# Patient Record
Sex: MALE | Race: BLACK OR AFRICAN AMERICAN | NOT HISPANIC OR LATINO | Employment: FULL TIME | URBAN - METROPOLITAN AREA
[De-identification: names, ages, dates, MRNs, and addresses within clinical notes are randomized per-mention and may not be internally consistent; named-entity substitution may affect disease eponyms.]

---

## 2017-02-13 ENCOUNTER — TELEPHONE (OUTPATIENT)
Dept: GASTROENTEROLOGY | Facility: CLINIC | Age: 32
End: 2017-02-13

## 2017-02-13 RX ORDER — SODIUM CHLORIDE 0.9 % (FLUSH) 0.9 %
10 SYRINGE (ML) INJECTION
Status: CANCELLED | OUTPATIENT
Start: 2017-02-13

## 2017-02-13 RX ORDER — METHYLPREDNISOLONE SODIUM SUCCINATE 125 MG/2ML
125 INJECTION INTRAMUSCULAR; INTRAVENOUS
Status: CANCELLED | OUTPATIENT
Start: 2017-02-13

## 2017-02-13 RX ORDER — PROMETHAZINE HYDROCHLORIDE 25 MG/ML
12.5 INJECTION, SOLUTION INTRAMUSCULAR; INTRAVENOUS
Status: CANCELLED | OUTPATIENT
Start: 2017-02-13

## 2017-02-13 RX ORDER — DIPHENHYDRAMINE HCL 25 MG
25 CAPSULE ORAL
Status: CANCELLED | OUTPATIENT
Start: 2017-02-13

## 2017-02-13 RX ORDER — ACETAMINOPHEN 500 MG
500 TABLET ORAL
Status: CANCELLED | OUTPATIENT
Start: 2017-02-13

## 2017-02-13 RX ORDER — HEPARIN SODIUM (PORCINE) LOCK FLUSH IV SOLN 100 UNIT/ML 100 UNIT/ML
100 SOLUTION INTRAVENOUS
Status: CANCELLED | OUTPATIENT
Start: 2017-02-13

## 2017-02-13 NOTE — TELEPHONE ENCOUNTER
----- Message from Ronni Freeman MD sent at 2/13/2017  8:03 AM CST -----  Therapy plan done, but he needs f/u appt with me, has been over year  Could be NP first then me  ----- Message -----     From: Lorin Yousif MA     Sent: 2/13/2017   7:24 AM       To: Ronni Freeman MD        ----- Message -----     From: Destinee Pham     Sent: 2/11/2017   8:52 AM       To: Pete Mcdonald    Good Morning,    An updated therapy plan is needed for Entyvio.  The last update was in 2016 and the dates must be updated for 2017.    Thank you,  Destinee Pham  Pre Service  [phone]

## 2017-02-15 RX ORDER — DIPHENHYDRAMINE HYDROCHLORIDE 50 MG/ML
25 INJECTION INTRAMUSCULAR; INTRAVENOUS
Status: CANCELLED | OUTPATIENT
Start: 2017-02-15

## 2017-03-06 ENCOUNTER — TELEPHONE (OUTPATIENT)
Dept: GASTROENTEROLOGY | Facility: CLINIC | Age: 32
End: 2017-03-06

## 2017-03-06 ENCOUNTER — OFFICE VISIT (OUTPATIENT)
Dept: GASTROENTEROLOGY | Facility: CLINIC | Age: 32
End: 2017-03-06
Payer: MEDICARE

## 2017-03-06 VITALS
SYSTOLIC BLOOD PRESSURE: 135 MMHG | WEIGHT: 197.75 LBS | HEART RATE: 76 BPM | HEIGHT: 68 IN | DIASTOLIC BLOOD PRESSURE: 85 MMHG | BODY MASS INDEX: 29.97 KG/M2

## 2017-03-06 DIAGNOSIS — K50.80 CROHN'S DISEASE OF BOTH SMALL AND LARGE INTESTINE WITHOUT COMPLICATION: Primary | ICD-10-CM

## 2017-03-06 DIAGNOSIS — K50.819 CROHN'S DISEASE OF SMALL AND LARGE INTESTINES WITH COMPLICATION: Primary | ICD-10-CM

## 2017-03-06 DIAGNOSIS — K50.819 CROHN'S DISEASE OF BOTH SMALL AND LARGE INTESTINE WITH COMPLICATION: Primary | ICD-10-CM

## 2017-03-06 PROCEDURE — 99999 PR PBB SHADOW E&M-EST. PATIENT-LVL III: CPT | Mod: PBBFAC,,, | Performed by: INTERNAL MEDICINE

## 2017-03-06 PROCEDURE — 99215 OFFICE O/P EST HI 40 MIN: CPT | Mod: S$PBB,,, | Performed by: INTERNAL MEDICINE

## 2017-03-06 PROCEDURE — 99213 OFFICE O/P EST LOW 20 MIN: CPT | Mod: PBBFAC | Performed by: INTERNAL MEDICINE

## 2017-03-06 NOTE — TELEPHONE ENCOUNTER
Patient aware of results and expressed understanding.  Scheduled for this Thursday 3/9 at 9:30 for lab work.

## 2017-03-06 NOTE — MR AVS SNAPSHOT
"    [facility]  [address]  [place] [address]  Phone: [phone]  Fax: [phone]                  Amando Calix   3/6/2017 8:00 AM   Office Visit    Description:  Male : 1985   Provider:  Ronni Freeman MD   Department:  [facility]           Reason for Visit     Crohn's Disease           Diagnoses this Visit        Comments    Crohn's disease of both small and large intestine with complication    -  Primary            To Do List           Future Appointments        Provider Department Dept Phone    3/6/2017 9:20 AM LAB, APPOINTMENT [place] [facility] [phone]      Goals (5 Years of Data)     None      [facility]     [facility] Care Line -  Assistance  Registered nurses in the [facility] provide clinical advisement, health education, appointment booking, and other advisory services.  Call for this free service at [phone].             Medications           Message regarding Medications     Verify the changes and/or additions to your medication regime listed below are the same as discussed with your clinician today.  If any of these changes or additions are incorrect, please notify your healthcare provider.        STOP taking these medications     ondansetron (ZOFRAN-ODT) 4 MG TbDL Take 1 tablet (4 mg total) by mouth every 8 (eight) hours as needed.           Verify that the below list of medications is an accurate representation of the medications you are currently taking.  If none reported, the list may be blank. If incorrect, please contact your healthcare provider. Carry this list with you in case of emergency.           Current Medications     VEDOLIZUMAB (ENTYVIO IV) Inject into the vein.           Clinical Reference Information           Your Vitals Were     BP Pulse Height Weight BMI    135/85 76 5' 8" (1.727 m) 89.7 kg (197 lb 12 oz) 30.07 kg/m2      Blood Pressure          Most " Recent Value    BP  135/85      Allergies as of 3/6/2017     Ibuprofen      Immunizations Administered on Date of Encounter - 3/6/2017     None      Orders Placed During Today's Visit     Future Labs/Procedures Expected by Expires    C-reactive protein  3/6/2017 3/6/2018    CBC auto differential  3/6/2017 3/6/2018    Comprehensive metabolic panel  3/6/2017 3/6/2018      [facility] Sign-Up     Activating your [facility] account is as easy as 1-2-3!     1) Visit [email or web address], select Sign Up Now, enter this activation code and your date of birth, then select Next.  [email or web address]  Expires: 4/20/2017  8:39 AM      2) Create a username and password to use when you visit [facility] in the future and select a security question in case you lose your password and select Next.    3) Enter your e-mail address and click Sign Up!    Additional Information  If you have questions, please e-mail [email or web address] or call [phone] to talk to our [facility] staff. Remember, [facility] is NOT to be used for urgent needs. For medical emergencies, dial 911.         Smoking Cessation     If you would like to quit smoking:   You may be eligible for free services if you are a [place] resident and started smoking cigarettes before September 1, 1988.  Call the Smoking Cessation Trust ([facility]) toll free at (051) 566-9577 or (026) 612-9538.   Call 1-800-QUIT-NOW if you do not meet the above criteria.            Language Assistance Services     ATTENTION: Language assistance services are available, free of charge. Please call [phone].      ATENCIÓN: Si habla español, tiene a dixon disposición servicios gratuitos de asistencia lingüística. Llame al [phone].     GLADYS Ý: N?u b?n nói Ti?ng Vi?t, có các d?ch v? h? tr? ngôn ng? mi?n phí dành cho b?n. G?i s? [phone].         [facility] complies with applicable Federal civil rights laws and does not discriminate on the basis of race, color, national origin,  age, disability, or sex.

## 2017-03-06 NOTE — PROGRESS NOTES
Subjective:       Patient ID: Amando Calix is a 31 y.o. male.    Chief Complaint: Crohn's Disease    HPI  Review of Systems   Constitutional: Negative for activity change, appetite change, chills, diaphoresis, fatigue, fever and unexpected weight change.   HENT: Positive for dental problem. Negative for congestion, ear pain, mouth sores, rhinorrhea, sinus pressure, sneezing, sore throat, trouble swallowing and voice change.    Eyes: Negative for pain.   Respiratory: Negative for cough and shortness of breath.    Cardiovascular: Negative for chest pain, palpitations and leg swelling.   Genitourinary: Negative for difficulty urinating and flank pain.   Musculoskeletal: Negative for arthralgias, back pain, gait problem, joint swelling, myalgias and neck pain.   Skin: Negative for rash.   Neurological: Negative for dizziness, tremors, syncope, numbness and headaches.   Hematological: Negative for adenopathy. Does not bruise/bleed easily.   Psychiatric/Behavioral: Negative for agitation, behavioral problems, confusion, decreased concentration and dysphoric mood.       Objective:      Physical Exam   Constitutional: He is oriented to person, place, and time. He appears well-developed and well-nourished. No distress.   HENT:   Right Ear: External ear normal.   Left Ear: External ear normal.   Nose: Nose normal.   Mouth/Throat: Oropharynx is clear and moist. No oropharyngeal exudate.   Eyes: Conjunctivae are normal. Pupils are equal, round, and reactive to light. No scleral icterus.   Neck: Normal range of motion. Neck supple. No thyromegaly present.   Cardiovascular: Normal rate, normal heart sounds and intact distal pulses.  Exam reveals no gallop.    No murmur heard.  Pulmonary/Chest: Effort normal and breath sounds normal. He has no wheezes.   Abdominal: Soft. Normal appearance and bowel sounds are normal. He exhibits no distension, no fluid wave, no ascites and no mass. There is no hepatosplenomegaly. There is no  tenderness. There is no rigidity, no rebound, no guarding and no CVA tenderness.   Genitourinary:   Genitourinary Comments: recent   Musculoskeletal: Normal range of motion. He exhibits no edema or tenderness.   Lymphadenopathy:     He has no cervical adenopathy.   Neurological: He is alert and oriented to person, place, and time. He has normal reflexes. No cranial nerve deficit.   Skin: Skin is warm. No rash noted. He is not diaphoretic.   Psychiatric: He has a normal mood and affect. His behavior is normal. Thought content normal.       Assessment:       1. Crohn's disease of both small and large intestine with complication        Plan:         HISTORY OF PRESENT ILLNESS:  This is a followup visit for Amando.  A   31-year-old gentleman with a very complicated Crohn disease.  I have followed   him for a number of years.  He had problems or failed for management with   Crohn's for his Remicade, Humira, methotrexate, azathioprine, in fact he is one   of the first patients we had on Stelara trial.  However, nothing seemed to work   and he developed very severe stricturing disease of his sigmoid colon.  So now,   he is about two and half to three years status post his subtotal colectomy.  His   rectum is left in place.  I have been maintaining him in Entyvio.    He comes in today and in general, he is doing very well.  He denies any   abdominal pain.  His ileostomy output is normal.  No nausea or vomiting.  No   significant reflux problems.  He still has some drainage as expected from his   rectum and he has intermittent abscesses in the perianal region.  This has not   required any aggressive treatment in terms of antibiotics or drainage.  He will   feel discomfort and then he will have spontaneous drainage and then he will be   okay for a while.  He does feel like he is having more of the abscesses than he   has had in the past.  He has not received his Entyvio infusion.  He has about   one to two months late on  that.  There were some insurance issues at the   beginning of the year, but I believe we have that straightened out.    SOCIAL HISTORY:  He has an 8-year-old daughter.  He is still working at [employer].    ASSESSMENT AND DECISION MAKIN.  Crohn disease.  He is status post colectomy with ileostomy.  He is on   Entyvio.  He is having problems with perianal abscesses.  On exam today, there   is one area of induration.  We talked about the natural history of Crohn's.  He   still is interested in surgery, which would be IPAA, but I told him and I had a   conference with Dr. Rodriguez that I just do not think that is feasible at this   time until there are medicines available that are more effective than what we   are using right now.  He would have such a high likelihood of recurrent disease,   which would cause I think significant problems.  He has done remarkably well   with his surgery.  I think before surgery, he had lost down to 120 pounds and   now he is up to almost 200 pounds, in fact he is for the first time ever at the   point where he really needs to watch his weight and even lose a little bit.  His   energy level is dramatically better than it was years ago.  I am going to   continue the Entyvio for now.  I am concerned about the abscesses.  I have asked   him to call me right away or call Dr. Rodriguez if this gets worse as he may   need antibiotics and/or drainage.  2.  Long-term use of high-risk medicine.  I discussed the importance of getting   in touch with me if there is any illness.  I do think that pneumonia vaccine is   a good idea.  I placed an order for that.  I did advise him to get a flu vaccine   every fall.  We will get some labs today to just monitor and the routine sets.      MARK  dd: 2017 09:16:18 (CST)  td: 2017 03:33:45 (CST)  Doc ID   #[id number]  Job ID #[id number]    CC:

## 2017-03-06 NOTE — TELEPHONE ENCOUNTER
----- Message from Ronni Freeman MD sent at 3/6/2017  2:08 PM CST -----  done  ----- Message -----     From: Lorin Yousif MA     Sent: 3/6/2017   2:04 PM       To: MD Alia Cavanaugh, the chemistry lab called and stated that Amando's blood hemolyzed and will need to be repeated.  Will need new orders.    ----- Message -----     From: Ronni Freeman MD     Sent: 3/6/2017   2:01 PM       To: Pete PATEL Staff    Blood count is great

## 2017-03-09 ENCOUNTER — TELEPHONE (OUTPATIENT)
Dept: GASTROENTEROLOGY | Facility: CLINIC | Age: 32
End: 2017-03-09

## 2017-03-09 ENCOUNTER — LAB VISIT (OUTPATIENT)
Dept: LAB | Facility: HOSPITAL | Age: 32
End: 2017-03-09
Attending: INTERNAL MEDICINE
Payer: MEDICARE

## 2017-03-09 DIAGNOSIS — K50.80 CROHN'S DISEASE OF BOTH SMALL AND LARGE INTESTINE WITHOUT COMPLICATION: ICD-10-CM

## 2017-03-09 LAB
ALBUMIN SERPL BCP-MCNC: 3.9 G/DL
ALP SERPL-CCNC: 92 U/L
ALT SERPL W/O P-5'-P-CCNC: 26 U/L
ANION GAP SERPL CALC-SCNC: 8 MMOL/L
AST SERPL-CCNC: 26 U/L
BILIRUB SERPL-MCNC: 0.4 MG/DL
BUN SERPL-MCNC: 11 MG/DL
CALCIUM SERPL-MCNC: 9.3 MG/DL
CHLORIDE SERPL-SCNC: 104 MMOL/L
CO2 SERPL-SCNC: 28 MMOL/L
CREAT SERPL-MCNC: 1.1 MG/DL
CRP SERPL-MCNC: 16.8 MG/L
EST. GFR  (AFRICAN AMERICAN): >60 ML/MIN/1.73 M^2
EST. GFR  (NON AFRICAN AMERICAN): >60 ML/MIN/1.73 M^2
GLUCOSE SERPL-MCNC: 110 MG/DL
POTASSIUM SERPL-SCNC: 4.4 MMOL/L
PROT SERPL-MCNC: 8.1 G/DL
SODIUM SERPL-SCNC: 140 MMOL/L

## 2017-03-09 PROCEDURE — 36415 COLL VENOUS BLD VENIPUNCTURE: CPT

## 2017-03-09 PROCEDURE — 86140 C-REACTIVE PROTEIN: CPT

## 2017-03-09 PROCEDURE — 80053 COMPREHEN METABOLIC PANEL: CPT

## 2017-03-09 NOTE — TELEPHONE ENCOUNTER
----- Message from Ronni Freeman MD sent at 3/9/2017  1:59 PM CST -----  Labs are ok, the inflammation test is a bit high. Not like it used to be, but more than i thought it would be

## 2017-03-30 ENCOUNTER — INFUSION (OUTPATIENT)
Dept: INFECTIOUS DISEASES | Facility: HOSPITAL | Age: 32
End: 2017-03-30
Attending: INTERNAL MEDICINE
Payer: MEDICARE

## 2017-03-30 VITALS
HEIGHT: 68 IN | WEIGHT: 197 LBS | DIASTOLIC BLOOD PRESSURE: 83 MMHG | TEMPERATURE: 98 F | SYSTOLIC BLOOD PRESSURE: 134 MMHG | BODY MASS INDEX: 29.86 KG/M2 | HEART RATE: 82 BPM

## 2017-03-30 DIAGNOSIS — K50.918 CROHN'S DISEASE WITH OTHER COMPLICATION, UNSPECIFIED GASTROINTESTINAL TRACT LOCATION: Primary | ICD-10-CM

## 2017-03-30 PROCEDURE — 96375 TX/PRO/DX INJ NEW DRUG ADDON: CPT

## 2017-03-30 PROCEDURE — 96413 CHEMO IV INFUSION 1 HR: CPT

## 2017-03-30 PROCEDURE — 25000003 PHARM REV CODE 250: Performed by: INTERNAL MEDICINE

## 2017-03-30 PROCEDURE — 63600175 PHARM REV CODE 636 W HCPCS: Performed by: INTERNAL MEDICINE

## 2017-03-30 RX ORDER — DIPHENHYDRAMINE HYDROCHLORIDE 50 MG/ML
25 INJECTION INTRAMUSCULAR; INTRAVENOUS
Status: DISCONTINUED | OUTPATIENT
Start: 2017-03-30 | End: 2017-03-30 | Stop reason: HOSPADM

## 2017-03-30 RX ORDER — DIPHENHYDRAMINE HYDROCHLORIDE 50 MG/ML
25 INJECTION INTRAMUSCULAR; INTRAVENOUS
Status: CANCELLED | OUTPATIENT
Start: 2017-03-30

## 2017-03-30 RX ADMIN — VEDOLIZUMAB 300 MG: 300 INJECTION, POWDER, LYOPHILIZED, FOR SOLUTION INTRAVENOUS at 09:03

## 2017-03-30 RX ADMIN — DIPHENHYDRAMINE HYDROCHLORIDE 25 MG: 50 INJECTION, SOLUTION INTRAMUSCULAR; INTRAVENOUS at 09:03

## 2017-05-25 ENCOUNTER — INFUSION (OUTPATIENT)
Dept: INFECTIOUS DISEASES | Facility: HOSPITAL | Age: 32
End: 2017-05-25
Attending: INTERNAL MEDICINE
Payer: MEDICARE

## 2017-05-25 VITALS — WEIGHT: 197 LBS | BODY MASS INDEX: 29.86 KG/M2 | HEIGHT: 68 IN

## 2017-05-25 DIAGNOSIS — K50.918 CROHN'S DISEASE WITH OTHER COMPLICATION, UNSPECIFIED GASTROINTESTINAL TRACT LOCATION: Primary | ICD-10-CM

## 2017-05-25 PROCEDURE — 96375 TX/PRO/DX INJ NEW DRUG ADDON: CPT

## 2017-05-25 PROCEDURE — 63600175 PHARM REV CODE 636 W HCPCS: Performed by: INTERNAL MEDICINE

## 2017-05-25 PROCEDURE — 25000003 PHARM REV CODE 250: Performed by: INTERNAL MEDICINE

## 2017-05-25 PROCEDURE — 96413 CHEMO IV INFUSION 1 HR: CPT

## 2017-05-25 RX ORDER — DIPHENHYDRAMINE HYDROCHLORIDE 50 MG/ML
25 INJECTION INTRAMUSCULAR; INTRAVENOUS
Status: DISCONTINUED | OUTPATIENT
Start: 2017-05-25 | End: 2017-05-25 | Stop reason: HOSPADM

## 2017-05-25 RX ORDER — DIPHENHYDRAMINE HYDROCHLORIDE 50 MG/ML
25 INJECTION INTRAMUSCULAR; INTRAVENOUS
Status: CANCELLED | OUTPATIENT
Start: 2017-05-25

## 2017-05-25 RX ADMIN — DIPHENHYDRAMINE HYDROCHLORIDE 25 MG: 50 INJECTION INTRAMUSCULAR; INTRAVENOUS at 09:05

## 2017-05-25 RX ADMIN — VEDOLIZUMAB 300 MG: 300 INJECTION, POWDER, LYOPHILIZED, FOR SOLUTION INTRAVENOUS at 09:05

## 2017-05-25 NOTE — PLAN OF CARE
Problem: Patient Care Overview (Adult)  Goal: Individualization & Mutuality  Outcome: Ongoing (interventions implemented as appropriate)  Pt educated on proper hand hygiene and infection risks. Pt verbalized understanding.

## 2017-05-27 ENCOUNTER — HOSPITAL ENCOUNTER (EMERGENCY)
Facility: HOSPITAL | Age: 32
Discharge: HOME | End: 2017-05-27
Attending: FAMILY MEDICINE
Payer: MEDICARE

## 2017-05-27 VITALS
RESPIRATION RATE: 18 BRPM | OXYGEN SATURATION: 96 % | SYSTOLIC BLOOD PRESSURE: 128 MMHG | DIASTOLIC BLOOD PRESSURE: 69 MMHG | HEART RATE: 95 BPM | HEIGHT: 68 IN | WEIGHT: 189 LBS | BODY MASS INDEX: 28.64 KG/M2 | TEMPERATURE: 98 F

## 2017-05-27 DIAGNOSIS — K61.0 PERIANAL ABSCESS: Primary | ICD-10-CM

## 2017-05-27 LAB
ALBUMIN SERPL BCP-MCNC: 3.7 G/DL
ALP SERPL-CCNC: 110 U/L
ALT SERPL W/O P-5'-P-CCNC: 17 U/L
ANION GAP SERPL CALC-SCNC: 13 MMOL/L
AST SERPL-CCNC: 23 U/L
BASOPHILS # BLD AUTO: 0.02 K/UL
BASOPHILS NFR BLD: 0.1 %
BILIRUB SERPL-MCNC: 0.6 MG/DL
BUN SERPL-MCNC: 9 MG/DL
CALCIUM SERPL-MCNC: 9.1 MG/DL
CHLORIDE SERPL-SCNC: 104 MMOL/L
CO2 SERPL-SCNC: 21 MMOL/L
CREAT SERPL-MCNC: 0.9 MG/DL
DIFFERENTIAL METHOD: ABNORMAL
EOSINOPHIL # BLD AUTO: 0.3 K/UL
EOSINOPHIL NFR BLD: 1.5 %
ERYTHROCYTE [DISTWIDTH] IN BLOOD BY AUTOMATED COUNT: 14.2 %
EST. GFR  (AFRICAN AMERICAN): >60 ML/MIN/1.73 M^2
EST. GFR  (NON AFRICAN AMERICAN): >60 ML/MIN/1.73 M^2
GLUCOSE SERPL-MCNC: 66 MG/DL
HCT VFR BLD AUTO: 42.3 %
HGB BLD-MCNC: 15.1 G/DL
LYMPHOCYTES # BLD AUTO: 2.1 K/UL
LYMPHOCYTES NFR BLD: 12.6 %
MCH RBC QN AUTO: 29.8 PG
MCHC RBC AUTO-ENTMCNC: 35.7 %
MCV RBC AUTO: 83 FL
MONOCYTES # BLD AUTO: 1.6 K/UL
MONOCYTES NFR BLD: 9.4 %
NEUTROPHILS # BLD AUTO: 12.6 K/UL
NEUTROPHILS NFR BLD: 76.1 %
PLATELET # BLD AUTO: 223 K/UL
PMV BLD AUTO: 11.2 FL
POTASSIUM SERPL-SCNC: 4.4 MMOL/L
PROT SERPL-MCNC: 8 G/DL
RBC # BLD AUTO: 5.07 M/UL
SODIUM SERPL-SCNC: 138 MMOL/L
WBC # BLD AUTO: 16.56 K/UL

## 2017-05-27 PROCEDURE — 87077 CULTURE AEROBIC IDENTIFY: CPT

## 2017-05-27 PROCEDURE — 96365 THER/PROPH/DIAG IV INF INIT: CPT

## 2017-05-27 PROCEDURE — 10061 I&D ABSCESS COMP/MULTIPLE: CPT | Mod: ,,, | Performed by: EMERGENCY MEDICINE

## 2017-05-27 PROCEDURE — 99284 EMERGENCY DEPT VISIT MOD MDM: CPT | Mod: 25

## 2017-05-27 PROCEDURE — 63600175 PHARM REV CODE 636 W HCPCS: Performed by: EMERGENCY MEDICINE

## 2017-05-27 PROCEDURE — 80053 COMPREHEN METABOLIC PANEL: CPT

## 2017-05-27 PROCEDURE — 99285 EMERGENCY DEPT VISIT HI MDM: CPT | Mod: 25,,, | Performed by: EMERGENCY MEDICINE

## 2017-05-27 PROCEDURE — 85025 COMPLETE CBC W/AUTO DIFF WBC: CPT

## 2017-05-27 PROCEDURE — 87186 SC STD MICRODIL/AGAR DIL: CPT

## 2017-05-27 PROCEDURE — 46050 I&D PERIANAL ABSCESS SUPFC: CPT

## 2017-05-27 PROCEDURE — 87147 CULTURE TYPE IMMUNOLOGIC: CPT

## 2017-05-27 PROCEDURE — 96375 TX/PRO/DX INJ NEW DRUG ADDON: CPT

## 2017-05-27 PROCEDURE — 25000003 PHARM REV CODE 250: Performed by: EMERGENCY MEDICINE

## 2017-05-27 PROCEDURE — 87070 CULTURE OTHR SPECIMN AEROBIC: CPT

## 2017-05-27 RX ORDER — CLINDAMYCIN HYDROCHLORIDE 150 MG/1
300 CAPSULE ORAL 4 TIMES DAILY
Qty: 40 CAPSULE | Refills: 0 | Status: SHIPPED | OUTPATIENT
Start: 2017-05-27 | End: 2017-06-06

## 2017-05-27 RX ORDER — LIDOCAINE HYDROCHLORIDE AND EPINEPHRINE 10; 10 MG/ML; UG/ML
20 INJECTION, SOLUTION INFILTRATION; PERINEURAL
Status: DISCONTINUED | OUTPATIENT
Start: 2017-05-27 | End: 2017-05-27

## 2017-05-27 RX ORDER — CLINDAMYCIN PHOSPHATE 600 MG/50ML
600 INJECTION, SOLUTION INTRAVENOUS
Status: COMPLETED | OUTPATIENT
Start: 2017-05-27 | End: 2017-05-27

## 2017-05-27 RX ORDER — LIDOCAINE HYDROCHLORIDE AND EPINEPHRINE 10; 10 MG/ML; UG/ML
20 INJECTION, SOLUTION INFILTRATION; PERINEURAL
Status: COMPLETED | OUTPATIENT
Start: 2017-05-27 | End: 2017-05-27

## 2017-05-27 RX ORDER — LIDOCAINE HCL/EPINEPHRINE/PF 2%-1:200K
1 VIAL (ML) INJECTION ONCE
Status: DISCONTINUED | OUTPATIENT
Start: 2017-05-27 | End: 2017-05-27

## 2017-05-27 RX ORDER — OXYCODONE AND ACETAMINOPHEN 5; 325 MG/1; MG/1
1 TABLET ORAL EVERY 4 HOURS PRN
Qty: 10 TABLET | Refills: 0 | Status: SHIPPED | OUTPATIENT
Start: 2017-05-27 | End: 2017-06-20

## 2017-05-27 RX ORDER — HYDROMORPHONE HYDROCHLORIDE 1 MG/ML
1 INJECTION, SOLUTION INTRAMUSCULAR; INTRAVENOUS; SUBCUTANEOUS
Status: COMPLETED | OUTPATIENT
Start: 2017-05-27 | End: 2017-05-27

## 2017-05-27 RX ADMIN — LIDOCAINE HYDROCHLORIDE,EPINEPHRINE BITARTRATE 20 ML: 10; .01 INJECTION, SOLUTION INFILTRATION; PERINEURAL at 10:05

## 2017-05-27 RX ADMIN — HYDROMORPHONE HYDROCHLORIDE 1 MG: 1 INJECTION, SOLUTION INTRAMUSCULAR; INTRAVENOUS; SUBCUTANEOUS at 10:05

## 2017-05-27 RX ADMIN — CLINDAMYCIN IN 5 PERCENT DEXTROSE 600 MG: 12 INJECTION, SOLUTION INTRAVENOUS at 11:05

## 2017-05-27 NOTE — ED NOTES
GENERAL: The patient is a well-developed, well-nourished male in no apparent distress. He is alert and oriented x3.    HEENT: Head is normocephalic and atraumatic. Extraocular muscles are intact. Pupils are equal, round, and reactive to light and accommodation. Nares appeared normal. Mouth is well hydrated and without lesions. Mucous membranes are moist. Posterior pharynx clear of any exudate or lesions.    NECK: Supple. No carotid bruits. No lymphadenopathy or thyromegaly.    LUNGS: Clear to auscultation.    HEART: Regular rate and rhythm without murmur.     ABDOMEN: Soft, nontender, and nondistended. Positive bowel sounds. No hepatosplenomegaly was noted.     EXTREMITIES: Without any cyanosis, clubbing, rash, lesions or edema.     NEUROLOGIC: Cranial nerves II through XII are grossly intact.     PSYCHIATRIC: Flat affect, but denies suicidal or homicidal ideations.    SKIN: No ulceration or induration present.  Large abscess right buttock near anus.

## 2017-05-28 NOTE — ED PROVIDER NOTES
"Encounter Date: 5/27/2017       History     Chief Complaint   Patient presents with    Abscess     Pt states "I have Crohn's and I have a lot of abscesses and I was told not to cut it myself."      Review of patient's allergies indicates:   Allergen Reactions    Ibuprofen Other (See Comments)     Can't take d/t stomach ulcers     Mr. Amando Calix is a 32 yo man here with perianal abscess and PMH significant for Crohn's disease s/p hemicolectomy and iliostomy. He has history of multiple abscesses in the past and frequent I&D with and without antibiotics. He reports this abscess has become quite painful and limits his ability to ambulated and perform normal ADLs. He is a patient of Dr. Rodriguez and has tried many immunosuppressive regimens; currently on Entyvio (last infusion on 5/25/17). He denies fever, chills, any blood in his stool, diarrhea, or constipation.           Past Medical History:   Diagnosis Date    Anemia     Chronic diarrhea     Clostridium difficile infection     Crohn's disease     Protein calorie malnutrition     Steroid-induced diabetes      Past Surgical History:   Procedure Laterality Date    ABSCESS DRAINAGE      COLONOSCOPY      COLOSTOMY      HERNIA REPAIR      ILEOSTOMY      SIGMOIDECTOMY       Family History   Problem Relation Age of Onset    Diabetes Father     Hyperlipidemia Mother     Diabetes Mother     Crohn's disease Neg Hx     Celiac disease Neg Hx     Cirrhosis Neg Hx     Colon cancer Neg Hx     Esophageal cancer Neg Hx     Irritable bowel syndrome Neg Hx     Liver cancer Neg Hx     Rectal cancer Neg Hx     Stomach cancer Neg Hx     Ulcerative colitis Neg Hx      Social History   Substance Use Topics    Smoking status: Current Every Day Smoker     Packs/day: 0.50     Years: 3.00     Types: Cigarettes     Last attempt to quit: 11/6/2014    Smokeless tobacco: Never Used    Alcohol use Yes      Comment: socially-weekly     Review of Systems "   Constitutional: Negative for chills, diaphoresis, fever and unexpected weight change.   HENT: Negative for sinus pressure, sneezing and sore throat.    Eyes: Negative for visual disturbance.   Respiratory: Negative for choking, chest tightness, shortness of breath and wheezing.    Cardiovascular: Negative for chest pain, palpitations and leg swelling.   Gastrointestinal: Negative for abdominal pain, blood in stool, constipation, diarrhea, nausea and vomiting.   Genitourinary: Negative for dysuria and hematuria.   Musculoskeletal: Negative for back pain and neck pain.   Skin: Positive for wound. Negative for color change and pallor.        Perianal abscess   Neurological: Negative for seizures, numbness and headaches.   Psychiatric/Behavioral: Negative for agitation, behavioral problems and confusion.       Physical Exam     Initial Vitals [05/27/17 1653]   BP Pulse Resp Temp SpO2   (!) 149/67 108 16 98.4 °F (36.9 °C) 96 %     Physical Exam    Constitutional: He appears well-developed and well-nourished. No distress.   Eyes: EOM are normal. Pupils are equal, round, and reactive to light.   Neck: Normal range of motion. Neck supple. No JVD present.   Cardiovascular: Normal rate, regular rhythm and normal heart sounds. Exam reveals no gallop and no friction rub.    No murmur heard.  Pulmonary/Chest: Breath sounds normal. No respiratory distress. He has no wheezes. He has no rhonchi. He has no rales.   Abdominal: Soft. Bowel sounds are normal. He exhibits no distension. There is no tenderness. There is no rebound and no guarding.   Genitourinary:   Genitourinary Comments: Perianal abscess measuring 2 cm x 5 cm; fluctuant, warm, and tender. No drainage.    Musculoskeletal: Normal range of motion. He exhibits no edema or tenderness.   Neurological: He is alert and oriented to person, place, and time. No cranial nerve deficit.   Skin: Skin is warm and dry.   Psychiatric: He has a normal mood and affect. His behavior is  "normal. Thought content normal.         ED Course   I & D - Incision and Drainage  Date/Time: 2017 11:59 PM  Performed by: DIANA FLOREZ  Authorized by: AURELIANO HERNANDEZ   Consent Done: Yes  Consent: Verbal consent obtained.  Risks and benefits: risks, benefits and alternatives were discussed  Consent given by: patient  Patient understanding: patient states understanding of the procedure being performed  Patient identity confirmed:  and name  Time out: Immediately prior to procedure a "time out" was called to verify the correct patient, procedure, equipment, support staff and site/side marked as required.  Type: abscess  Body area: anogenital  Location details: perianal  Anesthesia: local infiltration    Anesthesia:  Anesthesia: local infiltration  Local Anesthetic: lidocaine 1% with epinephrine   Anesthetic total: 5 mL  Risk factor: underlying major vessel  Scalpel size: 11  Incision type: single straight  Complexity: simple  Drainage: pus and  serosanguinous  Drainage amount: copious  Wound treatment: incision,  wound left open,  expression of material and  wound packed  Packing material: 1/2 in iodoform gauze  Complications: No  Estimated blood loss (mL): 5  Specimens: No  Implants: No  Patient tolerance: Patient tolerated the procedure well with no immediate complications        Labs Reviewed   CBC W/ AUTO DIFFERENTIAL - Abnormal; Notable for the following:        Result Value    WBC 16.56 (*)     Gran # 12.6 (*)     Mono # 1.6 (*)     Gran% 76.1 (*)     Lymph% 12.6 (*)     All other components within normal limits   COMPREHENSIVE METABOLIC PANEL - Abnormal; Notable for the following:     CO2 21 (*)     Glucose 66 (*)     All other components within normal limits             Medical Decision Making:   History:   Old Medical Records: I decided to obtain old medical records.  Initial Assessment:   Mr. Amando Calix is a 32 yo man here with perianal abscess and PMH significant for Crohn's disease s/p " hemicolectomy and iliostomy. Clinical exam shows perianal abscess that is tender and fluctuant. Will perform I&D at bedside.   Differential Diagnosis:   Perianal abscess  Clinical Tests:   Lab Tests: Ordered  ED Management:  CBC shows WBC 16.56; CMP glucose 66.  MDS 10:19 PM         APC / Resident Notes:   I assumed care of pt on sign out from previous provider.   I drained his abscess. He tolerated it well. No mass appreciated near rectum.   Pt is alert and oriented with stable gait. Iv med given. He was discharged with ABX, pain control and follow up with his surgeon. Pt aware of plan and will call on monday to be seen for wound check.   Pablo  Emergency Medicine, PGY-3  05/27/2017, 11:58 PM                ED Course     Clinical Impression:   The encounter diagnosis was Perianal abscess.          Erica Simpson MD  Resident  05/28/17 0001

## 2017-05-28 NOTE — PROGRESS NOTES
32 yo M with Crohn's on immunosuppressive therapy and hx of perirectal abscesses presents with an abscess to his R buttock. He denies fever or chills.     Large abscess noted to the medial R buttock with marked tenderness extending perianally. There is surrounding erythema and purulent drainage noted to the area. Given pt's medical history and immunosuppressed status, we will move to ED pod for further management, possible imaging and final disposition.    I initially evaluated this patient and ordered workup while in intake.  The patient will receive a full evaluation in an ED pod when space is available.  All results from tests ordered in intake will not be followed by the intake team, including myself. All results will be followed by the ED Pod team.

## 2017-05-29 ENCOUNTER — OFFICE VISIT (OUTPATIENT)
Dept: SURGERY | Facility: CLINIC | Age: 32
End: 2017-05-29
Payer: MEDICARE

## 2017-05-29 VITALS
DIASTOLIC BLOOD PRESSURE: 83 MMHG | BODY MASS INDEX: 29.9 KG/M2 | HEIGHT: 68 IN | WEIGHT: 197.31 LBS | HEART RATE: 87 BPM | SYSTOLIC BLOOD PRESSURE: 150 MMHG

## 2017-05-29 DIAGNOSIS — K61.1 PERIRECTAL ABSCESS: Primary | ICD-10-CM

## 2017-05-29 PROCEDURE — 99999 PR PBB SHADOW E&M-EST. PATIENT-LVL III: CPT | Mod: PBBFAC,,, | Performed by: COLON & RECTAL SURGERY

## 2017-05-29 PROCEDURE — 99212 OFFICE O/P EST SF 10 MIN: CPT | Mod: S$PBB,,, | Performed by: COLON & RECTAL SURGERY

## 2017-05-29 PROCEDURE — 99213 OFFICE O/P EST LOW 20 MIN: CPT | Mod: PBBFAC | Performed by: COLON & RECTAL SURGERY

## 2017-05-29 NOTE — PROGRESS NOTES
Subjective:       Patient ID: Amando Calix is a 31 y.o. male.    Chief Complaint: Follow-up    HPI   31 M with Crohn's on Entyvio who was seen in the ED 5/27 for drainage for a perirectal abscess. He is on a 10 day course of clindamycin. Reports improvement in pain, minimal drainage. Maryann fevers or chills.   Patient of Dr. Vasquez, last seen 05/2016.     Review of Systems   Constitutional: Negative for appetite change, chills, fatigue, fever and unexpected weight change.   Respiratory: Negative for cough and shortness of breath.    Cardiovascular: Negative for chest pain and leg swelling.   Gastrointestinal: Positive for abdominal distention. Negative for constipation, diarrhea, nausea, rectal pain and vomiting.       Objective:      Physical Exam   Constitutional: He is oriented to person, place, and time. He appears well-developed and well-nourished.   Eyes: Conjunctivae and EOM are normal.   Pulmonary/Chest: Effort normal. No respiratory distress.   Genitourinary: Rectal exam shows no mass and no tenderness.   Genitourinary Comments: Right lateral abscess, resolved. Packing removed. Minimal amount of purulent drainage present.    Musculoskeletal: Normal range of motion.   Neurological: He is alert and oriented to person, place, and time.   Skin: Skin is warm and dry.   Psychiatric: He has a normal mood and affect. His behavior is normal.       Assessment:       1. Perirectal abscess        Plan:       Calmoseptine ointment for intermittent itching  F/U with Dr. Rodriguez        I have personally taken the history and examined this patient and agree with the NP's note as stated above.

## 2017-05-31 LAB
BACTERIA SPEC AEROBE CULT: NORMAL
BACTERIA SPEC AEROBE CULT: NORMAL

## 2017-06-20 ENCOUNTER — OFFICE VISIT (OUTPATIENT)
Dept: SURGERY | Facility: CLINIC | Age: 32
End: 2017-06-20
Payer: MEDICARE

## 2017-06-20 VITALS
HEIGHT: 68 IN | HEART RATE: 77 BPM | WEIGHT: 196.88 LBS | DIASTOLIC BLOOD PRESSURE: 74 MMHG | BODY MASS INDEX: 29.84 KG/M2 | SYSTOLIC BLOOD PRESSURE: 148 MMHG

## 2017-06-20 DIAGNOSIS — K50.114 CROHN'S DISEASE OF LARGE INTESTINE WITH ABSCESS: Primary | ICD-10-CM

## 2017-06-20 PROCEDURE — 99213 OFFICE O/P EST LOW 20 MIN: CPT | Mod: S$PBB,,, | Performed by: COLON & RECTAL SURGERY

## 2017-06-20 PROCEDURE — 99999 PR PBB SHADOW E&M-EST. PATIENT-LVL III: CPT | Mod: PBBFAC,,, | Performed by: COLON & RECTAL SURGERY

## 2017-06-20 PROCEDURE — 99213 OFFICE O/P EST LOW 20 MIN: CPT | Mod: PBBFAC | Performed by: COLON & RECTAL SURGERY

## 2017-06-20 NOTE — PROGRESS NOTES
Subjective:       Patient ID: Amando Calix is a 31 y.o. male.    Chief Complaint: Follow-up (ED visit for Abscess )    HPI   31 M with Crohn's on Entyvio who was seen in the ED 5/27 for drainage for a perirectal abscess. He is on a 10 day course of clindamycin. Reports improvement in pain, minimal drainage. Maryann fevers or chills.   Was seen previously by Dr Ponce after Er  Review of Systems   Constitutional: Negative for appetite change, chills, diaphoresis, fatigue, fever and unexpected weight change.   Respiratory: Negative for cough and shortness of breath.    Cardiovascular: Negative for chest pain and leg swelling.   Gastrointestinal: Negative for abdominal distention, abdominal pain, anal bleeding, blood in stool, constipation, diarrhea, nausea, rectal pain and vomiting.       Objective:      Physical Exam   Constitutional: He is oriented to person, place, and time. He appears well-developed and well-nourished.   Eyes: Conjunctivae and EOM are normal.   Pulmonary/Chest: Effort normal. No respiratory distress.   Genitourinary: Rectal exam shows no mass and no tenderness.   Genitourinary Comments: Right lateral abscess, resolved. No  drainage present.    Musculoskeletal: Normal range of motion.   Neurological: He is alert and oriented to person, place, and time.   Skin: Skin is warm and dry.   Psychiatric: He has a normal mood and affect. His behavior is normal.       Assessment:       1. Crohn's disease of large intestine with abscess        Plan:   Resolved abscess  Continue medical managment

## 2017-09-07 ENCOUNTER — TELEPHONE (OUTPATIENT)
Dept: GASTROENTEROLOGY | Facility: CLINIC | Age: 32
End: 2017-09-07

## 2017-09-07 NOTE — TELEPHONE ENCOUNTER
----- Message from Cinthia Galloway sent at 9/7/2017 10:53 AM CDT -----  Contact: Self- [phone]  Pete- pt called to speak with Alisia about scheduling his infusion- please call pt back at [phone]

## 2017-09-07 NOTE — TELEPHONE ENCOUNTER
----- Message from Cinthia Galloway sent at 9/7/2017 10:53 AM CDT -----  Contact: Self- [phone]  Pete- pt called to speak with Alisia about scheduling his infusion- please call pt back at [phone]

## 2017-09-10 ENCOUNTER — HOSPITAL ENCOUNTER (EMERGENCY)
Facility: HOSPITAL | Age: 32
Discharge: HOME | End: 2017-09-10
Attending: FAMILY MEDICINE
Payer: MEDICARE

## 2017-09-10 VITALS
RESPIRATION RATE: 19 BRPM | DIASTOLIC BLOOD PRESSURE: 81 MMHG | BODY MASS INDEX: 28.49 KG/M2 | HEIGHT: 68 IN | WEIGHT: 188 LBS | TEMPERATURE: 100 F | OXYGEN SATURATION: 98 % | SYSTOLIC BLOOD PRESSURE: 141 MMHG | HEART RATE: 101 BPM

## 2017-09-10 DIAGNOSIS — J06.9 VIRAL URI WITH COUGH: Primary | ICD-10-CM

## 2017-09-10 DIAGNOSIS — R05.8 PRODUCTIVE COUGH: ICD-10-CM

## 2017-09-10 LAB
DEPRECATED S PYO AG THROAT QL EIA: NEGATIVE
FLUAV AG SPEC QL IA: NEGATIVE
FLUBV AG SPEC QL IA: NEGATIVE
SPECIMEN SOURCE: NORMAL

## 2017-09-10 PROCEDURE — 63600175 PHARM REV CODE 636 W HCPCS: Performed by: ANESTHESIOLOGY

## 2017-09-10 PROCEDURE — 87880 STREP A ASSAY W/OPTIC: CPT

## 2017-09-10 PROCEDURE — 87147 CULTURE TYPE IMMUNOLOGIC: CPT

## 2017-09-10 PROCEDURE — 96374 THER/PROPH/DIAG INJ IV PUSH: CPT

## 2017-09-10 PROCEDURE — 87400 INFLUENZA A/B EACH AG IA: CPT

## 2017-09-10 PROCEDURE — 99283 EMERGENCY DEPT VISIT LOW MDM: CPT | Mod: 25

## 2017-09-10 PROCEDURE — 87081 CULTURE SCREEN ONLY: CPT

## 2017-09-10 PROCEDURE — 99283 EMERGENCY DEPT VISIT LOW MDM: CPT | Mod: ,,, | Performed by: FAMILY MEDICINE

## 2017-09-10 RX ORDER — DEXAMETHASONE SODIUM PHOSPHATE 4 MG/ML
12 INJECTION, SOLUTION INTRA-ARTICULAR; INTRALESIONAL; INTRAMUSCULAR; INTRAVENOUS; SOFT TISSUE
Status: COMPLETED | OUTPATIENT
Start: 2017-09-10 | End: 2017-09-10

## 2017-09-10 RX ADMIN — DEXAMETHASONE SODIUM PHOSPHATE 12 MG: 4 INJECTION, SOLUTION INTRAMUSCULAR; INTRAVENOUS at 02:09

## 2017-09-10 NOTE — ED PROVIDER NOTES
Encounter Date: 9/10/2017       History     Chief Complaint   Patient presents with    Cough     cough and congestion with fever x 2 days     HPI   32yo M with PMHx of Crohn's disease, C. Diff infection, chronic diarrhea who presents complaining of productive cough with green/yellow muous, nose and chest congestion, fever, rhinorrhea, sneezing, watery eyes, SOB, and fever x2 days.Patient denies sore throat, CP, nausea, vomiting, body aches. He reports taking dayquil and nyquil without relief.     He denies Crohn's flare at this time. He denies any sick contacts.    Review of patient's allergies indicates:   Allergen Reactions    Ibuprofen Other (See Comments)     Can't take d/t stomach ulcers     Past Medical History:   Diagnosis Date    Anemia     Chronic diarrhea     Clostridium difficile infection     Crohn's disease     Protein calorie malnutrition     Steroid-induced diabetes      Past Surgical History:   Procedure Laterality Date    ABSCESS DRAINAGE      COLONOSCOPY      COLOSTOMY      HERNIA REPAIR      ILEOSTOMY      SIGMOIDECTOMY       Family History   Problem Relation Age of Onset    Diabetes Father     Hyperlipidemia Mother     Diabetes Mother     Crohn's disease Neg Hx     Celiac disease Neg Hx     Cirrhosis Neg Hx     Colon cancer Neg Hx     Esophageal cancer Neg Hx     Irritable bowel syndrome Neg Hx     Liver cancer Neg Hx     Rectal cancer Neg Hx     Stomach cancer Neg Hx     Ulcerative colitis Neg Hx      Social History   Substance Use Topics    Smoking status: Current Every Day Smoker     Packs/day: 0.50     Years: 3.00     Types: Cigarettes     Last attempt to quit: 11/6/2014    Smokeless tobacco: Never Used    Alcohol use Yes      Comment: socially-weekly     Review of Systems   Constitutional: Positive for fatigue and fever. Negative for chills.   HENT: Positive for congestion, rhinorrhea and sneezing. Negative for sore throat.    Eyes: Negative for discharge and  redness.   Respiratory: Positive for cough and shortness of breath. Negative for chest tightness and wheezing.    Cardiovascular: Negative for chest pain and leg swelling.   Gastrointestinal: Negative for abdominal pain, nausea and vomiting.   Genitourinary: Negative for dysuria, frequency and urgency.   Musculoskeletal: Negative for back pain.   Skin: Negative for rash.   Neurological: Negative for weakness.   Hematological: Does not bruise/bleed easily.   Psychiatric/Behavioral: Negative for agitation, behavioral problems and confusion.       Physical Exam     Initial Vitals [09/10/17 1205]   BP Pulse Resp Temp SpO2   (!) 156/89 92 16 99.2 °F (37.3 °C) 98 %      MAP       111.33         Physical Exam    Nursing note and vitals reviewed.  Constitutional: He appears well-developed and well-nourished. He is not diaphoretic. No distress.   HENT:   Head: Normocephalic and atraumatic.   Mild LAD without TTP   Eyes: Pupils are equal, round, and reactive to light. No scleral icterus.   Neck: Normal range of motion. Neck supple.   Cardiovascular: Normal rate, regular rhythm and normal heart sounds.   Pulmonary/Chest: Breath sounds normal. No respiratory distress. He has no wheezes.   Abdominal: Soft. Bowel sounds are normal. He exhibits no distension. There is no tenderness. There is no rebound and no guarding.   Musculoskeletal: He exhibits no edema or tenderness.   Neurological: He is alert and oriented to person, place, and time. No cranial nerve deficit.   Psychiatric: He has a normal mood and affect. Thought content normal.         ED Course   Procedures  Labs Reviewed   THROAT SCREEN, RAPID   CULTURE, STREP A,  THROAT   INFLUENZA A AND B ANTIGEN            Differential diagnosis includes but not limited to viral URI vs. Flu vs. Strep. Will get flu and strep test and give 12mg IV decadron.   Gemma Miramontes MD, PGY-4  12:57 PM    Flu and strep test are negative. Likely viral URI. Encouraged over the counter  symptomatic relief. Encouraged to follow up with PCP if symptoms not improving in 3 days. Work excuse given to return on 9/12/17.   Gemma Miramontes MD, PGY-4  2:44 PM                   Attending Attestation:   Physician Attestation Statement for Resident:  As the supervising MD  I agree with the above history. -:   As the supervising MD I agree with the above PE.    As the supervising MD I agree with the above treatment, course, plan, and disposition.                    ED Course      Clinical Impression:   The primary encounter diagnosis was Productive cough. A diagnosis of Viral URI with cough was also pertinent to this visit.    Disposition:   Disposition: Discharged  Condition: Stable                        Juan Manuel Michaels MD  09/10/17 2933

## 2017-09-10 NOTE — ED NOTES
LOC: The patient is awake, alert and aware of environment with an appropriate affect, the patient is oriented x 3 and speaking appropriately.  APPEARANCE: Patient resting comfortably and in no acute distress, patient is clean and well groomed, patient's clothing is properly fastened.  SKIN: The skin is warm and dry, color consistent with ethnicity, patient has normal skin turgor and moist mucus membranes, skin intact, no breakdown or bruising noted.  MUSCULOSKELETAL: Patient moving all extremities spontaneously, no obvious swelling or deformities noted.  RESPIRATORY: Airway is open and patent, respirations are spontaneous, patient has a normal effort and rate, no accessory muscle use noted.  ABDOMEN: Soft and non tender to palpation, no distention noted, ileostomy present.  NEUROLOGIC:  facial expression is symmetrical, patient moving all extremities spontaneously, normal sensation in all extremities when touched with a finger.  Follows all commands appropriately.

## 2017-09-13 LAB
BACTERIA THROAT CULT: NORMAL
BACTERIA THROAT CULT: NORMAL

## 2017-09-18 ENCOUNTER — INFUSION (OUTPATIENT)
Dept: INFECTIOUS DISEASES | Facility: HOSPITAL | Age: 32
End: 2017-09-18
Attending: INTERNAL MEDICINE
Payer: MEDICARE

## 2017-09-18 VITALS
TEMPERATURE: 99 F | BODY MASS INDEX: 31.57 KG/M2 | DIASTOLIC BLOOD PRESSURE: 90 MMHG | RESPIRATION RATE: 15 BRPM | WEIGHT: 208.31 LBS | HEIGHT: 68 IN | SYSTOLIC BLOOD PRESSURE: 134 MMHG | OXYGEN SATURATION: 98 % | HEART RATE: 87 BPM

## 2017-09-18 DIAGNOSIS — K50.918 CROHN'S DISEASE WITH OTHER COMPLICATION, UNSPECIFIED GASTROINTESTINAL TRACT LOCATION: Primary | ICD-10-CM

## 2017-09-18 PROCEDURE — 25000003 PHARM REV CODE 250: Performed by: INTERNAL MEDICINE

## 2017-09-18 PROCEDURE — 96375 TX/PRO/DX INJ NEW DRUG ADDON: CPT

## 2017-09-18 PROCEDURE — 96413 CHEMO IV INFUSION 1 HR: CPT

## 2017-09-18 PROCEDURE — 63600175 PHARM REV CODE 636 W HCPCS: Performed by: INTERNAL MEDICINE

## 2017-09-18 RX ORDER — DIPHENHYDRAMINE HYDROCHLORIDE 50 MG/ML
25 INJECTION INTRAMUSCULAR; INTRAVENOUS
Status: DISCONTINUED | OUTPATIENT
Start: 2017-09-18 | End: 2017-09-18 | Stop reason: HOSPADM

## 2017-09-18 RX ORDER — DIPHENHYDRAMINE HYDROCHLORIDE 50 MG/ML
25 INJECTION INTRAMUSCULAR; INTRAVENOUS
Status: CANCELLED | OUTPATIENT
Start: 2017-09-18

## 2017-09-18 RX ADMIN — VEDOLIZUMAB 300 MG: 300 INJECTION, POWDER, LYOPHILIZED, FOR SOLUTION INTRAVENOUS at 10:09

## 2017-09-18 RX ADMIN — DIPHENHYDRAMINE HYDROCHLORIDE 25 MG: 50 INJECTION INTRAMUSCULAR; INTRAVENOUS at 09:09

## 2017-09-18 NOTE — PROGRESS NOTES
"Infusion medication:  Entyvio (pre-medication of Benadryl 25 mgIVP)   Today's weight:    Wt Readings from Last 1 Encounters:   09/18/17 94.5 kg (208 lb 5.4 oz)         Checklist prior to infusion:  Is the Biologic RX (therapy plan) up to date within the past one year? Yes  Are the most recent labs within the last 3 - 6 months? Yes  Has the patient had an appointment with the MD/ANIKA that is prescribing the biologic infusion within the last 3 - 6 months? Yes  Documentation of safety questions prior to infusion:   RN TO NOTIFY MD IF "YES" answered to any of below questions prior to infusion:    Symptoms in past 2 weeks? (fever, night sweats, URI or Flu-like symptoms, cough, painful urination, warm/red/painful skin, skin ulcers/wounds, tooth infection/abscess): No (ER last Sunday for "flu-like" symptoms, but sent home with a steroid shot) (pt not taking AB)  Current symptoms of an active infection? No  Temperature greater than 100.4 in the last 48 hours? No  Recent infections that required antibiotic use in the last 10-14 days?No  If patient has had surgery, any problems with the surgical wound (drainage, redness, tenderness)? No If yes then has surgeon cleared patient prior to getting this infusion? N/A  Pregnant? N/A  If yes, is prescribing provider aware of this pregnancy?  N/A  NEW or WORSENING abdominal pain, diarrhea, nausea/vomiting? No  Any SOB, ankle/feet swelling, or sudden weight gain? No (weight gain, but not sudden)    Patient tolerated infusion well today, vital signs remained normal throughout infusion and patient left with no apparent distress:  Yes  6  Received next infusion date prior to discharge: Yes    Additional note to provider:  N/A        "

## 2017-11-16 ENCOUNTER — INFUSION (OUTPATIENT)
Dept: INFECTIOUS DISEASES | Facility: HOSPITAL | Age: 32
End: 2017-11-16
Attending: INTERNAL MEDICINE
Payer: MEDICARE

## 2017-11-16 VITALS
BODY MASS INDEX: 30.58 KG/M2 | SYSTOLIC BLOOD PRESSURE: 139 MMHG | TEMPERATURE: 99 F | HEART RATE: 87 BPM | DIASTOLIC BLOOD PRESSURE: 84 MMHG | HEIGHT: 68 IN | WEIGHT: 201.75 LBS

## 2017-11-16 DIAGNOSIS — K50.918 CROHN'S DISEASE WITH OTHER COMPLICATION, UNSPECIFIED GASTROINTESTINAL TRACT LOCATION: Primary | ICD-10-CM

## 2017-11-16 PROCEDURE — 25000003 PHARM REV CODE 250: Performed by: INTERNAL MEDICINE

## 2017-11-16 PROCEDURE — 96365 THER/PROPH/DIAG IV INF INIT: CPT

## 2017-11-16 PROCEDURE — 63600175 PHARM REV CODE 636 W HCPCS: Performed by: INTERNAL MEDICINE

## 2017-11-16 PROCEDURE — 96375 TX/PRO/DX INJ NEW DRUG ADDON: CPT

## 2017-11-16 RX ORDER — DIPHENHYDRAMINE HYDROCHLORIDE 50 MG/ML
25 INJECTION INTRAMUSCULAR; INTRAVENOUS
Status: DISCONTINUED | OUTPATIENT
Start: 2017-11-16 | End: 2017-11-16 | Stop reason: HOSPADM

## 2017-11-16 RX ORDER — DIPHENHYDRAMINE HYDROCHLORIDE 50 MG/ML
25 INJECTION INTRAMUSCULAR; INTRAVENOUS
Status: CANCELLED | OUTPATIENT
Start: 2017-11-16

## 2017-11-16 RX ADMIN — DIPHENHYDRAMINE HYDROCHLORIDE 25 MG: 50 INJECTION INTRAMUSCULAR; INTRAVENOUS at 10:11

## 2017-11-16 RX ADMIN — VEDOLIZUMAB 300 MG: 300 INJECTION, POWDER, LYOPHILIZED, FOR SOLUTION INTRAVENOUS at 10:11

## 2017-11-16 NOTE — PROGRESS NOTES
"Infusion medication:  ENTYVIO- PREMED OF BENADRYL IV   Today's weight:    Wt Readings from Last 1 Encounters:   11/16/17 91.5 kg (201 lb 11.5 oz)         Checklist prior to infusion:  Is the Biologic RX (therapy plan) up to date within the past one year? Yes  Are the most recent labs within the last 3 - 6 months ? No  Has the patient had an appointment with the MD/ANIKA that is prescribing the biologic infusion within the last 3 - 6 months? Yes  Documentation of safety questions prior to infusion:   RN TO NOTIFY MD IF "YES" answered to any of below questions prior to infusion:    Symptoms in past 2 weeks? (fever, night sweats, URI or Flu-like symptoms, cough, painful urination, warm/red/painful skin, skin ulcers/wounds, tooth infection/abscess): No  Current symptoms of an active infection? No  Temperature greater than 100.4 in the last 48 hours? No  Recent infections that required antibiotic use in the last 10-14 days?No  If patient has had surgery, any problems with the surgical wound (drainage, redness, tenderness)? No If yes then has surgeon cleared patient prior to getting this infusion? N/A  Pregnant? N/A  If yes, is prescribing provider aware of this pregnancy?  N/A  NEW or WORSENING abdominal pain, diarrhea, nausea/vomiting? No  Any SOB, ankle/feet swelling, or sudden weight gain? No    Patient tolerated infusion well today, vital signs remained normal throughout infusion and patient left with no apparent distress:  Yes  6  Received next infusion date prior to discharge: Yes    Additional note to provider:  No        "

## 2017-11-16 NOTE — PLAN OF CARE
Problem: Patient Care Overview  Goal: Individualization & Mutuality  Outcome: Ongoing (interventions implemented as appropriate)  Pt educated on proper hand hygiene and infection risks. Pt verbalized understanding.

## 2018-01-11 ENCOUNTER — INFUSION (OUTPATIENT)
Dept: INFECTIOUS DISEASES | Facility: HOSPITAL | Age: 33
End: 2018-01-11
Attending: INTERNAL MEDICINE
Payer: MEDICARE

## 2018-01-11 VITALS
HEART RATE: 77 BPM | BODY MASS INDEX: 30.94 KG/M2 | WEIGHT: 204.13 LBS | SYSTOLIC BLOOD PRESSURE: 142 MMHG | TEMPERATURE: 99 F | DIASTOLIC BLOOD PRESSURE: 83 MMHG | OXYGEN SATURATION: 99 % | RESPIRATION RATE: 16 BRPM | HEIGHT: 68 IN

## 2018-01-11 DIAGNOSIS — K50.918 CROHN'S DISEASE WITH OTHER COMPLICATION, UNSPECIFIED GASTROINTESTINAL TRACT LOCATION: Primary | ICD-10-CM

## 2018-01-11 PROCEDURE — 63600175 PHARM REV CODE 636 W HCPCS: Mod: JG | Performed by: INTERNAL MEDICINE

## 2018-01-11 PROCEDURE — 25000003 PHARM REV CODE 250: Performed by: INTERNAL MEDICINE

## 2018-01-11 PROCEDURE — 96375 TX/PRO/DX INJ NEW DRUG ADDON: CPT

## 2018-01-11 PROCEDURE — 96365 THER/PROPH/DIAG IV INF INIT: CPT

## 2018-01-11 RX ORDER — DIPHENHYDRAMINE HYDROCHLORIDE 50 MG/ML
25 INJECTION INTRAMUSCULAR; INTRAVENOUS
Status: DISCONTINUED | OUTPATIENT
Start: 2018-01-11 | End: 2018-01-11 | Stop reason: HOSPADM

## 2018-01-11 RX ORDER — DIPHENHYDRAMINE HYDROCHLORIDE 50 MG/ML
25 INJECTION INTRAMUSCULAR; INTRAVENOUS
Status: CANCELLED | OUTPATIENT
Start: 2018-01-11

## 2018-01-11 RX ADMIN — VEDOLIZUMAB 300 MG: 300 INJECTION, POWDER, LYOPHILIZED, FOR SOLUTION INTRAVENOUS at 10:01

## 2018-01-11 RX ADMIN — DIPHENHYDRAMINE HYDROCHLORIDE 25 MG: 50 INJECTION INTRAMUSCULAR; INTRAVENOUS at 09:01

## 2018-01-11 NOTE — PROGRESS NOTES
"Infusion medication:  Entyvio   Today's weight:    Wt Readings from Last 1 Encounters:   01/11/18 92.6 kg (204 lb 2.3 oz)     Premedications: 25mg Benadryl IV push     Checklist prior to infusion:  Is the Biologic RX (therapy plan) up to date within the past one year? Yes    Are the most recent labs within the last 3 - 6 months ? Yes    Has the patient had an appointment with the MD/ANIKA that is prescribing the biologic infusion within the last 3 - 6 months? Yes    Documentation of safety questions prior to infusion:   RN TO NOTIFY MD IF "YES" answered to any of below questions prior to infusion:    Symptoms in past 2 weeks? (fever, night sweats, URI or Flu-like symptoms, cough, painful urination, warm/red/painful skin, skin ulcers/wounds, tooth infection/abscess): No    Current symptoms of an active infection? No    Temperature greater than 100.4 in the last 48 hours? No    Recent infections that required antibiotic use in the last 10-14 days?No    If patient has had surgery, any problems with the surgical wound (drainage, redness, tenderness)? N/A If yes then has surgeon cleared patient prior to getting this infusion? N/A    Pregnant? N/A    If yes, is prescribing provider aware of this pregnancy?  N/A    NEW or WORSENING abdominal pain, diarrhea, nausea/vomiting? No    Any SOB, ankle/feet swelling, or sudden weight gain? No    Patient tolerated infusion well today, vital signs remained normal throughout infusion and patient left with no apparent distress:  Yes    Received next infusion date prior to discharge: Yes      Additional note to provider:  N/A      "

## 2018-03-05 ENCOUNTER — LAB VISIT (OUTPATIENT)
Dept: LAB | Facility: HOSPITAL | Age: 33
End: 2018-03-05
Attending: INTERNAL MEDICINE
Payer: MEDICARE

## 2018-03-05 ENCOUNTER — OFFICE VISIT (OUTPATIENT)
Dept: GASTROENTEROLOGY | Facility: CLINIC | Age: 33
End: 2018-03-05
Payer: MEDICARE

## 2018-03-05 VITALS
BODY MASS INDEX: 31.78 KG/M2 | WEIGHT: 209.69 LBS | SYSTOLIC BLOOD PRESSURE: 138 MMHG | HEIGHT: 68 IN | HEART RATE: 103 BPM | DIASTOLIC BLOOD PRESSURE: 77 MMHG

## 2018-03-05 DIAGNOSIS — Z79.899 ENCOUNTER FOR LONG-TERM (CURRENT) USE OF HIGH-RISK MEDICATION: ICD-10-CM

## 2018-03-05 DIAGNOSIS — K50.819 CROHN'S DISEASE OF BOTH SMALL AND LARGE INTESTINE WITH COMPLICATION: Primary | ICD-10-CM

## 2018-03-05 LAB
ALBUMIN SERPL BCP-MCNC: 4.2 G/DL
ALP SERPL-CCNC: 94 U/L
ALT SERPL W/O P-5'-P-CCNC: 49 U/L
ANION GAP SERPL CALC-SCNC: 10 MMOL/L
AST SERPL-CCNC: 33 U/L
BASOPHILS # BLD AUTO: 0.03 K/UL
BASOPHILS NFR BLD: 0.3 %
BILIRUB SERPL-MCNC: 0.4 MG/DL
BUN SERPL-MCNC: 13 MG/DL
CALCIUM SERPL-MCNC: 9.6 MG/DL
CHLORIDE SERPL-SCNC: 103 MMOL/L
CO2 SERPL-SCNC: 27 MMOL/L
CREAT SERPL-MCNC: 1.2 MG/DL
CRP SERPL-MCNC: 7.2 MG/L
DIFFERENTIAL METHOD: ABNORMAL
EOSINOPHIL # BLD AUTO: 0.2 K/UL
EOSINOPHIL NFR BLD: 2.1 %
ERYTHROCYTE [DISTWIDTH] IN BLOOD BY AUTOMATED COUNT: 13.4 %
EST. GFR  (AFRICAN AMERICAN): >60 ML/MIN/1.73 M^2
EST. GFR  (NON AFRICAN AMERICAN): >60 ML/MIN/1.73 M^2
GLUCOSE SERPL-MCNC: 82 MG/DL
HCT VFR BLD AUTO: 42.8 %
HGB BLD-MCNC: 15.4 G/DL
IMM GRANULOCYTES # BLD AUTO: 0.03 K/UL
IMM GRANULOCYTES NFR BLD AUTO: 0.3 %
LYMPHOCYTES # BLD AUTO: 3.3 K/UL
LYMPHOCYTES NFR BLD: 28.4 %
MCH RBC QN AUTO: 29.8 PG
MCHC RBC AUTO-ENTMCNC: 36 G/DL
MCV RBC AUTO: 83 FL
MONOCYTES # BLD AUTO: 1.1 K/UL
MONOCYTES NFR BLD: 9.3 %
NEUTROPHILS # BLD AUTO: 6.9 K/UL
NEUTROPHILS NFR BLD: 59.6 %
NRBC BLD-RTO: 0 /100 WBC
PLATELET # BLD AUTO: 187 K/UL
PMV BLD AUTO: 12.1 FL
POTASSIUM SERPL-SCNC: 4.4 MMOL/L
PROT SERPL-MCNC: 8.3 G/DL
RBC # BLD AUTO: 5.17 M/UL
SODIUM SERPL-SCNC: 140 MMOL/L
WBC # BLD AUTO: 11.53 K/UL

## 2018-03-05 PROCEDURE — 99215 OFFICE O/P EST HI 40 MIN: CPT | Mod: S$PBB,,, | Performed by: INTERNAL MEDICINE

## 2018-03-05 PROCEDURE — 36415 COLL VENOUS BLD VENIPUNCTURE: CPT

## 2018-03-05 PROCEDURE — 86140 C-REACTIVE PROTEIN: CPT

## 2018-03-05 PROCEDURE — 99999 PR PBB SHADOW E&M-EST. PATIENT-LVL III: CPT | Mod: PBBFAC,,, | Performed by: INTERNAL MEDICINE

## 2018-03-05 PROCEDURE — 85025 COMPLETE CBC W/AUTO DIFF WBC: CPT

## 2018-03-05 PROCEDURE — 99213 OFFICE O/P EST LOW 20 MIN: CPT | Mod: PBBFAC | Performed by: INTERNAL MEDICINE

## 2018-03-05 PROCEDURE — 80053 COMPREHEN METABOLIC PANEL: CPT

## 2018-03-05 RX ORDER — SODIUM CHLORIDE 0.9 % (FLUSH) 0.9 %
10 SYRINGE (ML) INJECTION
Status: CANCELLED | OUTPATIENT
Start: 2018-03-05

## 2018-03-05 RX ORDER — HEPARIN SODIUM (PORCINE) LOCK FLUSH IV SOLN 100 UNIT/ML 100 UNIT/ML
100 SOLUTION INTRAVENOUS
Status: CANCELLED | OUTPATIENT
Start: 2018-03-05

## 2018-03-05 NOTE — PROGRESS NOTES
Subjective:       Patient ID: Amando Calix is a 32 y.o. male.    Chief Complaint: Crohn's Disease    HPI  Review of Systems   Constitutional: Negative for activity change, appetite change, chills, diaphoresis, fatigue, fever and unexpected weight change.   HENT: Negative for congestion, ear pain, mouth sores, rhinorrhea, sinus pressure, sneezing, sore throat, trouble swallowing and voice change.    Eyes: Negative for pain.   Respiratory: Negative for cough and shortness of breath.    Cardiovascular: Negative for chest pain, palpitations and leg swelling.   Genitourinary: Negative for difficulty urinating and flank pain.   Musculoskeletal: Negative for arthralgias, back pain, gait problem, joint swelling, myalgias and neck pain.   Skin: Negative for rash.   Neurological: Negative for dizziness, tremors, syncope, numbness and headaches.   Hematological: Negative for adenopathy. Does not bruise/bleed easily.   Psychiatric/Behavioral: Negative for agitation, behavioral problems, confusion, decreased concentration and dysphoric mood.       Objective:      Physical Exam   Constitutional: He is oriented to person, place, and time. He appears well-developed and well-nourished. No distress.   HENT:   Right Ear: External ear normal.   Left Ear: External ear normal.   Nose: Nose normal.   Mouth/Throat: Oropharynx is clear and moist. No oropharyngeal exudate.   Eyes: Conjunctivae are normal. Pupils are equal, round, and reactive to light. No scleral icterus.   Neck: Normal range of motion. Neck supple. No thyromegaly present.   Cardiovascular: Normal rate, normal heart sounds and intact distal pulses.  Exam reveals no gallop.    No murmur heard.  Pulmonary/Chest: Effort normal and breath sounds normal. He has no wheezes.   Abdominal: Soft. Normal appearance and bowel sounds are normal. He exhibits no distension, no fluid wave, no ascites and no mass. There is no hepatosplenomegaly. There is no tenderness. There is no  rigidity, no rebound, no guarding and no CVA tenderness.   ileostomy   Genitourinary:   Genitourinary Comments: recent   Musculoskeletal: Normal range of motion. He exhibits no edema or tenderness.   Lymphadenopathy:     He has no cervical adenopathy.   Neurological: He is alert and oriented to person, place, and time. He has normal reflexes. No cranial nerve deficit.   Skin: Skin is warm. No rash noted. He is not diaphoretic.   Psychiatric: He has a normal mood and affect. His behavior is normal. Thought content normal.       Assessment:       1. Crohn's disease of both small and large intestine with complication    2. Encounter for long-term (current) use of high-risk medication        Plan:         HISTORY OF PRESENT ILLNESS:  This is a followup visit for Amanod, a 32-year-old   gentleman with a complicated Crohn disease.  This is very difficult to manage.    He failed treatment with Remicade, Humira, azathioprine, methotrexate and even   the experimental use of Stelara at that time.  He had severe stricturing disease   isolated to the colon.  It necessitated a subtotal colectomy in 2014.    Rectum was left in place.  He has now been on Entyvio to prevent disease   recurrence.    He has had problems with recurrent perianal abscess.  Only one time has this   required surgical drainage.  For the most part, he will start developing some   tenderness and pain, but the symptoms resolved spontaneously.  They tend to come   when he has missed a dose of Entyvio or been delayed on a dose of Entyvio and   because of that, he is trying to be more scheduled in his Entyvio.  He denies   any abdominal pain.  There is no change in ileostomy output.  No blood or mucus   seen.  No nausea, vomiting or abdominal pain.    REVIEW OF SYSTEMS:  There are no unusual infections that have occurred.  He had   a minor respiratory infection and may be a mild case of flu as well.    ASSESSMENT AND DECISION MAKIN.  Crohn  disease, status post subtotal colectomy with removal of all known   disease.  He still has the rectum in place and problems with perianal abscesses.    I think right now, the Entyvio is helping to minimize those.  We discussed   whether or not to stop the Entyvio.  I think even if we stop the Entyvio, he   would not have a recurrence of the small bowel, luminal disease, but I would be   worried about perianal complications and perianal complications could lead to a   surgery or other intervention, so I encouraged him to stay on the Entyvio for   now.  I asked him to call me right away if there is any persistence of pain such   that he might need to see one of the surgeons.  2.  Long-term use of high-risk medicine.  I will continue to do the therapy plan   for his Entyvio.  3.  Tobacco abuse.  I discussed the importance of stopping smoking in a patient   with Crohn disease.    RECOMMENDATIONS:  1.  Labs ordered tonight.  2.  Therapy plan reconfirmed.    A 40-minute appointment, greater than half the time in face-to-face counseling.      MARK  dd: 03/05/2018 17:12:10 (CST)  td: 03/06/2018 13:48:12 (CST)  Doc ID   #[id number]  Job ID #[id number]    CC:

## 2018-03-06 ENCOUNTER — TELEPHONE (OUTPATIENT)
Dept: GASTROENTEROLOGY | Facility: CLINIC | Age: 33
End: 2018-03-06

## 2018-03-06 NOTE — TELEPHONE ENCOUNTER
----- Message from Ronni Freeman MD sent at 3/6/2018  7:28 AM CST -----  The labs are ok, everything looks good

## 2018-03-08 ENCOUNTER — INFUSION (OUTPATIENT)
Dept: INFECTIOUS DISEASES | Facility: HOSPITAL | Age: 33
End: 2018-03-08
Attending: INTERNAL MEDICINE
Payer: MEDICARE

## 2018-03-08 VITALS
BODY MASS INDEX: 31.78 KG/M2 | WEIGHT: 209.69 LBS | HEART RATE: 91 BPM | SYSTOLIC BLOOD PRESSURE: 137 MMHG | OXYGEN SATURATION: 99 % | HEIGHT: 68 IN | DIASTOLIC BLOOD PRESSURE: 84 MMHG

## 2018-03-08 DIAGNOSIS — K50.918 CROHN'S DISEASE WITH OTHER COMPLICATION, UNSPECIFIED GASTROINTESTINAL TRACT LOCATION: Primary | ICD-10-CM

## 2018-03-08 PROCEDURE — 96361 HYDRATE IV INFUSION ADD-ON: CPT

## 2018-03-08 PROCEDURE — 25000003 PHARM REV CODE 250: Performed by: INTERNAL MEDICINE

## 2018-03-08 PROCEDURE — A4216 STERILE WATER/SALINE, 10 ML: HCPCS | Performed by: INTERNAL MEDICINE

## 2018-03-08 PROCEDURE — 63600175 PHARM REV CODE 636 W HCPCS: Mod: JG | Performed by: INTERNAL MEDICINE

## 2018-03-08 PROCEDURE — 96365 THER/PROPH/DIAG IV INF INIT: CPT

## 2018-03-08 RX ORDER — HEPARIN SODIUM (PORCINE) LOCK FLUSH IV SOLN 100 UNIT/ML 100 UNIT/ML
100 SOLUTION INTRAVENOUS
Status: CANCELLED | OUTPATIENT
Start: 2018-03-08

## 2018-03-08 RX ORDER — SODIUM CHLORIDE 0.9 % (FLUSH) 0.9 %
10 SYRINGE (ML) INJECTION
Status: CANCELLED | OUTPATIENT
Start: 2018-03-08

## 2018-03-08 RX ORDER — HEPARIN SODIUM (PORCINE) LOCK FLUSH IV SOLN 100 UNIT/ML 100 UNIT/ML
100 SOLUTION INTRAVENOUS
Status: DISCONTINUED | OUTPATIENT
Start: 2018-03-08 | End: 2018-03-08 | Stop reason: HOSPADM

## 2018-03-08 RX ORDER — SODIUM CHLORIDE 0.9 % (FLUSH) 0.9 %
10 SYRINGE (ML) INJECTION
Status: DISCONTINUED | OUTPATIENT
Start: 2018-03-08 | End: 2018-03-08 | Stop reason: HOSPADM

## 2018-03-08 RX ADMIN — Medication 10 ML: at 09:03

## 2018-03-08 RX ADMIN — VEDOLIZUMAB 300 MG: 300 INJECTION, POWDER, LYOPHILIZED, FOR SOLUTION INTRAVENOUS at 10:03

## 2018-03-08 RX ADMIN — SODIUM CHLORIDE 250 ML: 0.9 INJECTION, SOLUTION INTRAVENOUS at 09:03

## 2018-03-08 NOTE — PROGRESS NOTES
"Infusion medication:  entyvio w/ 250cc normal saline bolus  Today's weight:    Wt Readings from Last 1 Encounters:   03/08/18 95.1 kg (209 lb 10.5 oz)         Checklist prior to infusion:  Is the Biologic RX (therapy plan) up to date within the past one year? Yes  Are the most recent labs within the last 3 - 6 months ? Yes  Has the patient had an appointment with the MD/ANIKA that is prescribing the biologic infusion within the last 3 - 6 months? Yes  Documentation of safety questions prior to infusion:   RN TO NOTIFY MD IF "YES" answered to any of below questions prior to infusion:    Symptoms in past 2 weeks? (fever, night sweats, URI or Flu-like symptoms, cough, painful urination, warm/red/painful skin, skin ulcers/wounds, tooth infection/abscess): N/A  Current symptoms of an active infection? No  Temperature greater than 100.4 in the last 48 hours? No  Recent infections that required antibiotic use in the last 10-14 days?No  If patient has had surgery, any problems with the surgical wound (drainage, redness, tenderness)? No If yes then has surgeon cleared patient prior to getting this infusion? N/A  Pregnant? N/A  If yes, is prescribing provider aware of this pregnancy?  No  NEW or WORSENING abdominal pain, diarrhea, nausea/vomiting? No  Any SOB, ankle/feet swelling, or sudden weight gain? No    Patient tolerated infusion well today, vital signs remained normal throughout infusion and patient left with no apparent distress:  Yes  6  Received next infusion date prior to discharge: Yes    Additional note to provider:  No        "

## 2018-04-14 ENCOUNTER — HOSPITAL ENCOUNTER (EMERGENCY)
Facility: OTHER | Age: 33
Discharge: HOME | End: 2018-04-14
Attending: EMERGENCY MEDICINE
Payer: MEDICARE

## 2018-04-14 VITALS
HEART RATE: 100 BPM | BODY MASS INDEX: 31.21 KG/M2 | DIASTOLIC BLOOD PRESSURE: 81 MMHG | WEIGHT: 205.94 LBS | SYSTOLIC BLOOD PRESSURE: 138 MMHG | RESPIRATION RATE: 18 BRPM | OXYGEN SATURATION: 97 % | TEMPERATURE: 98 F | HEIGHT: 68 IN

## 2018-04-14 DIAGNOSIS — M79.89 SWELLING OF RIGHT HAND: ICD-10-CM

## 2018-04-14 DIAGNOSIS — S60.221A CONTUSION OF RIGHT HAND, INITIAL ENCOUNTER: Primary | ICD-10-CM

## 2018-04-14 DIAGNOSIS — M79.641 PAIN OF RIGHT HAND: ICD-10-CM

## 2018-04-14 PROCEDURE — 99283 EMERGENCY DEPT VISIT LOW MDM: CPT

## 2018-04-15 NOTE — ED PROVIDER NOTES
Encounter Date: 4/14/2018       History     Chief Complaint   Patient presents with    Hand Pain     R hand pain after he punched a shop towel container, swelling and ecchymosis     32-year-old male with history of Crohn's disease, anemia, history of C diff, steroid-induced diabetes and protein calorie malnutrition presents emergency department with complaints of right hand pain and swelling after punching a container while at work around 2 or 3:00 this afternoon.  He complains of pain in swelling that has been persistent since onset.  He reports sharp pain about the third MCP.  No current treatment at this time.  He complains of pain that is a 1 out of 10. He is right hand dominant      The history is provided by the patient.     Review of patient's allergies indicates:   Allergen Reactions    Ibuprofen Other (See Comments)     Can't take d/t stomach ulcers     Past Medical History:   Diagnosis Date    Anemia     Chronic diarrhea     Clostridium difficile infection     Crohn's disease     Protein calorie malnutrition     Steroid-induced diabetes      Past Surgical History:   Procedure Laterality Date    ABSCESS DRAINAGE      COLONOSCOPY      COLOSTOMY      HERNIA REPAIR      ILEOSTOMY      SIGMOIDECTOMY       Family History   Problem Relation Age of Onset    Diabetes Father     Hyperlipidemia Mother     Diabetes Mother     Crohn's disease Neg Hx     Celiac disease Neg Hx     Cirrhosis Neg Hx     Colon cancer Neg Hx     Esophageal cancer Neg Hx     Irritable bowel syndrome Neg Hx     Liver cancer Neg Hx     Rectal cancer Neg Hx     Stomach cancer Neg Hx     Ulcerative colitis Neg Hx      Social History   Substance Use Topics    Smoking status: Current Every Day Smoker     Packs/day: 0.50     Years: 3.00     Types: Cigarettes     Last attempt to quit: 11/6/2014    Smokeless tobacco: Never Used    Alcohol use Yes      Comment: socially-weekly     Review of Systems   Constitutional:  Negative for chills and fever.   HENT: Negative for sore throat.    Respiratory: Negative for shortness of breath.    Cardiovascular: Negative for chest pain.   Gastrointestinal: Negative for nausea.   Genitourinary: Negative for dysuria.   Musculoskeletal: Positive for arthralgias and joint swelling. Negative for back pain and gait problem.   Skin: Negative for rash.   Neurological: Negative for weakness and numbness.   Hematological: Does not bruise/bleed easily.       Physical Exam     Initial Vitals [04/14/18 2230]   BP Pulse Resp Temp SpO2   -- 105 20 98 °F (36.7 °C) 96 %      MAP       --         Physical Exam    Nursing note and vitals reviewed.  Constitutional: Vital signs are normal. He appears well-developed and well-nourished. He is not diaphoretic.  Non-toxic appearance. No distress.   HENT:   Head: Normocephalic and atraumatic.   Right Ear: External ear normal.   Left Ear: External ear normal.   Nose: Nose normal.   Eyes: Conjunctivae and lids are normal. No scleral icterus.   Neck: Phonation normal.   Cardiovascular: Normal rate, regular rhythm, normal heart sounds and intact distal pulses. Exam reveals no gallop and no friction rub.    No murmur heard.  Abdominal: Normal appearance.   Musculoskeletal: Normal range of motion.        Right hand: He exhibits tenderness and swelling. He exhibits normal range of motion, normal capillary refill, no deformity and no laceration. Normal sensation noted. Normal strength noted.        Hands:  No obvious deformities, moving all extremities, normal gait   Neurological: He is alert and oriented to person, place, and time. He has normal strength. He displays no atrophy. No sensory deficit. He exhibits normal muscle tone.   Skin: Skin is warm, dry and intact. Capillary refill takes less than 2 seconds. No abrasion, no bruising, no ecchymosis, no laceration, no lesion and no rash noted. No erythema.   Psychiatric: He has a normal mood and affect. His speech is normal  and behavior is normal. Judgment normal. Cognition and memory are normal.         ED Course   Procedures  Labs Reviewed - No data to display     Imaging Results          X-Ray Hand 3 view Right (Final result)  Result time 04/14/18 22:52:04    Final result by Gery Horn MD (04/14/18 22:52:04)                 Impression:      Dorsal soft tissue swelling.  No acute bony abnormality identified.      Electronically signed by: Grey Horn MD  Date:    04/14/2018  Time:    22:52             Narrative:    EXAMINATION:  XR HAND COMPLETE 3 VIEW RIGHT    CLINICAL HISTORY:  trauma;    TECHNIQUE:  Three views of the right hand.    COMPARISON:  None    FINDINGS:  No acute fracture, dislocation, or bony erosion. Dorsal soft tissue swelling is noted at the level of the metacarpals.  No radiopaque foreign body.                                X-Rays:   Independently Interpreted Readings:   Other Readings:  Right hand- no acute fracture or dislocation    Medical Decision Making:   History:   Old Medical Records: I decided to obtain old medical records.  Initial Assessment:   32-year-old male with complaints consistent with contusion to right hand status post punching inanimate objects.  Vital signs stable, afebrile, neurovascularly intact.  He is alert and nontoxic appearing.  He is in no apparent distress.  No focal neurological deficits.  On exam he has swelling and pain with tenderness to palpation about the third MCP joint.  No palpable deformity.  No ecchymosis, abrasions or lacerations.  No erythema or signs of serious bacterial infection or septic joint  Independently Interpreted Test(s):   I have ordered and independently interpreted X-rays - see prior notes.  Clinical Tests:   Radiological Study: Ordered and Reviewed  ED Management:  X-ray obtained independently interpreted by myself no evidence of fracture or dislocation.  He is stable will be discharged home with care instructions.  He is to follow-up with his  doctor in the next 48 hours or return for any worsening signs or symptoms.  He states understanding.  This patient was discussed with the attending physician who agrees with treatment plan.  Other:   I have discussed this case with another health care provider.       <> Summary of the Discussion: Marleny  This note was created using Dragon Medical dictation.  There may be typographical errors secondary to dictation.                        Clinical Impression:     1. Contusion of right hand, initial encounter    2. Pain of right hand    3. Swelling of right hand          Disposition:   Disposition: Discharged  Condition: Stable                        Charo Paul PA-C  04/14/18 6070

## 2018-05-03 ENCOUNTER — INFUSION (OUTPATIENT)
Dept: INFECTIOUS DISEASES | Facility: HOSPITAL | Age: 33
End: 2018-05-03
Attending: INTERNAL MEDICINE
Payer: MEDICARE

## 2018-05-03 VITALS
OXYGEN SATURATION: 96 % | RESPIRATION RATE: 16 BRPM | DIASTOLIC BLOOD PRESSURE: 84 MMHG | HEIGHT: 68 IN | SYSTOLIC BLOOD PRESSURE: 132 MMHG | BODY MASS INDEX: 30.87 KG/M2 | TEMPERATURE: 98 F | WEIGHT: 203.69 LBS | HEART RATE: 86 BPM

## 2018-05-03 DIAGNOSIS — K50.918 CROHN'S DISEASE WITH OTHER COMPLICATION, UNSPECIFIED GASTROINTESTINAL TRACT LOCATION: Primary | ICD-10-CM

## 2018-05-03 PROCEDURE — 25000003 PHARM REV CODE 250: Performed by: INTERNAL MEDICINE

## 2018-05-03 PROCEDURE — 63600175 PHARM REV CODE 636 W HCPCS: Mod: JG | Performed by: INTERNAL MEDICINE

## 2018-05-03 PROCEDURE — 96365 THER/PROPH/DIAG IV INF INIT: CPT

## 2018-05-03 RX ORDER — DIPHENHYDRAMINE HYDROCHLORIDE 50 MG/ML
25 INJECTION INTRAMUSCULAR; INTRAVENOUS
Status: DISCONTINUED | OUTPATIENT
Start: 2018-05-03 | End: 2018-05-03 | Stop reason: HOSPADM

## 2018-05-03 RX ORDER — ACETAMINOPHEN 325 MG/1
650 TABLET ORAL
Status: DISCONTINUED | OUTPATIENT
Start: 2018-05-03 | End: 2018-05-03 | Stop reason: HOSPADM

## 2018-05-03 RX ORDER — SODIUM CHLORIDE 0.9 % (FLUSH) 0.9 %
10 SYRINGE (ML) INJECTION
Status: DISCONTINUED | OUTPATIENT
Start: 2018-05-03 | End: 2018-05-03 | Stop reason: HOSPADM

## 2018-05-03 RX ORDER — EPINEPHRINE 1 MG/ML
0.3 INJECTION, SOLUTION, CONCENTRATE INTRAVENOUS
Status: DISCONTINUED | OUTPATIENT
Start: 2018-05-03 | End: 2018-05-03 | Stop reason: HOSPADM

## 2018-05-03 RX ORDER — IPRATROPIUM BROMIDE AND ALBUTEROL SULFATE 2.5; .5 MG/3ML; MG/3ML
3 SOLUTION RESPIRATORY (INHALATION)
Status: DISCONTINUED | OUTPATIENT
Start: 2018-05-03 | End: 2018-05-03 | Stop reason: HOSPADM

## 2018-05-03 RX ORDER — METHYLPREDNISOLONE SOD SUCC 125 MG
40 VIAL (EA) INJECTION
Status: DISCONTINUED | OUTPATIENT
Start: 2018-05-03 | End: 2018-05-03 | Stop reason: HOSPADM

## 2018-05-03 RX ORDER — SODIUM CHLORIDE 0.9 % (FLUSH) 0.9 %
10 SYRINGE (ML) INJECTION
Status: CANCELLED | OUTPATIENT
Start: 2018-05-03

## 2018-05-03 RX ORDER — HEPARIN SODIUM (PORCINE) LOCK FLUSH IV SOLN 100 UNIT/ML 100 UNIT/ML
100 SOLUTION INTRAVENOUS
Status: CANCELLED | OUTPATIENT
Start: 2018-05-03

## 2018-05-03 RX ORDER — HEPARIN 100 UNIT/ML
500 SYRINGE INTRAVENOUS
Status: DISCONTINUED | OUTPATIENT
Start: 2018-05-03 | End: 2018-05-03 | Stop reason: HOSPADM

## 2018-05-03 RX ADMIN — VEDOLIZUMAB 300 MG: 300 INJECTION, POWDER, LYOPHILIZED, FOR SOLUTION INTRAVENOUS at 10:05

## 2018-05-03 NOTE — PROGRESS NOTES
"Infusion medication:  entyvio w/ 250cc normal saline bolus  Today's weight:    Wt Readings from Last 1 Encounters:   05/03/18 92.4 kg (203 lb 11.3 oz)         Checklist prior to infusion:  Is the Biologic RX (therapy plan) up to date within the past one year? Yes  Are the most recent labs within the last 3 - 6 months ? Yes  Has the patient had an appointment with the MD/ANIKA that is prescribing the biologic infusion within the last 3 - 6 months? Yes  Documentation of safety questions prior to infusion:   RN TO NOTIFY MD IF "YES" answered to any of below questions prior to infusion:    Symptoms in past 2 weeks? (fever, night sweats, URI or Flu-like symptoms, cough, painful urination, warm/red/painful skin, skin ulcers/wounds, tooth infection/abscess): N/A  Current symptoms of an active infection? No  Temperature greater than 100.4 in the last 48 hours? No  Recent infections that required antibiotic use in the last 10-14 days?No  If patient has had surgery, any problems with the surgical wound (drainage, redness, tenderness)? No If yes then has surgeon cleared patient prior to getting this infusion? N/A  Pregnant? N/A  If yes, is prescribing provider aware of this pregnancy?  No  NEW or WORSENING abdominal pain, diarrhea, nausea/vomiting? No  Any SOB, ankle/feet swelling, or sudden weight gain? No    Patient tolerated infusion well today, vital signs remained normal throughout infusion and patient left with no apparent distress:  Yes  6  Received next infusion date prior to discharge: Yes    Additional note to provider:  No      "

## 2018-06-28 ENCOUNTER — INFUSION (OUTPATIENT)
Dept: INFECTIOUS DISEASES | Facility: HOSPITAL | Age: 33
End: 2018-06-28
Attending: INTERNAL MEDICINE
Payer: MEDICARE

## 2018-06-28 VITALS
TEMPERATURE: 99 F | DIASTOLIC BLOOD PRESSURE: 84 MMHG | RESPIRATION RATE: 16 BRPM | OXYGEN SATURATION: 98 % | HEART RATE: 100 BPM | HEIGHT: 68 IN | SYSTOLIC BLOOD PRESSURE: 133 MMHG | WEIGHT: 204.81 LBS | BODY MASS INDEX: 31.04 KG/M2

## 2018-06-28 DIAGNOSIS — K50.918 CROHN'S DISEASE WITH OTHER COMPLICATION, UNSPECIFIED GASTROINTESTINAL TRACT LOCATION: Primary | ICD-10-CM

## 2018-06-28 PROCEDURE — 25000003 PHARM REV CODE 250: Performed by: INTERNAL MEDICINE

## 2018-06-28 PROCEDURE — 96365 THER/PROPH/DIAG IV INF INIT: CPT

## 2018-06-28 PROCEDURE — 63600175 PHARM REV CODE 636 W HCPCS: Mod: JG | Performed by: INTERNAL MEDICINE

## 2018-06-28 RX ADMIN — VEDOLIZUMAB 300 MG: 300 INJECTION, POWDER, LYOPHILIZED, FOR SOLUTION INTRAVENOUS at 10:06

## 2018-06-28 NOTE — PROGRESS NOTES
"Infusion medication:  entyvio w/ 250cc normal saline bolus  Today's weight:    Wt Readings from Last 1 Encounters:   06/28/18 92.9 kg (204 lb 12.9 oz)         Checklist prior to infusion:  Is the Biologic RX (therapy plan) up to date within the past one year? Yes  Are the most recent labs within the last 3 - 6 months ? Yes  Has the patient had an appointment with the MD/ANIKA that is prescribing the biologic infusion within the last 3 - 6 months? Yes  Documentation of safety questions prior to infusion:   RN TO NOTIFY MD IF "YES" answered to any of below questions prior to infusion:    Symptoms in past 2 weeks? (fever, night sweats, URI or Flu-like symptoms, cough, painful urination, warm/red/painful skin, skin ulcers/wounds, tooth infection/abscess): YES  Current symptoms of an active infection? YES  Temperature greater than 100.4 in the last 48 hours? No  Recent infections that required antibiotic use in the last 10-14 days?YES  If patient has had surgery, any problems with the surgical wound (drainage, redness, tenderness)? No If yes then has surgeon cleared patient prior to getting this infusion? N/A  Pregnant? N/A  If yes, is prescribing provider aware of this pregnancy?  No  NEW or WORSENING abdominal pain, diarrhea, nausea/vomiting? No  Any SOB, ankle/feet swelling, or sudden weight gain? No    Patient tolerated infusion well today, vital signs remained normal throughout infusion and patient left with no apparent distress:  Yes  6  Received next infusion date prior to discharge: Yes    Additional note to provider:  No    "

## 2018-08-05 ENCOUNTER — HOSPITAL ENCOUNTER (EMERGENCY)
Facility: HOSPITAL | Age: 33
Discharge: HOME | End: 2018-08-05
Attending: EMERGENCY MEDICINE
Payer: MEDICARE

## 2018-08-05 VITALS
SYSTOLIC BLOOD PRESSURE: 144 MMHG | BODY MASS INDEX: 31.22 KG/M2 | RESPIRATION RATE: 18 BRPM | WEIGHT: 206 LBS | HEART RATE: 72 BPM | DIASTOLIC BLOOD PRESSURE: 79 MMHG | TEMPERATURE: 98 F | HEIGHT: 68 IN | OXYGEN SATURATION: 98 %

## 2018-08-05 DIAGNOSIS — L02.31 ABSCESS OF BUTTOCK, RIGHT: Primary | ICD-10-CM

## 2018-08-05 LAB
BUN SERPL-MCNC: 6 MG/DL (ref 6–30)
CHLORIDE SERPL-SCNC: 105 MMOL/L (ref 95–110)
CREAT SERPL-MCNC: 0.8 MG/DL (ref 0.5–1.4)
GLUCOSE SERPL-MCNC: 101 MG/DL (ref 70–110)
HCT VFR BLD CALC: 45 %PCV (ref 36–54)
POC IONIZED CALCIUM: 1.08 MMOL/L (ref 1.06–1.42)
POC TCO2 (MEASURED): 25 MMOL/L (ref 23–29)
POTASSIUM BLD-SCNC: 4.3 MMOL/L (ref 3.5–5.1)
SAMPLE: NORMAL
SODIUM BLD-SCNC: 139 MMOL/L (ref 136–145)

## 2018-08-05 PROCEDURE — 25500020 PHARM REV CODE 255: Performed by: EMERGENCY MEDICINE

## 2018-08-05 PROCEDURE — 96376 TX/PRO/DX INJ SAME DRUG ADON: CPT

## 2018-08-05 PROCEDURE — 63600175 PHARM REV CODE 636 W HCPCS: Performed by: NURSE PRACTITIONER

## 2018-08-05 PROCEDURE — 99283 EMERGENCY DEPT VISIT LOW MDM: CPT | Mod: 25,,, | Performed by: NURSE PRACTITIONER

## 2018-08-05 PROCEDURE — 96361 HYDRATE IV INFUSION ADD-ON: CPT | Mod: 59

## 2018-08-05 PROCEDURE — 63600175 PHARM REV CODE 636 W HCPCS: Performed by: EMERGENCY MEDICINE

## 2018-08-05 PROCEDURE — 25000003 PHARM REV CODE 250: Performed by: NURSE PRACTITIONER

## 2018-08-05 PROCEDURE — 10060 I&D ABSCESS SIMPLE/SINGLE: CPT | Mod: ,,, | Performed by: NURSE PRACTITIONER

## 2018-08-05 PROCEDURE — 96374 THER/PROPH/DIAG INJ IV PUSH: CPT | Mod: 59

## 2018-08-05 PROCEDURE — 10061 I&D ABSCESS COMP/MULTIPLE: CPT

## 2018-08-05 PROCEDURE — 25000003 PHARM REV CODE 250: Performed by: EMERGENCY MEDICINE

## 2018-08-05 PROCEDURE — 99284 EMERGENCY DEPT VISIT MOD MDM: CPT | Mod: 25

## 2018-08-05 RX ORDER — MORPHINE SULFATE 4 MG/ML
2 INJECTION, SOLUTION INTRAMUSCULAR; INTRAVENOUS
Status: COMPLETED | OUTPATIENT
Start: 2018-08-05 | End: 2018-08-05

## 2018-08-05 RX ORDER — CLINDAMYCIN HYDROCHLORIDE 300 MG/1
300 CAPSULE ORAL EVERY 6 HOURS
Qty: 40 CAPSULE | Refills: 0 | Status: SHIPPED | OUTPATIENT
Start: 2018-08-05 | End: 2018-08-15

## 2018-08-05 RX ORDER — ONDANSETRON 2 MG/ML
4 INJECTION INTRAMUSCULAR; INTRAVENOUS
Status: DISCONTINUED | OUTPATIENT
Start: 2018-08-05 | End: 2018-08-05

## 2018-08-05 RX ORDER — MORPHINE SULFATE 4 MG/ML
4 INJECTION, SOLUTION INTRAMUSCULAR; INTRAVENOUS
Status: COMPLETED | OUTPATIENT
Start: 2018-08-05 | End: 2018-08-05

## 2018-08-05 RX ORDER — LIDOCAINE HYDROCHLORIDE AND EPINEPHRINE 10; 10 MG/ML; UG/ML
1 INJECTION, SOLUTION INFILTRATION; PERINEURAL
Status: COMPLETED | OUTPATIENT
Start: 2018-08-05 | End: 2018-08-05

## 2018-08-05 RX ORDER — MORPHINE SULFATE 4 MG/ML
4 INJECTION, SOLUTION INTRAMUSCULAR; INTRAVENOUS
Status: DISCONTINUED | OUTPATIENT
Start: 2018-08-05 | End: 2018-08-05

## 2018-08-05 RX ORDER — LIDOCAINE HYDROCHLORIDE 10 MG/ML
10 INJECTION INFILTRATION; PERINEURAL ONCE
Status: COMPLETED | OUTPATIENT
Start: 2018-08-05 | End: 2018-08-05

## 2018-08-05 RX ORDER — LIDOCAINE HYDROCHLORIDE AND EPINEPHRINE 20; 10 MG/ML; UG/ML
5 INJECTION, SOLUTION INFILTRATION; PERINEURAL ONCE
Status: DISCONTINUED | OUTPATIENT
Start: 2018-08-05 | End: 2018-08-05

## 2018-08-05 RX ORDER — LIDOCAINE HYDROCHLORIDE 10 MG/ML
10 INJECTION INFILTRATION; PERINEURAL ONCE
Status: DISCONTINUED | OUTPATIENT
Start: 2018-08-05 | End: 2018-08-05

## 2018-08-05 RX ORDER — MORPHINE SULFATE 10 MG/ML
5 INJECTION INTRAMUSCULAR; INTRAVENOUS; SUBCUTANEOUS
Status: DISCONTINUED | OUTPATIENT
Start: 2018-08-05 | End: 2018-08-05

## 2018-08-05 RX ORDER — CLINDAMYCIN HYDROCHLORIDE 150 MG/1
300 CAPSULE ORAL
Status: COMPLETED | OUTPATIENT
Start: 2018-08-05 | End: 2018-08-05

## 2018-08-05 RX ORDER — OXYCODONE AND ACETAMINOPHEN 5; 325 MG/1; MG/1
1 TABLET ORAL EVERY 4 HOURS PRN
Qty: 12 TABLET | Refills: 0 | Status: SHIPPED | OUTPATIENT
Start: 2018-08-05 | End: 2020-06-24 | Stop reason: CLARIF

## 2018-08-05 RX ADMIN — MORPHINE SULFATE 2 MG: 4 INJECTION INTRAVENOUS at 03:08

## 2018-08-05 RX ADMIN — LIDOCAINE HYDROCHLORIDE,EPINEPHRINE BITARTRATE 1 ML: 10; .01 INJECTION, SOLUTION INFILTRATION; PERINEURAL at 02:08

## 2018-08-05 RX ADMIN — LIDOCAINE HYDROCHLORIDE 10 ML: 10 INJECTION, SOLUTION INFILTRATION; PERINEURAL at 09:08

## 2018-08-05 RX ADMIN — MORPHINE SULFATE 4 MG: 4 INJECTION INTRAVENOUS at 10:08

## 2018-08-05 RX ADMIN — IOHEXOL 100 ML: 350 INJECTION, SOLUTION INTRAVENOUS at 12:08

## 2018-08-05 RX ADMIN — MORPHINE SULFATE 4 MG: 4 INJECTION INTRAVENOUS at 01:08

## 2018-08-05 RX ADMIN — SODIUM CHLORIDE 1000 ML: 0.9 INJECTION, SOLUTION INTRAVENOUS at 10:08

## 2018-08-05 RX ADMIN — CLINDAMYCIN HYDROCHLORIDE 300 MG: 150 CAPSULE ORAL at 03:08

## 2018-08-05 NOTE — ED NOTES
Pt. Denies questions or concerns regarding discharge instructions, fu, or antbx prescription. Pt. Wound dressed on buttocks with tape and abd pad. Sent home with supplies for changing dressing. Ambulatory with steady gait to lobby for ride. No distress noted.

## 2018-08-05 NOTE — ED PROVIDER NOTES
Encounter Date: 8/5/2018    SCRIBE #1 NOTE: I, Baron Camilo, am scribing for, and in the presence of,  Dr. Gurrola. I have scribed the following portions of the note - the APC attestation.       History     Chief Complaint   Patient presents with    Abscess     'butt cheek'     HPI   This is a 32-year-old male with a past medical history significant for Crohn's disease, chronic diarrhea and anemia who presents to emergency department today for evaluation of abscess to the perirectal area.  Patient states that the abscess has been present for 1-2 months but has been worse over the course of the last 2 weeks.  Patient receives Entyvio infusions every 2 months and states that he typically gets perirectal abscesses following the infusion.  He states that this is one of the larger abscess is that he has had and he complains of pain to the area.  He denies fever or chills. Denies any other problems or concerns at this time.    Review of patient's allergies indicates:   Allergen Reactions    Ibuprofen Other (See Comments)     Can't take d/t stomach ulcers     Past Medical History:   Diagnosis Date    Anemia     Chronic diarrhea     Clostridium difficile infection     Crohn's disease     Protein calorie malnutrition     Steroid-induced diabetes      Past Surgical History:   Procedure Laterality Date    ABSCESS DRAINAGE      COLONOSCOPY      COLOSTOMY      HERNIA REPAIR      ILEOSTOMY      SIGMOIDECTOMY       Family History   Problem Relation Age of Onset    Diabetes Father     Hyperlipidemia Mother     Diabetes Mother     Crohn's disease Neg Hx     Celiac disease Neg Hx     Cirrhosis Neg Hx     Colon cancer Neg Hx     Esophageal cancer Neg Hx     Irritable bowel syndrome Neg Hx     Liver cancer Neg Hx     Rectal cancer Neg Hx     Stomach cancer Neg Hx     Ulcerative colitis Neg Hx      Social History   Substance Use Topics    Smoking status: Current Every Day Smoker     Packs/day: 0.50      Years: 3.00     Types: Cigarettes     Last attempt to quit: 11/6/2014    Smokeless tobacco: Never Used    Alcohol use Yes      Comment: socially-weekly     Review of Systems   Constitutional: Negative for chills and fever.   HENT: Negative for congestion and sinus pressure.    Eyes: Negative for visual disturbance.   Respiratory: Negative for cough, chest tightness and shortness of breath.    Cardiovascular: Negative for chest pain.   Gastrointestinal:  Negative for abdominal pain.  Genitourinary: Negative for flank pain. Negative for dysuria.  Musculoskeletal: Negative for arthralgias and myalgias.   Skin:  Positive for abscess.    Neurological: Negative for dizziness. Negative for weakness and numbness.   Psychiatric/Behavioral: Negative for confusion.         Physical Exam     Initial Vitals [08/05/18 0911]   BP Pulse Resp Temp SpO2   (!) 135/91 91 18 97.9 °F (36.6 °C) 96 %      MAP       --         Physical Exam   Nursing note and vitals reviewed.  Constitutional: Patient appears well-developed and well-nourished. Not diaphoretic. No distress.   HENT:   Head: Normocephalic and atraumatic.   Eyes: Conjunctivae are normal. No scleral icterus.   Neck: Normal range of motion. Neck supple.   Cardiovascular: Normal rate, regular rhythm and normal heart sounds.   Pulmonary/Chest: Breath sounds normal. No respiratory distress.  Abdominal: Soft. There is no tenderness.   Musculoskeletal: Normal range of motion. No edema or tenderness.   Neurological: Alert and oriented to person, place, and time. Normal strength. No sensory deficit.   Skin:  Multiple abscesses of varying sizes noted to the perirectal area without active purulent drainage or odor.  There is no surrounding cellulitis.  Induration extends toward the scrotum.    Psychiatric: Normal mood and affect. Thought content normal.       ED Course   I & D - Incision and Drainage  Date/Time: 8/5/2018 3:00 PM  Performed by: TAMIA LINARES  Authorized by: YAHIR  JP BUSTAMANTE   Type: abscess  Body area: anogenital (right buttock)  Anesthesia: local infiltration    Anesthesia:  Local Anesthetic: lidocaine 1% with epinephrine  Anesthetic total: 1 mL  Patient sedated: no  Scalpel size: 11  Incision type: single straight  Complexity: complex  Drainage: bloody  Drainage amount: moderate  Wound treatment: wound packed  Packing material: 1/4 in gauze  Patient tolerance: Patient tolerated the procedure well with no immediate complications        Labs Reviewed   ISTAT PROCEDURE     Labs Reviewed   ISTAT PROCEDURE       Imaging Results          CT Abdomen Pelvis With Contrast (Final result)  Result time 08/05/18 13:41:08    Final result by Jacob Buchanan MD (08/05/18 13:41:08)                 Impression:      Partial visualization of skin thickening and soft tissue attenuation in the right gluteal cleft which may reflect phlegmon or developing abscess with small pockets of fluid attenuation noted.    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    Electronically signed by resident: Elicia Tracy  Date:    08/05/2018  Time:    12:25    Electronically signed by: Jacob Buchanan MD  Date:    08/05/2018  Time:    13:41             Narrative:    EXAMINATION:  CT ABDOMEN PELVIS WITH CONTRAST    CLINICAL HISTORY:  daryl-rectal abscess. hx of crohn's disease    TECHNIQUE:  Low dose axial images, sagittal and coronal reformations were obtained from the lung bases to the pubic symphysis following the IV administration of 100 mL of Omnipaque 350 .  Oral contrast was not administered.    COMPARISON:  CT abdomen pelvis 10/11/2014    FINDINGS:  ABDOMEN:    - Lung bases: No infiltrates and no nodules.    - Liver: No focal mass.    - Gallbladder: No calcified gallstones.    - Bile Ducts: No evidence of intra or extra hepatic biliary ductal dilation.    - Spleen: Negative.    - Kidneys: No mass or hydronephrosis.    - Adrenals:  Unremarkable.    - Pancreas: No mass or peripancreatic fat stranding.    - Retroperitoneum:  No significant adenopathy.    - Vascular: No abdominal aortic aneurysm.    - Abdominal wall:  There is a small fat containing parastomal hernia in the right lower quadrant.  There is a 7.8 x 1.5 cm area of skin thickening and soft tissue attenuation within the immediate subcutaneous tissues of the right gluteal cleft which is nonspecific and may represent phlegmon or developing abscess noting small areas of hypoattenuation that may also reflect fluid.    PELVIS:    No pelvic mass, adenopathy, or free fluid.    BOWEL/MESENTERY:    Interval postoperative changes compatible with total abdominal colectomy and right lower quadrant end ileostomy.  Rectal pouch appears within normal limits.  No abnormal fluid collection around the rectal pouch is identified.  No evidence of bowel obstruction or inflammation.    BONES:  No acute osseous abnormality and no suspicious lytic or blastic lesion.                                     Imaging Results          CT Abdomen Pelvis With Contrast (Final result)  Result time 08/05/18 13:41:08    Final result by Jacob Buchanan MD (08/05/18 13:41:08)                 Impression:      Partial visualization of skin thickening and soft tissue attenuation in the right gluteal cleft which may reflect phlegmon or developing abscess with small pockets of fluid attenuation noted.    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    Electronically signed by resident: Elicia Tracy  Date:    08/05/2018  Time:    12:25    Electronically signed by: Jacob Buchanan MD  Date:    08/05/2018  Time:    13:41             Narrative:    EXAMINATION:  CT ABDOMEN PELVIS WITH CONTRAST    CLINICAL HISTORY:  daryl-rectal abscess. hx of crohn's disease    TECHNIQUE:  Low dose axial images, sagittal and coronal reformations were obtained from the lung bases  to the pubic symphysis following the IV administration of 100 mL of Omnipaque 350 .  Oral contrast was not administered.    COMPARISON:  CT abdomen pelvis 10/11/2014    FINDINGS:  ABDOMEN:    - Lung bases: No infiltrates and no nodules.    - Liver: No focal mass.    - Gallbladder: No calcified gallstones.    - Bile Ducts: No evidence of intra or extra hepatic biliary ductal dilation.    - Spleen: Negative.    - Kidneys: No mass or hydronephrosis.    - Adrenals: Unremarkable.    - Pancreas: No mass or peripancreatic fat stranding.    - Retroperitoneum:  No significant adenopathy.    - Vascular: No abdominal aortic aneurysm.    - Abdominal wall:  There is a small fat containing parastomal hernia in the right lower quadrant.  There is a 7.8 x 1.5 cm area of skin thickening and soft tissue attenuation within the immediate subcutaneous tissues of the right gluteal cleft which is nonspecific and may represent phlegmon or developing abscess noting small areas of hypoattenuation that may also reflect fluid.    PELVIS:    No pelvic mass, adenopathy, or free fluid.    BOWEL/MESENTERY:    Interval postoperative changes compatible with total abdominal colectomy and right lower quadrant end ileostomy.  Rectal pouch appears within normal limits.  No abnormal fluid collection around the rectal pouch is identified.  No evidence of bowel obstruction or inflammation.    BONES:  No acute osseous abnormality and no suspicious lytic or blastic lesion.                                 Medical Decision Making:   History:   Old Medical Records: I decided to obtain old medical records.  Clinical Tests:   Lab Tests: Ordered and Reviewed  Radiological Study: Ordered and Reviewed  ED Management:  This is a 32-year-old male with a history of Crohn's disease and recurrent abscesses presenting to the emergency department today for evaluation of painful abscesses to the perirectal area that have been present for several weeks.  I discussed this  patient's case with colorectal surgery who advised CT scan of the abdomen and pelvis with IV contrast to assess the extent of the abscess.  Abscess is cutaneous.  There are no acute changes otherwise on CT scan.  Colorectal surgery advised bedside incision and drainage by ED provider which was performed.  Please see procedure note.  Colorectal surgery also advise discharge home on oral clindamycin, 1st dose given in the ED.  Patient is to follow up in the colon rectal surgery Clinic.  He was given IV morphine for pain in the ED and discharged home with a prescription for Percocet if needed for pain at home.  Strict return precautions discussed.             Scribe Attestation:   Scribe #1: I performed the above scribed service and the documentation accurately describes the services I performed. I attest to the accuracy of the note.    Attending Attestation:     Physician Attestation Statement for NP/PA:   I discussed this assessment and plan of this patient with the NP/PA, but I did not personally examine the patient. The face to face encounter was performed by the NP/PA.                     Clinical Impression:   The encounter diagnosis was Abscess of buttock, right.                             Nathalie Webb NP  08/06/18 2311

## 2018-08-05 NOTE — ED NOTES
Pt. Back to 24, report from Rebecca ECHAVARRIA. States pain 3/10 at this time post medication administration per orders. Pt. Laying in bed, no distress noted, on cont. Pulse ox and bp monitor, call light within reach, will continue to monitor. Awaiting colorectal surg consult.

## 2018-08-05 NOTE — ED NOTES
Pt. States increasing pain, given meds per orders. Awaiting CT scan results and colorectal surg consult. Call light within reach, will continue to monitor.

## 2018-08-05 NOTE — ED NOTES
"Pt identifiers checked and accurate with Amando Calix    Pt reports to ED with complaints of right buttock abscess. Pt states "it flares up off and on with the chron's, this one hasn't settled down". Pt reports "a little blood and pus has come out of it". Pt denies changes in bladder and bowel habits, fever, chills, N/V/D.     LOC: The patient is awake, alert and aware of environment with an appropriate affect, the patient is oriented x 3 and speaking appropriately.  APPEARANCE: Patient resting comfortably and in no acute distress, patient is clean and well groomed  SKIN: The skin is warm and dry, color consistent with ethnicity or bruising noted.  MUSCULOSKELETAL: Patient moving all extremities well, no obvious swelling or deformities noted. Pt ambulates unassisted, steady gait   RESPIRATORY: Airway is open and patent; respirations are spontaneous, patient has a normal effort and rate, no accessory muscle use noted.   ABDOMEN: Soft and non tender to palpation, no distention noted. Bowel sounds present x 4  NEUROLOGIC: PERRL, 3 mm bilaterally, eyes open spontaneously, behavior appropriate to situation, follows commands    "

## 2018-08-23 ENCOUNTER — INFUSION (OUTPATIENT)
Dept: INFECTIOUS DISEASES | Facility: HOSPITAL | Age: 33
End: 2018-08-23
Attending: INTERNAL MEDICINE
Payer: MEDICARE

## 2018-08-23 VITALS
WEIGHT: 198.63 LBS | DIASTOLIC BLOOD PRESSURE: 83 MMHG | HEIGHT: 68 IN | SYSTOLIC BLOOD PRESSURE: 143 MMHG | HEART RATE: 94 BPM | OXYGEN SATURATION: 97 % | BODY MASS INDEX: 30.1 KG/M2 | RESPIRATION RATE: 16 BRPM | TEMPERATURE: 99 F

## 2018-08-23 DIAGNOSIS — K50.918 CROHN'S DISEASE WITH OTHER COMPLICATION, UNSPECIFIED GASTROINTESTINAL TRACT LOCATION: Primary | ICD-10-CM

## 2018-08-23 PROCEDURE — 96375 TX/PRO/DX INJ NEW DRUG ADDON: CPT

## 2018-08-23 PROCEDURE — 63600175 PHARM REV CODE 636 W HCPCS: Performed by: INTERNAL MEDICINE

## 2018-08-23 PROCEDURE — 96365 THER/PROPH/DIAG IV INF INIT: CPT

## 2018-08-23 PROCEDURE — 25000003 PHARM REV CODE 250: Performed by: INTERNAL MEDICINE

## 2018-08-23 RX ORDER — EPINEPHRINE 1 MG/ML
0.3 INJECTION, SOLUTION, CONCENTRATE INTRAVENOUS
Status: ACTIVE | OUTPATIENT
Start: 2018-08-23 | End: 2018-08-23

## 2018-08-23 RX ORDER — SODIUM CHLORIDE 0.9 % (FLUSH) 0.9 %
10 SYRINGE (ML) INJECTION
Status: DISCONTINUED | OUTPATIENT
Start: 2018-08-23 | End: 2018-08-23 | Stop reason: HOSPADM

## 2018-08-23 RX ORDER — IPRATROPIUM BROMIDE AND ALBUTEROL SULFATE 2.5; .5 MG/3ML; MG/3ML
3 SOLUTION RESPIRATORY (INHALATION)
Status: ACTIVE | OUTPATIENT
Start: 2018-08-23 | End: 2018-08-23

## 2018-08-23 RX ORDER — METHYLPREDNISOLONE SOD SUCC 125 MG
40 VIAL (EA) INJECTION
Status: ACTIVE | OUTPATIENT
Start: 2018-08-23 | End: 2018-08-23

## 2018-08-23 RX ORDER — HEPARIN 100 UNIT/ML
500 SYRINGE INTRAVENOUS
Status: ACTIVE | OUTPATIENT
Start: 2018-08-23 | End: 2018-08-23

## 2018-08-23 RX ORDER — ACETAMINOPHEN 325 MG/1
650 TABLET ORAL
Status: ACTIVE | OUTPATIENT
Start: 2018-08-23 | End: 2018-08-23

## 2018-08-23 RX ORDER — DIPHENHYDRAMINE HYDROCHLORIDE 50 MG/ML
25 INJECTION INTRAMUSCULAR; INTRAVENOUS
Status: ACTIVE | OUTPATIENT
Start: 2018-08-23 | End: 2018-08-23

## 2018-08-23 RX ADMIN — DIPHENHYDRAMINE HYDROCHLORIDE 25 MG: 50 INJECTION INTRAMUSCULAR; INTRAVENOUS at 09:08

## 2018-08-23 RX ADMIN — VEDOLIZUMAB 300 MG: 300 INJECTION, POWDER, LYOPHILIZED, FOR SOLUTION INTRAVENOUS at 09:08

## 2018-08-23 NOTE — PROGRESS NOTES
"Infusion medication:  entyvio w/ 250cc normal saline bolus  Today's weight:    Wt Readings from Last 1 Encounters:   08/05/18 93.4 kg (206 lb)         Checklist prior to infusion:  Is the Biologic RX (therapy plan) up to date within the past one year? Yes  Are the most recent labs within the last 3 - 6 months ? YES  Has the patient had an appointment with the MD/ANIKA that is prescribing the biologic infusion within the last 3 - 6 months? Yes  Documentation of safety questions prior to infusion:   RN TO NOTIFY MD IF "YES" answered to any of below questions prior to infusion:    Symptoms in past 2 weeks? (fever, night sweats, URI or Flu-like symptoms, cough, painful urination, warm/red/painful skin, skin ulcers/wounds, tooth infection/abscess): NO  Current symptoms of an active infection? NO  Temperature greater than 100.4 in the last 48 hours? No  Recent infections that required antibiotic use in the last 10-14 days?YES  If patient has had surgery, any problems with the surgical wound (drainage, redness, tenderness)? No If yes then has surgeon cleared patient prior to getting this infusion? N/A  Pregnant? N/A  If yes, is prescribing provider aware of this pregnancy?  No  NEW or WORSENING abdominal pain, diarrhea, nausea/vomiting? No  Any SOB, ankle/feet swelling, or sudden weight gain? No    Patient tolerated infusion well today, vital signs remained normal throughout infusion and patient left with no apparent distress:  Yes  6  Received next infusion date prior to discharge: Yes    Additional note to provider:  No  "

## 2018-10-18 ENCOUNTER — INFUSION (OUTPATIENT)
Dept: INFECTIOUS DISEASES | Facility: HOSPITAL | Age: 33
End: 2018-10-18
Attending: INTERNAL MEDICINE
Payer: MEDICARE

## 2018-10-18 VITALS
OXYGEN SATURATION: 95 % | TEMPERATURE: 98 F | SYSTOLIC BLOOD PRESSURE: 134 MMHG | BODY MASS INDEX: 30.37 KG/M2 | HEIGHT: 68 IN | DIASTOLIC BLOOD PRESSURE: 80 MMHG | WEIGHT: 200.38 LBS | HEART RATE: 92 BPM | RESPIRATION RATE: 16 BRPM

## 2018-10-18 DIAGNOSIS — K50.918 CROHN'S DISEASE WITH OTHER COMPLICATION, UNSPECIFIED GASTROINTESTINAL TRACT LOCATION: Primary | ICD-10-CM

## 2018-10-18 PROCEDURE — 25000003 PHARM REV CODE 250: Performed by: INTERNAL MEDICINE

## 2018-10-18 PROCEDURE — 96365 THER/PROPH/DIAG IV INF INIT: CPT

## 2018-10-18 PROCEDURE — 63600175 PHARM REV CODE 636 W HCPCS: Mod: JG | Performed by: INTERNAL MEDICINE

## 2018-10-18 RX ORDER — EPINEPHRINE 1 MG/ML
0.3 INJECTION, SOLUTION, CONCENTRATE INTRAVENOUS
Status: DISCONTINUED | OUTPATIENT
Start: 2018-10-18 | End: 2018-10-18 | Stop reason: HOSPADM

## 2018-10-18 RX ORDER — IPRATROPIUM BROMIDE AND ALBUTEROL SULFATE 2.5; .5 MG/3ML; MG/3ML
3 SOLUTION RESPIRATORY (INHALATION)
Status: DISCONTINUED | OUTPATIENT
Start: 2018-10-18 | End: 2018-10-18 | Stop reason: HOSPADM

## 2018-10-18 RX ORDER — METHYLPREDNISOLONE SOD SUCC 125 MG
40 VIAL (EA) INJECTION
Status: DISCONTINUED | OUTPATIENT
Start: 2018-10-18 | End: 2018-10-18 | Stop reason: HOSPADM

## 2018-10-18 RX ORDER — SODIUM CHLORIDE 0.9 % (FLUSH) 0.9 %
10 SYRINGE (ML) INJECTION
Status: DISCONTINUED | OUTPATIENT
Start: 2018-10-18 | End: 2018-10-18 | Stop reason: HOSPADM

## 2018-10-18 RX ORDER — DIPHENHYDRAMINE HYDROCHLORIDE 50 MG/ML
25 INJECTION INTRAMUSCULAR; INTRAVENOUS
Status: DISCONTINUED | OUTPATIENT
Start: 2018-10-18 | End: 2018-10-18 | Stop reason: HOSPADM

## 2018-10-18 RX ORDER — HEPARIN 100 UNIT/ML
500 SYRINGE INTRAVENOUS
Status: DISCONTINUED | OUTPATIENT
Start: 2018-10-18 | End: 2018-10-18 | Stop reason: HOSPADM

## 2018-10-18 RX ORDER — ACETAMINOPHEN 325 MG/1
650 TABLET ORAL
Status: DISCONTINUED | OUTPATIENT
Start: 2018-10-18 | End: 2018-10-18 | Stop reason: HOSPADM

## 2018-10-18 RX ADMIN — VEDOLIZUMAB 300 MG: 300 INJECTION, POWDER, LYOPHILIZED, FOR SOLUTION INTRAVENOUS at 09:10

## 2018-10-18 NOTE — PROGRESS NOTES
"Infusion medication:  entyvio   Today's weight:    Wt Readings from Last 1 Encounters:   10/18/18 90.9 kg (200 lb 6.4 oz)         Checklist prior to infusion:  Is the Biologic RX (therapy plan) up to date within the past one year? Yes  Are the most recent labs within the last 3 - 6 months ? YES  Has the patient had an appointment with the MD/ANIKA that is prescribing the biologic infusion within the last 3 - 6 months?   Documentation of safety questions prior to infusion:   RN TO NOTIFY MD IF "YES" answered to any of below questions prior to infusion:    Symptoms in past 2 weeks? (fever, night sweats, URI or Flu-like symptoms, cough, painful urination, warm/red/painful skin, skin ulcers/wounds, tooth infection/abscess): NO  Current symptoms of an active infection? NO  Temperature greater than 100.4 in the last 48 hours? No  Recent infections that required antibiotic use in the last 10-14 days?YES  If patient has had surgery, any problems with the surgical wound (drainage, redness, tenderness)? No If yes then has surgeon cleared patient prior to getting this infusion? N/A  Pregnant? N/A  If yes, is prescribing provider aware of this pregnancy?  No  NEW or WORSENING abdominal pain, diarrhea, nausea/vomiting? No  Any SOB, ankle/feet swelling, or sudden weight gain? No    Patient tolerated infusion well today, vital signs remained normal throughout infusion and patient left with no apparent distress:  Yes  6  Received next infusion date prior to discharge: Yes    Additional note to provider:  No    "

## 2018-12-13 ENCOUNTER — INFUSION (OUTPATIENT)
Dept: INFECTIOUS DISEASES | Facility: HOSPITAL | Age: 33
End: 2018-12-13
Attending: INTERNAL MEDICINE
Payer: MEDICARE

## 2018-12-13 VITALS
SYSTOLIC BLOOD PRESSURE: 140 MMHG | BODY MASS INDEX: 30.5 KG/M2 | RESPIRATION RATE: 18 BRPM | DIASTOLIC BLOOD PRESSURE: 82 MMHG | OXYGEN SATURATION: 98 % | TEMPERATURE: 98 F | HEIGHT: 68 IN | WEIGHT: 201.25 LBS | HEART RATE: 115 BPM

## 2018-12-13 DIAGNOSIS — K50.918 CROHN'S DISEASE WITH OTHER COMPLICATION, UNSPECIFIED GASTROINTESTINAL TRACT LOCATION: Primary | ICD-10-CM

## 2018-12-13 PROCEDURE — 63600175 PHARM REV CODE 636 W HCPCS: Mod: JG | Performed by: INTERNAL MEDICINE

## 2018-12-13 PROCEDURE — 96365 THER/PROPH/DIAG IV INF INIT: CPT

## 2018-12-13 PROCEDURE — 96374 THER/PROPH/DIAG INJ IV PUSH: CPT

## 2018-12-13 PROCEDURE — 25000003 PHARM REV CODE 250: Performed by: INTERNAL MEDICINE

## 2018-12-13 RX ORDER — DIPHENHYDRAMINE HYDROCHLORIDE 50 MG/ML
25 INJECTION INTRAMUSCULAR; INTRAVENOUS
Status: DISCONTINUED | OUTPATIENT
Start: 2018-12-13 | End: 2018-12-13 | Stop reason: HOSPADM

## 2018-12-13 RX ORDER — SODIUM CHLORIDE 0.9 % (FLUSH) 0.9 %
10 SYRINGE (ML) INJECTION
Status: DISCONTINUED | OUTPATIENT
Start: 2018-12-13 | End: 2018-12-13 | Stop reason: HOSPADM

## 2018-12-13 RX ADMIN — DIPHENHYDRAMINE HYDROCHLORIDE 25 MG: 50 INJECTION INTRAMUSCULAR; INTRAVENOUS at 09:12

## 2018-12-13 RX ADMIN — VEDOLIZUMAB 300 MG: 300 INJECTION, POWDER, LYOPHILIZED, FOR SOLUTION INTRAVENOUS at 10:12

## 2018-12-13 NOTE — PLAN OF CARE
Problem: Adult Inpatient Plan of Care  Goal: Plan of Care Review  Patient understands plan of care

## 2019-02-07 ENCOUNTER — INFUSION (OUTPATIENT)
Dept: INFECTIOUS DISEASES | Facility: HOSPITAL | Age: 34
End: 2019-02-07
Attending: INTERNAL MEDICINE
Payer: MEDICARE

## 2019-02-07 VITALS
SYSTOLIC BLOOD PRESSURE: 151 MMHG | TEMPERATURE: 99 F | BODY MASS INDEX: 31.41 KG/M2 | WEIGHT: 207.25 LBS | DIASTOLIC BLOOD PRESSURE: 86 MMHG | HEART RATE: 86 BPM | HEIGHT: 68 IN

## 2019-02-07 DIAGNOSIS — K50.918 CROHN'S DISEASE WITH OTHER COMPLICATION, UNSPECIFIED GASTROINTESTINAL TRACT LOCATION: Primary | ICD-10-CM

## 2019-02-07 PROCEDURE — 25000003 PHARM REV CODE 250: Performed by: INTERNAL MEDICINE

## 2019-02-07 PROCEDURE — 63600175 PHARM REV CODE 636 W HCPCS: Mod: JG | Performed by: INTERNAL MEDICINE

## 2019-02-07 PROCEDURE — 96365 THER/PROPH/DIAG IV INF INIT: CPT

## 2019-02-07 RX ORDER — EPINEPHRINE 0.3 MG/.3ML
0.3 INJECTION SUBCUTANEOUS
Status: DISCONTINUED | OUTPATIENT
Start: 2019-02-07 | End: 2019-02-07 | Stop reason: HOSPADM

## 2019-02-07 RX ORDER — DIPHENHYDRAMINE HYDROCHLORIDE 50 MG/ML
25 INJECTION INTRAMUSCULAR; INTRAVENOUS
Status: DISCONTINUED | OUTPATIENT
Start: 2019-02-07 | End: 2019-02-07 | Stop reason: HOSPADM

## 2019-02-07 RX ORDER — METHYLPREDNISOLONE SOD SUCC 125 MG
40 VIAL (EA) INJECTION
Status: DISCONTINUED | OUTPATIENT
Start: 2019-02-07 | End: 2019-02-07 | Stop reason: HOSPADM

## 2019-02-07 RX ORDER — SODIUM CHLORIDE 0.9 % (FLUSH) 0.9 %
10 SYRINGE (ML) INJECTION
Status: DISCONTINUED | OUTPATIENT
Start: 2019-02-07 | End: 2019-02-07 | Stop reason: HOSPADM

## 2019-02-07 RX ORDER — ACETAMINOPHEN 325 MG/1
650 TABLET ORAL
Status: DISCONTINUED | OUTPATIENT
Start: 2019-02-07 | End: 2019-02-07 | Stop reason: HOSPADM

## 2019-02-07 RX ORDER — IPRATROPIUM BROMIDE AND ALBUTEROL SULFATE 2.5; .5 MG/3ML; MG/3ML
3 SOLUTION RESPIRATORY (INHALATION)
Status: DISCONTINUED | OUTPATIENT
Start: 2019-02-07 | End: 2019-02-07 | Stop reason: HOSPADM

## 2019-02-07 RX ORDER — HEPARIN 100 UNIT/ML
500 SYRINGE INTRAVENOUS
Status: DISCONTINUED | OUTPATIENT
Start: 2019-02-07 | End: 2019-02-07 | Stop reason: HOSPADM

## 2019-02-07 RX ADMIN — VEDOLIZUMAB 300 MG: 300 INJECTION, POWDER, LYOPHILIZED, FOR SOLUTION INTRAVENOUS at 10:02

## 2019-02-07 NOTE — PROGRESS NOTES
"Infusion medication:  entyvio   Today's weight:    Wt Readings from Last 1 Encounters:   02/07/19 94 kg (207 lb 3.7 oz)         Checklist prior to infusion:  Is the Biologic RX (therapy plan) up to date within the past one year? Yes  Are the most recent labs within the last 3 - 6 months ? YES  Has the patient had an appointment with the MD/ANIKA that is prescribing the biologic infusion within the last 3 - 6 months?   Documentation of safety questions prior to infusion:   RN TO NOTIFY MD IF "YES" answered to any of below questions prior to infusion:    Symptoms in past 2 weeks? (fever, night sweats, URI or Flu-like symptoms, cough, painful urination, warm/red/painful skin, skin ulcers/wounds, tooth infection/abscess): NO  Current symptoms of an active infection? NO  Temperature greater than 100.4 in the last 48 hours? No  Recent infections that required antibiotic use in the last 10-14 days?YES  If patient has had surgery, any problems with the surgical wound (drainage, redness, tenderness)? No If yes then has surgeon cleared patient prior to getting this infusion? N/A  Pregnant? N/A  If yes, is prescribing provider aware of this pregnancy?  No  NEW or WORSENING abdominal pain, diarrhea, nausea/vomiting? No  Any SOB, ankle/feet swelling, or sudden weight gain? No    Patient tolerated infusion well today, vital signs remained normal throughout infusion and patient left with no apparent distress:  Yes    Received next infusion date prior to discharge: Yes    Additional note to provider:  No  "

## 2019-03-25 RX ORDER — ACETAMINOPHEN 325 MG/1
650 TABLET ORAL
Status: CANCELLED | OUTPATIENT
Start: 2019-03-31

## 2019-03-25 RX ORDER — DIPHENHYDRAMINE HYDROCHLORIDE 50 MG/ML
25 INJECTION INTRAMUSCULAR; INTRAVENOUS
Status: CANCELLED | OUTPATIENT
Start: 2019-03-31

## 2019-03-25 RX ORDER — HEPARIN 100 UNIT/ML
500 SYRINGE INTRAVENOUS
Status: CANCELLED | OUTPATIENT
Start: 2019-03-31

## 2019-03-25 RX ORDER — EPINEPHRINE 1 MG/ML
0.3 INJECTION, SOLUTION, CONCENTRATE INTRAVENOUS
Status: CANCELLED | OUTPATIENT
Start: 2019-03-31

## 2019-03-25 RX ORDER — METHYLPREDNISOLONE SOD SUCC 125 MG
40 VIAL (EA) INJECTION
Status: CANCELLED | OUTPATIENT
Start: 2019-03-31

## 2019-03-25 RX ORDER — SODIUM CHLORIDE 0.9 % (FLUSH) 0.9 %
10 SYRINGE (ML) INJECTION
Status: CANCELLED | OUTPATIENT
Start: 2019-03-31

## 2019-03-25 RX ORDER — IPRATROPIUM BROMIDE AND ALBUTEROL SULFATE 2.5; .5 MG/3ML; MG/3ML
3 SOLUTION RESPIRATORY (INHALATION)
Status: CANCELLED | OUTPATIENT
Start: 2019-03-31

## 2019-04-04 ENCOUNTER — INFUSION (OUTPATIENT)
Dept: INFECTIOUS DISEASES | Facility: HOSPITAL | Age: 34
End: 2019-04-04
Attending: INTERNAL MEDICINE
Payer: MEDICARE

## 2019-04-04 VITALS
WEIGHT: 198.19 LBS | TEMPERATURE: 98 F | HEART RATE: 90 BPM | RESPIRATION RATE: 16 BRPM | DIASTOLIC BLOOD PRESSURE: 82 MMHG | BODY MASS INDEX: 30.14 KG/M2 | OXYGEN SATURATION: 97 % | SYSTOLIC BLOOD PRESSURE: 143 MMHG

## 2019-04-04 DIAGNOSIS — K50.918 CROHN'S DISEASE WITH OTHER COMPLICATION, UNSPECIFIED GASTROINTESTINAL TRACT LOCATION: Primary | ICD-10-CM

## 2019-04-04 PROCEDURE — 96365 THER/PROPH/DIAG IV INF INIT: CPT

## 2019-04-04 PROCEDURE — 25000003 PHARM REV CODE 250: Performed by: INTERNAL MEDICINE

## 2019-04-04 PROCEDURE — 63600175 PHARM REV CODE 636 W HCPCS: Mod: JG | Performed by: INTERNAL MEDICINE

## 2019-04-04 RX ORDER — DIPHENHYDRAMINE HYDROCHLORIDE 50 MG/ML
25 INJECTION INTRAMUSCULAR; INTRAVENOUS
Status: CANCELLED | OUTPATIENT
Start: 2019-05-30

## 2019-04-04 RX ORDER — SODIUM CHLORIDE 0.9 % (FLUSH) 0.9 %
10 SYRINGE (ML) INJECTION
Status: DISCONTINUED | OUTPATIENT
Start: 2019-04-04 | End: 2019-04-04 | Stop reason: HOSPADM

## 2019-04-04 RX ORDER — HEPARIN 100 UNIT/ML
500 SYRINGE INTRAVENOUS
Status: CANCELLED | OUTPATIENT
Start: 2019-05-30

## 2019-04-04 RX ORDER — IPRATROPIUM BROMIDE AND ALBUTEROL SULFATE 2.5; .5 MG/3ML; MG/3ML
3 SOLUTION RESPIRATORY (INHALATION)
Status: CANCELLED | OUTPATIENT
Start: 2019-05-30

## 2019-04-04 RX ORDER — SODIUM CHLORIDE 0.9 % (FLUSH) 0.9 %
10 SYRINGE (ML) INJECTION
Status: CANCELLED | OUTPATIENT
Start: 2019-05-30

## 2019-04-04 RX ORDER — ACETAMINOPHEN 325 MG/1
650 TABLET ORAL
Status: CANCELLED | OUTPATIENT
Start: 2019-05-30

## 2019-04-04 RX ORDER — EPINEPHRINE 1 MG/ML
0.3 INJECTION, SOLUTION, CONCENTRATE INTRAVENOUS
Status: CANCELLED | OUTPATIENT
Start: 2019-05-30

## 2019-04-04 RX ORDER — METHYLPREDNISOLONE SOD SUCC 125 MG
40 VIAL (EA) INJECTION
Status: CANCELLED | OUTPATIENT
Start: 2019-05-30

## 2019-04-04 RX ADMIN — VEDOLIZUMAB 300 MG: 300 INJECTION, POWDER, LYOPHILIZED, FOR SOLUTION INTRAVENOUS at 11:04

## 2019-04-04 NOTE — PROGRESS NOTES
"Infusion medication:  entyvio  Today's weight:    Wt Readings from Last 1 Encounters:   04/04/19 89.9 kg (198 lb 3.1 oz)         Checklist prior to infusion:  Is the Biologic RX (therapy plan) up to date within the past one year? Yes  Are the most recent labs within the last 3 - 6 months ? Yes  Has the patient had an appointment with the MD/ANIKA that is prescribing the biologic infusion within the last 3 - 6 months? Yes  Documentation of safety questions prior to infusion:   RN TO NOTIFY MD IF "YES" answered to any of below questions prior to infusion:    Symptoms in past 2 weeks? (fever, night sweats, URI or Flu-like symptoms, cough, painful urination, warm/red/painful skin, skin ulcers/wounds, tooth infection/abscess): No  Current symptoms of an active infection? No  Temperature greater than 100.4 in the last 48 hours? No  Recent infections that required antibiotic use in the last 10-14 days?No  If patient has had surgery, any problems with the surgical wound (drainage, redness, tenderness)? No If yes then has surgeon cleared patient prior to getting this infusion? No  Pregnant? No  If yes, is prescribing provider aware of this pregnancy?  No  NEW or WORSENING abdominal pain, diarrhea, nausea/vomiting? No  Any SOB, ankle/feet swelling, or sudden weight gain? No    Patient tolerated infusion well today, vital signs remained normal throughout infusion and patient left with no apparent distress:  Yes  6  Received next infusion date prior to discharge: Yes    Additional note to provider:  No        "

## 2019-06-10 ENCOUNTER — INFUSION (OUTPATIENT)
Dept: INFECTIOUS DISEASES | Facility: HOSPITAL | Age: 34
End: 2019-06-10
Attending: INTERNAL MEDICINE
Payer: MEDICARE

## 2019-06-10 VITALS
SYSTOLIC BLOOD PRESSURE: 139 MMHG | WEIGHT: 199.31 LBS | HEART RATE: 84 BPM | OXYGEN SATURATION: 98 % | TEMPERATURE: 99 F | RESPIRATION RATE: 17 BRPM | DIASTOLIC BLOOD PRESSURE: 75 MMHG | HEIGHT: 68 IN | BODY MASS INDEX: 30.21 KG/M2

## 2019-06-10 DIAGNOSIS — K50.918 CROHN'S DISEASE WITH OTHER COMPLICATION, UNSPECIFIED GASTROINTESTINAL TRACT LOCATION: Primary | ICD-10-CM

## 2019-06-10 PROCEDURE — 25000003 PHARM REV CODE 250: Performed by: INTERNAL MEDICINE

## 2019-06-10 PROCEDURE — 63600175 PHARM REV CODE 636 W HCPCS: Mod: JG | Performed by: INTERNAL MEDICINE

## 2019-06-10 PROCEDURE — 96365 THER/PROPH/DIAG IV INF INIT: CPT

## 2019-06-10 RX ORDER — HEPARIN 100 UNIT/ML
500 SYRINGE INTRAVENOUS
Status: CANCELLED | OUTPATIENT
Start: 2019-07-22

## 2019-06-10 RX ORDER — SODIUM CHLORIDE 0.9 % (FLUSH) 0.9 %
10 SYRINGE (ML) INJECTION
Status: CANCELLED | OUTPATIENT
Start: 2019-07-22

## 2019-06-10 RX ORDER — METHYLPREDNISOLONE SOD SUCC 125 MG
40 VIAL (EA) INJECTION
Status: CANCELLED | OUTPATIENT
Start: 2019-07-22

## 2019-06-10 RX ORDER — SODIUM CHLORIDE 0.9 % (FLUSH) 0.9 %
10 SYRINGE (ML) INJECTION
Status: DISCONTINUED | OUTPATIENT
Start: 2019-06-10 | End: 2019-06-10 | Stop reason: HOSPADM

## 2019-06-10 RX ORDER — EPINEPHRINE 1 MG/ML
0.3 INJECTION, SOLUTION, CONCENTRATE INTRAVENOUS
Status: CANCELLED | OUTPATIENT
Start: 2019-07-22

## 2019-06-10 RX ORDER — IPRATROPIUM BROMIDE AND ALBUTEROL SULFATE 2.5; .5 MG/3ML; MG/3ML
3 SOLUTION RESPIRATORY (INHALATION)
Status: CANCELLED | OUTPATIENT
Start: 2019-07-22

## 2019-06-10 RX ORDER — DIPHENHYDRAMINE HYDROCHLORIDE 50 MG/ML
25 INJECTION INTRAMUSCULAR; INTRAVENOUS
Status: CANCELLED | OUTPATIENT
Start: 2019-07-22

## 2019-06-10 RX ORDER — ACETAMINOPHEN 325 MG/1
650 TABLET ORAL
Status: CANCELLED | OUTPATIENT
Start: 2019-07-22

## 2019-06-10 RX ADMIN — VEDOLIZUMAB 300 MG: 300 INJECTION, POWDER, LYOPHILIZED, FOR SOLUTION INTRAVENOUS at 09:06

## 2019-06-10 NOTE — PROGRESS NOTES
"Infusion medication:  entyvio  Today's weight:    Wt Readings from Last 1 Encounters:   06/10/19 90.4 kg (199 lb 4.7 oz)         Checklist prior to infusion:  Is the Biologic RX (therapy plan) up to date within the past one year? Yes  Are the most recent labs within the last 3 - 6 months ? Yes  Has the patient had an appointment with the MD/ANIKA that is prescribing the biologic infusion within the last 3 - 6 months? Yes  Documentation of safety questions prior to infusion:   RN TO NOTIFY MD IF "YES" answered to any of below questions prior to infusion:    Symptoms in past 2 weeks? (fever, night sweats, URI or Flu-like symptoms, cough, painful urination, warm/red/painful skin, skin ulcers/wounds, tooth infection/abscess): No  Current symptoms of an active infection? No  Temperature greater than 100.4 in the last 48 hours? No  Recent infections that required antibiotic use in the last 10-14 days?No  If patient has had surgery, any problems with the surgical wound (drainage, redness, tenderness)? No If yes then has surgeon cleared patient prior to getting this infusion? No  Pregnant? No  If yes, is prescribing provider aware of this pregnancy?  No  NEW or WORSENING abdominal pain, diarrhea, nausea/vomiting? No  Any SOB, ankle/feet swelling, or sudden weight gain? No    Patient tolerated infusion well today, vital signs remained normal throughout infusion and patient left with no apparent distress:  Yes  6  Received next infusion date prior to discharge: Yes    Additional note to provider:  No      "

## 2019-08-12 ENCOUNTER — INFUSION (OUTPATIENT)
Dept: INFECTIOUS DISEASES | Facility: HOSPITAL | Age: 34
End: 2019-08-12
Attending: INTERNAL MEDICINE
Payer: MEDICARE

## 2019-08-12 VITALS
BODY MASS INDEX: 31.28 KG/M2 | SYSTOLIC BLOOD PRESSURE: 131 MMHG | HEIGHT: 68 IN | HEART RATE: 91 BPM | RESPIRATION RATE: 19 BRPM | WEIGHT: 206.38 LBS | TEMPERATURE: 98 F | OXYGEN SATURATION: 97 % | DIASTOLIC BLOOD PRESSURE: 82 MMHG

## 2019-08-12 DIAGNOSIS — K50.918 CROHN'S DISEASE WITH OTHER COMPLICATION, UNSPECIFIED GASTROINTESTINAL TRACT LOCATION: Primary | ICD-10-CM

## 2019-08-12 PROCEDURE — 96365 THER/PROPH/DIAG IV INF INIT: CPT

## 2019-08-12 PROCEDURE — 63600175 PHARM REV CODE 636 W HCPCS: Mod: JG | Performed by: INTERNAL MEDICINE

## 2019-08-12 RX ORDER — ACETAMINOPHEN 325 MG/1
650 TABLET ORAL
Status: CANCELLED | OUTPATIENT
Start: 2019-09-30

## 2019-08-12 RX ORDER — METHYLPREDNISOLONE SOD SUCC 125 MG
40 VIAL (EA) INJECTION
Status: CANCELLED | OUTPATIENT
Start: 2019-09-30

## 2019-08-12 RX ORDER — HEPARIN 100 UNIT/ML
500 SYRINGE INTRAVENOUS
Status: CANCELLED | OUTPATIENT
Start: 2019-09-30

## 2019-08-12 RX ORDER — SODIUM CHLORIDE 0.9 % (FLUSH) 0.9 %
10 SYRINGE (ML) INJECTION
Status: DISCONTINUED | OUTPATIENT
Start: 2019-08-12 | End: 2019-08-12 | Stop reason: HOSPADM

## 2019-08-12 RX ORDER — SODIUM CHLORIDE 0.9 % (FLUSH) 0.9 %
10 SYRINGE (ML) INJECTION
Status: CANCELLED | OUTPATIENT
Start: 2019-09-30

## 2019-08-12 RX ORDER — DIPHENHYDRAMINE HYDROCHLORIDE 50 MG/ML
25 INJECTION INTRAMUSCULAR; INTRAVENOUS
Status: CANCELLED | OUTPATIENT
Start: 2019-09-30

## 2019-08-12 RX ORDER — EPINEPHRINE 1 MG/ML
0.3 INJECTION, SOLUTION, CONCENTRATE INTRAVENOUS
Status: CANCELLED | OUTPATIENT
Start: 2019-09-30

## 2019-08-12 RX ORDER — IPRATROPIUM BROMIDE AND ALBUTEROL SULFATE 2.5; .5 MG/3ML; MG/3ML
3 SOLUTION RESPIRATORY (INHALATION)
Status: CANCELLED | OUTPATIENT
Start: 2019-09-30

## 2019-08-12 RX ADMIN — VEDOLIZUMAB 300 MG: 300 INJECTION, POWDER, LYOPHILIZED, FOR SOLUTION INTRAVENOUS at 10:08

## 2019-08-12 NOTE — PROGRESS NOTES
"Infusion medication:  entyvio  Today's weight:    Wt Readings from Last 1 Encounters:   08/12/19 93.6 kg (206 lb 5.6 oz)         Checklist prior to infusion:  Is the Biologic RX (therapy plan) up to date within the past one year? Yes  Are the most recent labs within the last 3 - 6 months ? Yes  Has the patient had an appointment with the MD/ANIKA that is prescribing the biologic infusion within the last 3 - 6 months? No  Documentation of safety questions prior to infusion:   RN TO NOTIFY MD IF "YES" answered to any of below questions prior to infusion:    Symptoms in past 2 weeks? (fever, night sweats, URI or Flu-like symptoms, cough, painful urination, warm/red/painful skin, skin ulcers/wounds, tooth infection/abscess): No  Current symptoms of an active infection? No  Temperature greater than 100.4 in the last 48 hours? No  Recent infections that required antibiotic use in the last 10-14 days?No  If patient has had surgery, any problems with the surgical wound (drainage, redness, tenderness)? No If yes then has surgeon cleared patient prior to getting this infusion? No  Pregnant? No  If yes, is prescribing provider aware of this pregnancy?  No  NEW or WORSENING abdominal pain, diarrhea, nausea/vomiting? No  Any SOB, ankle/feet swelling, or sudden weight gain? No    Patient tolerated infusion well today, vital signs remained normal throughout infusion and patient left with no apparent distress, pt will make an appointment with md prior to next visit.   Yes  6  Received next infusion date prior to discharge: Yes    Additional note to provider:  No    "

## 2019-08-22 ENCOUNTER — TELEPHONE (OUTPATIENT)
Dept: GASTROENTEROLOGY | Facility: CLINIC | Age: 34
End: 2019-08-22

## 2019-08-22 NOTE — TELEPHONE ENCOUNTER
----- Message from Jasmin Anderson sent at 8/22/2019 12:53 PM CDT -----  Contact: Madison with [facility]#[phone]  She's calling for a diagnosis code for entyvio

## 2019-08-23 ENCOUNTER — TELEPHONE (OUTPATIENT)
Dept: GASTROENTEROLOGY | Facility: CLINIC | Age: 34
End: 2019-08-23

## 2019-08-23 NOTE — TELEPHONE ENCOUNTER
Patient is aware Mrs. Crump is out of the office today and will contact him when she returns.     Patient verbalized understanding and thanked MA for call.

## 2019-08-23 NOTE — TELEPHONE ENCOUNTER
----- Message from Hansa Mccarthy sent at 8/23/2019  2:13 PM CDT -----  Contact: Madison [facility] [phone]  Patient has been denied for assistance for entyvio he needs to apply through Medicaid  Please call back to discuss

## 2019-08-27 ENCOUNTER — TELEPHONE (OUTPATIENT)
Dept: GASTROENTEROLOGY | Facility: CLINIC | Age: 34
End: 2019-08-27

## 2019-08-27 NOTE — TELEPHONE ENCOUNTER
----- Message from Hansa Mccarthy sent at 8/23/2019  2:13 PM CDT -----  Contact: Madison [facility] [phone]  Patient has been denied for assistance for entyvio he needs to apply through Medicaid  Please call back to discuss

## 2019-10-07 ENCOUNTER — INFUSION (OUTPATIENT)
Dept: INFECTIOUS DISEASES | Facility: HOSPITAL | Age: 34
End: 2019-10-07
Attending: INTERNAL MEDICINE
Payer: MEDICARE

## 2019-10-07 VITALS
OXYGEN SATURATION: 97 % | SYSTOLIC BLOOD PRESSURE: 144 MMHG | TEMPERATURE: 98 F | BODY MASS INDEX: 31.78 KG/M2 | HEART RATE: 95 BPM | HEIGHT: 68 IN | WEIGHT: 209.69 LBS | RESPIRATION RATE: 18 BRPM | DIASTOLIC BLOOD PRESSURE: 94 MMHG

## 2019-10-07 DIAGNOSIS — K50.918 CROHN'S DISEASE WITH OTHER COMPLICATION, UNSPECIFIED GASTROINTESTINAL TRACT LOCATION: Primary | ICD-10-CM

## 2019-10-07 PROCEDURE — 63600175 PHARM REV CODE 636 W HCPCS: Performed by: INTERNAL MEDICINE

## 2019-10-07 PROCEDURE — 96365 THER/PROPH/DIAG IV INF INIT: CPT

## 2019-10-07 RX ORDER — EPINEPHRINE 1 MG/ML
0.3 INJECTION, SOLUTION, CONCENTRATE INTRAVENOUS
Status: CANCELLED | OUTPATIENT
Start: 2019-12-02

## 2019-10-07 RX ORDER — METHYLPREDNISOLONE SOD SUCC 125 MG
40 VIAL (EA) INJECTION
Status: CANCELLED | OUTPATIENT
Start: 2019-12-02

## 2019-10-07 RX ORDER — IPRATROPIUM BROMIDE AND ALBUTEROL SULFATE 2.5; .5 MG/3ML; MG/3ML
3 SOLUTION RESPIRATORY (INHALATION)
Status: CANCELLED | OUTPATIENT
Start: 2019-12-02

## 2019-10-07 RX ORDER — SODIUM CHLORIDE 0.9 % (FLUSH) 0.9 %
10 SYRINGE (ML) INJECTION
Status: CANCELLED | OUTPATIENT
Start: 2019-12-02

## 2019-10-07 RX ORDER — ACETAMINOPHEN 325 MG/1
650 TABLET ORAL
Status: CANCELLED | OUTPATIENT
Start: 2019-12-02

## 2019-10-07 RX ORDER — HEPARIN 100 UNIT/ML
500 SYRINGE INTRAVENOUS
Status: CANCELLED | OUTPATIENT
Start: 2019-12-02

## 2019-10-07 RX ORDER — SODIUM CHLORIDE 0.9 % (FLUSH) 0.9 %
10 SYRINGE (ML) INJECTION
Status: DISCONTINUED | OUTPATIENT
Start: 2019-10-07 | End: 2019-10-07 | Stop reason: HOSPADM

## 2019-10-07 RX ORDER — DIPHENHYDRAMINE HYDROCHLORIDE 50 MG/ML
25 INJECTION INTRAMUSCULAR; INTRAVENOUS
Status: CANCELLED | OUTPATIENT
Start: 2019-12-02

## 2019-10-07 RX ADMIN — VEDOLIZUMAB 300 MG: 300 INJECTION, POWDER, LYOPHILIZED, FOR SOLUTION INTRAVENOUS at 09:10

## 2019-10-07 NOTE — PROGRESS NOTES
"  Infusion medication:  Entyvio  Today's weight:  95.10 kg  Wt Readings from Last 1 Encounters:   07/21/17 109.8 kg (242 lb)         Checklist prior to infusion:  Is the Biologic RX (therapy plan) up to date within the past one year? Yes  Are the most recent labs within the last 3 - 6 months ? No  Has the patient had an appointment with the MD/ANIKA that is prescribing the biologic infusion within the last 3 - 6 months? No , Notified Alisia at Dr. Freeman's office. She stated ok for patient to receive infusion.   Documentation of safety questions prior to infusion:   RN TO NOTIFY MD IF "YES" answered to any of below questions prior to infusion:    Symptoms in past 2 weeks? (fever, night sweats, URI or Flu-like symptoms, cough, painful urination, warm/red/painful skin, skin ulcers/wounds, tooth infection/abscess): No  Current symptoms of an active infection? No  Temperature greater than 100.4 in the last 48 hours? No  Recent infections that required antibiotic use in the last 10-14 days?No  If patient has had surgery, any problems with the surgical wound (drainage, redness, tenderness)? No If yes then has surgeon cleared patient prior to getting this infusion? N/A  Pregnant? N/A  If yes, is prescribing provider aware of this pregnancy?  No  NEW or WORSENING abdominal pain, diarrhea, nausea/vomiting? No  Any SOB, ankle/feet swelling, or sudden weight gain? No    Patient tolerated infusion well today, vital signs remained normal throughout infusion and patient left with no apparent distress:  Yes  6  Received next infusion date prior to discharge: Yes    Additional note to provider:  Yes, last patient appointment was on 2/19  "

## 2020-02-08 ENCOUNTER — HOSPITAL ENCOUNTER (EMERGENCY)
Facility: HOSPITAL | Age: 35
Discharge: HOME | End: 2020-02-09
Attending: EMERGENCY MEDICINE
Payer: OTHER GOVERNMENT

## 2020-02-08 DIAGNOSIS — D72.829 LEUKOCYTOSIS, UNSPECIFIED TYPE: ICD-10-CM

## 2020-02-08 DIAGNOSIS — K61.0 PERIANAL CELLULITIS: ICD-10-CM

## 2020-02-08 DIAGNOSIS — K60.3 PERIANAL FISTULA: Primary | ICD-10-CM

## 2020-02-08 PROCEDURE — 99285 EMERGENCY DEPT VISIT HI MDM: CPT | Mod: ,,, | Performed by: EMERGENCY MEDICINE

## 2020-02-08 PROCEDURE — 96374 THER/PROPH/DIAG INJ IV PUSH: CPT

## 2020-02-08 PROCEDURE — 85025 COMPLETE CBC W/AUTO DIFF WBC: CPT

## 2020-02-08 PROCEDURE — 99285 EMERGENCY DEPT VISIT HI MDM: CPT | Mod: 25

## 2020-02-08 PROCEDURE — 85652 RBC SED RATE AUTOMATED: CPT

## 2020-02-08 PROCEDURE — 99285 PR EMERGENCY DEPT VISIT,LEVEL V: ICD-10-PCS | Mod: ,,, | Performed by: EMERGENCY MEDICINE

## 2020-02-08 PROCEDURE — 80047 BASIC METABLC PNL IONIZED CA: CPT

## 2020-02-08 RX ORDER — MORPHINE SULFATE 4 MG/ML
8 INJECTION, SOLUTION INTRAMUSCULAR; INTRAVENOUS
Status: COMPLETED | OUTPATIENT
Start: 2020-02-08 | End: 2020-02-09

## 2020-02-09 VITALS
OXYGEN SATURATION: 99 % | RESPIRATION RATE: 16 BRPM | TEMPERATURE: 98 F | HEIGHT: 68 IN | DIASTOLIC BLOOD PRESSURE: 75 MMHG | BODY MASS INDEX: 31.07 KG/M2 | SYSTOLIC BLOOD PRESSURE: 140 MMHG | HEART RATE: 96 BPM | WEIGHT: 205 LBS

## 2020-02-09 LAB
ANION GAP SERPL CALC-SCNC: 9 MMOL/L (ref 8–16)
BASOPHILS # BLD AUTO: 0.02 K/UL (ref 0–0.2)
BASOPHILS NFR BLD: 0.1 % (ref 0–1.9)
BUN SERPL-MCNC: 12 MG/DL (ref 6–20)
BUN SERPL-MCNC: 13 MG/DL (ref 6–30)
CALCIUM SERPL-MCNC: 9.1 MG/DL (ref 8.7–10.5)
CHLORIDE SERPL-SCNC: 102 MMOL/L (ref 95–110)
CHLORIDE SERPL-SCNC: 104 MMOL/L (ref 95–110)
CO2 SERPL-SCNC: 24 MMOL/L (ref 23–29)
CREAT SERPL-MCNC: 0.7 MG/DL (ref 0.5–1.4)
CREAT SERPL-MCNC: 0.8 MG/DL (ref 0.5–1.4)
CRP SERPL-MCNC: 34.9 MG/L (ref 0–8.2)
DIFFERENTIAL METHOD: ABNORMAL
EOSINOPHIL # BLD AUTO: 0.2 K/UL (ref 0–0.5)
EOSINOPHIL NFR BLD: 1.7 % (ref 0–8)
ERYTHROCYTE [DISTWIDTH] IN BLOOD BY AUTOMATED COUNT: 16.1 % (ref 11.5–14.5)
ERYTHROCYTE [SEDIMENTATION RATE] IN BLOOD BY WESTERGREN METHOD: 53 MM/HR (ref 0–23)
EST. GFR  (AFRICAN AMERICAN): >60 ML/MIN/1.73 M^2
EST. GFR  (NON AFRICAN AMERICAN): >60 ML/MIN/1.73 M^2
GLUCOSE SERPL-MCNC: 87 MG/DL (ref 70–110)
GLUCOSE SERPL-MCNC: 97 MG/DL (ref 70–110)
HCT VFR BLD AUTO: 42.2 % (ref 40–54)
HCT VFR BLD CALC: 40 %PCV (ref 36–54)
HGB BLD-MCNC: 14.1 G/DL (ref 14–18)
IMM GRANULOCYTES # BLD AUTO: 0.04 K/UL (ref 0–0.04)
IMM GRANULOCYTES NFR BLD AUTO: 0.3 % (ref 0–0.5)
LYMPHOCYTES # BLD AUTO: 3.4 K/UL (ref 1–4.8)
LYMPHOCYTES NFR BLD: 24.8 % (ref 18–48)
MCH RBC QN AUTO: 29.3 PG (ref 27–31)
MCHC RBC AUTO-ENTMCNC: 33.4 G/DL (ref 32–36)
MCV RBC AUTO: 88 FL (ref 82–98)
MONOCYTES # BLD AUTO: 1.2 K/UL (ref 0.3–1)
MONOCYTES NFR BLD: 8.8 % (ref 4–15)
NEUTROPHILS # BLD AUTO: 8.7 K/UL (ref 1.8–7.7)
NEUTROPHILS NFR BLD: 64.3 % (ref 38–73)
NRBC BLD-RTO: 0 /100 WBC
PLATELET # BLD AUTO: 374 K/UL (ref 150–350)
PMV BLD AUTO: 11.9 FL (ref 9.2–12.9)
POC IONIZED CALCIUM: 1.16 MMOL/L (ref 1.06–1.42)
POC TCO2 (MEASURED): 25 MMOL/L (ref 23–29)
POTASSIUM BLD-SCNC: 3.7 MMOL/L (ref 3.5–5.1)
POTASSIUM SERPL-SCNC: 4 MMOL/L (ref 3.5–5.1)
RBC # BLD AUTO: 4.81 M/UL (ref 4.6–6.2)
SAMPLE: NORMAL
SODIUM BLD-SCNC: 138 MMOL/L (ref 136–145)
SODIUM SERPL-SCNC: 135 MMOL/L (ref 136–145)
WBC # BLD AUTO: 13.61 K/UL (ref 3.9–12.7)

## 2020-02-09 PROCEDURE — 80048 BASIC METABOLIC PNL TOTAL CA: CPT

## 2020-02-09 PROCEDURE — 25500020 PHARM REV CODE 255: Performed by: EMERGENCY MEDICINE

## 2020-02-09 PROCEDURE — 86140 C-REACTIVE PROTEIN: CPT

## 2020-02-09 PROCEDURE — 63600175 PHARM REV CODE 636 W HCPCS: Performed by: EMERGENCY MEDICINE

## 2020-02-09 PROCEDURE — 25000003 PHARM REV CODE 250: Performed by: EMERGENCY MEDICINE

## 2020-02-09 RX ORDER — CLINDAMYCIN HYDROCHLORIDE 150 MG/1
450 CAPSULE ORAL EVERY 8 HOURS
Qty: 63 CAPSULE | Refills: 0 | Status: SHIPPED | OUTPATIENT
Start: 2020-02-09 | End: 2020-02-16

## 2020-02-09 RX ORDER — HYDROCODONE BITARTRATE AND ACETAMINOPHEN 5; 325 MG/1; MG/1
1 TABLET ORAL EVERY 6 HOURS PRN
Qty: 12 TABLET | Refills: 0 | Status: SHIPPED | OUTPATIENT
Start: 2020-02-09 | End: 2020-06-24 | Stop reason: CLARIF

## 2020-02-09 RX ORDER — AMOXICILLIN AND CLAVULANATE POTASSIUM 875; 125 MG/1; MG/1
1 TABLET, FILM COATED ORAL
Status: COMPLETED | OUTPATIENT
Start: 2020-02-09 | End: 2020-02-09

## 2020-02-09 RX ORDER — CLINDAMYCIN HYDROCHLORIDE 150 MG/1
450 CAPSULE ORAL
Status: COMPLETED | OUTPATIENT
Start: 2020-02-09 | End: 2020-02-09

## 2020-02-09 RX ORDER — AMOXICILLIN AND CLAVULANATE POTASSIUM 875; 125 MG/1; MG/1
1 TABLET, FILM COATED ORAL 2 TIMES DAILY
Qty: 14 TABLET | Refills: 0 | Status: SHIPPED | OUTPATIENT
Start: 2020-02-09 | End: 2020-06-24 | Stop reason: CLARIF

## 2020-02-09 RX ADMIN — CLINDAMYCIN HYDROCHLORIDE 450 MG: 150 CAPSULE ORAL at 02:02

## 2020-02-09 RX ADMIN — AMOXICILLIN AND CLAVULANATE POTASSIUM 1 TABLET: 875; 125 TABLET, FILM COATED ORAL at 02:02

## 2020-02-09 RX ADMIN — IOHEXOL 100 ML: 350 INJECTION, SOLUTION INTRAVENOUS at 12:02

## 2020-02-09 RX ADMIN — MORPHINE SULFATE 8 MG: 4 INJECTION, SOLUTION INTRAMUSCULAR; INTRAVENOUS at 12:02

## 2020-02-09 NOTE — ED NOTES
MD Deanna aware of pt's potassium level per ISTAT. VORB received for repeat ISTAT at this time. Pt denies palpitations, CP, dizziness at this time.

## 2020-02-09 NOTE — ED PROVIDER NOTES
Encounter Date: 2020       History     Chief Complaint   Patient presents with    Abscess     Abcess to rectum x2 months. Has gotten it drained before. Denies fevers, chills. + Drainage     HPI   Mr. Calix is a 34 y.o. male with crohn's disease with ileostomy and h/o rectal abscess here today with rectal pain. Reports several months history of rectal pain with intermittent drainage. Has had this in the past and required surgical involvement. Seems to have worsening pain and drainage over past few days, so he came to get checked out. Taken nothing for pain. Denies fevers, chills, n/v, abd pain, chest pain, SOB, diarrhea, constipation, hematochezia, melena, trauma.     Review of patient's allergies indicates:   Allergen Reactions    Ibuprofen Other (See Comments)     Can't take d/t stomach ulcers     Past Medical History:   Diagnosis Date    Anemia     Chronic diarrhea     Clostridium difficile infection     Crohn's disease     Protein calorie malnutrition     Steroid-induced diabetes      Past Surgical History:   Procedure Laterality Date    ABSCESS DRAINAGE      COLONOSCOPY      COLOSTOMY      HERNIA REPAIR      ILEOSTOMY      SIGMOIDECTOMY       Family History   Problem Relation Age of Onset    Diabetes Father     Hyperlipidemia Mother     Diabetes Mother     Crohn's disease Neg Hx     Celiac disease Neg Hx     Cirrhosis Neg Hx     Colon cancer Neg Hx     Esophageal cancer Neg Hx     Irritable bowel syndrome Neg Hx     Liver cancer Neg Hx     Rectal cancer Neg Hx     Stomach cancer Neg Hx     Ulcerative colitis Neg Hx      Social History     Tobacco Use    Smoking status: Current Every Day Smoker     Packs/day: 0.50     Years: 3.00     Pack years: 1.50     Types: Cigarettes     Last attempt to quit: 2014     Years since quittin.2    Smokeless tobacco: Never Used   Substance Use Topics    Alcohol use: Yes     Comment: socially-weekly    Drug use: No     Review of Systems    Constitutional: Negative for fever.   HENT: Negative for sore throat.    Respiratory: Negative for shortness of breath.    Cardiovascular: Negative for chest pain.   Gastrointestinal: Positive for rectal pain. Negative for abdominal distention, abdominal pain, anal bleeding, blood in stool, constipation, diarrhea, nausea and vomiting.   Genitourinary: Negative for dysuria.   Musculoskeletal: Negative for back pain.   Skin: Negative for rash.   Neurological: Negative for weakness.   Hematological: Does not bruise/bleed easily.       Physical Exam     Initial Vitals [02/08/20 2244]   BP Pulse Resp Temp SpO2   (!) 161/89 100 14 98.2 °F (36.8 °C) 97 %      MAP       --         Physical Exam    Nursing note and vitals reviewed.  Constitutional: He appears well-developed and well-nourished. He is not diaphoretic. No distress.   HENT:   Head: Normocephalic and atraumatic.   Right Ear: External ear normal.   Left Ear: External ear normal.   Neck: Neck supple.   Cardiovascular: Normal rate, regular rhythm, normal heart sounds and intact distal pulses.   Pulmonary/Chest: Breath sounds normal. No respiratory distress. He has no wheezes. He has no rhonchi. He has no rales.   Abdominal: Soft. He exhibits no distension. There is no tenderness. There is no rebound and no guarding.   Ileostomy in place with normal stool in collection bag.   Genitourinary:   Genitourinary Comments: Gluteal cleft with scarred tissue with TTP. Mild patchy erythema without increased warmth. No active drainage. Refused GRECIA due to pain.   Neurological: He is alert and oriented to person, place, and time. GCS score is 15. GCS eye subscore is 4. GCS verbal subscore is 5. GCS motor subscore is 6.   Skin: Skin is warm. Capillary refill takes less than 2 seconds. No rash noted.   Psychiatric: He has a normal mood and affect.         ED Course   Procedures  Labs Reviewed   SEDIMENTATION RATE - Abnormal; Notable for the following components:       Result  Value    Sed Rate 53 (*)     All other components within normal limits   CBC W/ AUTO DIFFERENTIAL - Abnormal; Notable for the following components:    WBC 13.61 (*)     RDW 16.1 (*)     Platelets 374 (*)     Gran # (ANC) 8.7 (*)     Mono # 1.2 (*)     All other components within normal limits   BASIC METABOLIC PANEL - Abnormal; Notable for the following components:    Sodium 135 (*)     All other components within normal limits   C-REACTIVE PROTEIN - Abnormal; Notable for the following components:    CRP 34.9 (*)     All other components within normal limits   ISTAT PROCEDURE          Imaging Results          CT Pelvis With Contrast (Final result)  Result time 02/09/20 01:52:52    Final result by Grey Horn MD (02/09/20 01:52:52)                 Impression:      Right-sided perianal fistulous tract with area of right gluteal soft tissue inflammation and skin thickening suggestive of cellulitis and/or draining fistula.  No drainable abscess.    Postoperative changes of total colectomy with right lower abdominal quadrant end ileostomy.    Electronically signed by resident: Lydia Mahajan MD  Date:    02/09/2020  Time:    00:54    Electronically signed by: Grey Horn MD  Date:    02/09/2020  Time:    01:52             Narrative:    EXAMINATION:  CT PELVIS WITH  CONTRAST    CLINICAL HISTORY:  Abscess of anal and rectal regions;    TECHNIQUE:  Low dose axial images, sagittal and coronal reformations were obtained from the iliac crests to the pubic symphysis after the administration of 75 cc Omnipaque 350 intravenous contrast.  Oral contrast was not administered.    COMPARISON:  CT abdomen pelvis with contrast 08/05/2018.    FINDINGS:  BOWEL/MESENTERY: Postoperative changes of total colectomy with right lower abdominal quadrant end ileostomy.  The rectal pouch appear unremarkable without evidence of fluid collections to suggest abscess formation.  No free pelvic air or free fluid.  Prominent presacral  fat.    PROSTATE: Unremarkable.    URINARY BLADDER: Unremarkable.    VASCULATURE: No significant atherosclerotic calcification.    BONES: No acute fracture or bony destructive process.    SOFT TISSUES: Asymmetrical soft tissue thickening and induration of the inner right gluteal fold.  Small perianal fistulous tract is identified in the right superior perineum which is continuous with the area of soft tissue induration in the right gluteal fold (coronal image 31).  This fistula appears new when compared with the prior CT in 2018.  No drainable fluid collections.                                 Medical Decision Making:   History:   Old Medical Records: I decided to obtain old medical records.  Clinical Tests:   Lab Tests: Ordered and Reviewed  Radiological Study: Ordered and Reviewed  ED Management:  Tachycardic in triage but not on exam. Here w/ rectal pain, concern for new abscess. Has had this in past. No abd pain. Clinical exam concerning for rectal abscess. Will obtain CBC, BMP, ESR, CRP, and CT pelvis with contrast. 8 mg IV morphine given for pain.    Labs reviewed; grossly WNL w/o actionable values except mildly elevated inflammatory markers.    CT w/ perianal fistula without evidence of abscess. Has some cellulitis.  Will treat with augmentin and clindamycin, first dose here. Rx provided.  Advised that he needs to f/u w/ GI and CRS.    Stable for discharge at this time. Return precautions discussed.                                  Clinical Impression:       ICD-10-CM ICD-9-CM   1. 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 Perianal cellulitis K61.0 566   3. Leukocytosis, unspecified type D72.829 288.60         Disposition:   Disposition: Discharged  Condition: Stable                     Bhargav Huerta MD  02/10/20 [id number]

## 2020-02-09 NOTE — ED TRIAGE NOTES
"Amando Calix, a 34 y.o. male presents to the ED w/ complaint of abscess. Pt with abscess to right inner gluteal fold proximal to rectum. Pt states "I have it because of my Crohn's disease. It comes and goes." Pt states he is supposed to be getting infusions for Crohn's and has missed 2 months due to his job. Pt states "I have to wear maxi pads because it's leaking." Pt denies fever/chills.    Triage note:  Chief Complaint   Patient presents with    Abscess     Abcess to rectum x2 months. Has gotten it drained before. Denies fevers, chills. + Drainage     Review of patient's allergies indicates:   Allergen Reactions    Ibuprofen Other (See Comments)     Can't take d/t stomach ulcers     Past Medical History:   Diagnosis Date    Anemia     Chronic diarrhea     Clostridium difficile infection     Crohn's disease     Protein calorie malnutrition     Steroid-induced diabetes      Adult Physical Assessment  LOC: Amando Calix, 34 y.o. male verified via two identifiers.  The patient is awake, alert, oriented x4 and speaking appropriately at this time.  APPEARANCE: Patient resting comfortably and appears to be in no acute distress at this time. Patient is clean and well groomed, patient's clothing is properly fastened.  SKIN:The skin is warm and dry, color consistent with ethnicity, patient has normal skin turgor and moist mucus membranes. Pt noted to have abscess to right inner gluteal fold proximal to rectum. Site raised, and redness surrounding site. Small amount of yellow drainage noted to site.  MUSCULOSKELETAL: Patient moving all extremities well, no obvious swelling or deformities noted.  RESPIRATORY: Airway is open and patent, respirations are spontaneous, patient has a normal effort and rate, no accessory muscle use noted. Denies SOB.  CARDIAC: Patient has a normal rate and rhythm, no periphreal edema noted in any extremity, capillary refill < 3 seconds in all extremities. Denies CP.  ABDOMEN: Soft " and non tender to palpation, no abdominal distention noted. Bowel sounds present in all four quadrants. Denies N/V/D.  NEUROLOGIC: Eyes open spontaneously, behavior appropriate to situation, follows commands, facial expression symmetrical, bilateral hand grasp equal and even, purposeful motor response noted, normal sensation in all extremities when touched with a finger.

## 2020-03-03 ENCOUNTER — TELEPHONE (OUTPATIENT)
Dept: GASTROENTEROLOGY | Facility: CLINIC | Age: 35
End: 2020-03-03

## 2020-03-03 NOTE — TELEPHONE ENCOUNTER
----- Message from Eddie Ray sent at 3/3/2020 11:22 AM CST -----  Contact: pt @ [phone]  Pt calling to schedule f/u appt w/ the doctor

## 2020-03-18 ENCOUNTER — OFFICE VISIT (OUTPATIENT)
Dept: GASTROENTEROLOGY | Facility: CLINIC | Age: 35
End: 2020-03-18
Payer: OTHER GOVERNMENT

## 2020-03-18 ENCOUNTER — LAB VISIT (OUTPATIENT)
Dept: LAB | Facility: HOSPITAL | Age: 35
End: 2020-03-18
Attending: INTERNAL MEDICINE

## 2020-03-18 ENCOUNTER — TELEPHONE (OUTPATIENT)
Dept: GASTROENTEROLOGY | Facility: CLINIC | Age: 35
End: 2020-03-18

## 2020-03-18 VITALS
BODY MASS INDEX: 30.34 KG/M2 | HEART RATE: 97 BPM | DIASTOLIC BLOOD PRESSURE: 108 MMHG | HEIGHT: 68 IN | SYSTOLIC BLOOD PRESSURE: 149 MMHG | WEIGHT: 200.19 LBS

## 2020-03-18 DIAGNOSIS — K50.113 CROHN'S DISEASE OF COLON WITH FISTULA: ICD-10-CM

## 2020-03-18 DIAGNOSIS — K50.113 CROHN'S DISEASE OF COLON WITH FISTULA: Primary | ICD-10-CM

## 2020-03-18 LAB
ALBUMIN SERPL BCP-MCNC: 3.8 G/DL (ref 3.5–5.2)
ALP SERPL-CCNC: 100 U/L (ref 55–135)
ALT SERPL W/O P-5'-P-CCNC: 62 U/L (ref 10–44)
ANION GAP SERPL CALC-SCNC: 9 MMOL/L (ref 8–16)
AST SERPL-CCNC: 30 U/L (ref 10–40)
BASOPHILS # BLD AUTO: 0.02 K/UL (ref 0–0.2)
BASOPHILS NFR BLD: 0.2 % (ref 0–1.9)
BILIRUB SERPL-MCNC: 0.3 MG/DL (ref 0.1–1)
BUN SERPL-MCNC: 9 MG/DL (ref 6–20)
CALCIUM SERPL-MCNC: 9.7 MG/DL (ref 8.7–10.5)
CHLORIDE SERPL-SCNC: 103 MMOL/L (ref 95–110)
CO2 SERPL-SCNC: 26 MMOL/L (ref 23–29)
CREAT SERPL-MCNC: 0.8 MG/DL (ref 0.5–1.4)
CRP SERPL-MCNC: 31.1 MG/L (ref 0–8.2)
DIFFERENTIAL METHOD: NORMAL
EOSINOPHIL # BLD AUTO: 0.1 K/UL (ref 0–0.5)
EOSINOPHIL NFR BLD: 1 % (ref 0–8)
ERYTHROCYTE [DISTWIDTH] IN BLOOD BY AUTOMATED COUNT: 14.1 % (ref 11.5–14.5)
EST. GFR  (AFRICAN AMERICAN): >60 ML/MIN/1.73 M^2
EST. GFR  (NON AFRICAN AMERICAN): >60 ML/MIN/1.73 M^2
GLUCOSE SERPL-MCNC: 101 MG/DL (ref 70–110)
HCT VFR BLD AUTO: 41.2 % (ref 40–54)
HGB BLD-MCNC: 14.2 G/DL (ref 14–18)
IMM GRANULOCYTES # BLD AUTO: 0.02 K/UL (ref 0–0.04)
IMM GRANULOCYTES NFR BLD AUTO: 0.2 % (ref 0–0.5)
LYMPHOCYTES # BLD AUTO: 1.8 K/UL (ref 1–4.8)
LYMPHOCYTES NFR BLD: 18.2 % (ref 18–48)
MCH RBC QN AUTO: 30 PG (ref 27–31)
MCHC RBC AUTO-ENTMCNC: 34.5 G/DL (ref 32–36)
MCV RBC AUTO: 87 FL (ref 82–98)
MONOCYTES # BLD AUTO: 0.9 K/UL (ref 0.3–1)
MONOCYTES NFR BLD: 9.2 % (ref 4–15)
NEUTROPHILS # BLD AUTO: 7.2 K/UL (ref 1.8–7.7)
NEUTROPHILS NFR BLD: 71.2 % (ref 38–73)
NRBC BLD-RTO: 0 /100 WBC
PLATELET # BLD AUTO: 328 K/UL (ref 150–350)
PMV BLD AUTO: 10.8 FL (ref 9.2–12.9)
POTASSIUM SERPL-SCNC: 4.3 MMOL/L (ref 3.5–5.1)
PROT SERPL-MCNC: 8.7 G/DL (ref 6–8.4)
RBC # BLD AUTO: 4.73 M/UL (ref 4.6–6.2)
SODIUM SERPL-SCNC: 138 MMOL/L (ref 136–145)
WBC # BLD AUTO: 10.04 K/UL (ref 3.9–12.7)

## 2020-03-18 PROCEDURE — 86140 C-REACTIVE PROTEIN: CPT

## 2020-03-18 PROCEDURE — 99215 PR OFFICE/OUTPT VISIT, EST, LEVL V, 40-54 MIN: ICD-10-PCS | Mod: S$PBB,,, | Performed by: INTERNAL MEDICINE

## 2020-03-18 PROCEDURE — 99215 OFFICE O/P EST HI 40 MIN: CPT | Mod: S$PBB,,, | Performed by: INTERNAL MEDICINE

## 2020-03-18 PROCEDURE — 36415 COLL VENOUS BLD VENIPUNCTURE: CPT

## 2020-03-18 PROCEDURE — 99999 PR PBB SHADOW E&M-EST. PATIENT-LVL III: ICD-10-PCS | Mod: PBBFAC,,, | Performed by: INTERNAL MEDICINE

## 2020-03-18 PROCEDURE — 80053 COMPREHEN METABOLIC PANEL: CPT

## 2020-03-18 PROCEDURE — 99213 OFFICE O/P EST LOW 20 MIN: CPT | Mod: PBBFAC | Performed by: INTERNAL MEDICINE

## 2020-03-18 PROCEDURE — 85025 COMPLETE CBC W/AUTO DIFF WBC: CPT

## 2020-03-18 PROCEDURE — 99999 PR PBB SHADOW E&M-EST. PATIENT-LVL III: CPT | Mod: PBBFAC,,, | Performed by: INTERNAL MEDICINE

## 2020-03-18 RX ORDER — CETIRIZINE HYDROCHLORIDE 10 MG/1
10 TABLET ORAL ONCE
Status: CANCELLED | OUTPATIENT
Start: 2020-03-25

## 2020-03-18 RX ORDER — CIPROFLOXACIN 500 MG/1
500 TABLET ORAL EVERY 12 HOURS
Qty: 28 TABLET | Refills: 3 | Status: SHIPPED | OUTPATIENT
Start: 2020-03-18 | End: 2020-11-21 | Stop reason: CLARIF

## 2020-03-18 RX ORDER — ACETAMINOPHEN 325 MG/1
650 TABLET ORAL
Status: CANCELLED | OUTPATIENT
Start: 2020-03-25

## 2020-03-18 RX ORDER — HEPARIN 100 UNIT/ML
500 SYRINGE INTRAVENOUS
Status: CANCELLED | OUTPATIENT
Start: 2020-03-25

## 2020-03-18 RX ORDER — METRONIDAZOLE 500 MG/1
500 TABLET ORAL EVERY 8 HOURS
Qty: 42 TABLET | Refills: 3 | Status: SHIPPED | OUTPATIENT
Start: 2020-03-18 | End: 2020-06-24 | Stop reason: CLARIF

## 2020-03-18 RX ORDER — SODIUM CHLORIDE 0.9 % (FLUSH) 0.9 %
10 SYRINGE (ML) INJECTION
Status: CANCELLED | OUTPATIENT
Start: 2020-03-25

## 2020-03-18 RX ORDER — ACETAMINOPHEN 325 MG/1
650 TABLET ORAL ONCE
Status: CANCELLED | OUTPATIENT
Start: 2020-03-25

## 2020-03-18 RX ORDER — IPRATROPIUM BROMIDE AND ALBUTEROL SULFATE 2.5; .5 MG/3ML; MG/3ML
3 SOLUTION RESPIRATORY (INHALATION)
Status: CANCELLED | OUTPATIENT
Start: 2020-03-25

## 2020-03-18 RX ORDER — DIPHENHYDRAMINE HYDROCHLORIDE 50 MG/ML
25 INJECTION INTRAMUSCULAR; INTRAVENOUS
Status: CANCELLED | OUTPATIENT
Start: 2020-03-25

## 2020-03-18 RX ORDER — EPINEPHRINE 1 MG/ML
0.3 INJECTION, SOLUTION, CONCENTRATE INTRAVENOUS
Status: CANCELLED | OUTPATIENT
Start: 2020-03-25

## 2020-03-18 RX ORDER — METHYLPREDNISOLONE SOD SUCC 125 MG
40 VIAL (EA) INJECTION
Status: CANCELLED | OUTPATIENT
Start: 2020-03-25

## 2020-03-18 NOTE — TELEPHONE ENCOUNTER
----- Message from Ronni Freeman MD sent at 3/18/2020  1:17 PM CDT -----  Labs about the same, stable

## 2020-03-18 NOTE — PROGRESS NOTES
Subjective:       Patient ID: Amando Calix is a 34 y.o. male.    Chief Complaint: Crohn's Disease    HPI  Review of Systems   Constitutional: Negative for activity change, appetite change, chills, diaphoresis, fatigue, fever and unexpected weight change.   HENT: Negative for congestion, ear pain, mouth sores, rhinorrhea, sinus pressure, sneezing, sore throat, trouble swallowing and voice change.    Eyes: Negative for pain.   Respiratory: Negative for cough and shortness of breath.    Cardiovascular: Negative for chest pain, palpitations and leg swelling.   Genitourinary: Negative for difficulty urinating and flank pain.   Musculoskeletal: Negative for arthralgias, back pain, gait problem, joint swelling, myalgias and neck pain.   Skin: Negative for rash.   Neurological: Negative for dizziness, tremors, syncope, numbness and headaches.   Hematological: Negative for adenopathy. Does not bruise/bleed easily.   Psychiatric/Behavioral: Negative for agitation, behavioral problems, confusion, decreased concentration and dysphoric mood.       Objective:      Physical Exam   Constitutional: He is oriented to person, place, and time. He appears well-developed and well-nourished. No distress.   HENT:   Right Ear: External ear normal.   Left Ear: External ear normal.   Nose: Nose normal.   Mouth/Throat: Oropharynx is clear and moist. No oropharyngeal exudate.   Eyes: Pupils are equal, round, and reactive to light. Conjunctivae are normal. No scleral icterus.   Neck: Normal range of motion. Neck supple. No thyromegaly present.   Cardiovascular: Normal rate, normal heart sounds and intact distal pulses. Exam reveals no gallop.   No murmur heard.  Pulmonary/Chest: Effort normal and breath sounds normal. He has no wheezes.   Abdominal: Soft. Normal appearance and bowel sounds are normal. He exhibits no distension, no fluid wave, no ascites and no mass. There is no hepatosplenomegaly. There is no tenderness. There is no  rigidity, no rebound, no guarding and no CVA tenderness.   Genitourinary:   Genitourinary Comments: recent   Musculoskeletal: Normal range of motion. He exhibits no edema or tenderness.   Lymphadenopathy:     He has no cervical adenopathy.   Neurological: He is alert and oriented to person, place, and time. He has normal reflexes. No cranial nerve deficit.   Skin: Skin is warm. No rash noted. He is not diaphoretic.   Psychiatric: He has a normal mood and affect. His behavior is normal. Thought content normal.       Assessment:       1. Crohn's disease of colon with fistula        Plan:         this is an urgent appointment Amando is a 34-year-old gentleman in whom I last visit was 2 years ago per has a complicated history with Crohn's disease and I followed him for many years.  He was originally diagnosed and treated at [facility].  And that is where he probably got his 1st Remicade treatment.  He has failed treatment with Remicade Cimzia Humira azathioprine and methotrexate.  He had severe colonic disease.  He had 1 earlier surgery as well per in about 2012 I was able to get him on the stelara experimental protocol.  And he definitely improved with that.  He was probably better for about a year and a half.  However he developed C difficile.  It seemed like the disease never returned to its improved state after that period and ultimately at the end of 2014 he had a subtotal colectomy with preservation of the rectum.    After that I started him on vedolizumab.  He has been maintained on that for these 5 years.  But he had recurring problems with perianal disease throughout this entire time period for the past 2 years he has done fairly well.  He will have intermittent symptomatic abscesses.  But once he gets the vedolizumab infusion it will improve.  His infusion stopped last October when he lost his insurance.    Right now he complains of mostly perianal symptoms.  He has perianal pain.  He has drainage  from the fistula.  He drains  both pus and blood.  He denies any abdominal pain.  His ileostomy output is normal.  No nausea vomiting.  No blood in the ostomy fluid.    He did go the emergency room a month ago.  They did a CT scan which showed a perianal fistula but no drainable abscess.  They sent him home on Augmentin and clindamycin.    On physical exam today there is significant scarring of the perianal area.  There is tenderness and induration.  There is an active fistula that straining on the right.    Assessment  1.  Crohn's disease.  With persistent active perianal disease.  This is manifest as a fistula and recurring abscess.  He unfortunately had to discontinue the entyvio because of insurance issues.  Hopefully I can restart that.  I want to start him on Cipro and Flagyl today.  Will check labs today.  I want him to follow up with Colorectal surgery.  Will have to assess whether he is a treatment failure and whether  we should proceed with proctectomy.  But we may be able to significantly fine tune his medical therapy.  I wonder if we can even revisit stelara are even Remicade now that we have levels to guide our therapy.    Recommendation 1.  Re-induction of entyvio  2.  Assess levels at week 14  3.  CBC CRP CMP today   4.  Cipro and Flagyl day   5.  MRI pelvis in about a week after antibiotics  6.  Follow-up Colorectal surgery.    40 min appointment greater than half time face-to-face counseling

## 2020-03-18 NOTE — PROGRESS NOTES
Here are some recommendations in regards to your health and coronavirus.     - Please do not stop any medications given this can put you at risk for a flare up or complication  - Try to avoid touching your face including eyes, nose, mouth  - Contact us immediately if you have fevers, cough, or shortness of breath  - Avoid travel unless absolutely necessary  - Avoid public events  - Avoid sick contacts  - Wash hands for 20 seconds or longer with soap and water or hand  with 60% or more alcohol content, especially before touching your face  - Maintain a distance of approximately 6 ft to other individuals   - If you are able to work from home please discuss this with your employer    For more information please visit the website listed below:    [facility]  https://www.crohnscolitisfoundation.org/what-ibd-patients-should-know-about-2019-novel-coronavirus-covid-19    CDC  https://www.cdc.gov/coronavirus/2019-ncov/about/prevention.html?CDC_AA_refVal=https%3A%2F%2Fwww.cdc.gov%2Fcoronavirus%0R7549-zupu%2Fabout%2Fprevention-treatment.html

## 2020-03-19 DIAGNOSIS — K50.80 CROHN'S DISEASE OF BOTH SMALL AND LARGE INTESTINE WITHOUT COMPLICATION: Primary | ICD-10-CM

## 2020-03-26 ENCOUNTER — HOSPITAL ENCOUNTER (OUTPATIENT)
Dept: RADIOLOGY | Facility: HOSPITAL | Age: 35
Discharge: HOME | End: 2020-03-26
Attending: INTERNAL MEDICINE
Payer: OTHER GOVERNMENT

## 2020-03-26 ENCOUNTER — TELEPHONE (OUTPATIENT)
Dept: GASTROENTEROLOGY | Facility: CLINIC | Age: 35
End: 2020-03-26

## 2020-03-26 DIAGNOSIS — K50.113 CROHN'S DISEASE OF COLON WITH FISTULA: ICD-10-CM

## 2020-03-26 PROCEDURE — 72197 MRI PELVIS W/O & W/DYE: CPT | Mod: TC

## 2020-03-26 PROCEDURE — 25500020 PHARM REV CODE 255

## 2020-03-26 PROCEDURE — 74183 MRI ABD W/O CNTR FLWD CNTR: CPT | Mod: 26,,, | Performed by: RADIOLOGY

## 2020-03-26 PROCEDURE — 72197 MRI ENTEROGRAPHY: ICD-10-PCS | Mod: 26,,, | Performed by: RADIOLOGY

## 2020-03-26 PROCEDURE — 74183 MRI ENTEROGRAPHY: ICD-10-PCS | Mod: 26,,, | Performed by: RADIOLOGY

## 2020-03-26 PROCEDURE — A9585 GADOBUTROL INJECTION: HCPCS

## 2020-03-26 PROCEDURE — 72197 MRI PELVIS W/O & W/DYE: CPT | Mod: 26,,, | Performed by: RADIOLOGY

## 2020-03-26 RX ORDER — GADOBUTROL 604.72 MG/ML
10 INJECTION INTRAVENOUS
Status: DISCONTINUED | OUTPATIENT
Start: 2020-03-26 | End: 2020-03-27 | Stop reason: HOSPADM

## 2020-03-26 NOTE — TELEPHONE ENCOUNTER
----- Message from Ronni Freeman MD sent at 3/26/2020  9:18 AM CDT -----  Please call, the mri shows a fistula, but no abscess, let's continue with the antibiotics like we are doing.

## 2020-04-01 DIAGNOSIS — K50.80 CROHN'S DISEASE OF BOTH SMALL AND LARGE INTESTINE WITHOUT COMPLICATION: Primary | ICD-10-CM

## 2020-04-07 ENCOUNTER — TELEPHONE (OUTPATIENT)
Dept: GASTROENTEROLOGY | Facility: CLINIC | Age: 35
End: 2020-04-07

## 2020-04-07 ENCOUNTER — TELEPHONE (OUTPATIENT)
Dept: INFECTIOUS DISEASES | Facility: HOSPITAL | Age: 35
End: 2020-04-07

## 2020-04-07 NOTE — TELEPHONE ENCOUNTER
Spoke with patient.  Aware he is scheduled for Entyvio infusion on 4/8 for 10:00 in the [facility]fusion [facility].  Aware he is come alone and wear a mask if he has one.

## 2020-04-07 NOTE — TELEPHONE ENCOUNTER
Message left for patient to return my call regarding his Entyvio infusion.  Scheduled on 4/8 for 10:00 in the [facility] Infusion [facility].

## 2020-04-07 NOTE — TELEPHONE ENCOUNTER
Spoke with Alisia in GI.  Confirmed with MD patient does not need pre-medications prior to receiving Entyvio.

## 2020-04-08 ENCOUNTER — INFUSION (OUTPATIENT)
Dept: INFECTIOUS DISEASES | Facility: HOSPITAL | Age: 35
End: 2020-04-08
Attending: INTERNAL MEDICINE

## 2020-04-08 VITALS
RESPIRATION RATE: 18 BRPM | HEIGHT: 68 IN | WEIGHT: 203.69 LBS | OXYGEN SATURATION: 97 % | BODY MASS INDEX: 30.87 KG/M2 | SYSTOLIC BLOOD PRESSURE: 142 MMHG | DIASTOLIC BLOOD PRESSURE: 87 MMHG | TEMPERATURE: 99 F | HEART RATE: 92 BPM

## 2020-04-08 DIAGNOSIS — K50.918 CROHN'S DISEASE WITH OTHER COMPLICATION, UNSPECIFIED GASTROINTESTINAL TRACT LOCATION: Primary | ICD-10-CM

## 2020-04-08 PROCEDURE — 25000003 PHARM REV CODE 250: Performed by: INTERNAL MEDICINE

## 2020-04-08 PROCEDURE — 96365 THER/PROPH/DIAG IV INF INIT: CPT

## 2020-04-08 PROCEDURE — 63600175 PHARM REV CODE 636 W HCPCS: Mod: JG | Performed by: INTERNAL MEDICINE

## 2020-04-08 RX ORDER — HEPARIN 100 UNIT/ML
500 SYRINGE INTRAVENOUS
Status: ACTIVE | OUTPATIENT
Start: 2020-04-08 | End: 2020-04-08

## 2020-04-08 RX ORDER — SODIUM CHLORIDE 0.9 % (FLUSH) 0.9 %
10 SYRINGE (ML) INJECTION
Status: CANCELLED | OUTPATIENT
Start: 2020-05-28

## 2020-04-08 RX ORDER — ACETAMINOPHEN 325 MG/1
650 TABLET ORAL
Status: CANCELLED | OUTPATIENT
Start: 2020-05-28

## 2020-04-08 RX ORDER — METHYLPREDNISOLONE SOD SUCC 125 MG
40 VIAL (EA) INJECTION
Status: CANCELLED | OUTPATIENT
Start: 2020-05-28

## 2020-04-08 RX ORDER — IPRATROPIUM BROMIDE AND ALBUTEROL SULFATE 2.5; .5 MG/3ML; MG/3ML
3 SOLUTION RESPIRATORY (INHALATION)
Status: CANCELLED | OUTPATIENT
Start: 2020-05-28

## 2020-04-08 RX ORDER — SODIUM CHLORIDE 0.9 % (FLUSH) 0.9 %
10 SYRINGE (ML) INJECTION
Status: DISCONTINUED | OUTPATIENT
Start: 2020-04-08 | End: 2020-04-08 | Stop reason: HOSPADM

## 2020-04-08 RX ORDER — HEPARIN 100 UNIT/ML
500 SYRINGE INTRAVENOUS
Status: CANCELLED | OUTPATIENT
Start: 2020-05-28

## 2020-04-08 RX ORDER — EPINEPHRINE 1 MG/ML
0.3 INJECTION, SOLUTION, CONCENTRATE INTRAVENOUS
Status: CANCELLED | OUTPATIENT
Start: 2020-05-28

## 2020-04-08 RX ORDER — DIPHENHYDRAMINE HYDROCHLORIDE 50 MG/ML
25 INJECTION INTRAMUSCULAR; INTRAVENOUS
Status: CANCELLED | OUTPATIENT
Start: 2020-05-28

## 2020-04-08 RX ADMIN — VEDOLIZUMAB 300 MG: 300 INJECTION, POWDER, LYOPHILIZED, FOR SOLUTION INTRAVENOUS at 10:04

## 2020-04-08 NOTE — PROGRESS NOTES
Tolerated 1st dose of Entyvio, no pre-meds administered, eduction and handout provided on medication, verbally acknowledged understanding, no reactions noted, tolerated without any difficulty, future appt made prior to depaarture

## 2020-04-09 DIAGNOSIS — K50.80 CROHN'S DISEASE OF BOTH SMALL AND LARGE INTESTINE WITHOUT COMPLICATION: Primary | ICD-10-CM

## 2020-04-14 DIAGNOSIS — K50.80 CROHN'S DISEASE OF BOTH SMALL AND LARGE INTESTINE WITHOUT COMPLICATION: Primary | ICD-10-CM

## 2020-04-17 ENCOUNTER — TELEPHONE (OUTPATIENT)
Dept: INFECTIOUS DISEASES | Facility: HOSPITAL | Age: 35
End: 2020-04-17

## 2020-04-17 DIAGNOSIS — K50.80 CROHN'S DISEASE OF BOTH SMALL AND LARGE INTESTINE WITHOUT COMPLICATION: Primary | ICD-10-CM

## 2020-04-17 NOTE — TELEPHONE ENCOUNTER
Contacted patient this morning to discuss infusion appt.  Pt denied any contact with anyone who is sick and denied any recent travel.  Advised patient on temperature screening upon arrival and restrictions on visitor policy.     Discussed the following with the patient:     In an effort to protect our immunocompromised patients from potential exposure to COVID-19, [facility] will now require all patients receiving an infusion or injection to be tested for COVID-19 prior to their appointment.  All patients must be tested within 72 hours of their appointment.     Pt verbalized understanding and COVID-19 screening appt scheduled.  Will contact patient with results prior to scheduled infusion.

## 2020-04-21 ENCOUNTER — TELEPHONE (OUTPATIENT)
Dept: INFECTIOUS DISEASES | Facility: HOSPITAL | Age: 35
End: 2020-04-21

## 2020-04-21 ENCOUNTER — LAB VISIT (OUTPATIENT)
Dept: INTERNAL MEDICINE | Facility: CLINIC | Age: 35
End: 2020-04-21
Payer: OTHER GOVERNMENT

## 2020-04-21 DIAGNOSIS — K50.80 CROHN'S DISEASE OF BOTH SMALL AND LARGE INTESTINE WITHOUT COMPLICATION: ICD-10-CM

## 2020-04-21 LAB — SARS-COV-2 RNA RESP QL NAA+PROBE: NOT DETECTED

## 2020-04-21 PROCEDURE — U0002 COVID-19 LAB TEST NON-CDC: HCPCS

## 2020-04-22 ENCOUNTER — INFUSION (OUTPATIENT)
Dept: INFECTIOUS DISEASES | Facility: HOSPITAL | Age: 35
End: 2020-04-22
Attending: INTERNAL MEDICINE

## 2020-04-22 VITALS
BODY MASS INDEX: 38.89 KG/M2 | SYSTOLIC BLOOD PRESSURE: 133 MMHG | TEMPERATURE: 98 F | HEIGHT: 60 IN | HEART RATE: 90 BPM | OXYGEN SATURATION: 98 % | RESPIRATION RATE: 18 BRPM | WEIGHT: 198.06 LBS | DIASTOLIC BLOOD PRESSURE: 87 MMHG

## 2020-04-22 DIAGNOSIS — K50.918 CROHN'S DISEASE WITH OTHER COMPLICATION, UNSPECIFIED GASTROINTESTINAL TRACT LOCATION: ICD-10-CM

## 2020-04-22 DIAGNOSIS — K50.80 CROHN'S DISEASE OF BOTH SMALL AND LARGE INTESTINE WITHOUT COMPLICATION: Primary | ICD-10-CM

## 2020-04-22 PROCEDURE — 96365 THER/PROPH/DIAG IV INF INIT: CPT

## 2020-04-22 PROCEDURE — 25000003 PHARM REV CODE 250: Performed by: INTERNAL MEDICINE

## 2020-04-22 PROCEDURE — 63600175 PHARM REV CODE 636 W HCPCS: Mod: JG | Performed by: INTERNAL MEDICINE

## 2020-04-22 RX ORDER — HEPARIN 100 UNIT/ML
500 SYRINGE INTRAVENOUS
Status: CANCELLED | OUTPATIENT
Start: 2020-05-28

## 2020-04-22 RX ORDER — IPRATROPIUM BROMIDE AND ALBUTEROL SULFATE 2.5; .5 MG/3ML; MG/3ML
3 SOLUTION RESPIRATORY (INHALATION)
Status: CANCELLED | OUTPATIENT
Start: 2020-05-28

## 2020-04-22 RX ORDER — EPINEPHRINE 1 MG/ML
0.3 INJECTION, SOLUTION, CONCENTRATE INTRAVENOUS
Status: CANCELLED | OUTPATIENT
Start: 2020-05-28

## 2020-04-22 RX ORDER — DIPHENHYDRAMINE HYDROCHLORIDE 50 MG/ML
25 INJECTION INTRAMUSCULAR; INTRAVENOUS
Status: CANCELLED | OUTPATIENT
Start: 2020-05-28

## 2020-04-22 RX ORDER — ACETAMINOPHEN 325 MG/1
650 TABLET ORAL
Status: CANCELLED | OUTPATIENT
Start: 2020-05-28

## 2020-04-22 RX ORDER — SODIUM CHLORIDE 0.9 % (FLUSH) 0.9 %
10 SYRINGE (ML) INJECTION
Status: DISCONTINUED | OUTPATIENT
Start: 2020-04-22 | End: 2020-04-22 | Stop reason: HOSPADM

## 2020-04-22 RX ORDER — SODIUM CHLORIDE 0.9 % (FLUSH) 0.9 %
10 SYRINGE (ML) INJECTION
Status: CANCELLED | OUTPATIENT
Start: 2020-05-28

## 2020-04-22 RX ORDER — METHYLPREDNISOLONE SOD SUCC 125 MG
40 VIAL (EA) INJECTION
Status: CANCELLED | OUTPATIENT
Start: 2020-05-28

## 2020-04-22 RX ADMIN — VEDOLIZUMAB 300 MG: 300 INJECTION, POWDER, LYOPHILIZED, FOR SOLUTION INTRAVENOUS at 10:04

## 2020-04-22 NOTE — PROGRESS NOTES
Triage Note  Patient arrived for Entyvio infusion, tolerated last infusion without difficulty, no c/o pain or discomfort at this time.

## 2020-04-22 NOTE — PROGRESS NOTES
"  Infusion medication:  Entyvio  Today's weight:  89.8 kg  Wt Readings from Last 1 Encounters:   04/22/20 89.8 kg (198lb)         Checklist prior to infusion:  Is the Biologic RX (therapy plan) up to date within the past one year? Yes  Are the most recent labs within the last 3 - 6 months ? No  Has the patient had an appointment with the MD/ANIKA that is prescribing the biologic infusion within the last 3 - 6 months?  Yes.   Documentation of safety questions prior to infusion:   RN TO NOTIFY MD IF "YES" answered to any of below questions prior to infusion:    Symptoms in past 2 weeks? (fever, night sweats, URI or Flu-like symptoms, cough, painful urination, warm/red/painful skin, skin ulcers/wounds, tooth infection/abscess): No  Current symptoms of an active infection? No  Temperature greater than 100.4 in the last 48 hours? No  Recent infections that required antibiotic use in the last 10-14 days?No  If patient has had surgery, any problems with the surgical wound (drainage, redness, tenderness)? No If yes then has surgeon cleared patient prior to getting this infusion? N/A  Pregnant? N/A  If yes, is prescribing provider aware of this pregnancy?  No  NEW or WORSENING abdominal pain, diarrhea, nausea/vomiting? No  Any SOB, ankle/feet swelling, or sudden weight gain? No    Patient tolerated infusion well today, vital signs remained normal throughout infusion and patient left with no apparent distress:  Yes  6  Received next infusion date prior to discharge: Yes    Additional note to provider:  No  "

## 2020-05-18 ENCOUNTER — LAB VISIT (OUTPATIENT)
Dept: INTERNAL MEDICINE | Facility: CLINIC | Age: 35
End: 2020-05-18
Payer: OTHER GOVERNMENT

## 2020-05-18 DIAGNOSIS — K50.80 CROHN'S DISEASE OF BOTH SMALL AND LARGE INTESTINE WITHOUT COMPLICATION: ICD-10-CM

## 2020-05-18 PROCEDURE — U0003 INFECTIOUS AGENT DETECTION BY NUCLEIC ACID (DNA OR RNA); SEVERE ACUTE RESPIRATORY SYNDROME CORONAVIRUS 2 (SARS-COV-2) (CORONAVIRUS DISEASE [COVID-19]), AMPLIFIED PROBE TECHNIQUE, MAKING USE OF HIGH THROUGHPUT TECHNOLOGIES AS DESCRIBED BY CMS-2020-01-R: HCPCS

## 2020-05-19 LAB — SARS-COV-2 RNA RESP QL NAA+PROBE: NOT DETECTED

## 2020-05-20 ENCOUNTER — INFUSION (OUTPATIENT)
Dept: INFECTIOUS DISEASES | Facility: HOSPITAL | Age: 35
End: 2020-05-20
Attending: INTERNAL MEDICINE

## 2020-05-20 VITALS
HEART RATE: 80 BPM | RESPIRATION RATE: 18 BRPM | BODY MASS INDEX: 29.77 KG/M2 | DIASTOLIC BLOOD PRESSURE: 76 MMHG | SYSTOLIC BLOOD PRESSURE: 130 MMHG | WEIGHT: 196.44 LBS | HEIGHT: 68 IN | OXYGEN SATURATION: 99 % | TEMPERATURE: 99 F

## 2020-05-20 DIAGNOSIS — K50.80 CROHN'S DISEASE OF BOTH SMALL AND LARGE INTESTINE WITHOUT COMPLICATION: Primary | ICD-10-CM

## 2020-05-20 DIAGNOSIS — K50.918 CROHN'S DISEASE WITH OTHER COMPLICATION, UNSPECIFIED GASTROINTESTINAL TRACT LOCATION: ICD-10-CM

## 2020-05-20 PROCEDURE — 96365 THER/PROPH/DIAG IV INF INIT: CPT

## 2020-05-20 PROCEDURE — 63600175 PHARM REV CODE 636 W HCPCS: Mod: JG | Performed by: INTERNAL MEDICINE

## 2020-05-20 PROCEDURE — 25000003 PHARM REV CODE 250: Performed by: INTERNAL MEDICINE

## 2020-05-20 RX ORDER — DIPHENHYDRAMINE HYDROCHLORIDE 50 MG/ML
25 INJECTION INTRAMUSCULAR; INTRAVENOUS
Status: CANCELLED | OUTPATIENT
Start: 2020-05-28

## 2020-05-20 RX ORDER — IPRATROPIUM BROMIDE AND ALBUTEROL SULFATE 2.5; .5 MG/3ML; MG/3ML
3 SOLUTION RESPIRATORY (INHALATION)
Status: CANCELLED | OUTPATIENT
Start: 2020-05-28

## 2020-05-20 RX ORDER — METHYLPREDNISOLONE SOD SUCC 125 MG
40 VIAL (EA) INJECTION
Status: CANCELLED | OUTPATIENT
Start: 2020-05-28

## 2020-05-20 RX ORDER — EPINEPHRINE 0.3 MG/.3ML
0.3 INJECTION SUBCUTANEOUS
Status: ACTIVE | OUTPATIENT
Start: 2020-05-20 | End: 2020-05-20

## 2020-05-20 RX ORDER — HEPARIN 100 UNIT/ML
500 SYRINGE INTRAVENOUS
Status: ACTIVE | OUTPATIENT
Start: 2020-05-20 | End: 2020-05-20

## 2020-05-20 RX ORDER — SODIUM CHLORIDE 0.9 % (FLUSH) 0.9 %
10 SYRINGE (ML) INJECTION
Status: DISCONTINUED | OUTPATIENT
Start: 2020-05-20 | End: 2020-05-20 | Stop reason: HOSPADM

## 2020-05-20 RX ORDER — METHYLPREDNISOLONE SOD SUCC 125 MG
40 VIAL (EA) INJECTION
Status: ACTIVE | OUTPATIENT
Start: 2020-05-20 | End: 2020-05-20

## 2020-05-20 RX ORDER — SODIUM CHLORIDE 0.9 % (FLUSH) 0.9 %
10 SYRINGE (ML) INJECTION
Status: CANCELLED | OUTPATIENT
Start: 2020-05-28

## 2020-05-20 RX ORDER — ACETAMINOPHEN 325 MG/1
650 TABLET ORAL
Status: ACTIVE | OUTPATIENT
Start: 2020-05-20 | End: 2020-05-20

## 2020-05-20 RX ORDER — ACETAMINOPHEN 325 MG/1
650 TABLET ORAL
Status: CANCELLED | OUTPATIENT
Start: 2020-05-28

## 2020-05-20 RX ORDER — EPINEPHRINE 1 MG/ML
0.3 INJECTION, SOLUTION, CONCENTRATE INTRAVENOUS
Status: CANCELLED | OUTPATIENT
Start: 2020-05-28

## 2020-05-20 RX ORDER — IPRATROPIUM BROMIDE AND ALBUTEROL SULFATE 2.5; .5 MG/3ML; MG/3ML
3 SOLUTION RESPIRATORY (INHALATION)
Status: DISPENSED | OUTPATIENT
Start: 2020-05-20 | End: 2020-05-20

## 2020-05-20 RX ORDER — DIPHENHYDRAMINE HYDROCHLORIDE 50 MG/ML
25 INJECTION INTRAMUSCULAR; INTRAVENOUS
Status: ACTIVE | OUTPATIENT
Start: 2020-05-20 | End: 2020-05-20

## 2020-05-20 RX ORDER — HEPARIN 100 UNIT/ML
500 SYRINGE INTRAVENOUS
Status: CANCELLED | OUTPATIENT
Start: 2020-05-28

## 2020-05-20 RX ADMIN — VEDOLIZUMAB 300 MG: 300 INJECTION, POWDER, LYOPHILIZED, FOR SOLUTION INTRAVENOUS at 09:05

## 2020-05-20 NOTE — PROGRESS NOTES
"  Infusion medication:  Entyvio  Today's weight:  89.1 kg  Wt Readings from Last 1 Encounters:   07/21/17 109.8 kg (242 lb)         Checklist prior to infusion:  Is the Biologic RX (therapy plan) up to date within the past one year? Yes  Are the most recent labs within the last 3 - 6 months ? Yes  Has the patient had an appointment with the MD/ANIKA that is prescribing the biologic infusion within the last 3 - 6 months? Yes  Documentation of safety questions prior to infusion:   RN TO NOTIFY MD IF "YES" answered to any of below questions prior to infusion:    Symptoms in past 2 weeks? (fever, night sweats, URI or Flu-like symptoms, cough, painful urination, warm/red/painful skin, skin ulcers/wounds, tooth infection/abscess): No  Current symptoms of an active infection? No  Temperature greater than 100.4 in the last 48 hours? No  Recent infections that required antibiotic use in the last 10-14 days?No  If patient has had surgery, any problems with the surgical wound (drainage, redness, tenderness)? No If yes then has surgeon cleared patient prior to getting this infusion? N/A  Pregnant? N/A  If yes, is prescribing provider aware of this pregnancy?  N/A  NEW or WORSENING abdominal pain, diarrhea, nausea/vomiting? No  Any SOB, ankle/feet swelling, or sudden weight gain? No    Patient tolerated infusion well today, vital signs remained normal throughout infusion and patient left with no apparent distress:  Yes  6  Received next infusion date prior to discharge: Yes    Additional note to provider:  No  "

## 2020-06-01 ENCOUNTER — OFFICE VISIT (OUTPATIENT)
Dept: GASTROENTEROLOGY | Facility: CLINIC | Age: 35
End: 2020-06-01

## 2020-06-01 VITALS
DIASTOLIC BLOOD PRESSURE: 87 MMHG | HEIGHT: 68 IN | HEART RATE: 104 BPM | SYSTOLIC BLOOD PRESSURE: 155 MMHG | WEIGHT: 199.5 LBS | BODY MASS INDEX: 30.23 KG/M2

## 2020-06-01 DIAGNOSIS — K50.80 CROHN'S DISEASE OF BOTH SMALL AND LARGE INTESTINE WITHOUT COMPLICATION: Primary | ICD-10-CM

## 2020-06-01 PROCEDURE — 99999 PR PBB SHADOW E&M-EST. PATIENT-LVL III: ICD-10-PCS | Mod: PBBFAC,,, | Performed by: INTERNAL MEDICINE

## 2020-06-01 PROCEDURE — 99214 OFFICE O/P EST MOD 30 MIN: CPT | Mod: S$GLB,,, | Performed by: INTERNAL MEDICINE

## 2020-06-01 PROCEDURE — 99999 PR PBB SHADOW E&M-EST. PATIENT-LVL III: CPT | Mod: PBBFAC,,, | Performed by: INTERNAL MEDICINE

## 2020-06-01 PROCEDURE — 99214 PR OFFICE/OUTPT VISIT, EST, LEVL IV, 30-39 MIN: ICD-10-PCS | Mod: S$GLB,,, | Performed by: INTERNAL MEDICINE

## 2020-06-01 NOTE — PROGRESS NOTES
Subjective:       Patient ID: Amando Calix is a 34 y.o. male.    Chief Complaint: Crohn's Disease    HPI  Review of Systems   Constitutional: Negative for activity change, appetite change, chills, diaphoresis, fatigue, fever and unexpected weight change.   HENT: Negative for congestion, ear pain, mouth sores, rhinorrhea, sinus pressure, sneezing, sore throat, trouble swallowing and voice change.    Eyes: Negative for pain.   Respiratory: Negative for cough and shortness of breath.    Cardiovascular: Negative for chest pain, palpitations and leg swelling.   Genitourinary: Negative for difficulty urinating and flank pain.   Musculoskeletal: Positive for myalgias. Negative for arthralgias, back pain, gait problem, joint swelling and neck pain.   Skin: Negative for rash.   Neurological: Negative for dizziness, tremors, syncope, numbness and headaches.   Hematological: Negative for adenopathy. Does not bruise/bleed easily.   Psychiatric/Behavioral: Negative for agitation, behavioral problems, confusion, decreased concentration and dysphoric mood.       Objective:      Physical Exam   Abdominal: Soft. Normal appearance and bowel sounds are normal. He exhibits no shifting dullness, no distension, no fluid wave, no abdominal bruit and no mass. There is no tenderness.           Assessment:       1. Crohn's disease of both small and large intestine without complication        Plan:         Amando is here for follow-up.  He has longstanding Crohn's disease.  He has end ileostomy.  Multiple medications for Crohn's as noted in past notes.  On his last visit I had concerned about a perianal fistula and perianal abscess.  I put him on antibiotics and restarted his induction with in tibia.  MRI confirmed perianal fistula.  It did not show an abscess.    He felt like he antibiotics improved although he was never symptom free.  However once the antibiotics were discontinued the drainage has restarted.  He denies any significant  pain.  There is no abdominal pain.  There is no nausea vomiting.  No change in his ileostomy output.    He did apparently injure his arm a couple months ago.  While lifting something he heard a pop in the forearm.  He developed bruising near the antecubital fossa.  And since that time he has had some discomfort and decrease in strength.    Assessment  1.  Crohn's disease.  He has had the problem with active perianal disease.  I would like him to see Colorectal surgery again.  This may require exam under anesthesia or possibly further surgery.  I am hopeful that re-induction of Entyvio may be helpful.  2.  Arm pain this is unrelated Crohn's but he mentions that he would like to see someone about this.  I will put her request and Orthopedics.

## 2020-06-09 ENCOUNTER — TELEPHONE (OUTPATIENT)
Dept: ORTHOPEDICS | Facility: CLINIC | Age: 35
End: 2020-06-09

## 2020-06-09 NOTE — TELEPHONE ENCOUNTER
Patient states verbal understanding and has no further questions.  Regarding appointment will be cancel, pt is pending medicaid. MYRON Calloway PA-C is not accepting any new medicaid pt's at this time. Left number for [facility] Medicaid [phone].

## 2020-06-22 ENCOUNTER — OFFICE VISIT (OUTPATIENT)
Dept: SURGERY | Facility: CLINIC | Age: 35
End: 2020-06-22

## 2020-06-22 VITALS
HEIGHT: 68 IN | WEIGHT: 198.19 LBS | HEART RATE: 90 BPM | SYSTOLIC BLOOD PRESSURE: 154 MMHG | DIASTOLIC BLOOD PRESSURE: 91 MMHG | BODY MASS INDEX: 30.04 KG/M2

## 2020-06-22 DIAGNOSIS — K50.80 CROHN'S DISEASE OF BOTH SMALL AND LARGE INTESTINE WITHOUT COMPLICATION: ICD-10-CM

## 2020-06-22 DIAGNOSIS — K60.3 ANAL FISTULA: Primary | ICD-10-CM

## 2020-06-22 PROCEDURE — 99999 PR PBB SHADOW E&M-EST. PATIENT-LVL V: ICD-10-PCS | Mod: PBBFAC,,, | Performed by: COLON & RECTAL SURGERY

## 2020-06-22 PROCEDURE — 99999 PR PBB SHADOW E&M-EST. PATIENT-LVL V: CPT | Mod: PBBFAC,,, | Performed by: COLON & RECTAL SURGERY

## 2020-06-22 PROCEDURE — 99203 PR OFFICE/OUTPT VISIT, NEW, LEVL III, 30-44 MIN: ICD-10-PCS | Mod: S$GLB,,, | Performed by: COLON & RECTAL SURGERY

## 2020-06-22 PROCEDURE — 99203 OFFICE O/P NEW LOW 30 MIN: CPT | Mod: S$GLB,,, | Performed by: COLON & RECTAL SURGERY

## 2020-06-22 NOTE — H&P (VIEW-ONLY)
Subjective:       Patient ID: Amando Calix is a 34 y.o. male.    Chief Complaint: Crohn's Disease    HPI 33 yo M with long-standing Crohn's disease.    Underwent TAC/EI 11/2014 (Jennifer)    Last flex sig 4/18/16 - anal stricture    I&D perirectal abscess done 5/2017 - last seen by CRS 6/2017    He has continued to have issues with anorectal disease related to his Crohn's - as a placed on a number of antibiotic courses by his gastroenterologist (Dr. Freeman).  Dr. Freeman most recently saw him on 06/01/2020, there which point he was referred for further evaluation.  He had been on Entyvio until last fall which point he lost his health insurance and could no longer take it.  He was recently reinstituted on therapy with Entyvio.  He is a current everyday smoker.    Today reports perianal pain and drainage.  The drainage is not copious.  No fevers or chills.  He denies any significant abdominal symptoms, and his ileostomy is functioning well.    CT A/P 2/9/20:  (Images personally reviewed.)  -Right-sided perianal fistulous tract with area of right gluteal soft tissue inflammation and skin thickening suggestive of cellulitis and/or draining fistula.  No drainable abscess.  -Postoperative changes of total colectomy with right lower abdominal quadrant end ileostomy.    MRE 3/26/20:  (Images personally reviewed.)  1. Postoperative changes of subtotal colectomy with right lower quadrant end ileostomy without evidence to suggest active inflammatory Crohn's disease of the small bowel.  2. Right-sided perianal fistula which can be further evaluated with MRI perianal fistula protocol if clinically indicated.  3. Mild hyperenhancement and mural fat deposition within the rectal stump, consistent with chronic inflammation.    Review of patient's allergies indicates:   Allergen Reactions    Ibuprofen Other (See Comments)     Can't take d/t stomach ulcers       Past Medical History:   Diagnosis Date    Anemia     Chronic diarrhea      Clostridium difficile infection     Crohn's disease     Protein calorie malnutrition     Steroid-induced diabetes        Past Surgical History:   Procedure Laterality Date    ABSCESS DRAINAGE      COLONOSCOPY      COLOSTOMY      HERNIA REPAIR      ILEOSTOMY      SIGMOIDECTOMY         Current Outpatient Medications   Medication Sig Dispense Refill    acetaminophen (TYLENOL ORAL) Take by mouth as needed.      VEDOLIZUMAB (ENTYVIO IV) Inject into the vein.      ciprofloxacin HCl (CIPRO) 500 MG tablet Take 1 tablet (500 mg total) by mouth every 12 (twelve) hours. (Patient not taking: Reported on 2020) 28 tablet 3     Current Facility-Administered Medications   Medication Dose Route Frequency Provider Last Rate Last Dose    vedolizumab (ENTYVIO) 300 mg in sodium chloride 0.9% 250 mL  300 mg Intravenous 1 time in Clinic/HOD Ronni Freeman MD           Family History   Problem Relation Age of Onset    Diabetes Father     Hyperlipidemia Mother     Diabetes Mother     Crohn's disease Neg Hx     Celiac disease Neg Hx     Cirrhosis Neg Hx     Colon cancer Neg Hx     Esophageal cancer Neg Hx     Irritable bowel syndrome Neg Hx     Liver cancer Neg Hx     Rectal cancer Neg Hx     Stomach cancer Neg Hx     Ulcerative colitis Neg Hx        Social History     Socioeconomic History    Marital status: Single     Spouse name: Not on file    Number of children: Not on file    Years of education: Not on file    Highest education level: Not on file   Occupational History    Not on file   Social Needs    Financial resource strain: Not on file    Food insecurity     Worry: Not on file     Inability: Not on file    Transportation needs     Medical: Not on file     Non-medical: Not on file   Tobacco Use    Smoking status: Current Every Day Smoker     Packs/day: 0.50     Years: 3.00     Pack years: 1.50     Types: Cigarettes     Last attempt to quit: 2014     Years since quittin.6     Smokeless tobacco: Never Used   Substance and Sexual Activity    Alcohol use: Yes     Comment: socially-weekly    Drug use: No    Sexual activity: Yes     Partners: Female   Lifestyle    Physical activity     Days per week: Not on file     Minutes per session: Not on file    Stress: Not on file   Relationships    Social connections     Talks on phone: Not on file     Gets together: Not on file     Attends Anabaptist service: Not on file     Active member of club or organization: Not on file     Attends meetings of clubs or organizations: Not on file     Relationship status: Not on file   Other Topics Concern    Not on file   Social History Narrative    Not on file       Review of Systems   Constitutional: Negative for chills and fever.   HENT: Negative for congestion and sinus pressure.    Eyes: Negative for visual disturbance.   Respiratory: Negative for cough and shortness of breath.    Cardiovascular: Negative for chest pain and palpitations.   Gastrointestinal: Positive for rectal pain. Negative for abdominal distention, abdominal pain, anal bleeding, blood in stool, constipation, diarrhea, nausea and vomiting.   Endocrine: Negative for cold intolerance and heat intolerance.   Genitourinary: Negative for dysuria and frequency.   Musculoskeletal: Negative for arthralgias and back pain.   Skin: Negative for rash.   Allergic/Immunologic: Negative for immunocompromised state.   Neurological: Negative for dizziness, light-headedness and headaches.   Psychiatric/Behavioral: Negative for confusion. The patient is not nervous/anxious.        Objective:      Physical Exam  Constitutional:       Appearance: He is well-developed.   HENT:      Head: Normocephalic and atraumatic.   Eyes:      Conjunctiva/sclera: Conjunctivae normal.   Pulmonary:      Effort: Pulmonary effort is normal. No respiratory distress.   Abdominal:      General: There is no distension.      Palpations: Abdomen is soft. There is no mass.       Tenderness: There is no abdominal tenderness. There is no guarding or rebound.      Hernia: No hernia is present.      Comments: Midline scar, RLQ ileostomy   Genitourinary:     Comments: Perineum - extensive Crohn's or changes of the right anal margin with raised, thickened, lichenified skin and external fistula openings in the right lateral and the right anterolateral position.  No evidence of acute abscess.  Skin:     General: Skin is warm and dry.      Findings: No erythema.   Neurological:      Mental Status: He is alert and oriented to person, place, and time.           Lab Results   Component Value Date    WBC 10.24 06/22/2020    HGB 13.4 (L) 06/22/2020    HCT 38.7 (L) 06/22/2020    MCV 87 06/22/2020     06/22/2020     BMP  Lab Results   Component Value Date     06/22/2020    K 4.0 06/22/2020     06/22/2020    CO2 24 06/22/2020    BUN 8 06/22/2020    CREATININE 0.9 06/22/2020    CALCIUM 9.3 06/22/2020    ANIONGAP 11 06/22/2020    ESTGFRAFRICA >60.0 06/22/2020    EGFRNONAA >60.0 06/22/2020     CMP  Sodium   Date Value Ref Range Status   06/22/2020 138 136 - 145 mmol/L Final     Potassium   Date Value Ref Range Status   06/22/2020 4.0 3.5 - 5.1 mmol/L Final     Chloride   Date Value Ref Range Status   06/22/2020 103 95 - 110 mmol/L Final     CO2   Date Value Ref Range Status   06/22/2020 24 23 - 29 mmol/L Final     Glucose   Date Value Ref Range Status   06/22/2020 128 (H) 70 - 110 mg/dL Final     BUN, Bld   Date Value Ref Range Status   06/22/2020 8 6 - 20 mg/dL Final     Creatinine   Date Value Ref Range Status   06/22/2020 0.9 0.5 - 1.4 mg/dL Final     Calcium   Date Value Ref Range Status   06/22/2020 9.3 8.7 - 10.5 mg/dL Final     Total Protein   Date Value Ref Range Status   03/18/2020 8.7 (H) 6.0 - 8.4 g/dL Final     Albumin   Date Value Ref Range Status   03/18/2020 3.8 3.5 - 5.2 g/dL Final     Total Bilirubin   Date Value Ref Range Status   03/18/2020 0.3 0.1 - 1.0 mg/dL Final      Comment:     For infants and newborns, interpretation of results should be based  on gestational age, weight and in agreement with clinical  observations.  Premature Infant recommended reference ranges:  Up to 24 hours.............<8.0 mg/dL  Up to 48 hours............<12.0 mg/dL  3-5 days..................<15.0 mg/dL  6-29 days.................<15.0 mg/dL       Alkaline Phosphatase   Date Value Ref Range Status   03/18/2020 100 55 - 135 U/L Final     AST   Date Value Ref Range Status   03/18/2020 30 10 - 40 U/L Final     ALT   Date Value Ref Range Status   03/18/2020 62 (H) 10 - 44 U/L Final     Anion Gap   Date Value Ref Range Status   06/22/2020 11 8 - 16 mmol/L Final     eGFR if    Date Value Ref Range Status   06/22/2020 >60.0 >60 mL/min/1.73 m^2 Final     eGFR if non    Date Value Ref Range Status   06/22/2020 >60.0 >60 mL/min/1.73 m^2 Final     Comment:     Calculation used to obtain the estimated glomerular filtration  rate (eGFR) is the CKD-EPI equation.        No results found for: CEA        Assessment:       1. Anal fistula    2. Crohn's disease of both small and large intestine without complication        Plan:   He has fairly extensive Crohnsoid changes of the anal margin, with active fistulous disease but no acute abscess.  Will schedule for examination under anesthesia with seton placement - scheduled for 07/10/2020.  I'll also do a flex sig at the same time to evaluate the status of his residual anorectal stump.  In all reality, I do not think he is going to be candidate for an attempt at restoring intestinal continuity given the extent of his anal disease, and I suspect he will ultimately require a completion proctectomy.  This would likely require some sort of flap coverage of the perineum given the extent of his perianal disease.  I mention this briefly, and will discuss this with him again further after the EUA and I've assess his current state of disease.      Robe Suarez MD, FACS, FASCRS  Senior Staff Surgeon  Department of Colon & Rectal Surgery     This note was created using voice recognition software, and may contain some unrecognized transcriptional errors.

## 2020-06-22 NOTE — PROGRESS NOTES
Subjective:       Patient ID: Amando Calix is a 34 y.o. male.    Chief Complaint: Crohn's Disease    HPI 33 yo M with long-standing Crohn's disease.    Underwent TAC/EI 11/2014 (Jennifer)    Last flex sig 4/18/16 - anal stricture    I&D perirectal abscess done 5/2017 - last seen by CRS 6/2017    He has continued to have issues with anorectal disease related to his Crohn's - as a placed on a number of antibiotic courses by his gastroenterologist (Dr. Freeman).  Dr. Freeman most recently saw him on 06/01/2020, there which point he was referred for further evaluation.  He had been on Entyvio until last fall which point he lost his health insurance and could no longer take it.  He was recently reinstituted on therapy with Entyvio.  He is a current everyday smoker.    Today reports perianal pain and drainage.  The drainage is not copious.  No fevers or chills.  He denies any significant abdominal symptoms, and his ileostomy is functioning well.    CT A/P 2/9/20:  (Images personally reviewed.)  -Right-sided perianal fistulous tract with area of right gluteal soft tissue inflammation and skin thickening suggestive of cellulitis and/or draining fistula.  No drainable abscess.  -Postoperative changes of total colectomy with right lower abdominal quadrant end ileostomy.    MRE 3/26/20:  (Images personally reviewed.)  1. Postoperative changes of subtotal colectomy with right lower quadrant end ileostomy without evidence to suggest active inflammatory Crohn's disease of the small bowel.  2. Right-sided perianal fistula which can be further evaluated with MRI perianal fistula protocol if clinically indicated.  3. Mild hyperenhancement and mural fat deposition within the rectal stump, consistent with chronic inflammation.    Review of patient's allergies indicates:   Allergen Reactions    Ibuprofen Other (See Comments)     Can't take d/t stomach ulcers       Past Medical History:   Diagnosis Date    Anemia     Chronic diarrhea      Clostridium difficile infection     Crohn's disease     Protein calorie malnutrition     Steroid-induced diabetes        Past Surgical History:   Procedure Laterality Date    ABSCESS DRAINAGE      COLONOSCOPY      COLOSTOMY      HERNIA REPAIR      ILEOSTOMY      SIGMOIDECTOMY         Current Outpatient Medications   Medication Sig Dispense Refill    acetaminophen (TYLENOL ORAL) Take by mouth as needed.      VEDOLIZUMAB (ENTYVIO IV) Inject into the vein.      ciprofloxacin HCl (CIPRO) 500 MG tablet Take 1 tablet (500 mg total) by mouth every 12 (twelve) hours. (Patient not taking: Reported on 2020) 28 tablet 3     Current Facility-Administered Medications   Medication Dose Route Frequency Provider Last Rate Last Dose    vedolizumab (ENTYVIO) 300 mg in sodium chloride 0.9% 250 mL  300 mg Intravenous 1 time in Clinic/HOD Ronni Freeman MD           Family History   Problem Relation Age of Onset    Diabetes Father     Hyperlipidemia Mother     Diabetes Mother     Crohn's disease Neg Hx     Celiac disease Neg Hx     Cirrhosis Neg Hx     Colon cancer Neg Hx     Esophageal cancer Neg Hx     Irritable bowel syndrome Neg Hx     Liver cancer Neg Hx     Rectal cancer Neg Hx     Stomach cancer Neg Hx     Ulcerative colitis Neg Hx        Social History     Socioeconomic History    Marital status: Single     Spouse name: Not on file    Number of children: Not on file    Years of education: Not on file    Highest education level: Not on file   Occupational History    Not on file   Social Needs    Financial resource strain: Not on file    Food insecurity     Worry: Not on file     Inability: Not on file    Transportation needs     Medical: Not on file     Non-medical: Not on file   Tobacco Use    Smoking status: Current Every Day Smoker     Packs/day: 0.50     Years: 3.00     Pack years: 1.50     Types: Cigarettes     Last attempt to quit: 2014     Years since quittin.6     Smokeless tobacco: Never Used   Substance and Sexual Activity    Alcohol use: Yes     Comment: socially-weekly    Drug use: No    Sexual activity: Yes     Partners: Female   Lifestyle    Physical activity     Days per week: Not on file     Minutes per session: Not on file    Stress: Not on file   Relationships    Social connections     Talks on phone: Not on file     Gets together: Not on file     Attends [employer] service: Not on file     Active member of club or organization: Not on file     Attends meetings of clubs or organizations: Not on file     Relationship status: Not on file   Other Topics Concern    Not on file   Social History Narrative    Not on file       Review of Systems   Constitutional: Negative for chills and fever.   HENT: Negative for congestion and sinus pressure.    Eyes: Negative for visual disturbance.   Respiratory: Negative for cough and shortness of breath.    Cardiovascular: Negative for chest pain and palpitations.   Gastrointestinal: Positive for rectal pain. Negative for abdominal distention, abdominal pain, anal bleeding, blood in stool, constipation, diarrhea, nausea and vomiting.   Endocrine: Negative for cold intolerance and heat intolerance.   Genitourinary: Negative for dysuria and frequency.   Musculoskeletal: Negative for arthralgias and back pain.   Skin: Negative for rash.   Allergic/Immunologic: Negative for immunocompromised state.   Neurological: Negative for dizziness, light-headedness and headaches.   Psychiatric/Behavioral: Negative for confusion. The patient is not nervous/anxious.        Objective:      Physical Exam  Constitutional:       Appearance: He is well-developed.   HENT:      Head: Normocephalic and atraumatic.   Eyes:      Conjunctiva/sclera: Conjunctivae normal.   Pulmonary:      Effort: Pulmonary effort is normal. No respiratory distress.   Abdominal:      General: There is no distension.      Palpations: Abdomen is soft. There is no mass.       Tenderness: There is no abdominal tenderness. There is no guarding or rebound.      Hernia: No hernia is present.      Comments: Midline scar, RLQ ileostomy   Genitourinary:     Comments: Perineum - extensive Crohn's or changes of the right anal margin with raised, thickened, lichenified skin and external fistula openings in the right lateral and the right anterolateral position.  No evidence of acute abscess.  Skin:     General: Skin is warm and dry.      Findings: No erythema.   Neurological:      Mental Status: He is alert and oriented to person, place, and time.           Lab Results   Component Value Date    WBC 10.24 06/22/2020    HGB 13.4 (L) 06/22/2020    HCT 38.7 (L) 06/22/2020    MCV 87 06/22/2020     06/22/2020     BMP  Lab Results   Component Value Date     06/22/2020    K 4.0 06/22/2020     06/22/2020    CO2 24 06/22/2020    BUN 8 06/22/2020    CREATININE 0.9 06/22/2020    CALCIUM 9.3 06/22/2020    ANIONGAP 11 06/22/2020    ESTGFRAFRICA >60.0 06/22/2020    EGFRNONAA >60.0 06/22/2020     CMP  Sodium   Date Value Ref Range Status   06/22/2020 138 136 - 145 mmol/L Final     Potassium   Date Value Ref Range Status   06/22/2020 4.0 3.5 - 5.1 mmol/L Final     Chloride   Date Value Ref Range Status   06/22/2020 103 95 - 110 mmol/L Final     CO2   Date Value Ref Range Status   06/22/2020 24 23 - 29 mmol/L Final     Glucose   Date Value Ref Range Status   06/22/2020 128 (H) 70 - 110 mg/dL Final     BUN, Bld   Date Value Ref Range Status   06/22/2020 8 6 - 20 mg/dL Final     Creatinine   Date Value Ref Range Status   06/22/2020 0.9 0.5 - 1.4 mg/dL Final     Calcium   Date Value Ref Range Status   06/22/2020 9.3 8.7 - 10.5 mg/dL Final     Total Protein   Date Value Ref Range Status   03/18/2020 8.7 (H) 6.0 - 8.4 g/dL Final     Albumin   Date Value Ref Range Status   03/18/2020 3.8 3.5 - 5.2 g/dL Final     Total Bilirubin   Date Value Ref Range Status   03/18/2020 0.3 0.1 - 1.0 mg/dL Final      Comment:     For infants and newborns, interpretation of results should be based  on gestational age, weight and in agreement with clinical  observations.  Premature Infant recommended reference ranges:  Up to 24 hours.............<8.0 mg/dL  Up to 48 hours............<12.0 mg/dL  3-5 days..................<15.0 mg/dL  6-29 days.................<15.0 mg/dL       Alkaline Phosphatase   Date Value Ref Range Status   03/18/2020 100 55 - 135 U/L Final     AST   Date Value Ref Range Status   03/18/2020 30 10 - 40 U/L Final     ALT   Date Value Ref Range Status   03/18/2020 62 (H) 10 - 44 U/L Final     Anion Gap   Date Value Ref Range Status   06/22/2020 11 8 - 16 mmol/L Final     eGFR if    Date Value Ref Range Status   06/22/2020 >60.0 >60 mL/min/1.73 m^2 Final     eGFR if non    Date Value Ref Range Status   06/22/2020 >60.0 >60 mL/min/1.73 m^2 Final     Comment:     Calculation used to obtain the estimated glomerular filtration  rate (eGFR) is the CKD-EPI equation.        No results found for: CEA        Assessment:       1. Anal fistula    2. Crohn's disease of both small and large intestine without complication        Plan:   He has fairly extensive Crohnsoid changes of the anal margin, with active fistulous disease but no acute abscess.  Will schedule for examination under anesthesia with seton placement - scheduled for 07/10/2020.  I'll also do a flex sig at the same time to evaluate the status of his residual anorectal stump.  In all reality, I do not think he is going to be candidate for an attempt at restoring intestinal continuity given the extent of his anal disease, and I suspect he will ultimately require a completion proctectomy.  This would likely require some sort of flap coverage of the perineum given the extent of his perianal disease.  I mention this briefly, and will discuss this with him again further after the EUA and I've assess his current state of disease.      Robe Suarez MD, FACS, FASCRS  Senior Staff Surgeon  Department of Colon & Rectal Surgery     This note was created using voice recognition software, and may contain some unrecognized transcriptional errors.

## 2020-06-24 NOTE — ANESTHESIA PAT ROS NOTE
06/24/2020  Amando Calix is a 34 y.o., male.      Pre-op Assessment          Review of Systems  Anesthesia Hx:  No problems with previous Anesthesia  Denies Family Hx of Anesthesia complications.   Denies Personal Hx of Anesthesia complications.   Social:  Smoker, Social Alcohol Use    Hematology/Oncology:     Oncology Normal    -- Anemia (h/o anemia):   EENT/Dental:EENT/Dental Normal   Cardiovascular:    Denies Angina.  Functional Capacity 4 METS, ABLE TO CLIMB 2 FLIGHTS OF STAIRS    Pulmonary:  Pulmonary Normal  Denies Shortness of breath.  Denies Recent URI.    Renal/:  Renal/ Normal     Hepatic/GI:   ANAL FISTULA,   H/O GASTRIC ULCER PER EPIC Bowel Conditions: (CROHN'S DISEASE OF LARGE AND SMALL INTESTINE)    Musculoskeletal:  Musculoskeletal General/Symptoms: Functional capacity is ambulatory without assistance.    Neurological:  Neurology Normal    Endocrine:  Metabolic Disorders, Obesity / BMI > 30  Psych:  Psychiatric Normal              Anesthesia Assessment: Preoperative EQUATION    Planned Procedure: Procedure(s) (LRB):  PLACEMENT-SETON DRAIN (N/A)  SIGMOIDOSCOPY, FLEXIBLE (N/A)  Requested Anesthesia Type:General  Surgeon: Robe Suarez MD  Service: Colon and Rectal  Known or anticipated Date of Surgery:7/10/2020    Surgeon notes: reviewed    Electronic QUestionnaire Assessment completed via nurse interview with patient.        Triage considerations:     The patient has no apparent active cardiac condition (No unstable coronary Syndrome such as severe unstable angina or recent [<1 month] myocardial infarction, decompensated CHF, severe valvular   disease or significant arrhythmia)    Previous anesthesia records:MAC and No problems  4/18/2016 SIGMOIDOSCOPY-FLEXIBLE (N/A Anus)  Airway/Jaw/Neck:  Airway Findings: Mouth Opening: Normal Tongue: Normal  General Airway Assessment: Adult, Average   Mallampati: II  TM Distance: Normal, at least 6 cm  Jaw/Neck Findings:  Neck ROM: Normal ROM     Last PCP note: outside [facility]   Subspecialty notes: Gastroenterology, CRS    Other important co-morbidities: Obesity, Smoker and CROHN'S DISEASE      Tests already available:  Available tests,  within 1 month , within [facility] .             Instructions given. (See in Nurse's note)    Optimization:  Anesthesia Preop Clinic Assessment- not  Indicated for this surgery        Plan:       Patient  has previously scheduled Medical Appointment:7/8 covid testing    Navigation:                Straight Line to surgery.               No tests, anesthesia preop clinic visit, or consult required.  Jovita Shin RN BSN

## 2020-06-24 NOTE — PRE-PROCEDURE INSTRUCTIONS
Patient stated has not had any problem with anesthesia in the past. No further testing is needed.  Preop instructions given. Hold asa, asa containing products, nsaids, vitamins and supplements one week prior to surgery.(sent to my chart)Medication instructions given. .  Shower the night before surgery and the morning of surgery with an antibacterial soap( hibiclens or dial antibacterial soap).  Nothing on the skin once shower. Do not apply any deodorant, lotion, powder, perfume,or aftershave.  No jewelry going to surgery. . May have solid foods, gum, and hard candy until 8 hours before surgery/procedure time.  May have clear liquids( water, gatorade, powerade or apple juice) until 2 hours prior to surgery/procedure time.  No red drinks. If in doubt , drink water. Nothing to drink 2 hours before arrival time for surgery/procedure. If you are told to take medication in the morning of surgery, it may be taken with a sip of water. If your doctor tells you something different pertaining to when to stop eating or drinking, follow your doctor's instructions.  Call for changes in status or questions.  Directions given to the surgery center. Verbalizes understanding.(sent preop instructions to my chart)

## 2020-07-08 ENCOUNTER — LAB VISIT (OUTPATIENT)
Dept: SURGERY | Facility: CLINIC | Age: 35
End: 2020-07-08
Payer: OTHER GOVERNMENT

## 2020-07-08 DIAGNOSIS — K60.3 ANAL FISTULA: ICD-10-CM

## 2020-07-08 DIAGNOSIS — K50.80 CROHN'S DISEASE OF BOTH SMALL AND LARGE INTESTINE WITHOUT COMPLICATION: ICD-10-CM

## 2020-07-08 LAB — SARS-COV-2 RNA RESP QL NAA+PROBE: NOT DETECTED

## 2020-07-08 PROCEDURE — U0003 INFECTIOUS AGENT DETECTION BY NUCLEIC ACID (DNA OR RNA); SEVERE ACUTE RESPIRATORY SYNDROME CORONAVIRUS 2 (SARS-COV-2) (CORONAVIRUS DISEASE [COVID-19]), AMPLIFIED PROBE TECHNIQUE, MAKING USE OF HIGH THROUGHPUT TECHNOLOGIES AS DESCRIBED BY CMS-2020-01-R: HCPCS

## 2020-07-09 ENCOUNTER — ANESTHESIA EVENT (OUTPATIENT)
Dept: SURGERY | Facility: HOSPITAL | Age: 35
End: 2020-07-09

## 2020-07-09 ENCOUNTER — TELEPHONE (OUTPATIENT)
Dept: SURGERY | Facility: CLINIC | Age: 35
End: 2020-07-09

## 2020-07-10 ENCOUNTER — HOSPITAL ENCOUNTER (OUTPATIENT)
Facility: HOSPITAL | Age: 35
Discharge: HOME | End: 2020-07-10
Attending: COLON & RECTAL SURGERY | Admitting: COLON & RECTAL SURGERY

## 2020-07-10 ENCOUNTER — ANESTHESIA (OUTPATIENT)
Dept: SURGERY | Facility: HOSPITAL | Age: 35
End: 2020-07-10

## 2020-07-10 VITALS
BODY MASS INDEX: 30.04 KG/M2 | OXYGEN SATURATION: 100 % | WEIGHT: 198.19 LBS | TEMPERATURE: 98 F | RESPIRATION RATE: 17 BRPM | SYSTOLIC BLOOD PRESSURE: 144 MMHG | HEART RATE: 75 BPM | DIASTOLIC BLOOD PRESSURE: 79 MMHG | HEIGHT: 68 IN

## 2020-07-10 DIAGNOSIS — K60.3 ANAL FISTULA: Primary | ICD-10-CM

## 2020-07-10 PROBLEM — K60.30 ANAL FISTULA: Status: ACTIVE | Noted: 2020-07-10

## 2020-07-10 PROCEDURE — 45330 DIAGNOSTIC SIGMOIDOSCOPY: CPT | Mod: 51,,, | Performed by: COLON & RECTAL SURGERY

## 2020-07-10 PROCEDURE — 25000003 PHARM REV CODE 250: Performed by: STUDENT IN AN ORGANIZED HEALTH CARE EDUCATION/TRAINING PROGRAM

## 2020-07-10 PROCEDURE — 71000044 HC DOSC ROUTINE RECOVERY FIRST HOUR: Performed by: COLON & RECTAL SURGERY

## 2020-07-10 PROCEDURE — 46020 PR PLACEMENT,SETON: ICD-10-PCS | Mod: ,,, | Performed by: COLON & RECTAL SURGERY

## 2020-07-10 PROCEDURE — 36000704 HC OR TIME LEV I 1ST 15 MIN: Performed by: COLON & RECTAL SURGERY

## 2020-07-10 PROCEDURE — 25000003 PHARM REV CODE 250: Performed by: COLON & RECTAL SURGERY

## 2020-07-10 PROCEDURE — 63600175 PHARM REV CODE 636 W HCPCS: Performed by: ANESTHESIOLOGY

## 2020-07-10 PROCEDURE — 46020 PLACEMENT OF SETON: CPT | Mod: ,,, | Performed by: COLON & RECTAL SURGERY

## 2020-07-10 PROCEDURE — 37000008 HC ANESTHESIA 1ST 15 MINUTES: Performed by: COLON & RECTAL SURGERY

## 2020-07-10 PROCEDURE — 27201423 OPTIME MED/SURG SUP & DEVICES STERILE SUPPLY: Performed by: COLON & RECTAL SURGERY

## 2020-07-10 PROCEDURE — 63600175 PHARM REV CODE 636 W HCPCS: Performed by: STUDENT IN AN ORGANIZED HEALTH CARE EDUCATION/TRAINING PROGRAM

## 2020-07-10 PROCEDURE — D9220A PRA ANESTHESIA: ICD-10-PCS | Mod: ,,, | Performed by: ANESTHESIOLOGY

## 2020-07-10 PROCEDURE — D9220A PRA ANESTHESIA: Mod: ,,, | Performed by: ANESTHESIOLOGY

## 2020-07-10 PROCEDURE — 36000705 HC OR TIME LEV I EA ADD 15 MIN: Performed by: COLON & RECTAL SURGERY

## 2020-07-10 PROCEDURE — C9290 INJ, BUPIVACAINE LIPOSOME: HCPCS | Performed by: COLON & RECTAL SURGERY

## 2020-07-10 PROCEDURE — 71000016 HC POSTOP RECOV ADDL HR: Performed by: COLON & RECTAL SURGERY

## 2020-07-10 PROCEDURE — 71000015 HC POSTOP RECOV 1ST HR: Performed by: COLON & RECTAL SURGERY

## 2020-07-10 PROCEDURE — 63600175 PHARM REV CODE 636 W HCPCS: Performed by: COLON & RECTAL SURGERY

## 2020-07-10 PROCEDURE — 37000009 HC ANESTHESIA EA ADD 15 MINS: Performed by: COLON & RECTAL SURGERY

## 2020-07-10 PROCEDURE — 94761 N-INVAS EAR/PLS OXIMETRY MLT: CPT

## 2020-07-10 PROCEDURE — 25000003 PHARM REV CODE 250: Performed by: NURSE PRACTITIONER

## 2020-07-10 PROCEDURE — 45330 PR SIGMOIDOSCOPY,DIAG2STIC: ICD-10-PCS | Mod: 51,,, | Performed by: COLON & RECTAL SURGERY

## 2020-07-10 RX ORDER — KETAMINE HCL IN 0.9 % NACL 50 MG/5 ML
SYRINGE (ML) INTRAVENOUS
Status: DISCONTINUED | OUTPATIENT
Start: 2020-07-10 | End: 2020-07-10

## 2020-07-10 RX ORDER — OXYCODONE HYDROCHLORIDE 5 MG/1
5 TABLET ORAL EVERY 4 HOURS PRN
Qty: 25 TABLET | Refills: 0 | Status: SHIPPED | OUTPATIENT
Start: 2020-07-10 | End: 2020-11-21 | Stop reason: CLARIF

## 2020-07-10 RX ORDER — FENTANYL CITRATE 50 UG/ML
INJECTION, SOLUTION INTRAMUSCULAR; INTRAVENOUS
Status: DISCONTINUED | OUTPATIENT
Start: 2020-07-10 | End: 2020-07-10

## 2020-07-10 RX ORDER — HYDROMORPHONE HYDROCHLORIDE 1 MG/ML
0.2 INJECTION, SOLUTION INTRAMUSCULAR; INTRAVENOUS; SUBCUTANEOUS EVERY 5 MIN PRN
Status: DISCONTINUED | OUTPATIENT
Start: 2020-07-10 | End: 2020-07-10 | Stop reason: HOSPADM

## 2020-07-10 RX ORDER — SODIUM CHLORIDE 9 MG/ML
INJECTION, SOLUTION INTRAVENOUS CONTINUOUS
Status: DISCONTINUED | OUTPATIENT
Start: 2020-07-10 | End: 2020-07-10 | Stop reason: HOSPADM

## 2020-07-10 RX ORDER — LIDOCAINE HCL/PF 100 MG/5ML
SYRINGE (ML) INTRAVENOUS
Status: DISCONTINUED | OUTPATIENT
Start: 2020-07-10 | End: 2020-07-10

## 2020-07-10 RX ORDER — ROCURONIUM BROMIDE 10 MG/ML
INJECTION, SOLUTION INTRAVENOUS
Status: DISCONTINUED | OUTPATIENT
Start: 2020-07-10 | End: 2020-07-10

## 2020-07-10 RX ORDER — SODIUM CHLORIDE 0.9 % (FLUSH) 0.9 %
10 SYRINGE (ML) INJECTION
Status: DISCONTINUED | OUTPATIENT
Start: 2020-07-10 | End: 2020-07-10 | Stop reason: HOSPADM

## 2020-07-10 RX ORDER — ONDANSETRON 2 MG/ML
4 INJECTION INTRAMUSCULAR; INTRAVENOUS DAILY PRN
Status: DISCONTINUED | OUTPATIENT
Start: 2020-07-10 | End: 2020-07-10 | Stop reason: HOSPADM

## 2020-07-10 RX ORDER — HYDRALAZINE HYDROCHLORIDE 20 MG/ML
5 INJECTION INTRAMUSCULAR; INTRAVENOUS ONCE
Status: COMPLETED | OUTPATIENT
Start: 2020-07-10 | End: 2020-07-10

## 2020-07-10 RX ORDER — DEXAMETHASONE SODIUM PHOSPHATE 4 MG/ML
INJECTION, SOLUTION INTRA-ARTICULAR; INTRALESIONAL; INTRAMUSCULAR; INTRAVENOUS; SOFT TISSUE
Status: DISCONTINUED | OUTPATIENT
Start: 2020-07-10 | End: 2020-07-10

## 2020-07-10 RX ORDER — BUPIVACAINE HYDROCHLORIDE 2.5 MG/ML
INJECTION, SOLUTION EPIDURAL; INFILTRATION; INTRACAUDAL
Status: DISCONTINUED | OUTPATIENT
Start: 2020-07-10 | End: 2020-07-10 | Stop reason: HOSPADM

## 2020-07-10 RX ORDER — LIDOCAINE HYDROCHLORIDE 10 MG/ML
1 INJECTION, SOLUTION EPIDURAL; INFILTRATION; INTRACAUDAL; PERINEURAL ONCE
Status: COMPLETED | OUTPATIENT
Start: 2020-07-10 | End: 2020-07-10

## 2020-07-10 RX ORDER — MIDAZOLAM HYDROCHLORIDE 1 MG/ML
INJECTION, SOLUTION INTRAMUSCULAR; INTRAVENOUS
Status: DISCONTINUED | OUTPATIENT
Start: 2020-07-10 | End: 2020-07-10

## 2020-07-10 RX ORDER — PROPOFOL 10 MG/ML
VIAL (ML) INTRAVENOUS
Status: DISCONTINUED | OUTPATIENT
Start: 2020-07-10 | End: 2020-07-10

## 2020-07-10 RX ORDER — OXYCODONE HYDROCHLORIDE 5 MG/1
5 TABLET ORAL ONCE
Status: COMPLETED | OUTPATIENT
Start: 2020-07-10 | End: 2020-07-10

## 2020-07-10 RX ORDER — MUPIROCIN 20 MG/G
OINTMENT TOPICAL
Status: DISCONTINUED | OUTPATIENT
Start: 2020-07-10 | End: 2020-07-10 | Stop reason: HOSPADM

## 2020-07-10 RX ADMIN — OXYCODONE HYDROCHLORIDE 5 MG: 5 TABLET ORAL at 08:07

## 2020-07-10 RX ADMIN — MIDAZOLAM HYDROCHLORIDE 2 MG: 1 INJECTION, SOLUTION INTRAMUSCULAR; INTRAVENOUS at 06:07

## 2020-07-10 RX ADMIN — HYDRALAZINE HYDROCHLORIDE 5 MG: 20 INJECTION INTRAMUSCULAR; INTRAVENOUS at 08:07

## 2020-07-10 RX ADMIN — HYDROMORPHONE HYDROCHLORIDE 0.2 MG: 1 INJECTION, SOLUTION INTRAMUSCULAR; INTRAVENOUS; SUBCUTANEOUS at 09:07

## 2020-07-10 RX ADMIN — ROCURONIUM BROMIDE 40 MG: 10 INJECTION, SOLUTION INTRAVENOUS at 07:07

## 2020-07-10 RX ADMIN — PROPOFOL 200 MG: 10 INJECTION, EMULSION INTRAVENOUS at 07:07

## 2020-07-10 RX ADMIN — MUPIROCIN: 20 OINTMENT TOPICAL at 06:07

## 2020-07-10 RX ADMIN — DEXAMETHASONE SODIUM PHOSPHATE 8 MG: 4 INJECTION, SOLUTION INTRAMUSCULAR; INTRAVENOUS at 07:07

## 2020-07-10 RX ADMIN — LIDOCAINE HYDROCHLORIDE 10 MG: 10 INJECTION, SOLUTION EPIDURAL; INFILTRATION; INTRACAUDAL; PERINEURAL at 06:07

## 2020-07-10 RX ADMIN — Medication 20 MG: at 07:07

## 2020-07-10 RX ADMIN — LIDOCAINE HYDROCHLORIDE 60 MG: 20 INJECTION, SOLUTION INTRAVENOUS at 07:07

## 2020-07-10 RX ADMIN — HYDROMORPHONE HYDROCHLORIDE 0.2 MG: 1 INJECTION, SOLUTION INTRAMUSCULAR; INTRAVENOUS; SUBCUTANEOUS at 08:07

## 2020-07-10 RX ADMIN — FENTANYL CITRATE 100 MCG: 50 INJECTION, SOLUTION INTRAMUSCULAR; INTRAVENOUS at 07:07

## 2020-07-10 NOTE — OP NOTE
DATE OF PROCEDURE:  07/10/2020     PREOPERATIVE DIAGNOSES:   1.  Crohn's colitis, s/p total abdominal colectomy with end ileostomy  2.  Anal fistulae     POSTOPERATIVE DIAGNOSES:   1.  Crohn's colitis, s/p total abdominal colectomy with end ileostomy  2.  Anal fistulae    PROCEDURES PERFORMED:   1.  Flexible sigmoidoscopy  2.  Examination under anesthesia with placement of draining seton x3     SURGEON:  Roeb Suarez M.D.     ASSISTANT: Karolina Torres M.D. [RES]     ANESTHESIA:  General endotracheal     IV FLUIDS:  1000 mL of crystalloid.     ESTIMATED BLOOD LOSS:  <5 mL.     DRAINS:  Draining seton x3     SPECIMENS:  None    COMPLICATIONS:  None.     OPERATIVE FINDINGS:   1.  Tight anal stenosis, digitally dilated  2.  12 cm anorectal stump proctitis  3.  Watering can appearance of the right ischiorectal fossa with extensive inflammatory changes and multiple extra sphincteric fistulae - no obvious communication with the anal canal     INDICATIONS:  The patient is a 34-year-old male with history of Crohn's colitis having undergone a prior colectomy with end ileostomy, leaving a short rectal stump.  He did well postoperatively, but late last year lost his insurance and stopped his biologic therapy for Crohn's disease.  Following this she began to have worsening problems with perirectal fistulas and drainage.  On examination eating had extensive inflammatory changes involving the right ischiorectal fossa with a watering can appearance and multiple fistulas.  He is being brought to the Operating Room today for examination under anesthesia and drainage.  Additionally we did elected to evaluate his residual rectal stump endoscopically at the same setting, as it has been sometime since this has been performed.       DESCRIPTION OF PROCEDURE:  The patient was identified, brought to the Operating Room and placed on the stretcher in a supine position after obtaining informed consent.  Venous sequential stockings were  placed.  After induction of general endotracheal anesthesia, he was repositioned on the operating table in a prone position using chest rolls and adequate padding of all pressure points.  The table was jackknifed and the buttocks were taped apart to efface the anal verge.      I started with a flexible sigmoidoscopy.  Digital rectal examination was performed, which identified a significant anal stenosis that had to be digitally dilated in order to insert the sigmoidoscope.  Once the stenosis was dilated, the flexible sigmoidoscope was inserted and advanced to the proximal end of the rectal stump, which was approximately 12 cm from the anal verge.  There was severe proctitis involving the mucosa of the rectal stump, likely due to a combination of active Crohn's disease and disuse proctitis.  There were no mass lesions or anything to suggest underlying malignancy identified.    The scope was then removed, and the perianal region was prepped and draped in the usual sterile fashion.  The left ischiorectal fossa appeared grossly normal.  On the right side there were extensive inflammatory changes with skin thickening and induration and multiple external fistula openings.  I probed many of the openings, and they all seemed to interconnect with a subcutaneous cavity in the ischiorectal fossa on the right side.  There was no obvious transsphincteric communication with the anal canal, though evaluation of this was fairly limited given the significant stenosis present.  Given that all the external openings appeared to communicate within a single subcutaneous cavity, I elected to place multiple extra sphincteric setons in order to facilitate full drainage.  A total of 3 extrasphincteric setons were placed on the right side externally to drain the subcutaneous space, utilizing silastic vessel loops.  The ends of the vessel loops were secured to each other with 0 silk ties for them to be left in place.  Once I felt that we had  achieved adequate drainage, an anal block was performed using a combination of Exparel and 0.25% bupivacaine.  There was some diffuse oozing coming from the anal verge were the anal dilation had been performed, so I placed Surgicel within the distal most anal canal to assist with hemostasis.  Sterile dressings were applied.    The patient tolerated the procedure well with no complications.  He was extubated in the Operating Room and taken to Recovery in satisfactory condition.  All needle, instrument and sponge counts were correct at the end of the case.  I was present throughout the entire procedure.    Robe Suarez MD, FACS, FASCRS  Senior Staff Surgeon  Department of Colon & Rectal Surgery     This note was created using voice recognition software, and may contain some unrecognized transcriptional errors.

## 2020-07-10 NOTE — PROGRESS NOTES
Dr. Yu notified of patient's BP.  No new orders noted at this time.  States to continue to monitor BP and call again if not trending down in 20 minutes.

## 2020-07-10 NOTE — ANESTHESIA POSTPROCEDURE EVALUATION
Anesthesia Post Evaluation    Patient: Amando Calix    Procedure(s) Performed: Procedure(s) (LRB):  PLACEMENT-SETON DRAIN (N/A)  SIGMOIDOSCOPY, FLEXIBLE (N/A)    Final Anesthesia Type: general    Patient location during evaluation: PACU  Patient participation: Yes- Able to Participate  Level of consciousness: awake and alert  Post-procedure vital signs: reviewed and stable  Pain management: adequate  Airway patency: patent    PONV status at discharge: No PONV  Anesthetic complications: no      Cardiovascular status: blood pressure returned to baseline  Respiratory status: unassisted  Hydration status: euvolemic  Follow-up not needed.  Comments: Hypertensive initially, responded to hydralazine.          Vitals Value Taken Time   /79 07/10/20 0952   Temp 36.7 °C (98 °F) 07/10/20 0952   Pulse 93 07/10/20 0952   Resp 10 07/10/20 0952   SpO2 100 % 07/10/20 0952   Vitals shown include unvalidated device data.      No case tracking events are documented in the log.      Pain/Anu Score: Pain Rating Prior to Med Admin: 7 (7/10/2020  9:15 AM)  Pain Rating Post Med Admin: 4 (patient states tolerable.) (7/10/2020  9:30 AM)  Anu Score: 9 (7/10/2020  8:45 AM)

## 2020-07-10 NOTE — ANESTHESIA PROCEDURE NOTES
Intubation  Performed by: Gretel Smith MD  Authorized by: Michael Yu MD     Intubation:     Induction:  Intravenous    Intubated:  Postinduction    Mask Ventilation:  Easy mask    Attempts:  2    Attempted By:  Resident anesthesiologist    Method of Intubation:  Direct    Blade:  Jarred 3    Laryngeal View Grade: Grade IIb - only the arytenoids and epiglottis seen      Attempted By (2nd Attempt):  Staff anesthesiologist    Method of Intubation (2nd Attempt):  Direct    Blade (2nd Attempt):  Olivier 2    Laryngeal View Grade (2nd Attempt): Grade IIa - cords partially seen      Difficult Airway Encountered?: No      Complications:  None (anterior airway)    Airway Device:  Oral endotracheal tube    Airway Device Size:  7.5    Style/Cuff Inflation:  Cuffed    Tube secured:  22    Secured at:  The lips    Placement Verified By:  Capnometry    Complicating Factors:  Anterior larynx    Findings Post-Intubation:  BS equal bilateral

## 2020-07-10 NOTE — ANESTHESIA PREPROCEDURE EVALUATION
[facility]  Anesthesia Pre-Operative Evaluation         Patient Name: Amando Calix  YOB: 1985  MRN: [id number]    SUBJECTIVE:     Pre-operative evaluation for Procedure(s) (LRB):  PLACEMENT-SETON DRAIN (N/A)  SIGMOIDOSCOPY, FLEXIBLE (N/A)     07/10/2020    Amando Calix is a 34 y.o. male w/ a significant PMHx of crohn's disease, anemia, and tobacco abuse.    Patient now presents for the above procedure(s).      LDA: None documented.       Peripheral IV - Single Lumen 06/10/19 0910 22 G Left Forearm (Active)   Number of days: 395            Peripheral IV - Single Lumen 10/07/19 0846 24 G Left Antecubital (Active)   Number of days: 276            Peripheral IV - Single Lumen 07/10/20 0626 20 G Left Hand (Active)   Site Assessment Clean;Dry;Intact 07/10/20 0618   Line Status Blood return noted;Flushed 07/10/20 0618   Dressing Status Clean;Dry;Intact 07/10/20 0618   Dressing Intervention First dressing 07/10/20 0618   Number of days: 0            Ileostomy RUQ (Active)   Number of days:        Prev airway: None documented.    Drips: None documented.   sodium chloride 0.9%         Patient Active Problem List   Diagnosis    Crohn's disease of both small and large intestine without complication    Protein calorie malnutrition    Anemia    Encounter for long-term (current) use of high-risk medication    Protein calorie malnutrition    Exacerbation of Crohn's disease    Anemia of other chronic disease    Abdominal pain, LLQ (left lower quadrant)    Perirectal abscess    Abdominal pain    Crohn disease    Crohn's disease    Cigarette smoker    Crohn's disease of colon with fistula    Stricture of sigmoid colon    Moderate protein-calorie malnutrition    Bleeding gastric ulcer    Tachycardia    Bleeding gastric ulcer    Gastric ulcer    Esophagitis, reflux    Special screening for malignant neoplasms, colon    Attention to ileostomy    Anal fistula       Review  of patient's allergies indicates:   Allergen Reactions    Ibuprofen Other (See Comments)     Can't take d/t stomach ulcers       Current Inpatient Medications:      No current facility-administered medications on file prior to encounter.      Current Outpatient Medications on File Prior to Encounter   Medication Sig Dispense Refill    acetaminophen (TYLENOL ORAL) Take by mouth as needed.      ciprofloxacin HCl (CIPRO) 500 MG tablet Take 1 tablet (500 mg total) by mouth every 12 (twelve) hours. (Patient not taking: Reported on 2020) 28 tablet 3    VEDOLIZUMAB (ENTYVIO IV) Inject into the vein.         Past Surgical History:   Procedure Laterality Date    ABSCESS DRAINAGE      COLONOSCOPY      COLOSTOMY      HERNIA REPAIR      ILEOSTOMY      SIGMOIDECTOMY         Social History     Socioeconomic History    Marital status: Single     Spouse name: Not on file    Number of children: Not on file    Years of education: Not on file    Highest education level: Not on file   Occupational History    Not on file   Social Needs    Financial resource strain: Not on file    Food insecurity     Worry: Not on file     Inability: Not on file    Transportation needs     Medical: Not on file     Non-medical: Not on file   Tobacco Use    Smoking status: Current Every Day Smoker     Packs/day: 0.50     Years: 3.00     Pack years: 1.50     Types: Cigarettes     Last attempt to quit: 2014     Years since quittin.6    Smokeless tobacco: Never Used   Substance and Sexual Activity    Alcohol use: Yes     Comment: socially-weekly    Drug use: No    Sexual activity: Yes     Partners: Female   Lifestyle    Physical activity     Days per week: Not on file     Minutes per session: Not on file    Stress: Not on file   Relationships    Social connections     Talks on phone: Not on file     Gets together: Not on file     Attends Yazidi service: Not on file     Active member of club or organization: Not on  file     Attends meetings of clubs or organizations: Not on file     Relationship status: Not on file   Other Topics Concern    Not on file   Social History Narrative    Not on file       OBJECTIVE:     Vital Signs Range (Last 24H):  Temp:  [36.8 °C (98.3 °F)]   Pulse:  [81]   Resp:  [18]   BP: (134)/(83)   SpO2:  [97 %]       Significant Labs:  Lab Results   Component Value Date    WBC 10.24 06/22/2020    HGB 13.4 (L) 06/22/2020    HCT 38.7 (L) 06/22/2020     06/22/2020    ALT 62 (H) 03/18/2020    AST 30 03/18/2020     06/22/2020    K 4.0 06/22/2020     06/22/2020    CREATININE 0.9 06/22/2020    BUN 8 06/22/2020    CO2 24 06/22/2020    TSH 0.635 12/29/2012    INR 1.1 11/18/2014    HGBA1C 6.0 06/23/2011       Diagnostic Studies: No relevant studies.    EKG:   Results for orders placed or performed during the hospital encounter of 11/09/14   EKG 12-lead    Collection Time: 11/18/14  1:59 PM    Narrative    Test Reason : 786.50  Blood Pressure : - mmHG  Vent. Rate : 126 BPM     Atrial Rate : 126 BPM     P-R Int : 124 ms          QRS Dur : 106 ms      QT Int : 316 ms       P-R-T Axes : 069 070 068 degrees     QTc Int : 457 ms    Sinus tachycardia  Nonspecific ST and T wave abnormality  Abnormal ECG  When compared with ECG of 03-OCT-2012 11:00,  Vent. rate has increased BY  47 BPM  ST now depressed in Inferior leads  ST now depressed in Anterior leads  Confirmed by RAOUL ANDERSON MD ([id number]) on 11/18/2014 2:31:58 PM    Referred By: JP SINCLAIR           Confirmed By:RAOUL ANDERSON MD       2D ECHO:  TTE:  No results found for this or any previous visit.    SOL:  No results found for this or any previous visit.    ASSESSMENT/PLAN:         Anesthesia Evaluation          Review of Systems  Anesthesia Hx:  No problems with previous Anesthesia Denies Family Hx of Anesthesia complications.   Denies Personal Hx of Anesthesia complications.   Social:  Smoker, Social Alcohol Use     Hematology/Oncology:     Oncology Normal    -- Anemia (h/o anemia):   EENT/Dental:EENT/Dental Normal   Cardiovascular:    Denies Angina.  Functional Capacity 4 METS, ABLE TO CLIMB 2 FLIGHTS OF STAIRS    Pulmonary:  Pulmonary Normal  Denies Shortness of breath.  Denies Recent URI.    Renal/:  Renal/ Normal     Hepatic/GI:   ANAL FISTULA,   H/O GASTRIC ULCER PER EPIC Bowel Conditions: (CROHN'S DISEASE OF LARGE AND SMALL INTESTINE)    Musculoskeletal:  Musculoskeletal General/Symptoms: Functional capacity is ambulatory without assistance.    Neurological:  Neurology Normal    Endocrine:  Metabolic Disorders, Obesity / BMI > 30  Psych:  Psychiatric Normal           Physical Exam  General:  Well nourished    Airway/Jaw/Neck:  Airway Findings: Mouth Opening: Normal Tongue: Normal  General Airway Assessment: Adult  Mallampati: III  Improves to II with phonation.  TM Distance: Normal, at least 6 cm  Jaw/Neck Findings:  Neck ROM: Normal ROM      Dental:  Dental Findings:    Chest/Lungs:  Chest/Lungs Findings: Normal Respiratory Rate     Heart/Vascular:  Heart Findings: Rate: Normal        Mental Status:  Mental Status Findings:  Alert and Oriented         Anesthesia Assessment: Preoperative EQUATION    Planned Procedure: Procedure(s) (LRB):  PLACEMENT-SETON DRAIN (N/A)  SIGMOIDOSCOPY, FLEXIBLE (N/A)  Requested Anesthesia Type:General  Surgeon: Robe Suarez MD  Service: Colon and Rectal  Known or anticipated Date of Surgery:7/10/2020    Surgeon notes: reviewed    Electronic QUestionnaire Assessment completed via nurse interview with patient.        Triage considerations:     The patient has no apparent active cardiac condition (No unstable coronary Syndrome such as severe unstable angina or recent [<1 month] myocardial infarction, decompensated CHF, severe valvular   disease or significant arrhythmia)    Previous anesthesia records:MAC and No problems  4/18/2016 SIGMOIDOSCOPY-FLEXIBLE (N/A  Anus)  Airway/Jaw/Neck:  Airway Findings: Mouth Opening: Normal Tongue: Normal  General Airway Assessment: Adult, Average  Mallampati: II  TM Distance: Normal, at least 6 cm  Jaw/Neck Findings:  Neck ROM: Normal ROM     Last PCP note: outside [facility]   Subspecialty notes: Gastroenterology, CRS    Other important co-morbidities: Obesity, Smoker and CROHN'S DISEASE      Tests already available:  Available tests,  within 1 month , within [facility] .             Instructions given. (See in Nurse's note)    Optimization:  Anesthesia Preop Clinic Assessment- not  Indicated for this surgery        Plan:       Patient  has previously scheduled Medical Appointment:7/8 covid testing    Navigation:                Straight Line to surgery.               No tests, anesthesia preop clinic visit, or consult required.  Jovita Shin RN BSN      Anesthesia Plan  Type of Anesthesia, risks & benefits discussed:  Anesthesia Type:  general  Patient's Preference:   Intra-op Monitoring Plan: standard ASA monitors  Intra-op Monitoring Plan Comments:   Post Op Pain Control Plan: per primary service following discharge from PACU  Post Op Pain Control Plan Comments:   Induction:   IV  Beta Blocker:  Patient is not currently on a Beta-Blocker (No further documentation required).       Informed Consent: Patient understands risks and agrees with Anesthesia plan.  Questions answered. Anesthesia consent signed with patient.  ASA Score: 3     Day of Surgery Review of History & Physical:    H&P update referred to the surgeon.     Anesthesia Plan Notes: NPO confirmed.   No history of anesthesia problems.  Poor dentition noted.        Ready For Surgery From Anesthesia Perspective.

## 2020-07-10 NOTE — PROGRESS NOTES
Dr. Torres paged, returned page immediately. Notified that patient is extremely uncomfortable with gauze packing in place.  Patient requesting to remove gauze now instead of tomorrow morning.  Dr. Torres states that is fine, ok to remove gauze now.

## 2020-07-10 NOTE — PLAN OF CARE
Discharge instructions reviewed with patient.  Verbalizes understanding. Copies of instructions given to patient. Prescription given to patient. Tolerating po fluids. Denies nausea. States pain is tolerable.

## 2020-07-10 NOTE — INTERVAL H&P NOTE
The patient has been examined and the H&P has been reviewed:    I concur with the findings and no changes have occurred since H&P was written.    Anesthesia/Surgery risks, benefits and alternative options discussed and understood by patient/family.          Active Hospital Problems    Diagnosis  POA    Anal fistula [K60.3]  Yes      Resolved Hospital Problems   No resolved problems to display.

## 2020-07-10 NOTE — BRIEF OP NOTE
[facility]  Brief Operative Note    Surgery Date: 7/10/2020     Surgeon(s) and Role:     * Robe Suarez MD - Primary     * Karolina Torres MD - Fellow    Assisting Surgeon: None    Pre-op Diagnosis:  Crohn's disease of both small and large intestine without complication [K50.80]  Anal fistula [K60.3]    Post-op Diagnosis:  Post-Op Diagnosis Codes:     * Crohn's disease of both small and large intestine without complication [K50.80]     * Anal fistula [K60.3]    Procedure(s) (LRB):  PLACEMENT-SETON DRAIN (N/A)  SIGMOIDOSCOPY, FLEXIBLE (N/A)    Anesthesia: General    Description of the findings of the procedure(s):     Complex right perianal fistula with several subcutaneous tracts and openings, main tracts are:   - One anterior towards the perineal body.   - Two openings in the right lateral side about 6 cm from anal verge.  - And lastly one towards posterior right midline.     No rectal intraluminal fistula openings seen.     Three setons placed, one anterior, one right lateral and one right posterior.     During Flex sigmoidoscopy severe anal stenosis with erythematous and edematous mucosa, about 14 cm rectal stump.      Estimated Blood Loss: * No values recorded between 7/10/2020  7:23 AM and 7/10/2020  7:57 AM *         Specimens:   Specimen (12h ago, onward)    None            Discharge Note    OUTCOME: Patient tolerated treatment/procedure well without complication and is now ready for discharge.    DISPOSITION: Home or Self Care    FINAL DIAGNOSIS:  Perianal fistulizing Crohn's disease.    FOLLOWUP: In clinic    DISCHARGE INSTRUCTIONS:    Discharge Procedure Orders   Diet Adult Regular     Notify your health care provider if you experience any of the following:  temperature >100.4     Notify your health care provider if you experience any of the following:  persistent nausea and vomiting or diarrhea     Notify your health care provider if you experience any of the following:   severe uncontrolled pain     Notify your health care provider if you experience any of the following:  redness, tenderness, or signs of infection (pain, swelling, redness, odor or green/yellow discharge around incision site)     Notify your health care provider if you experience any of the following:  difficulty breathing or increased cough     Notify your health care provider if you experience any of the following:  severe persistent headache     Change dressing (specify)   Order Comments: Change dressing as needed     Activity as tolerated

## 2020-07-10 NOTE — DISCHARGE INSTRUCTIONS
Instructions    Please take pain medication as needed, if taking narcotics please avoid driving.   Take Tylenol and Ibuprofen (Motrin/Advil) around the clock and use narcotic pain medication ONLY when needed.   You have small gauze inside your anus, you may remove it tomorrow.   We placed three seton drains around your perianal area.   Please call if fevers, chills, sob, increase drainage from your wound or bleeding or you may go to the ED.     Please call as well to make an appointment in 3 weeks with Dr. Suarez for wound recheck.         Anesthesia: General Anesthesia     You are watched continuously during your procedure by your anesthesia provider.     Youre due to have surgery. During surgery, youll be given medicine called anesthesia or anesthetic. This will keep you comfortable and pain-free. Your anesthesia provider will use general anesthesia.  What is general anesthesia?  General anesthesia puts you into a state like deep sleep. It goes into the bloodstream (IV anesthetics), into the lungs (gas anesthetics), or both. You feel nothing during the procedure. You will not remember it. During the procedure, the anesthesia provider monitors you continuously. He or she checks your heart rate and rhythm, blood pressure, breathing, and blood oxygen.  · IV anesthetics. IV anesthetics are given through an IV line in your arm. Theyre often given first. This is so you are asleep before a gas anesthetic is started. Some kinds of IV anesthetics relieve pain. Others relax you. Your doctor will decide which kind is best in your case.  · Gas anesthetics. Gas anesthetics are breathed into the lungs. They are often used to keep you asleep. They can be given through a facemask or a tube placed in your larynx or trachea (breathing tube).  ¨ If you have a facemask, your anesthesia provider will most likely place it over your nose and mouth while youre still awake. Youll breathe oxygen through the mask as your IV anesthetic is  started. Gas anesthetic may be added through the mask.  ¨ If you have a tube in the larynx or trachea, it will be inserted into your throat after youre asleep.  Anesthesia tools and medicines  You will likely have:  · IV anesthetics. These are put into an IV line into your bloodstream.  · Gas anesthetics. You breathe these anesthetics into your lungs, where they pass into your bloodstream.  · Pulse oximeter. This is a small clip that is attached to the end of your finger. This measures your blood oxygen level.  · Electrocardiography leads (electrodes). These are small sticky pads that are placed on your chest. They record your heart rate and rhythm.  · Blood pressure cuff. This reads your blood pressure.  Risks and possible complications  General anesthesia has some risks. These include:  · Breathing problems  · Nausea and vomiting  · Sore throat or hoarseness (usually temporary)  · Allergic reaction to the anesthetic  · Irregular heartbeat (rare)  · Cardiac arrest (rare)   Anesthesia safety  · Follow all instructions you are given for how long not to eat or drink before your procedure.  · Be sure your doctor knows what medicines and drugs you take. This includes over-the-counter medicines, herbs, supplements, alcohol or other drugs. You will be asked when those were last taken.  · Have an adult family member or friend drive you home after the procedure.  · For the first 24 hours after your surgery:  ¨ Do not drive or use heavy equipment.  ¨ Do not make important decisions or sign legal documents. If important decisions or signing legal documents is necessary during the first 24 hours after surgery, have a trusted family member or spouse act on your behalf.  ¨ Avoid alcohol.  ¨ Have a responsible adult stay with you. He or she can watch for problems and help keep you safe.  Date Last Reviewed: 12/1/2016  © 0189-1578 [employer]. [address] [place] [address], [place] [address]. All rights reserved. This  information is not intended as a substitute for professional medical care. Always follow your healthcare professional's instructions.

## 2020-07-10 NOTE — TRANSFER OF CARE
"Anesthesia Transfer of Care Note    Patient: Amando Calix    Procedure(s) Performed: Procedure(s) (LRB):  PLACEMENT-SETON DRAIN (N/A)  SIGMOIDOSCOPY, FLEXIBLE (N/A)    Patient location: PACU    Anesthesia Type: general    Transport from OR: Transported from OR on 6-10 L/min O2 by face mask with adequate spontaneous ventilation    Post pain: adequate analgesia    Post assessment: no apparent anesthetic complications    Post vital signs: stable    Level of consciousness: awake    Nausea/Vomiting: no nausea/vomiting    Complications: none    Transfer of care protocol was followed      Last vitals:   Visit Vitals  /83 (BP Location: Left arm, Patient Position: Sitting)   Pulse 81   Temp 36.8 °C (98.3 °F) (Oral)   Resp 18   Ht 5' 8" (1.727 m)   Wt 89.9 kg (198 lb 3.1 oz)   SpO2 97%   BMI 30.14 kg/m²     "

## 2020-07-15 ENCOUNTER — INFUSION (OUTPATIENT)
Dept: INFECTIOUS DISEASES | Facility: HOSPITAL | Age: 35
End: 2020-07-15
Attending: INTERNAL MEDICINE

## 2020-07-15 VITALS
HEIGHT: 68 IN | DIASTOLIC BLOOD PRESSURE: 83 MMHG | TEMPERATURE: 98 F | WEIGHT: 198 LBS | HEART RATE: 92 BPM | BODY MASS INDEX: 30.01 KG/M2 | SYSTOLIC BLOOD PRESSURE: 139 MMHG | RESPIRATION RATE: 18 BRPM

## 2020-07-15 DIAGNOSIS — K50.918 CROHN'S DISEASE WITH OTHER COMPLICATION, UNSPECIFIED GASTROINTESTINAL TRACT LOCATION: Primary | ICD-10-CM

## 2020-07-15 PROCEDURE — 63600175 PHARM REV CODE 636 W HCPCS: Mod: JG | Performed by: INTERNAL MEDICINE

## 2020-07-15 PROCEDURE — 96365 THER/PROPH/DIAG IV INF INIT: CPT

## 2020-07-15 PROCEDURE — 25000003 PHARM REV CODE 250: Performed by: INTERNAL MEDICINE

## 2020-07-15 RX ORDER — METHYLPREDNISOLONE SOD SUCC 125 MG
40 VIAL (EA) INJECTION
Status: ACTIVE | OUTPATIENT
Start: 2020-07-15 | End: 2020-07-15

## 2020-07-15 RX ORDER — HEPARIN 100 UNIT/ML
500 SYRINGE INTRAVENOUS
Status: CANCELLED | OUTPATIENT
Start: 2020-07-22

## 2020-07-15 RX ORDER — IPRATROPIUM BROMIDE AND ALBUTEROL SULFATE 2.5; .5 MG/3ML; MG/3ML
3 SOLUTION RESPIRATORY (INHALATION)
Status: DISPENSED | OUTPATIENT
Start: 2020-07-15 | End: 2020-07-15

## 2020-07-15 RX ORDER — IPRATROPIUM BROMIDE AND ALBUTEROL SULFATE 2.5; .5 MG/3ML; MG/3ML
3 SOLUTION RESPIRATORY (INHALATION)
Status: CANCELLED | OUTPATIENT
Start: 2020-07-22

## 2020-07-15 RX ORDER — SODIUM CHLORIDE 0.9 % (FLUSH) 0.9 %
10 SYRINGE (ML) INJECTION
Status: CANCELLED | OUTPATIENT
Start: 2020-07-22

## 2020-07-15 RX ORDER — DIPHENHYDRAMINE HYDROCHLORIDE 50 MG/ML
25 INJECTION INTRAMUSCULAR; INTRAVENOUS
Status: ACTIVE | OUTPATIENT
Start: 2020-07-15 | End: 2020-07-15

## 2020-07-15 RX ORDER — EPINEPHRINE 1 MG/ML
0.3 INJECTION, SOLUTION, CONCENTRATE INTRAVENOUS
Status: CANCELLED | OUTPATIENT
Start: 2020-07-22

## 2020-07-15 RX ORDER — ACETAMINOPHEN 325 MG/1
650 TABLET ORAL
Status: ACTIVE | OUTPATIENT
Start: 2020-07-15 | End: 2020-07-15

## 2020-07-15 RX ORDER — DIPHENHYDRAMINE HYDROCHLORIDE 50 MG/ML
25 INJECTION INTRAMUSCULAR; INTRAVENOUS
Status: CANCELLED | OUTPATIENT
Start: 2020-07-22

## 2020-07-15 RX ORDER — EPINEPHRINE 1 MG/ML
0.3 INJECTION, SOLUTION, CONCENTRATE INTRAVENOUS
Status: DISPENSED | OUTPATIENT
Start: 2020-07-15 | End: 2020-07-15

## 2020-07-15 RX ORDER — METHYLPREDNISOLONE SOD SUCC 125 MG
40 VIAL (EA) INJECTION
Status: CANCELLED | OUTPATIENT
Start: 2020-07-22

## 2020-07-15 RX ORDER — HEPARIN 100 UNIT/ML
500 SYRINGE INTRAVENOUS
Status: ACTIVE | OUTPATIENT
Start: 2020-07-15 | End: 2020-07-15

## 2020-07-15 RX ORDER — SODIUM CHLORIDE 0.9 % (FLUSH) 0.9 %
10 SYRINGE (ML) INJECTION
Status: DISCONTINUED | OUTPATIENT
Start: 2020-07-15 | End: 2020-07-15 | Stop reason: HOSPADM

## 2020-07-15 RX ORDER — ACETAMINOPHEN 325 MG/1
650 TABLET ORAL
Status: CANCELLED | OUTPATIENT
Start: 2020-07-22

## 2020-07-15 RX ADMIN — VEDOLIZUMAB 300 MG: 300 INJECTION, POWDER, LYOPHILIZED, FOR SOLUTION INTRAVENOUS at 10:07

## 2020-08-03 ENCOUNTER — OFFICE VISIT (OUTPATIENT)
Dept: SURGERY | Facility: CLINIC | Age: 35
End: 2020-08-03
Payer: OTHER GOVERNMENT

## 2020-08-03 VITALS
DIASTOLIC BLOOD PRESSURE: 90 MMHG | BODY MASS INDEX: 30.1 KG/M2 | HEART RATE: 100 BPM | SYSTOLIC BLOOD PRESSURE: 150 MMHG | HEIGHT: 68 IN | WEIGHT: 198.63 LBS

## 2020-08-03 DIAGNOSIS — K50.80 CROHN'S DISEASE OF BOTH SMALL AND LARGE INTESTINE WITHOUT COMPLICATION: ICD-10-CM

## 2020-08-03 DIAGNOSIS — K60.3 ANAL FISTULA: Primary | ICD-10-CM

## 2020-08-03 PROCEDURE — 99999 PR PBB SHADOW E&M-EST. PATIENT-LVL III: ICD-10-PCS | Mod: PBBFAC,,, | Performed by: COLON & RECTAL SURGERY

## 2020-08-03 PROCEDURE — 99024 PR POST-OP FOLLOW-UP VISIT: ICD-10-PCS | Mod: ,,, | Performed by: COLON & RECTAL SURGERY

## 2020-08-03 PROCEDURE — 99024 POSTOP FOLLOW-UP VISIT: CPT | Mod: ,,, | Performed by: COLON & RECTAL SURGERY

## 2020-08-03 PROCEDURE — 99999 PR PBB SHADOW E&M-EST. PATIENT-LVL III: CPT | Mod: PBBFAC,,, | Performed by: COLON & RECTAL SURGERY

## 2020-08-03 PROCEDURE — 99213 OFFICE O/P EST LOW 20 MIN: CPT | Mod: PBBFAC | Performed by: COLON & RECTAL SURGERY

## 2020-08-03 NOTE — LETTER
August 15, 2020      Ronni Freeman MD  [address]  [facility] [address]           Raheem Gray  Center- [address]  [address]  [facility] [address]  Phone: [phone]          Patient: Amando Calix   MR Number: [id number]   YOB: 1985   Date of Visit: 8/3/2020       Dear Dr. Freeman:    Thank you for referring Amando Calix to me for evaluation. Attached you will find relevant portions of my assessment and plan of care.    If you have questions, please do not hesitate to call me. I look forward to following Amando Calix along with you.    Sincerely,    Robe Suarez MD    Enclosure  CC:  No Recipients    If you would like to receive this communication electronically, please contact [email or web address] or [phone] to request more information on [employer] access.    For providers and/or their staff who would like to refer a patient to [facility], please contact us through our one-stop-shop provider referral line, [facility], at [phone].    If you feel you have received this communication in error or would no longer like to receive these types of communications, please e-mail [email or web address]

## 2020-08-15 NOTE — PROGRESS NOTES
"CRS Post-operative visit    Visit Info:     Procedure:   1.  Flexible sigmoidoscopy  2.  Examination under anesthesia with placement of draining seton x3    Date of Procedure: July 10, 2020    Indication: 34-year-old male with history of Crohn's colitis having undergone a prior colectomy with end ileostomy, leaving a short rectal stump.  He did well postoperatively, but late last year lost his insurance and stopped his biologic therapy for Crohn's disease.  Following this he began to have worsening problems with perirectal fistulas and drainage.  On examination he had extensive inflammatory changes involving the right ischiorectal fossa with a watering can appearance and multiple fistulas.  He was brought to the Operating Room for examination under anesthesia and drainage.  Additionally we elected to evaluate his residual rectal stump endoscopically at the same setting, as it has been sometime since this has been performed.      OPERATIVE FINDINGS:   1.  Tight anal stenosis, digitally dilated  2.  12 cm anorectal stump with significant proctitis  3.  Watering can appearance of the right ischiorectal fossa with extensive inflammatory changes and multiple extra sphincteric fistulae - no obvious communication with the anal canal    Current Status:  Doing reasonably well postop.  Complains of minor discomfort.  Still with some drainage, though this has been gradually improving.  No fevers or chills.  He has resume treatment with Entyvio.    Physical Exam:  General: Black or  male in NAD   Neuro: aaox4   Respiratory: resps even unlabored  Extremities: Warm dry and intact  Anorectal: "Watering can" appearance of the right ischiorectal fossa with extensive inflammatory changes, 3 draining setons in place, + mild surrounding induration but no fluctuance or crepitus.    Assessment:  Crohn's disease, s/p remote total abdominal colectomy with end ileostomy  Extensive disease involving the remaining " anorectum with proctitis and extensive anal fistulization    Plan:  Continue medical management of Crohn's disease per GI.  I did discuss with the patient the option of a completion proctectomy given that there is very little hope of over restore intestinal continuity.  This might require some sort of rotational flap closure of the perineum given the extent of his perianal disease.  He is not ready to pursue this at this point.  Will plan to leave setons in place for some time.  RTO 2 months.    Robe Suarez MD, FACS, FASCRS  Senior Staff Surgeon  Department of Colon & Rectal Surgery     This note was created using voice recognition software, and may contain some unrecognized transcriptional errors.

## 2020-09-08 ENCOUNTER — TELEPHONE (OUTPATIENT)
Dept: INFECTIOUS DISEASES | Facility: HOSPITAL | Age: 35
End: 2020-09-08

## 2020-09-08 NOTE — TELEPHONE ENCOUNTER
Confirmed appt. Denies any fever, chills, cough or SOB at this time.    
Principal Discharge DX:	Hand pain

## 2020-09-09 ENCOUNTER — INFUSION (OUTPATIENT)
Dept: INFECTIOUS DISEASES | Facility: HOSPITAL | Age: 35
End: 2020-09-09
Attending: INTERNAL MEDICINE
Payer: OTHER GOVERNMENT

## 2020-09-09 VITALS
RESPIRATION RATE: 18 BRPM | WEIGHT: 199.5 LBS | DIASTOLIC BLOOD PRESSURE: 85 MMHG | HEIGHT: 68 IN | BODY MASS INDEX: 30.23 KG/M2 | OXYGEN SATURATION: 97 % | HEART RATE: 102 BPM | SYSTOLIC BLOOD PRESSURE: 136 MMHG | TEMPERATURE: 98 F

## 2020-09-09 DIAGNOSIS — K50.80 CROHN'S DISEASE OF BOTH SMALL AND LARGE INTESTINE WITHOUT COMPLICATION: Primary | ICD-10-CM

## 2020-09-09 DIAGNOSIS — K50.918 CROHN'S DISEASE WITH OTHER COMPLICATION, UNSPECIFIED GASTROINTESTINAL TRACT LOCATION: ICD-10-CM

## 2020-09-09 PROCEDURE — 25000003 PHARM REV CODE 250: Performed by: INTERNAL MEDICINE

## 2020-09-09 PROCEDURE — 96365 THER/PROPH/DIAG IV INF INIT: CPT

## 2020-09-09 PROCEDURE — 63600175 PHARM REV CODE 636 W HCPCS: Mod: JG | Performed by: INTERNAL MEDICINE

## 2020-09-09 RX ORDER — METHYLPREDNISOLONE SOD SUCC 125 MG
40 VIAL (EA) INJECTION
Status: CANCELLED | OUTPATIENT
Start: 2020-11-04

## 2020-09-09 RX ORDER — HEPARIN 100 UNIT/ML
500 SYRINGE INTRAVENOUS
Status: CANCELLED | OUTPATIENT
Start: 2020-11-04

## 2020-09-09 RX ORDER — IPRATROPIUM BROMIDE AND ALBUTEROL SULFATE 2.5; .5 MG/3ML; MG/3ML
3 SOLUTION RESPIRATORY (INHALATION)
Status: CANCELLED | OUTPATIENT
Start: 2020-11-04

## 2020-09-09 RX ORDER — SODIUM CHLORIDE 0.9 % (FLUSH) 0.9 %
10 SYRINGE (ML) INJECTION
Status: DISCONTINUED | OUTPATIENT
Start: 2020-09-09 | End: 2020-09-09 | Stop reason: HOSPADM

## 2020-09-09 RX ORDER — SODIUM CHLORIDE 0.9 % (FLUSH) 0.9 %
10 SYRINGE (ML) INJECTION
Status: CANCELLED | OUTPATIENT
Start: 2020-11-04

## 2020-09-09 RX ORDER — HEPARIN 100 UNIT/ML
500 SYRINGE INTRAVENOUS
Status: DISPENSED | OUTPATIENT
Start: 2020-09-09 | End: 2020-09-09

## 2020-09-09 RX ORDER — EPINEPHRINE 1 MG/ML
0.3 INJECTION, SOLUTION, CONCENTRATE INTRAVENOUS
Status: CANCELLED | OUTPATIENT
Start: 2020-11-04

## 2020-09-09 RX ORDER — DIPHENHYDRAMINE HYDROCHLORIDE 50 MG/ML
25 INJECTION INTRAMUSCULAR; INTRAVENOUS
Status: CANCELLED | OUTPATIENT
Start: 2020-11-04

## 2020-09-09 RX ORDER — ACETAMINOPHEN 325 MG/1
650 TABLET ORAL
Status: CANCELLED | OUTPATIENT
Start: 2020-11-04

## 2020-09-09 RX ADMIN — VEDOLIZUMAB 300 MG: 300 INJECTION, POWDER, LYOPHILIZED, FOR SOLUTION INTRAVENOUS at 09:09

## 2020-09-30 ENCOUNTER — TELEPHONE (OUTPATIENT)
Dept: SURGERY | Facility: CLINIC | Age: 35
End: 2020-09-30

## 2020-10-05 ENCOUNTER — OFFICE VISIT (OUTPATIENT)
Dept: SURGERY | Facility: CLINIC | Age: 35
End: 2020-10-05
Payer: OTHER GOVERNMENT

## 2020-10-05 VITALS
HEIGHT: 68 IN | HEART RATE: 102 BPM | BODY MASS INDEX: 30.14 KG/M2 | WEIGHT: 198.88 LBS | SYSTOLIC BLOOD PRESSURE: 163 MMHG | DIASTOLIC BLOOD PRESSURE: 91 MMHG

## 2020-10-05 DIAGNOSIS — K60.3 ANAL FISTULA: Primary | ICD-10-CM

## 2020-10-05 DIAGNOSIS — K50.80 CROHN'S DISEASE OF BOTH SMALL AND LARGE INTESTINE WITHOUT COMPLICATION: ICD-10-CM

## 2020-10-05 PROCEDURE — 99213 OFFICE O/P EST LOW 20 MIN: CPT | Mod: PBBFAC | Performed by: COLON & RECTAL SURGERY

## 2020-10-05 PROCEDURE — 99024 PR POST-OP FOLLOW-UP VISIT: ICD-10-PCS | Mod: ,,, | Performed by: COLON & RECTAL SURGERY

## 2020-10-05 PROCEDURE — 99999 PR PBB SHADOW E&M-EST. PATIENT-LVL III: ICD-10-PCS | Mod: PBBFAC,,, | Performed by: COLON & RECTAL SURGERY

## 2020-10-05 PROCEDURE — 99999 PR PBB SHADOW E&M-EST. PATIENT-LVL III: CPT | Mod: PBBFAC,,, | Performed by: COLON & RECTAL SURGERY

## 2020-10-05 PROCEDURE — 99024 POSTOP FOLLOW-UP VISIT: CPT | Mod: ,,, | Performed by: COLON & RECTAL SURGERY

## 2020-10-05 NOTE — LETTER
October 6, 2020      Ronni Freeman MD  [address]  [facility] [address]           Raheem Gray  Center- [facility] 4th Fl  [address]  [facility] [address]  Phone: [phone]          Patient: Amando Calix   MR Number: [id number]   YOB: 1985   Date of Visit: 10/5/2020       Dear Dr Freeman:    Thank you for referring Amando Calix to me for evaluation. Attached you will find relevant portions of my assessment and plan of care.    If you have questions, please do not hesitate to call me. I look forward to following Amando Calix along with you.    Sincerely,    Robe Suarez MD    Enclosure  CC:  No Recipients    If you would like to receive this communication electronically, please contact [email or web address] or [phone] to request more information on [employer] access.    For providers and/or their staff who would like to refer a patient to [facility], please contact us through our one-stop-shop provider referral line, [address], at [phone].    If you feel you have received this communication in error or would no longer like to receive these types of communications, please e-mail [email or web address]

## 2020-10-05 NOTE — PROGRESS NOTES
History & Physical  Colon and Rectal Surgery Clinic    SUBJECTIVE:     Chief complaint: post-operative visit    History of Present Illness:  Amando Calix is a 34 y.o. male with a past medical history of Crohn's disease who presents to clinic following EUA with seton stitch placement x3 on 7/10/20. The patient endorses that the seton stitches remain in place currently. He states that he continues to have drainage to the area. He complains of discomfort to the area where the setons are place, especially the seton near his scrotum. He states, overall, he is improving though, and the drainage is less than his previous clinic visit and initial presentation. Patient with ileostomy in place. Endorse good output from ostomy. He continues to take his Entyvio for his Crohn's disease. He has no new complaints at this time.     Past Medical History:  Past Medical History:   Diagnosis Date    Anemia     Chronic diarrhea     Clostridium difficile infection     Crohn's disease     Protein calorie malnutrition     Steroid-induced diabetes        Past Surgical History:  Past Surgical History:   Procedure Laterality Date    ABSCESS DRAINAGE      COLONOSCOPY      COLOSTOMY      FLEXIBLE SIGMOIDOSCOPY N/A 7/10/2020    Procedure: SIGMOIDOSCOPY, FLEXIBLE;  Surgeon: Robe Suarez MD;  Location: [facility] OR [address] [place] [address];  Service: Colon and Rectal;  Laterality: N/A;    HERNIA REPAIR      ILEOSTOMY      INSERTION OF SETON STITCH N/A 7/10/2020    Procedure: PLACEMENT-SETON DRAIN;  Surgeon: Robe Suarez MD;  Location: [facility] OR [address] [place] [address];  Service: Colon and Rectal;  Laterality: N/A;    SIGMOIDECTOMY         Family History:  Family History   Problem Relation Age of Onset    Diabetes Father     Hyperlipidemia Mother     Diabetes Mother     Crohn's disease Neg Hx     Celiac disease Neg Hx     Cirrhosis Neg Hx     Colon cancer Neg Hx     Esophageal cancer Neg Hx     Irritable bowel syndrome Neg Hx     Liver cancer Neg Hx      Rectal cancer Neg Hx     Stomach cancer Neg Hx     Ulcerative colitis Neg Hx        Social History:  Social History     Socioeconomic History    Marital status: Single     Spouse name: Not on file    Number of children: Not on file    Years of education: Not on file    Highest education level: Not on file   Occupational History    Not on file   Social Needs    Financial resource strain: Not on file    Food insecurity     Worry: Not on file     Inability: Not on file    Transportation needs     Medical: Not on file     Non-medical: Not on file   Tobacco Use    Smoking status: Current Every Day Smoker     Packs/day: 0.50     Years: 3.00     Pack years: 1.50     Types: Cigarettes     Last attempt to quit: 2014     Years since quittin.9    Smokeless tobacco: Never Used   Substance and Sexual Activity    Alcohol use: Yes     Comment: socially-weekly    Drug use: No    Sexual activity: Yes     Partners: Female   Lifestyle    Physical activity     Days per week: Not on file     Minutes per session: Not on file    Stress: Not on file   Relationships    Social connections     Talks on phone: Not on file     Gets together: Not on file     Attends Congregational service: Not on file     Active member of club or organization: Not on file     Attends meetings of clubs or organizations: Not on file     Relationship status: Not on file   Other Topics Concern    Not on file   Social History Narrative    Not on file       Medications:  Current Outpatient Medications   Medication Sig Dispense Refill    acetaminophen (TYLENOL ORAL) Take by mouth as needed.      VEDOLIZUMAB (ENTYVIO IV) Inject into the vein.      ciprofloxacin HCl (CIPRO) 500 MG tablet Take 1 tablet (500 mg total) by mouth every 12 (twelve) hours. (Patient not taking: Reported on 2020) 28 tablet 3    oxyCODONE (ROXICODONE) 5 MG immediate release tablet Take 1 tablet (5 mg total) by mouth every 4 (four) hours as needed. 25 tablet 0  "    Current Facility-Administered Medications   Medication Dose Route Frequency Provider Last Rate Last Dose    vedolizumab (ENTYVIO) 300 mg in sodium chloride 0.9% 250 mL  300 mg Intravenous 1 time in Clinic/HOD Ronni Freeman MD           Allergies:  Review of patient's allergies indicates:   Allergen Reactions    Ibuprofen Other (See Comments)     Can't take d/t stomach ulcers       Review of Systems:  Negative except for HPI.      OBJECTIVE:     BP (!) 163/91 (BP Location: Right arm, Patient Position: Sitting, BP Method: Large (Automatic))   Pulse 102   Ht 5' 8" (1.727 m)   Wt 90.2 kg (198 lb 13.7 oz)   BMI 30.24 kg/m²     Physical Exam:  Constitutional: Oriented to person, place, and time. Appears well-developed and well-nourished.   General appearance: alert, appears stated age and cooperative  Lungs: normal respiratory effort  Heart: regular rate and rhythm  Abdomen: soft, non-tender; bowel sounds normal; no masses,  no organomegaly and midline scar with RLQ ileostomy  Extremities: extremities normal, atraumatic, no cyanosis or edema  Skin: Skin color, texture, turgor normal. No rashes or lesions  Anorectal: seton stitches in place to the left aspect of the perianal area; one stitch coursing down towards the scrotum; area of free drainage noted on exam          ASSESSMENT/PLAN:     34 y.o. male with Crohn's disease who presents for a post-operative visit s/p EUA with seton stitch x3 on 7/10/20.    - seton stitches to remain in place   - due to rampant disease, patient with many perianal fistulas  - discussed repeat pelvic MRI, more seton stitches if patient worsens   - patient not amenable to completion proctectomy at this time  - RTC in 3 months, or sooner if necessary    I have interviewed and examined the patient, reviewed the notes and assessments, and/or personally supervised the procedure(s) performed by Dr Yu, and I concur with her/his documentation of Amando Calix.  See below addendum " for my evaluation and additional findings.    Overall doing well - c/o minor local discomfort from setons, especially anteriormost seton at base of scrotum.  Volume of drainage is decreased per patient.  No F/C.    Exam as note above.  3 draining setons in place on right side. There is a new external fistula opening between setons that is freely draining seropurulent fluid - no surrounding erythema/induration/fluctuance.    Impression:  Crohn's colitis, s/p TAP/EI with significant anorectal fistulizing disease, s/p EUA & placement of multiple setons    Recommendations:  Continue medical management of Crohn's per GI  Will plan to leave setons indefinitely  Given extent of anorectal disease with fistulae and stenosis, ileostomy will be permanent.  Discussed completion proctectomy wi patient - he is not ready to proceed at this point.  RTO 3 months - sooner if problems arise.    Robe Suarez MD, FACS, FASCRS  Senior Staff Surgeon  Department of Colon & Rectal Surgery

## 2020-11-04 ENCOUNTER — INFUSION (OUTPATIENT)
Dept: INFECTIOUS DISEASES | Facility: HOSPITAL | Age: 35
End: 2020-11-04
Attending: INTERNAL MEDICINE
Payer: OTHER GOVERNMENT

## 2020-11-04 VITALS
OXYGEN SATURATION: 97 % | HEART RATE: 104 BPM | RESPIRATION RATE: 18 BRPM | WEIGHT: 184.44 LBS | SYSTOLIC BLOOD PRESSURE: 145 MMHG | BODY MASS INDEX: 27.95 KG/M2 | HEIGHT: 68 IN | TEMPERATURE: 99 F | DIASTOLIC BLOOD PRESSURE: 85 MMHG

## 2020-11-04 DIAGNOSIS — K50.918 CROHN'S DISEASE WITH OTHER COMPLICATION, UNSPECIFIED GASTROINTESTINAL TRACT LOCATION: Primary | ICD-10-CM

## 2020-11-04 PROCEDURE — 63600175 PHARM REV CODE 636 W HCPCS: Mod: JG | Performed by: INTERNAL MEDICINE

## 2020-11-04 PROCEDURE — 96365 THER/PROPH/DIAG IV INF INIT: CPT

## 2020-11-04 PROCEDURE — 25000003 PHARM REV CODE 250: Performed by: INTERNAL MEDICINE

## 2020-11-04 RX ORDER — IPRATROPIUM BROMIDE AND ALBUTEROL SULFATE 2.5; .5 MG/3ML; MG/3ML
3 SOLUTION RESPIRATORY (INHALATION)
Status: DISPENSED | OUTPATIENT
Start: 2020-11-04 | End: 2020-11-04

## 2020-11-04 RX ORDER — HEPARIN 100 UNIT/ML
500 SYRINGE INTRAVENOUS
Status: DISPENSED | OUTPATIENT
Start: 2020-11-04 | End: 2020-11-04

## 2020-11-04 RX ORDER — DIPHENHYDRAMINE HYDROCHLORIDE 50 MG/ML
25 INJECTION INTRAMUSCULAR; INTRAVENOUS
Status: DISPENSED | OUTPATIENT
Start: 2020-11-04 | End: 2020-11-04

## 2020-11-04 RX ORDER — METHYLPREDNISOLONE SOD SUCC 125 MG
40 VIAL (EA) INJECTION
Status: CANCELLED | OUTPATIENT
Start: 2020-12-30

## 2020-11-04 RX ORDER — SODIUM CHLORIDE 0.9 % (FLUSH) 0.9 %
10 SYRINGE (ML) INJECTION
Status: CANCELLED | OUTPATIENT
Start: 2020-12-30

## 2020-11-04 RX ORDER — ACETAMINOPHEN 325 MG/1
650 TABLET ORAL
Status: DISPENSED | OUTPATIENT
Start: 2020-11-04 | End: 2020-11-04

## 2020-11-04 RX ORDER — DIPHENHYDRAMINE HYDROCHLORIDE 50 MG/ML
25 INJECTION INTRAMUSCULAR; INTRAVENOUS
Status: CANCELLED | OUTPATIENT
Start: 2020-12-30

## 2020-11-04 RX ORDER — SODIUM CHLORIDE 0.9 % (FLUSH) 0.9 %
10 SYRINGE (ML) INJECTION
Status: DISCONTINUED | OUTPATIENT
Start: 2020-11-04 | End: 2020-11-04 | Stop reason: HOSPADM

## 2020-11-04 RX ORDER — HEPARIN 100 UNIT/ML
500 SYRINGE INTRAVENOUS
Status: CANCELLED | OUTPATIENT
Start: 2020-12-30

## 2020-11-04 RX ORDER — EPINEPHRINE 0.3 MG/.3ML
0.3 INJECTION SUBCUTANEOUS
Status: DISPENSED | OUTPATIENT
Start: 2020-11-04 | End: 2020-11-04

## 2020-11-04 RX ORDER — IPRATROPIUM BROMIDE AND ALBUTEROL SULFATE 2.5; .5 MG/3ML; MG/3ML
3 SOLUTION RESPIRATORY (INHALATION)
Status: CANCELLED | OUTPATIENT
Start: 2020-12-30

## 2020-11-04 RX ORDER — EPINEPHRINE 1 MG/ML
0.3 INJECTION, SOLUTION, CONCENTRATE INTRAVENOUS
Status: CANCELLED | OUTPATIENT
Start: 2020-12-30

## 2020-11-04 RX ORDER — ACETAMINOPHEN 325 MG/1
650 TABLET ORAL
Status: CANCELLED | OUTPATIENT
Start: 2020-12-30

## 2020-11-04 RX ADMIN — VEDOLIZUMAB 300 MG: 300 INJECTION, POWDER, LYOPHILIZED, FOR SOLUTION INTRAVENOUS at 10:11

## 2020-11-04 NOTE — PROGRESS NOTES
"  Infusion medication:  Entyvio  Today's weight:  83.7 kg  Wt Readings from Last 1 Encounters:   07/21/17 109.8 kg (242 lb)         Checklist prior to infusion:  Is the Biologic RX (therapy plan) up to date within the past one year? Yes  Are the most recent labs within the last 3 - 6 months ? Yes  Has the patient had an appointment with the MD/ANIKA that is prescribing the biologic infusion within the last 3 - 6 months? Yes  Documentation of safety questions prior to infusion:   RN TO NOTIFY MD IF "YES" answered to any of below questions prior to infusion:    Symptoms in past 2 weeks? (fever, night sweats, URI or Flu-like symptoms, cough, painful urination, warm/red/painful skin, skin ulcers/wounds, tooth infection/abscess): No  Current symptoms of an active infection? No  Temperature greater than 100.4 in the last 48 hours? No  Recent infections that required antibiotic use in the last 10-14 days?No  If patient has had surgery, any problems with the surgical wound (drainage, redness, tenderness)? No If yes then has surgeon cleared patient prior to getting this infusion? N/A  Pregnant? N/A  If yes, is prescribing provider aware of this pregnancy?  N/A  NEW or WORSENING abdominal pain, diarrhea, nausea/vomiting? No  Any SOB, ankle/feet swelling, or sudden weight gain? No    Patient tolerated infusion well today, vital signs remained normal throughout infusion and patient left with no apparent distress:  Yes  6  Received next infusion date prior to discharge: Yes    Additional note to provider:  No    "

## 2020-11-21 ENCOUNTER — HOSPITAL ENCOUNTER (INPATIENT)
Facility: HOSPITAL | Age: 35
LOS: 13 days | Discharge: HOME | DRG: 982 | End: 2020-12-04
Attending: EMERGENCY MEDICINE | Admitting: INTERNAL MEDICINE

## 2020-11-21 DIAGNOSIS — R00.0 TACHYCARDIA: ICD-10-CM

## 2020-11-21 DIAGNOSIS — N17.9 AKI (ACUTE KIDNEY INJURY): ICD-10-CM

## 2020-11-21 DIAGNOSIS — E08.10 DIABETIC KETOACIDOSIS WITHOUT COMA ASSOCIATED WITH DIABETES MELLITUS DUE TO UNDERLYING CONDITION: Primary | ICD-10-CM

## 2020-11-21 DIAGNOSIS — L03.314 CELLULITIS OF GROIN: ICD-10-CM

## 2020-11-21 DIAGNOSIS — E11.65 TYPE 2 DIABETES MELLITUS WITH HYPERGLYCEMIA, UNSPECIFIED WHETHER LONG TERM INSULIN USE: ICD-10-CM

## 2020-11-21 DIAGNOSIS — E11.65 TYPE 2 DIABETES MELLITUS WITH HYPERGLYCEMIA: ICD-10-CM

## 2020-11-21 DIAGNOSIS — K50.80 CROHN'S DISEASE OF BOTH SMALL AND LARGE INTESTINE WITHOUT COMPLICATION: ICD-10-CM

## 2020-11-21 DIAGNOSIS — R53.83 FATIGUE: ICD-10-CM

## 2020-11-21 DIAGNOSIS — D64.9 ANEMIA, UNSPECIFIED TYPE: ICD-10-CM

## 2020-11-21 DIAGNOSIS — K50.113 CROHN'S DISEASE OF COLON WITH FISTULA: ICD-10-CM

## 2020-11-21 PROBLEM — N49.2 CELLULITIS OF SCROTUM: Status: ACTIVE | Noted: 2020-11-21

## 2020-11-21 LAB
ALBUMIN SERPL BCP-MCNC: 4 G/DL (ref 3.5–5.2)
ALLENS TEST: ABNORMAL
ALP SERPL-CCNC: 110 U/L (ref 55–135)
ALT SERPL W/O P-5'-P-CCNC: 12 U/L (ref 10–44)
ANION GAP SERPL CALC-SCNC: 13 MMOL/L (ref 8–16)
ANION GAP SERPL CALC-SCNC: 14 MMOL/L (ref 8–16)
ANION GAP SERPL CALC-SCNC: 27 MMOL/L (ref 8–16)
AST SERPL-CCNC: 9 U/L (ref 10–40)
B-OH-BUTYR BLD STRIP-SCNC: 4.6 MMOL/L (ref 0–0.5)
BACTERIA #/AREA URNS AUTO: NORMAL /HPF
BASOPHILS # BLD AUTO: 0.02 K/UL (ref 0–0.2)
BASOPHILS NFR BLD: 0.3 % (ref 0–1.9)
BILIRUB SERPL-MCNC: 0.4 MG/DL (ref 0.1–1)
BILIRUB UR QL STRIP: NEGATIVE
BUN SERPL-MCNC: 6 MG/DL (ref 6–20)
BUN SERPL-MCNC: 6 MG/DL (ref 6–20)
BUN SERPL-MCNC: 9 MG/DL (ref 6–20)
CALCIUM SERPL-MCNC: 8.5 MG/DL (ref 8.7–10.5)
CALCIUM SERPL-MCNC: 9.1 MG/DL (ref 8.7–10.5)
CALCIUM SERPL-MCNC: 9.5 MG/DL (ref 8.7–10.5)
CHLORIDE SERPL-SCNC: 105 MMOL/L (ref 95–110)
CHLORIDE SERPL-SCNC: 109 MMOL/L (ref 95–110)
CHLORIDE SERPL-SCNC: 97 MMOL/L (ref 95–110)
CLARITY UR REFRACT.AUTO: CLEAR
CO2 SERPL-SCNC: 13 MMOL/L (ref 23–29)
CO2 SERPL-SCNC: 20 MMOL/L (ref 23–29)
CO2 SERPL-SCNC: 21 MMOL/L (ref 23–29)
COLOR UR AUTO: ABNORMAL
CREAT SERPL-MCNC: 1.1 MG/DL (ref 0.5–1.4)
CREAT SERPL-MCNC: 1.2 MG/DL (ref 0.5–1.4)
CREAT SERPL-MCNC: 1.7 MG/DL (ref 0.5–1.4)
CRP SERPL-MCNC: 35.4 MG/L (ref 0–8.2)
CTP QC/QA: YES
DIFFERENTIAL METHOD: ABNORMAL
EOSINOPHIL # BLD AUTO: 0.1 K/UL (ref 0–0.5)
EOSINOPHIL NFR BLD: 0.6 % (ref 0–8)
ERYTHROCYTE [DISTWIDTH] IN BLOOD BY AUTOMATED COUNT: 13.1 % (ref 11.5–14.5)
ERYTHROCYTE [SEDIMENTATION RATE] IN BLOOD BY WESTERGREN METHOD: 39 MM/HR (ref 0–23)
EST. GFR  (AFRICAN AMERICAN): 59.5 ML/MIN/1.73 M^2
EST. GFR  (AFRICAN AMERICAN): >60 ML/MIN/1.73 M^2
EST. GFR  (AFRICAN AMERICAN): >60 ML/MIN/1.73 M^2
EST. GFR  (NON AFRICAN AMERICAN): 51.5 ML/MIN/1.73 M^2
EST. GFR  (NON AFRICAN AMERICAN): >60 ML/MIN/1.73 M^2
EST. GFR  (NON AFRICAN AMERICAN): >60 ML/MIN/1.73 M^2
ESTIMATED AVG GLUCOSE: 335 MG/DL (ref 68–131)
ESTIMATED AVG GLUCOSE: 349 MG/DL (ref 68–131)
GLUCOSE SERPL-MCNC: 232 MG/DL (ref 70–110)
GLUCOSE SERPL-MCNC: 345 MG/DL (ref 70–110)
GLUCOSE SERPL-MCNC: 615 MG/DL (ref 70–110)
GLUCOSE UR QL STRIP: ABNORMAL
HBA1C MFR BLD HPLC: 13.3 % (ref 4–5.6)
HBA1C MFR BLD HPLC: 13.8 % (ref 4–5.6)
HCO3 UR-SCNC: 18.8 MMOL/L (ref 24–28)
HCT VFR BLD AUTO: 46.8 % (ref 40–54)
HGB BLD-MCNC: 15.8 G/DL (ref 14–18)
HGB UR QL STRIP: NEGATIVE
IMM GRANULOCYTES # BLD AUTO: 0.02 K/UL (ref 0–0.04)
IMM GRANULOCYTES NFR BLD AUTO: 0.3 % (ref 0–0.5)
KETONES UR QL STRIP: ABNORMAL
LACTATE SERPL-SCNC: 1.9 MMOL/L (ref 0.5–2.2)
LEUKOCYTE ESTERASE UR QL STRIP: NEGATIVE
LYMPHOCYTES # BLD AUTO: 1.4 K/UL (ref 1–4.8)
LYMPHOCYTES NFR BLD: 16.9 % (ref 18–48)
MCH RBC QN AUTO: 30.5 PG (ref 27–31)
MCHC RBC AUTO-ENTMCNC: 33.8 G/DL (ref 32–36)
MCV RBC AUTO: 90 FL (ref 82–98)
MICROSCOPIC COMMENT: NORMAL
MONOCYTES # BLD AUTO: 0.7 K/UL (ref 0.3–1)
MONOCYTES NFR BLD: 8.1 % (ref 4–15)
NEUTROPHILS # BLD AUTO: 5.9 K/UL (ref 1.8–7.7)
NEUTROPHILS NFR BLD: 73.8 % (ref 38–73)
NITRITE UR QL STRIP: NEGATIVE
NRBC BLD-RTO: 0 /100 WBC
OSMOLALITY SERPL: 323 MOSM/KG (ref 280–300)
PCO2 BLDA: 38.8 MMHG (ref 35–45)
PH SMN: 7.29 [PH] (ref 7.35–7.45)
PH UR STRIP: 5 [PH] (ref 5–8)
PLATELET # BLD AUTO: 234 K/UL (ref 150–350)
PMV BLD AUTO: 13.9 FL (ref 9.2–12.9)
PO2 BLDA: 48 MMHG (ref 40–60)
POC BE: -8 MMOL/L
POC SATURATED O2: 79 % (ref 95–100)
POC TCO2: 20 MMOL/L (ref 24–29)
POCT GLUCOSE: 207 MG/DL (ref 70–110)
POCT GLUCOSE: 222 MG/DL (ref 70–110)
POCT GLUCOSE: 254 MG/DL (ref 70–110)
POCT GLUCOSE: 294 MG/DL (ref 70–110)
POCT GLUCOSE: 305 MG/DL (ref 70–110)
POCT GLUCOSE: 308 MG/DL (ref 70–110)
POCT GLUCOSE: 336 MG/DL (ref 70–110)
POCT GLUCOSE: 349 MG/DL (ref 70–110)
POCT GLUCOSE: 425 MG/DL (ref 70–110)
POTASSIUM SERPL-SCNC: 3.6 MMOL/L (ref 3.5–5.1)
POTASSIUM SERPL-SCNC: 3.9 MMOL/L (ref 3.5–5.1)
POTASSIUM SERPL-SCNC: 4 MMOL/L (ref 3.5–5.1)
PROT SERPL-MCNC: 8.4 G/DL (ref 6–8.4)
PROT UR QL STRIP: NEGATIVE
RBC # BLD AUTO: 5.18 M/UL (ref 4.6–6.2)
RBC #/AREA URNS AUTO: 1 /HPF (ref 0–4)
SAMPLE: ABNORMAL
SARS-COV-2 RDRP RESP QL NAA+PROBE: NEGATIVE
SITE: ABNORMAL
SODIUM SERPL-SCNC: 137 MMOL/L (ref 136–145)
SODIUM SERPL-SCNC: 140 MMOL/L (ref 136–145)
SODIUM SERPL-SCNC: 142 MMOL/L (ref 136–145)
SP GR UR STRIP: >=1.03 (ref 1–1.03)
SQUAMOUS #/AREA URNS AUTO: 0 /HPF
TROPONIN I SERPL DL<=0.01 NG/ML-MCNC: 0.02 NG/ML (ref 0–0.03)
URN SPEC COLLECT METH UR: ABNORMAL
WBC # BLD AUTO: 7.99 K/UL (ref 3.9–12.7)
WBC #/AREA URNS AUTO: 1 /HPF (ref 0–5)
YEAST UR QL AUTO: NORMAL

## 2020-11-21 PROCEDURE — 83036 HEMOGLOBIN GLYCOSYLATED A1C: CPT | Mod: 91

## 2020-11-21 PROCEDURE — 96375 TX/PRO/DX INJ NEW DRUG ADDON: CPT

## 2020-11-21 PROCEDURE — 99223 PR INITIAL HOSPITAL CARE,LEVL III: ICD-10-PCS | Mod: ,,, | Performed by: INTERNAL MEDICINE

## 2020-11-21 PROCEDURE — 25000003 PHARM REV CODE 250: Performed by: PHYSICIAN ASSISTANT

## 2020-11-21 PROCEDURE — 80053 COMPREHEN METABOLIC PANEL: CPT

## 2020-11-21 PROCEDURE — 80048 BASIC METABOLIC PNL TOTAL CA: CPT

## 2020-11-21 PROCEDURE — 99291 PR CRITICAL CARE, E/M 30-74 MINUTES: ICD-10-PCS | Mod: ,,, | Performed by: EMERGENCY MEDICINE

## 2020-11-21 PROCEDURE — 99223 1ST HOSP IP/OBS HIGH 75: CPT | Mod: ,,, | Performed by: INTERNAL MEDICINE

## 2020-11-21 PROCEDURE — 83605 ASSAY OF LACTIC ACID: CPT

## 2020-11-21 PROCEDURE — 25000003 PHARM REV CODE 250: Performed by: INTERNAL MEDICINE

## 2020-11-21 PROCEDURE — 20600001 HC STEP DOWN PRIVATE ROOM

## 2020-11-21 PROCEDURE — 81001 URINALYSIS AUTO W/SCOPE: CPT

## 2020-11-21 PROCEDURE — 99291 CRITICAL CARE FIRST HOUR: CPT | Mod: 25

## 2020-11-21 PROCEDURE — 82803 BLOOD GASES ANY COMBINATION: CPT

## 2020-11-21 PROCEDURE — 36415 COLL VENOUS BLD VENIPUNCTURE: CPT

## 2020-11-21 PROCEDURE — 25000242 PHARM REV CODE 250 ALT 637 W/ HCPCS: Performed by: PHYSICIAN ASSISTANT

## 2020-11-21 PROCEDURE — 94640 AIRWAY INHALATION TREATMENT: CPT

## 2020-11-21 PROCEDURE — 86140 C-REACTIVE PROTEIN: CPT

## 2020-11-21 PROCEDURE — 96361 HYDRATE IV INFUSION ADD-ON: CPT

## 2020-11-21 PROCEDURE — 25500020 PHARM REV CODE 255: Performed by: EMERGENCY MEDICINE

## 2020-11-21 PROCEDURE — 82962 GLUCOSE BLOOD TEST: CPT

## 2020-11-21 PROCEDURE — 99900035 HC TECH TIME PER 15 MIN (STAT)

## 2020-11-21 PROCEDURE — 94761 N-INVAS EAR/PLS OXIMETRY MLT: CPT

## 2020-11-21 PROCEDURE — 63600175 PHARM REV CODE 636 W HCPCS: Performed by: INTERNAL MEDICINE

## 2020-11-21 PROCEDURE — 99291 CRITICAL CARE FIRST HOUR: CPT | Mod: ,,, | Performed by: EMERGENCY MEDICINE

## 2020-11-21 PROCEDURE — 84484 ASSAY OF TROPONIN QUANT: CPT

## 2020-11-21 PROCEDURE — 93010 ELECTROCARDIOGRAM REPORT: CPT | Mod: ,,, | Performed by: INTERNAL MEDICINE

## 2020-11-21 PROCEDURE — 85025 COMPLETE CBC W/AUTO DIFF WBC: CPT

## 2020-11-21 PROCEDURE — 83930 ASSAY OF BLOOD OSMOLALITY: CPT

## 2020-11-21 PROCEDURE — U0002 COVID-19 LAB TEST NON-CDC: HCPCS | Performed by: EMERGENCY MEDICINE

## 2020-11-21 PROCEDURE — 87040 BLOOD CULTURE FOR BACTERIA: CPT | Mod: 59

## 2020-11-21 PROCEDURE — 82010 KETONE BODYS QUAN: CPT

## 2020-11-21 PROCEDURE — 83036 HEMOGLOBIN GLYCOSYLATED A1C: CPT

## 2020-11-21 PROCEDURE — S5010 5% DEXTROSE AND 0.45% SALINE: HCPCS | Performed by: INTERNAL MEDICINE

## 2020-11-21 PROCEDURE — 96365 THER/PROPH/DIAG IV INF INIT: CPT

## 2020-11-21 PROCEDURE — 93005 ELECTROCARDIOGRAM TRACING: CPT

## 2020-11-21 PROCEDURE — 93010 EKG 12-LEAD: ICD-10-PCS | Mod: 76,,, | Performed by: INTERNAL MEDICINE

## 2020-11-21 PROCEDURE — 85652 RBC SED RATE AUTOMATED: CPT

## 2020-11-21 PROCEDURE — 63600175 PHARM REV CODE 636 W HCPCS: Performed by: PHYSICIAN ASSISTANT

## 2020-11-21 RX ORDER — CLINDAMYCIN PHOSPHATE 900 MG/50ML
900 INJECTION, SOLUTION INTRAVENOUS
Status: COMPLETED | OUTPATIENT
Start: 2020-11-21 | End: 2020-11-21

## 2020-11-21 RX ORDER — SODIUM CHLORIDE 0.9 % (FLUSH) 0.9 %
10 SYRINGE (ML) INJECTION
Status: DISCONTINUED | OUTPATIENT
Start: 2020-11-21 | End: 2020-12-04 | Stop reason: HOSPADM

## 2020-11-21 RX ORDER — ONDANSETRON 2 MG/ML
4 INJECTION INTRAMUSCULAR; INTRAVENOUS EVERY 6 HOURS PRN
Status: DISCONTINUED | OUTPATIENT
Start: 2020-11-21 | End: 2020-12-04 | Stop reason: HOSPADM

## 2020-11-21 RX ORDER — ACETAMINOPHEN 325 MG/1
650 TABLET ORAL EVERY 4 HOURS PRN
Status: DISCONTINUED | OUTPATIENT
Start: 2020-11-21 | End: 2020-11-22

## 2020-11-21 RX ORDER — SODIUM CHLORIDE 9 MG/ML
125 INJECTION, SOLUTION INTRAVENOUS CONTINUOUS
Status: DISCONTINUED | OUTPATIENT
Start: 2020-11-21 | End: 2020-11-22

## 2020-11-21 RX ORDER — TALC
6 POWDER (GRAM) TOPICAL NIGHTLY PRN
Status: DISCONTINUED | OUTPATIENT
Start: 2020-11-21 | End: 2020-12-04 | Stop reason: HOSPADM

## 2020-11-21 RX ORDER — HYDROMORPHONE HYDROCHLORIDE 1 MG/ML
0.2 INJECTION, SOLUTION INTRAMUSCULAR; INTRAVENOUS; SUBCUTANEOUS EVERY 6 HOURS PRN
Status: DISCONTINUED | OUTPATIENT
Start: 2020-11-21 | End: 2020-11-21

## 2020-11-21 RX ORDER — CLINDAMYCIN PHOSPHATE 900 MG/50ML
900 INJECTION, SOLUTION INTRAVENOUS
Status: DISCONTINUED | OUTPATIENT
Start: 2020-11-21 | End: 2020-11-24

## 2020-11-21 RX ORDER — POTASSIUM CHLORIDE 20 MEQ/1
40 TABLET, EXTENDED RELEASE ORAL
Status: COMPLETED | OUTPATIENT
Start: 2020-11-21 | End: 2020-11-21

## 2020-11-21 RX ORDER — HYDROMORPHONE HYDROCHLORIDE 1 MG/ML
0.5 INJECTION, SOLUTION INTRAMUSCULAR; INTRAVENOUS; SUBCUTANEOUS EVERY 6 HOURS PRN
Status: DISCONTINUED | OUTPATIENT
Start: 2020-11-21 | End: 2020-12-04 | Stop reason: HOSPADM

## 2020-11-21 RX ORDER — ACETAMINOPHEN 325 MG/1
650 TABLET ORAL EVERY 4 HOURS PRN
Status: DISCONTINUED | OUTPATIENT
Start: 2020-11-21 | End: 2020-11-21

## 2020-11-21 RX ORDER — OXYCODONE HYDROCHLORIDE 5 MG/1
5 TABLET ORAL EVERY 8 HOURS PRN
Status: DISCONTINUED | OUTPATIENT
Start: 2020-11-21 | End: 2020-11-21

## 2020-11-21 RX ORDER — OXYCODONE HYDROCHLORIDE 5 MG/1
5 TABLET ORAL EVERY 6 HOURS PRN
Status: DISCONTINUED | OUTPATIENT
Start: 2020-11-21 | End: 2020-12-04 | Stop reason: HOSPADM

## 2020-11-21 RX ORDER — ALBUTEROL SULFATE 2.5 MG/.5ML
15 SOLUTION RESPIRATORY (INHALATION)
Status: COMPLETED | OUTPATIENT
Start: 2020-11-21 | End: 2020-11-21

## 2020-11-21 RX ORDER — OXYCODONE AND ACETAMINOPHEN 5; 325 MG/1; MG/1
1 TABLET ORAL
Status: COMPLETED | OUTPATIENT
Start: 2020-11-21 | End: 2020-11-21

## 2020-11-21 RX ORDER — DEXTROSE MONOHYDRATE AND SODIUM CHLORIDE 5; .45 G/100ML; G/100ML
125 INJECTION, SOLUTION INTRAVENOUS CONTINUOUS PRN
Status: DISCONTINUED | OUTPATIENT
Start: 2020-11-21 | End: 2020-11-22

## 2020-11-21 RX ADMIN — SODIUM CHLORIDE, SODIUM LACTATE, POTASSIUM CHLORIDE, AND CALCIUM CHLORIDE 1000 ML: .6; .31; .03; .02 INJECTION, SOLUTION INTRAVENOUS at 11:11

## 2020-11-21 RX ADMIN — CLINDAMYCIN IN 5 PERCENT DEXTROSE 900 MG: 18 INJECTION, SOLUTION INTRAVENOUS at 08:11

## 2020-11-21 RX ADMIN — OXYCODONE HYDROCHLORIDE AND ACETAMINOPHEN 1 TABLET: 5; 325 TABLET ORAL at 10:11

## 2020-11-21 RX ADMIN — OXYCODONE HYDROCHLORIDE 5 MG: 5 TABLET ORAL at 03:11

## 2020-11-21 RX ADMIN — SODIUM CHLORIDE 125 ML/HR: 0.9 INJECTION, SOLUTION INTRAVENOUS at 06:11

## 2020-11-21 RX ADMIN — INSULIN HUMAN 8 UNITS: 100 INJECTION, SOLUTION PARENTERAL at 09:11

## 2020-11-21 RX ADMIN — IOHEXOL 75 ML: 350 INJECTION, SOLUTION INTRAVENOUS at 11:11

## 2020-11-21 RX ADMIN — DEXTROSE AND SODIUM CHLORIDE 125 ML/HR: 5; .45 INJECTION, SOLUTION INTRAVENOUS at 09:11

## 2020-11-21 RX ADMIN — ALBUTEROL SULFATE 15 MG: 2.5 SOLUTION RESPIRATORY (INHALATION) at 08:11

## 2020-11-21 RX ADMIN — CLINDAMYCIN IN 5 PERCENT DEXTROSE 900 MG: 18 INJECTION, SOLUTION INTRAVENOUS at 11:11

## 2020-11-21 RX ADMIN — POTASSIUM CHLORIDE 40 MEQ: 1500 TABLET, EXTENDED RELEASE ORAL at 11:11

## 2020-11-21 RX ADMIN — SODIUM CHLORIDE 1000 ML: 0.9 INJECTION, SOLUTION INTRAVENOUS at 09:11

## 2020-11-21 RX ADMIN — HYDROMORPHONE HYDROCHLORIDE 0.5 MG: 1 INJECTION, SOLUTION INTRAMUSCULAR; INTRAVENOUS; SUBCUTANEOUS at 08:11

## 2020-11-21 RX ADMIN — SODIUM CHLORIDE 5 UNITS/HR: 9 INJECTION, SOLUTION INTRAVENOUS at 12:11

## 2020-11-21 NOTE — ED PROVIDER NOTES
Encounter Date: 11/21/2020       History     Chief Complaint   Patient presents with    Fatigue     fatigue and weight loss x 2 weeks. Pt with 20lbs weight loss since 11/4, hx crohn's dz     35-year-old male with Crohn's disease on Entyvio, anemia and history of steroid induced diabetes presents for fatigue for about 2 weeks.  His symptoms started around November 4th after he received infusion of Entyvio and also ate a very rare steak.  He reports associated increased urinary frequency as well as polydipsia and an 18 lb weight loss over the past few weeks.  States that he has a good appetite has been eating well.  He is also reporting some pain in his testicles and scrotum with swelling.  He has a scrotal stent that was placed for the fistula tract.  He denies any abdominal pain, bloody stool from his ostomy, diarrhea, nausea/vomiting, fevers/chills, hematuria, dysuria, flank pain, chest pain, shortness of breath, cough or sore throat.  States symptoms not consistent with prior Crohn's flares.        Review of patient's allergies indicates:   Allergen Reactions    Ibuprofen Other (See Comments)     Can't take d/t stomach ulcers     Past Medical History:   Diagnosis Date    Anemia     Chronic diarrhea     Clostridium difficile infection     Crohn's disease     Diabetic ketoacidosis without coma associated with diabetes mellitus due to underlying condition 11/21/2020    Protein calorie malnutrition     Steroid-induced diabetes      Past Surgical History:   Procedure Laterality Date    ABSCESS DRAINAGE      COLONOSCOPY      COLOSTOMY      FLEXIBLE SIGMOIDOSCOPY N/A 7/10/2020    Procedure: SIGMOIDOSCOPY, FLEXIBLE;  Surgeon: Robe Suarez MD;  Location: [facility] OR [address] [place] [address];  Service: Colon and Rectal;  Laterality: N/A;    HERNIA REPAIR      ILEOSTOMY      INSERTION OF SETON STITCH N/A 7/10/2020    Procedure: PLACEMENT-SETON DRAIN;  Surgeon: Robe Suarez MD;  Location: [facility] OR [address] [place] [address];  Service: Colon and  Rectal;  Laterality: N/A;    SIGMOIDECTOMY       Family History   Problem Relation Age of Onset    Diabetes Father     Hyperlipidemia Mother     Diabetes Mother     Crohn's disease Neg Hx     Celiac disease Neg Hx     Cirrhosis Neg Hx     Colon cancer Neg Hx     Esophageal cancer Neg Hx     Irritable bowel syndrome Neg Hx     Liver cancer Neg Hx     Rectal cancer Neg Hx     Stomach cancer Neg Hx     Ulcerative colitis Neg Hx      Social History     Tobacco Use    Smoking status: Current Every Day Smoker     Packs/day: 0.50     Years: 3.00     Pack years: 1.50     Types: Cigarettes     Last attempt to quit: 2014     Years since quittin.0    Smokeless tobacco: Never Used   Substance Use Topics    Alcohol use: Yes     Comment: socially-weekly    Drug use: No     Review of Systems   Constitutional: Positive for fatigue and unexpected weight change. Negative for appetite change and fever.   HENT: Negative for sore throat.    Respiratory: Negative for cough and shortness of breath.    Cardiovascular: Negative for chest pain, palpitations and leg swelling.   Gastrointestinal: Negative for abdominal pain, blood in stool, constipation, diarrhea, nausea, rectal pain and vomiting.   Endocrine: Positive for polydipsia and polyuria. Negative for polyphagia.   Genitourinary: Positive for frequency, scrotal swelling and testicular pain. Negative for dysuria, hematuria and urgency.   Musculoskeletal: Negative for back pain.   Skin: Negative for rash.   Allergic/Immunologic: Positive for immunocompromised state.   Neurological: Negative for weakness, light-headedness and headaches.   Hematological: Does not bruise/bleed easily.       Physical Exam     Initial Vitals [20 0742]   BP Pulse Resp Temp SpO2   (!) 155/93 110 20 98.6 °F (37 °C) 98 %      MAP       --         Physical Exam    Nursing note and vitals reviewed.  Constitutional: He appears well-developed and well-nourished. He is not  diaphoretic. He appears ill. No distress.   HENT:   Head: Normocephalic and atraumatic.   Mouth/Throat: Uvula is midline. Abnormal dentition. Dental caries present.   Multiple dental caries    Eyes: EOM are normal. Pupils are equal, round, and reactive to light.   Neck: Normal range of motion. Neck supple.   Cardiovascular: Normal rate, regular rhythm, normal heart sounds and intact distal pulses. Exam reveals no gallop and no friction rub.    No murmur heard.  Pulmonary/Chest: Breath sounds normal. No respiratory distress. He has no wheezes. He has no rhonchi. He has no rales. He exhibits no tenderness.   Abdominal: Soft. Bowel sounds are normal. He exhibits no distension. There is no abdominal tenderness.   Ostomy with bright green stool.  No visible blood or melena   Genitourinary: Right testis shows swelling and tenderness. Right testis is descended. Cremasteric reflex is not absent on the right side. Left testis shows no mass, no swelling and no tenderness. Left testis is descended. Cremasteric reflex is not absent on the left side.    Genitourinary Comments: RN present as chaperone for genital exam.  Right side of the scrotum is thickened and indurated with some erythema and foul odor.  No fluctuance.  No crepitus, no perineal cellulitis.       Musculoskeletal: Normal range of motion.   Neurological: He is alert and oriented to person, place, and time.   Skin: Skin is warm and dry.   Psychiatric: He has a normal mood and affect.         ED Course   Critical Care    Date/Time: 11/21/2020 9:15 AM  Performed by: Nathan King DO  Authorized by: Nathan King DO   Direct patient critical care time: 15 minutes  Additional history critical care time: 15 minutes  Ordering / reviewing critical care time: 15 minutes  Documentation critical care time: 8 minutes  Consulting other physicians critical care time: 5 minutes  Total critical care time (exclusive of procedural time) : 58 minutes  Critical care was  necessary to treat or prevent imminent or life-threatening deterioration of the following conditions: endocrine crisis and metabolic crisis.  Critical care was time spent personally by me on the following activities: blood draw for specimens, development of treatment plan with patient or surrogate, evaluation of patient's response to treatment, examination of patient, obtaining history from patient or surrogate, ordering and performing treatments and interventions, ordering and review of laboratory studies, ordering and review of radiographic studies, pulse oximetry, re-evaluation of patient's condition and review of old charts.        Labs Reviewed   CBC W/ AUTO DIFFERENTIAL - Abnormal; Notable for the following components:       Result Value    MPV 13.9 (*)     Gran % 73.8 (*)     Lymph % 16.9 (*)     All other components within normal limits   COMPREHENSIVE METABOLIC PANEL - Abnormal; Notable for the following components:    CO2 13 (*)     Glucose 615 (*)     Creatinine 1.7 (*)     AST 9 (*)     Anion Gap 27 (*)     eGFR if  59.5 (*)     eGFR if non  51.5 (*)     All other components within normal limits    Narrative:     ADDING ON ESR PER NGOC PALACIOSPrescription EyewearSHERIDAN JAMES 09:21  11/21/2020   CRP ADD-ON per NgocALPHONSE MontesC 08:56 AM on 11/21/2020 ;   Order #[id number]   URINALYSIS, REFLEX TO URINE CULTURE - Abnormal; Notable for the following components:    Specific Gravity, UA >=1.030 (*)     Glucose, UA 3+ (*)     Ketones, UA 3+ (*)     All other components within normal limits    Narrative:     Specimen Source->Urine   BETA - HYDROXYBUTYRATE, SERUM - Abnormal; Notable for the following components:    Beta-Hydroxybutyrate 4.6 (*)     All other components within normal limits    Narrative:     ADDING ON ESR PER NGOCALLEN PALACIOSPrescription EyewearALPHONSE JAMESC 09:21 11/21/2020    OSMOLALITY, SERUM - Abnormal; Notable for the following components:    Osmolality 323 (*)     All other components within  normal limits    Narrative:     OSMO   critical result(s) called and verbal readback obtained from   REGGIE MASCORRO RN by LEIGHANN 11/21/2020 09:52   SEDIMENTATION RATE - Abnormal; Notable for the following components:    Sed Rate 39 (*)     All other components within normal limits    Narrative:     ADDING ON ESR PER NGOC CAROSONE-LINK, PA-C 09:21  11/21/2020    C-REACTIVE PROTEIN - Abnormal; Notable for the following components:    CRP 35.4 (*)     All other components within normal limits    Narrative:     ADDING ON ESR PER NGOC CAROSONE-LINK, PA-C 09:21 11/21/2020   CRP ADD-ON per Ngoc Carosone-Link, PA-C 08:56 AM on 11/21/2020 ;   Order #[id number]   ISTAT PROCEDURE - Abnormal; Notable for the following components:    POC PH 7.293 (*)     POC HCO3 18.8 (*)     POC SATURATED O2 79 (*)     POC TCO2 20 (*)     All other components within normal limits   POCT GLUCOSE - Abnormal; Notable for the following components:    POCT Glucose 425 (*)     All other components within normal limits   POCT GLUCOSE - Abnormal; Notable for the following components:    POCT Glucose 349 (*)     All other components within normal limits   POCT GLUCOSE - Abnormal; Notable for the following components:    POCT Glucose 305 (*)     All other components within normal limits   CULTURE, BLOOD   CULTURE, BLOOD   TROPONIN I   SEDIMENTATION RATE   C-REACTIVE PROTEIN   LACTIC ACID, PLASMA   URINALYSIS MICROSCOPIC    Narrative:     Specimen Source->Urine   HEMOGLOBIN A1C   BASIC METABOLIC PANEL   HEMOGLOBIN A1C   SARS-COV-2 RDRP GENE    Narrative:     This test utilizes isothermal nucleic acid amplification   technology to detect the SARS-CoV-2 RdRp nucleic acid segment.   The analytical sensitivity (limit of detection) is 125 genome   equivalents/mL.   A POSITIVE result implies infection with the SARS-CoV-2 virus;   the patient is presumed to be contagious.     A NEGATIVE result means that SARS-CoV-2 nucleic acids are not   present above the  limit of detection. A NEGATIVE result should be   treated as presumptive. It does not rule out the possibility of   COVID-19 and should not be the sole basis for treatment decisions.   If COVID-19 is strongly suspected based on clinical and exposure   history, re-testing using an alternate molecular assay should be   considered.   This test is only for use under the Food and Drug   Administration s Emergency Use Authorization (EUA).   Commercial kits are provided by [employer].   Performance characteristics of the EUA have been independently   verified by [facility] Department of   Pathology and Laboratory Medicine.   _________________________________________________________________   The authorized Fact Sheet for Healthcare Providers and the authorized Fact   Sheet for Patients of the ID NOW COVID-19 are available on the FDA   website:     https://www.fda.gov/media/090510/download  https://www.fda.gov/media/774337/download       POCT GLUCOSE MONITORING CONTINUOUS   POCT GLUCOSE MONITORING CONTINUOUS   POCT GLUCOSE MONITORING CONTINUOUS   POCT GLUCOSE MONITORING CONTINUOUS   POCT GLUCOSE MONITORING CONTINUOUS   POCT GLUCOSE MONITORING CONTINUOUS     EKG Readings: (Independently Interpreted)   Initial Reading: No STEMI. Previous EKG: Compared with most recent EKG Previous EKG Date: 11/18/2014. Rhythm: Sinus Tachycardia. Heart Rate: 105. Ectopy: No Ectopy. Conduction: Normal. ST Segment Elevation: V2, V5 and V4. Clinical Impression: Sinus Tachycardia Other Impression: Early repolarization     ECG Results          EKG 12-lead (Final result)  Result time 11/21/20 12:29:04    Final result by Interface, Lab In [facility] (11/21/20 12:29:04)                 Narrative:    Test Reason : R53.83,    Vent. Rate : 104 BPM     Atrial Rate : 104 BPM     P-R Int : 126 ms          QRS Dur : 100 ms      QT Int : 338 ms       P-R-T Axes : 070 074 046 degrees     QTc Int : 444 ms    Sinus tachycardia  Early  repolarization  Abnormal ECG  When compared with ECG of 21-NOV-2020 08:29,  No significant change was found  Confirmed by Micaela Spain MD ([id number]) on 11/21/2020 12:28:55 PM    Referred By: JOSE A   SELF           Confirmed By:Micaela Spain MD                             Repeat EKG 12-lead (Final result)  Result time 11/21/20 12:34:13    Final result by Interface, Lab In [facility] (11/21/20 12:34:13)                 Narrative:    Test Reason : R53.83,    Vent. Rate : 105 BPM     Atrial Rate : 105 BPM     P-R Int : 126 ms          QRS Dur : 098 ms      QT Int : 328 ms       P-R-T Axes : 074 069 055 degrees     QTc Int : 433 ms    Sinus tachycardia  Minimal voltage criteria for LVH, may be normal variant  ST elevation, consider early repolarization, pericarditis, or injury  Abnormal ECG  When compared with ECG of 18-NOV-2014 13:59,  ST elevation has replaced ST depression in Inferior leads  ST elevation has replaced ST depression in anterolateral leads    Confirmed by Micaela Spain MD ([id number]) on 11/21/2020 12:34:00 PM    Referred By: JOSE A   SELF           Confirmed By:Micaela Spain MD                            Imaging Results          CT Abdomen Pelvis With Contrast (Final result)  Result time 11/21/20 12:07:12    Final result by Nabil Cooper Jr., MD (11/21/20 12:07:12)                 Impression:      Right perianal fistulous tract extending towards the scrotum with interval placement of a seton, noting the fistulous tract is similar to prior CT 02/09/2020.  There is a slightly more cephalad fistulous track in relation to the seton again noted.  No abnormal fluid collection to suggest an abscess.    Marked scrotal skin thickening.  Correlate for cellulitis.    Stable postoperative changes of a total colectomy and right lower quadrant end ileostomy in this patient with reported history of Crohn's disease.    Electronically signed by resident: Rosio  Lydia  Date:    11/21/2020  Time:    11:30    Electronically signed by: Nabil Cooper MD  Date:    11/21/2020  Time:    12:07             Narrative:    EXAMINATION:  CT ABDOMEN PELVIS WITH CONTRAST    CLINICAL HISTORY:  Abdominal pain, fever;Low abd pain and testicular pain/ swelling.  Patient with Crohn's disease, anal fistula with seat on and scrotal stent.  Concern for scrotal cellulitis/abscess/fistula;    TECHNIQUE:  Low dose axial images were obtained from the lung bases through the proximal thighs following the intravenous administration of 75 cc of Omnipaque 350.  Sagittal and coronal reformats were provided.    COMPARISON:  CT pelvis 02/09/2020    FINDINGS:  Heart: Normal in size.  No pericardial effusion.    Lung bases: Symmetrically expanded.  No consolidation, pleural effusion, mass, or pneumothorax.    Liver: Normal in size and attenuation without focal hepatic abnormality.    Gallbladder: Normal in appearance without evidence for cholecystitis.    Bile Ducts: No intra or extrahepatic biliary ductal dilation.    Pancreas: No pancreatic mass lesion or peripancreatic inflammatory change.    Spleen: Unremarkable.    Adrenals: Unremarkable.    Kidneys/ Ureters: Normal in size and location.  Kidneys enhance normally.  No focal renal abnormality or nephrolithiasis.  No hydroureteronephrosis.    Bladder: Smooth contours without bladder wall thickening.    Reproductive organs: Unremarkable.    GI Tract/Mesentery: The stomach is unremarkable.  Postoperative changes of a total colectomy and right lower quadrant end ileostomy.  Fatty deposition within the wall with a few loops of small bowel in the pelvis, favored to reflect chronic inflammation.    Peritoneal Space: No intraperitoneal free fluid or free air. No pathologically enlarged lymph nodes.    Retroperitoneum: No significant adenopathy.    Abdominal wall/extraperitoneal soft tissues: Soft tissue thickening again seen extending from the right perianal  region along the inner right medial gluteal fold into the perineum and right scrotum with interval placement of a Seton in this patient with known perianal fistula, similar in appearance to prior CT 02/09/2020.  Increased soft tissue and skin induration within the scrotum, new from prior CT 02/09/2020.  No peripherally enhancing fluid collections to suggest an abscess.    Vasculature: Abdominal aorta is normal in caliber, contour, and course without significant calcific atherosclerosis.    Bones: Unremarkable without acute fracture or bone destructive process.                               X-Ray Chest PA And Lateral (Final result)  Result time 11/21/20 10:29:32    Final result by Randy Amador DO (11/21/20 10:29:32)                 Impression:      No acute abnormality.      Electronically signed by: Randy Amador  Date:    11/21/2020  Time:    10:29             Narrative:    EXAMINATION:  XR CHEST PA AND LATERAL    CLINICAL HISTORY:  Other fatigue.    TECHNIQUE:  PA and lateral views of the chest were performed.    COMPARISON:  Multiple prior radiographs of the chest, most recent from 04/01/2016.    FINDINGS:  The lungs are well expanded and clear. No focal opacities are seen. The pleural spaces are clear.  The cardiac silhouette is unremarkable.  The visualized osseous structures are unremarkable.                                 Medical Decision Making:   History:   Old Medical Records: I decided to obtain old medical records.  Old Records Summarized: records from clinic visits.       <> Summary of Records: Patient seen infusion clinic 11/04/2020.  Last in clinic by Colorectal surgery on 10/05/2020  Initial Assessment:   35-year-old male presenting for fatigue for 2 weeks.  Hypertensive at 155/93 and tachycardic with heart of 110 with otherwise normal vitals.  He appears ill but nontoxic.  His abdomen is soft and nontender with normoactive bowel sounds.  He does have what appears to be scrotal cellulitis on  exam, I do not appreciate any fluid collections.  Differential Diagnosis:   Strong suspicion for new onset diabetes given weight loss, polyuria, polydipsia and fatigue  Dehydration  Anemia  Scrotal cellulitis  Scrotal abscess  Fistulous tract  Doubt Macario's gangrene given no perineal cellulitis or crepitus  Independently Interpreted Test(s):   I have ordered and independently interpreted X-rays - see summary below.       <> Summary of X-Ray Reading(s): No consolidation or pulmonary edema  I have ordered and independently interpreted EKG Reading(s) - see prior notes  Clinical Tests:   Lab Tests: Ordered and Reviewed  Radiological Study: Ordered and Reviewed  Medical Tests: Ordered and Reviewed  ED Management:  Will check labs, EKG, chest x-ray, CT abdomen pelvis, and reassess.    POCT glucose greater than 500. I-STAT shows hyperglycemia with glucose over 600 and hyperkalemia with potassium of 5.6.  Recheck of potassium on i-STAT highly elevated at 9.0, suspect hemolyzed.  Renal function normal.  Will give 8 units IV insulin, fluids and recheck with metabolic panel.  EKG also with some ST changes consistent with early repolarization.  Place patient on cardiac monitor, check troponin and repeat EKG.    Repeat EKG is still showing early repolarization, no dynamic changes.  Will give clindamycin for scrotal cellulitis and continue to hydrate.  Labs are notable for ketosis with beta hydroxybutyrate of 4.6, mild acidosis with pH of 7.293 and anion gap of 27.  Osmolality also significantly elevated at 323.  Will continue to hydrate and place patient on insulin drip.  Metabolic panel shows normal potassium, will give potassium supplementation.    CT shows right perianal fistulous tract extending toward scrotum, no fluid collection but March scrotal skin thickening consistent with cellulitis.  The patient will be admitted to Hospital Medicine for DKA and scrotal cellulitis.  Patient comfortable with admission.  I discussed  this patient with my supervising physician.                  Attending Attestation:     Physician Attestation Statement for NP/PA:   I have conducted a face to face encounter with this patient in addition to the NP/PA, due to          I have reviewed and concur with the advanced practice clinician's history, physical, assessment, and plan.  I have personally interviewed and examined the patient at bedside.  See below addendum for my evaluation and additional findings. I did supervise any and all procedures and was present for any critical portion, and was always immediately available for help and as a resource.     Briefly,  this is a 35 y.o. male with a history of Crohn's disease presenting with fatigue with workup showing elevated blood sugars, and concern for DKA.  He also has cellulitis around his groin and new acute kidney injury based on exam.  Remainder exam unremarkable, CT scan showing no acute abscess collection but continues to show perianal fistula tract extending toward scrotum.  Patient was placed on insulin drip, discussed with hospital medicine for DKA and scrotal cellulitis admission.  IV antibiotics were given.  Labs significant for elevated beta hydroxybutyrate of 4.6, acidosis with pH of 7.2 and an anion gap of 27.  Significantly elevated osmolality as well.  IV hydration, insulin drip, normal potassium.  Please see critical care note for critical care time.    Complexity:  Critical Care, DKA    Final diagnoses:  [R53.83] Fatigue  [E08.10] Diabetic ketoacidosis without coma associated with diabetes mellitus due to underlying condition (Primary)  [N17.9] KEITH (acute kidney injury)  [L03.314] Cellulitis of groin      Nathan King DO, FAAEM    Emergency Medicine Physician  Dept of Emergency Medicine   [facility]  Spectralink: [id number]          ED Course as of Nov 21 1518   Sat Nov 21, 2020   0933 Beta-Hydroxybutyrate(!): 4.6 [CC]   0933 POC PH(!): 7.293 [CC]   1012  Osmolality(!!): 323 [CC]   1023 Troponin I: 0.020 [CC]   1042 Lactate, Zaki: 1.9 [CC]   1053 Sed Rate(!): 39 [CC]   1108 CO2(!): 13 [CC]   1108 Anion Gap(!): 27 [CC]      ED Course User Index  [CC] Leena Turner PA-C            Clinical Impression:     ICD-10-CM ICD-9-CM   1. Diabetic ketoacidosis without coma associated with diabetes mellitus due to underlying condition  E08.10 249.11   2. Fatigue  R53.83 780.79   3. KEITH (acute kidney injury)  N17.9 584.9   4. Cellulitis of groin  L03.314 682.2                      Disposition:   Disposition: Admitted  Condition: Fair     ED Disposition Condition    Admit                             Leena Turner PA-C  11/21/20 1518       Nathan King DO  11/22/20 1338

## 2020-11-21 NOTE — ED NOTES
MD at bedside, pt verbalizes understanding of ongoing diagnoses. C/o aching pain 8 out of 10 to scrotum, ordered pain meds given and lights dimmed for comfort, will reassess pain in one hour. Bed rails x 2, bed low and wheels locked. Will cont to monitor.

## 2020-11-21 NOTE — ED NOTES
MD at bedside. Pt c/o body aches, general tiredness. Pt has ileostomy R abd, draing WNL to  bag. Area of breakdown, redness and foul odor to scrotum and endorses pain 8 out of 10 to area. Toenails overgrown and dark in color. Pt pleasant and cooperative, AAOX4.

## 2020-11-21 NOTE — ED NOTES
I-STAT Chem-8+ Results:   Value Reference Range   Sodium 129 136-145 mmol/L   Potassium  5.6 3.5-5.1 mmol/L   Chloride 99  mmol/L   Ionized Calcium 1.10 1.06-1.42 mmol/L   CO2 (measured) 18 23-29 mmol/L   Glucose 669  mg/dL   BUN 11 6-30 mg/dL   Creatinine 0.6 0.5-1.4 mg/dL   Hematocrit 50 36-54%

## 2020-11-21 NOTE — ED NOTES
LOC: The patient is awake, alert and aware of environment with an appropriate affect, the patient is oriented x 3 and speaking appropriately.  SKIN: The skin is warm and dry, color consistent with ethnicity, patient has normal skin turgor and moist mucus membranes, skin intact, no breakdown or bruising noted.  MUSCULOSKELETAL: Patient moving all extremities spontaneously, no obvious swelling or deformities noted.  RESPIRATORY: Airway is open and patent, respirations are spontaneous, patient has a normal effort and rate, no accessory muscle use noted  NEUROLOGIC:  facial expression is symmetrical, patient moving all extremities spontaneously, normal sensation in all extremities when touched with a finger.  Follows all commands appropriately.    Patient states over the last month he has been having increased fatigue and generalized weakness, patient states he has been having normal Bms and eating appropriately, he states his appetite has not decreased, denies any nausea or vomiting, no acute distress noted, will continue to monitor

## 2020-11-21 NOTE — PROVIDER PROGRESS NOTES - EMERGENCY DEPT.
Encounter Date: 11/21/2020    ED Physician Progress Notes         EKG - STEMI Decision  Initial Reading: No STEMI present.       Nonspecific ST and T-wave changes in the septal and lateral leads.  Needs repeat ECG.  No criteria for STEMI at this time.  Sinus tachycardia, ST changes in inferior and anterior leads.    Nathan King DO, FAAEM  Emergency Staff Physician   Dept of Emergency Medicine   [facility]  Spectralink: [id number]

## 2020-11-21 NOTE — H&P
[facility]  Emergency Medicine  History & Physical      Patient Name: Amando Calix   MRN: [id number]  Admission Date: 11/21/2020  Attending Physician: Juan Rowe MD   Primary Care Provider: Celestino Wright MD    Hospital Medicine Team: [facility] HOSP MED A Juan Rowe MD     Patient information was obtained from patient and ER records.     Subjective:     Principal Problem:<principal problem not specified>    Chief Complaint:   Chief Complaint   Patient presents with    Fatigue     fatigue and weight loss x 2 weeks. Pt with 20lbs weight loss since 11/4, hx crohn's dz        HPI:     Mr. Calix is a 35 year old gentleman with PMH of Crohn's colitis (on Entyvio) s/p colectomy with end ileostomy with significant anorectal fistulizing disease s/p placement of multiple setons, h/o c diff infection, anemia and h/o steroid induced diabetes presents for fatigue for about 2 weeks.  His symptoms started around November 4th after he received infusion of Entyvio and also ate a very rare steak.  He reports associated increased urinary frequency as well as polydipsia and an 18 lb weight loss over the past few weeks.  States that he has a good appetite has been eating well, he denies any abdominal pain bloody stool from his ostomy, diarrhea, nausea/vomiting, fevers/chills, hematuria, dysuria, flank pain, chest pain, shortness of breath, cough or sore throat.    Past Medical History:   Diagnosis Date    Anemia     Chronic diarrhea     Clostridium difficile infection     Crohn's disease     Diabetic ketoacidosis without coma associated with diabetes mellitus due to underlying condition 11/21/2020    Protein calorie malnutrition     Steroid-induced diabetes        Past Surgical History:   Procedure Laterality Date    ABSCESS DRAINAGE      COLONOSCOPY      COLOSTOMY      FLEXIBLE SIGMOIDOSCOPY N/A 7/10/2020    Procedure: SIGMOIDOSCOPY, FLEXIBLE;  Surgeon: Robe Suarez MD;  Location: [facility]  OR 2ND FLR;  Service: Colon and Rectal;  Laterality: N/A;    HERNIA REPAIR      ILEOSTOMY      INSERTION OF SETON STITCH N/A 7/10/2020    Procedure: PLACEMENT-SETON DRAIN;  Surgeon: Robe Suarez MD;  Location: [facility] OR 2ND FLR;  Service: Colon and Rectal;  Laterality: N/A;    SIGMOIDECTOMY         Review of patient's allergies indicates:   Allergen Reactions    Ibuprofen Other (See Comments)     Can't take d/t stomach ulcers       Current Facility-Administered Medications on File Prior to Encounter   Medication    vedolizumab (ENTYVIO) 300 mg in sodium chloride 0.9% 250 mL     Current Outpatient Medications on File Prior to Encounter   Medication Sig    acetaminophen (TYLENOL ORAL) Take by mouth as needed.    VEDOLIZUMAB (ENTYVIO IV) Inject into the vein.    [DISCONTINUED] ciprofloxacin HCl (CIPRO) 500 MG tablet Take 1 tablet (500 mg total) by mouth every 12 (twelve) hours. (Patient not taking: Reported on 2020)    [DISCONTINUED] oxyCODONE (ROXICODONE) 5 MG immediate release tablet Take 1 tablet (5 mg total) by mouth every 4 (four) hours as needed.     Family History     Problem Relation (Age of Onset)    Diabetes Father, Mother    Hyperlipidemia Mother        Tobacco Use    Smoking status: Current Every Day Smoker     Packs/day: 0.50     Years: 3.00     Pack years: 1.50     Types: Cigarettes     Last attempt to quit: 2014     Years since quittin.0    Smokeless tobacco: Never Used   Substance and Sexual Activity    Alcohol use: Yes     Comment: socially-weekly    Drug use: No    Sexual activity: Yes     Partners: Female     Review of Systems   Constitutional: Positive for appetite change and fatigue. Negative for activity change, chills and fever.   HENT: Negative for congestion.    Respiratory: Negative for cough and shortness of breath.    Cardiovascular: Negative for chest pain and leg swelling.   Gastrointestinal: Positive for abdominal pain and nausea. Negative for abdominal  distention and vomiting.   Genitourinary: Positive for scrotal swelling and testicular pain. Negative for dysuria and hematuria.   Skin: Positive for rash (scrotal rash).   Allergic/Immunologic: Positive for immunocompromised state.   Neurological: Positive for weakness. Negative for dizziness.   Psychiatric/Behavioral: Negative for confusion.        Objective:     Vital Signs (Most Recent):  Temp: 98.6 °F (37 °C) (11/21/20 0742)  Pulse: (!) 119 (11/21/20 1044)  Resp: 16 (11/21/20 1149)  BP: (!) 140/79 (11/21/20 1149)  SpO2: 99 % (11/21/20 1149) Vital Signs (24h Range):  Temp:  [98.6 °F (37 °C)] 98.6 °F (37 °C)  Pulse:  [] 119  Resp:  [16-20] 16  SpO2:  [97 %-99 %] 99 %  BP: (116-155)/(72-93) 140/79     Weight: 76.5 kg (168 lb 10.4 oz)  Body mass index is 25.64 kg/m².    Physical Exam  HENT:      Mouth/Throat:      Dentition: Dental caries present.   Eyes:      Extraocular Movements: Extraocular movements intact.      Pupils: Pupils are equal, round, and reactive to light.   Neck:      Musculoskeletal: Normal range of motion.   Cardiovascular:      Rate and Rhythm: Regular rhythm. Tachycardia present.   Pulmonary:      Effort: Pulmonary effort is normal. No respiratory distress.      Breath sounds: Normal breath sounds.   Abdominal:      General: Bowel sounds are normal.      Palpations: Abdomen is soft.      Comments: Ostomy with green stool    Genitourinary:     Comments: Right side of the scrotum is thickened and indurated with some erythema and foul odor.  No fluctuance.  No crepitus, no perineal cellulitis.   Musculoskeletal: Normal range of motion.   Skin:     General: Skin is warm.   Neurological:      General: No focal deficit present.      Mental Status: He is alert.   Psychiatric:         Mood and Affect: Mood normal.           CRANIAL NERVES     CN III, IV, VI   Pupils are equal, round, and reactive to light.      Significant Labs:   A1C:   Recent Labs   Lab 11/21/20  0843 11/21/20  1802   HGBA1C  13.3* 13.8*     CBC:   Recent Labs   Lab 11/21/20  0843   WBC 7.99   HGB 15.8   HCT 46.8        CMP:   Recent Labs   Lab 11/21/20  1015 11/21/20  1802    140   K 3.6 4.0   CL 97 105   CO2 13* 21*   * 345*   BUN 9 6   CREATININE 1.7* 1.2   CALCIUM 9.5 9.1   PROT 8.4  --    ALBUMIN 4.0  --    BILITOT 0.4  --    ALKPHOS 110  --    AST 9*  --    ALT 12  --    ANIONGAP 27* 14   EGFRNONAA 51.5* >60.0     Coagulation: No results for input(s): PT, INR, APTT in the last 48 hours.  Magnesium: No results for input(s): MG in the last 48 hours.  POCT Glucose:   Recent Labs   Lab 11/21/20  1358 11/21/20  1505 11/21/20  1834   POCTGLUCOSE 349* 305* 336*     Troponin:   Recent Labs   Lab 11/21/20  0843   TROPONINI 0.020     TSH: No results for input(s): TSH in the last 4320 hours.  Urine Studies:   Recent Labs   Lab 11/21/20  0913   COLORU Straw   APPEARANCEUA Clear   PHUR 5.0   SPECGRAV >=1.030*   PROTEINUA Negative   GLUCUA 3+*   KETONESU 3+*   BILIRUBINUA Negative   OCCULTUA Negative   NITRITE Negative   LEUKOCYTESUR Negative   RBCUA 1   WBCUA 1   BACTERIA None   SQUAMEPITHEL 0       Significant Imaging: I have reviewed and interpreted all pertinent imaging results/findings within the past 24 hours.    Assessment/Plan:     Active Diagnoses:    Diagnosis Date Noted POA    Diabetic ketoacidosis without coma associated with diabetes mellitus due to underlying condition [E08.10] 11/21/2020 Yes      Problems Resolved During this Admission:     Scheduled Meds:   vedolizumab 300 mg in sodium chloride 0.9% 250mL infusion  300 mg Intravenous 1 time in Clinic/HOD     Continuous Infusions:   sodium chloride 0.9%      dextrose 5 % and 0.45 % NaCl      insulin regular 1 units/mL infusion orderable (DKA) 2.5 Units/hr (11/21/20 1358)     PRN Meds:.acetaminophen, dextrose 5 % and 0.45 % NaCl, dextrose 50%, dextrose 50%, melatonin, ondansetron, oxyCODONE, promethazine (PHENERGAN) IVPB, sodium chloride 0.9%, sodium  chloride 0.9%      PLAN:    DKA  H/o steroid induced DM  - DKA likely triggered by scrotal cellulitis   - HA1c: pending   - betahydroxybutyrate: 4.6  - BG on admit, 615 --> 425  - VBG on admit, pH: 7.29/38  - s/p albuterol, insulin/dextrose in the ED  - DKA pathway initiated   - continue insulin gtt  - continue IVF  - clear liquid diet (no sugar)  - endocrinology consulted. followup recs     Crohn colitis  S/p colectomy with end ileostomy  Anorectal fistulizing disease s/p placement of multiple setons  - follows with general surgery (Dr. Blackburn). Last visit on 10/5/2020  - follows with CRS (Dr. Suarez). Last visit 8/15/2020  - Given extent of anorectal disease with fistulae and stenosis, ileostomy will be permanent. Appears not to be a candidate for an attempt at restoring intestinal continuity given the extent of his anal disease, and I suspect he will ultimately require a completion proctectomy Discussed completion proctectomy wi patient - he is not ready to proceed at this point.  - ESR: 39, CRP: 35  - continue home Entyvio    KEITH  - Scr on admit, 1.7  - b/l Scr: 0.9 (6/2020)  - likely prerenal   - s/p 2 units IVF bolus in ED   - NS @125 cc/hr  - BMP daily     Scrotal cellulitis   - elevated ESR and CRP  - CT A/P with contrast  (11/21) showed 'Right perianal fistulous tract extending towards the scrotum with interval placement of a seton, noting the fistulous tract is similar to prior CT 02/09/2020. No abnormal fluid collection to suggest an abscess. Marked scrotal skin thickening. Correlate for cellulitis'  - followup bcx   - continue IV clindamycin (D1 -11/21)    Anemia  - Hb 15.8  - stable  - CBC daily     Protein calorie malnutrition  - Boost TID when DKA resolves       VTE Risk Mitigation (From admission, onward)    None        Time spent in care of patient: > 45 minutes     Juan Rowe MD  Department of Hospital Medicine   [facility]

## 2020-11-22 LAB
ANION GAP SERPL CALC-SCNC: 13 MMOL/L (ref 8–16)
BASOPHILS # BLD AUTO: 0.02 K/UL (ref 0–0.2)
BASOPHILS NFR BLD: 0.3 % (ref 0–1.9)
BUN SERPL-MCNC: 6 MG/DL (ref 6–20)
CALCIUM SERPL-MCNC: 8.1 MG/DL (ref 8.7–10.5)
CHLORIDE SERPL-SCNC: 103 MMOL/L (ref 95–110)
CO2 SERPL-SCNC: 21 MMOL/L (ref 23–29)
CREAT SERPL-MCNC: 1 MG/DL (ref 0.5–1.4)
DIFFERENTIAL METHOD: ABNORMAL
EOSINOPHIL # BLD AUTO: 0.1 K/UL (ref 0–0.5)
EOSINOPHIL NFR BLD: 1.6 % (ref 0–8)
ERYTHROCYTE [DISTWIDTH] IN BLOOD BY AUTOMATED COUNT: 12.3 % (ref 11.5–14.5)
EST. GFR  (AFRICAN AMERICAN): >60 ML/MIN/1.73 M^2
EST. GFR  (NON AFRICAN AMERICAN): >60 ML/MIN/1.73 M^2
GLUCOSE SERPL-MCNC: 287 MG/DL (ref 70–110)
HCT VFR BLD AUTO: 38.9 % (ref 40–54)
HGB BLD-MCNC: 13 G/DL (ref 14–18)
IMM GRANULOCYTES # BLD AUTO: 0.02 K/UL (ref 0–0.04)
IMM GRANULOCYTES NFR BLD AUTO: 0.3 % (ref 0–0.5)
LYMPHOCYTES # BLD AUTO: 2.1 K/UL (ref 1–4.8)
LYMPHOCYTES NFR BLD: 28.6 % (ref 18–48)
MAGNESIUM SERPL-MCNC: 1.6 MG/DL (ref 1.6–2.6)
MCH RBC QN AUTO: 30 PG (ref 27–31)
MCHC RBC AUTO-ENTMCNC: 33.4 G/DL (ref 32–36)
MCV RBC AUTO: 90 FL (ref 82–98)
MONOCYTES # BLD AUTO: 0.6 K/UL (ref 0.3–1)
MONOCYTES NFR BLD: 8.8 % (ref 4–15)
NEUTROPHILS # BLD AUTO: 4.4 K/UL (ref 1.8–7.7)
NEUTROPHILS NFR BLD: 60.4 % (ref 38–73)
NRBC BLD-RTO: 0 /100 WBC
PHOSPHATE SERPL-MCNC: 3 MG/DL (ref 2.7–4.5)
PLATELET # BLD AUTO: 173 K/UL (ref 150–350)
PMV BLD AUTO: 13.4 FL (ref 9.2–12.9)
POCT GLUCOSE: 128 MG/DL (ref 70–110)
POCT GLUCOSE: 169 MG/DL (ref 70–110)
POCT GLUCOSE: 174 MG/DL (ref 70–110)
POCT GLUCOSE: 233 MG/DL (ref 70–110)
POCT GLUCOSE: 241 MG/DL (ref 70–110)
POCT GLUCOSE: 244 MG/DL (ref 70–110)
POCT GLUCOSE: 256 MG/DL (ref 70–110)
POCT GLUCOSE: 256 MG/DL (ref 70–110)
POCT GLUCOSE: 263 MG/DL (ref 70–110)
POCT GLUCOSE: 275 MG/DL (ref 70–110)
POCT GLUCOSE: 283 MG/DL (ref 70–110)
POCT GLUCOSE: 288 MG/DL (ref 70–110)
POCT GLUCOSE: 290 MG/DL (ref 70–110)
POCT GLUCOSE: 357 MG/DL (ref 70–110)
POCT GLUCOSE: >500 MG/DL (ref 70–110)
POTASSIUM SERPL-SCNC: 3.3 MMOL/L (ref 3.5–5.1)
RBC # BLD AUTO: 4.33 M/UL (ref 4.6–6.2)
SODIUM SERPL-SCNC: 137 MMOL/L (ref 136–145)
WBC # BLD AUTO: 7.3 K/UL (ref 3.9–12.7)

## 2020-11-22 PROCEDURE — 99233 PR SUBSEQUENT HOSPITAL CARE,LEVL III: ICD-10-PCS | Mod: ,,, | Performed by: INTERNAL MEDICINE

## 2020-11-22 PROCEDURE — S5010 5% DEXTROSE AND 0.45% SALINE: HCPCS | Performed by: INTERNAL MEDICINE

## 2020-11-22 PROCEDURE — 84100 ASSAY OF PHOSPHORUS: CPT

## 2020-11-22 PROCEDURE — 63600175 PHARM REV CODE 636 W HCPCS: Performed by: INTERNAL MEDICINE

## 2020-11-22 PROCEDURE — 99222 PR INITIAL HOSPITAL CARE,LEVL II: ICD-10-PCS | Mod: ,,, | Performed by: INTERNAL MEDICINE

## 2020-11-22 PROCEDURE — 63600175 PHARM REV CODE 636 W HCPCS: Performed by: GENERAL ACUTE CARE HOSPITAL

## 2020-11-22 PROCEDURE — 36415 COLL VENOUS BLD VENIPUNCTURE: CPT

## 2020-11-22 PROCEDURE — 25000003 PHARM REV CODE 250: Performed by: INTERNAL MEDICINE

## 2020-11-22 PROCEDURE — 85025 COMPLETE CBC W/AUTO DIFF WBC: CPT

## 2020-11-22 PROCEDURE — 97802 MEDICAL NUTRITION INDIV IN: CPT

## 2020-11-22 PROCEDURE — 99233 SBSQ HOSP IP/OBS HIGH 50: CPT | Mod: ,,, | Performed by: INTERNAL MEDICINE

## 2020-11-22 PROCEDURE — 25000003 PHARM REV CODE 250: Performed by: GENERAL ACUTE CARE HOSPITAL

## 2020-11-22 PROCEDURE — 80048 BASIC METABOLIC PNL TOTAL CA: CPT

## 2020-11-22 PROCEDURE — 83735 ASSAY OF MAGNESIUM: CPT

## 2020-11-22 PROCEDURE — 20600001 HC STEP DOWN PRIVATE ROOM

## 2020-11-22 PROCEDURE — 99222 1ST HOSP IP/OBS MODERATE 55: CPT | Mod: ,,, | Performed by: INTERNAL MEDICINE

## 2020-11-22 RX ORDER — INSULIN ASPART 100 [IU]/ML
0-10 INJECTION, SOLUTION INTRAVENOUS; SUBCUTANEOUS
Status: DISCONTINUED | OUTPATIENT
Start: 2020-11-22 | End: 2020-11-23

## 2020-11-22 RX ORDER — IBUPROFEN 200 MG
24 TABLET ORAL
Status: DISCONTINUED | OUTPATIENT
Start: 2020-11-22 | End: 2020-12-02

## 2020-11-22 RX ORDER — POTASSIUM CHLORIDE 20 MEQ/1
40 TABLET, EXTENDED RELEASE ORAL ONCE
Status: DISCONTINUED | OUTPATIENT
Start: 2020-11-22 | End: 2020-11-22

## 2020-11-22 RX ORDER — IBUPROFEN 200 MG
16 TABLET ORAL
Status: DISCONTINUED | OUTPATIENT
Start: 2020-11-22 | End: 2020-12-02

## 2020-11-22 RX ORDER — INSULIN ASPART 100 [IU]/ML
6 INJECTION, SOLUTION INTRAVENOUS; SUBCUTANEOUS
Status: DISCONTINUED | OUTPATIENT
Start: 2020-11-22 | End: 2020-11-23

## 2020-11-22 RX ORDER — POTASSIUM CHLORIDE 20 MEQ/1
40 TABLET, EXTENDED RELEASE ORAL EVERY 4 HOURS
Status: COMPLETED | OUTPATIENT
Start: 2020-11-22 | End: 2020-11-22

## 2020-11-22 RX ORDER — GLUCAGON 1 MG
1 KIT INJECTION
Status: DISCONTINUED | OUTPATIENT
Start: 2020-11-22 | End: 2020-12-02

## 2020-11-22 RX ADMIN — OXYCODONE HYDROCHLORIDE 5 MG: 5 TABLET ORAL at 01:11

## 2020-11-22 RX ADMIN — OXYCODONE HYDROCHLORIDE 5 MG: 5 TABLET ORAL at 08:11

## 2020-11-22 RX ADMIN — CLINDAMYCIN IN 5 PERCENT DEXTROSE 900 MG: 18 INJECTION, SOLUTION INTRAVENOUS at 03:11

## 2020-11-22 RX ADMIN — HYDROMORPHONE HYDROCHLORIDE 0.5 MG: 1 INJECTION, SOLUTION INTRAMUSCULAR; INTRAVENOUS; SUBCUTANEOUS at 03:11

## 2020-11-22 RX ADMIN — INSULIN ASPART 6 UNITS: 100 INJECTION, SOLUTION INTRAVENOUS; SUBCUTANEOUS at 02:11

## 2020-11-22 RX ADMIN — CLINDAMYCIN IN 5 PERCENT DEXTROSE 900 MG: 18 INJECTION, SOLUTION INTRAVENOUS at 07:11

## 2020-11-22 RX ADMIN — HYDROMORPHONE HYDROCHLORIDE 0.5 MG: 1 INJECTION, SOLUTION INTRAMUSCULAR; INTRAVENOUS; SUBCUTANEOUS at 11:11

## 2020-11-22 RX ADMIN — SODIUM CHLORIDE 2.5 UNITS/HR: 9 INJECTION, SOLUTION INTRAVENOUS at 07:11

## 2020-11-22 RX ADMIN — POTASSIUM CHLORIDE 40 MEQ: 1500 TABLET, EXTENDED RELEASE ORAL at 02:11

## 2020-11-22 RX ADMIN — CLINDAMYCIN IN 5 PERCENT DEXTROSE 900 MG: 18 INJECTION, SOLUTION INTRAVENOUS at 11:11

## 2020-11-22 RX ADMIN — SODIUM CHLORIDE 3.2 UNITS/HR: 9 INJECTION, SOLUTION INTRAVENOUS at 10:11

## 2020-11-22 RX ADMIN — SODIUM CHLORIDE 2.5 UNITS/HR: 9 INJECTION, SOLUTION INTRAVENOUS at 01:11

## 2020-11-22 RX ADMIN — DEXTROSE AND SODIUM CHLORIDE 125 ML/HR: 5; .45 INJECTION, SOLUTION INTRAVENOUS at 05:11

## 2020-11-22 RX ADMIN — OXYCODONE HYDROCHLORIDE 5 MG: 5 TABLET ORAL at 02:11

## 2020-11-22 RX ADMIN — INSULIN ASPART 8 UNITS: 100 INJECTION, SOLUTION INTRAVENOUS; SUBCUTANEOUS at 07:11

## 2020-11-22 RX ADMIN — OXYCODONE HYDROCHLORIDE 5 MG: 5 TABLET ORAL at 09:11

## 2020-11-22 RX ADMIN — HYDROMORPHONE HYDROCHLORIDE 0.5 MG: 1 INJECTION, SOLUTION INTRAMUSCULAR; INTRAVENOUS; SUBCUTANEOUS at 05:11

## 2020-11-22 RX ADMIN — POTASSIUM CHLORIDE 40 MEQ: 1500 TABLET, EXTENDED RELEASE ORAL at 10:11

## 2020-11-22 RX ADMIN — HYDROMORPHONE HYDROCHLORIDE 0.5 MG: 1 INJECTION, SOLUTION INTRAMUSCULAR; INTRAVENOUS; SUBCUTANEOUS at 10:11

## 2020-11-22 NOTE — PROGRESS NOTES
[facility] Hospital Medicine  Progress Note    Patient Name: Amando Calix  MRN: [id number]  Patient Class: IP- Inpatient   Admission Date: 11/21/2020  Length of Stay: 1 days  Attending Physician: Juan Rowe MD  Primary Care Provider: Celestino Wright MD    [facility] Medicine Team: [facility] HOSP MED A Juan Rowe MD    HPI:      Mr. Calix is a 35 year old gentleman with PMH of Crohn's colitis (on Entyvio) s/p colectomy with end ileostomy with significant anorectal fistulizing disease s/p placement of multiple setons, h/o c diff infection, anemia and h/o steroid induced diabetes presents for fatigue for about 2 weeks.  His symptoms started around November 4th after he received infusion of Entyvio and also ate a very rare steak.  He reports associated increased urinary frequency as well as polydipsia and an 18 lb weight loss over the past few weeks.  States that he has a good appetite has been eating well, he denies any abdominal pain bloody stool from his ostomy, diarrhea, nausea/vomiting, fevers/chills, hematuria, dysuria, flank pain, chest pain, shortness of breath, cough or sore throat.    Subjective:     Principal Problem:<principal problem not specified>    Interval History: Patient lying in bed, no acute distress. Reports pain controlled. Tolerating diet. Reports fatigue improving.       Review of Systems   Constitutional: Positive for fatigue. Negative for chills and fever.   HENT: Negative for congestion.    Eyes: Negative for visual disturbance.   Respiratory: Negative for cough and shortness of breath.    Cardiovascular: Negative for chest pain and leg swelling.   Gastrointestinal: Positive for abdominal pain. Negative for abdominal distention, nausea and vomiting.   Genitourinary: Negative for dysuria.   Neurological: Negative for dizziness, weakness and numbness.   Psychiatric/Behavioral: Negative for confusion.        Objective:     Vital Signs (Most Recent):  Temp:  97.3 °F (36.3 °C) (11/22/20 0300)  Pulse: 68 (11/22/20 0709)  Resp: 18 (11/22/20 0849)  BP: 115/75 (11/22/20 0300)  SpO2: 98 % (11/22/20 0300) Vital Signs (24h Range):  Temp:  [97.3 °F (36.3 °C)-97.8 °F (36.6 °C)] 97.3 °F (36.3 °C)  Pulse:  [] 68  Resp:  [13-20] 18  SpO2:  [95 %-100 %] 98 %  BP: (108-140)/(70-79) 115/75     Weight: 76.5 kg (168 lb 10.4 oz)  Body mass index is 25.64 kg/m².    Intake/Output Summary (Last 24 hours) at 11/22/2020 0924  Last data filed at 11/22/2020 0800  Gross per 24 hour   Intake --   Output 720 ml   Net -720 ml      Physical Exam  HENT:      Head: Normocephalic.      Mouth/Throat:      Mouth: Mucous membranes are moist.   Eyes:      Extraocular Movements: Extraocular movements intact.      Pupils: Pupils are equal, round, and reactive to light.   Neck:      Musculoskeletal: Normal range of motion.   Cardiovascular:      Rate and Rhythm: Normal rate and regular rhythm.      Pulses: Normal pulses.      Heart sounds: Normal heart sounds.   Pulmonary:      Effort: Pulmonary effort is normal. No respiratory distress.      Breath sounds: Normal breath sounds.   Abdominal:      General: Bowel sounds are normal. There is no distension.      Palpations: Abdomen is soft.      Tenderness: There is abdominal tenderness.      Comments: Colostomy bag with normal output   Genitourinary:     Comments: Scrotal swelling  Musculoskeletal: Normal range of motion.   Skin:     General: Skin is warm.   Neurological:      General: No focal deficit present.      Mental Status: He is alert.   Psychiatric:         Mood and Affect: Mood normal.           Significant Labs:   A1C:   Recent Labs   Lab 11/21/20  0843 11/21/20  1802   HGBA1C 13.3* 13.8*     Blood Culture:   Recent Labs   Lab 11/21/20  0934 11/21/20  0946   LABBLOO No Growth to date  No Growth to date No Growth to date  No Growth to date     CBC:   Recent Labs   Lab 11/21/20  0843 11/22/20  0528   WBC 7.99 7.30   HGB 15.8 13.0*   HCT 46.8  38.9*    173     CMP:   Recent Labs   Lab 11/21/20  1015 11/21/20  1802 11/21/20  2112 11/22/20  0528    140 142 137   K 3.6 4.0 3.9 3.3*   CL 97 105 109 103   CO2 13* 21* 20* 21*   * 345* 232* 287*   BUN 9 6 6 6   CREATININE 1.7* 1.2 1.1 1.0   CALCIUM 9.5 9.1 8.5* 8.1*   PROT 8.4  --   --   --    ALBUMIN 4.0  --   --   --    BILITOT 0.4  --   --   --    ALKPHOS 110  --   --   --    AST 9*  --   --   --    ALT 12  --   --   --    ANIONGAP 27* 14 13 13   EGFRNONAA 51.5* >60.0 >60.0 >60.0     Lactic Acid:   Recent Labs   Lab 11/21/20  0939   LACTATE 1.9     Magnesium:   Recent Labs   Lab 11/22/20  0528   MG 1.6     POCT Glucose:   Recent Labs   Lab 11/22/20 1957 11/22/20  2335 11/23/20  0337   POCTGLUCOSE 357* 128* 104     Troponin:   Recent Labs   Lab 11/21/20  0843   TROPONINI 0.020     Urine Studies:   Recent Labs   Lab 11/21/20  0913   COLORU Straw   APPEARANCEUA Clear   PHUR 5.0   SPECGRAV >=1.030*   PROTEINUA Negative   GLUCUA 3+*   KETONESU 3+*   BILIRUBINUA Negative   OCCULTUA Negative   NITRITE Negative   LEUKOCYTESUR Negative   RBCUA 1   WBCUA 1   BACTERIA None   SQUAMEPITHEL 0       Significant Imaging: I have reviewed and interpreted all pertinent imaging results/findings within the past 24 hours.    Assessment/Plan:      Active Diagnoses:    Diagnosis Date Noted POA    Diabetic ketoacidosis without coma associated with diabetes mellitus due to underlying condition [E08.10] 11/21/2020 Yes    KEITH (acute kidney injury) [N17.9] 11/21/2020 Unknown    Cellulitis of scrotum [N49.2] 11/21/2020 Unknown    Tachycardia [R00.0]  Yes    Crohn's disease of colon with fistula [K50.113] 09/30/2014 Yes    Moderate protein-calorie malnutrition [E44.0] 09/30/2014 Yes     Chronic    Anemia [D64.9] 09/05/2012 Yes    Crohn's disease of both small and large intestine without complication [K50.80] 09/05/2012 Yes      Problems Resolved During this Admission:     Scheduled Meds:   clindamycin  (CLEOCIN) IVPB  900 mg Intravenous Q8H     Continuous Infusions:   insulin regular 1 units/mL infusion orderable (TRANSFER)       PRN Meds:.dextrose 50%, dextrose 50%, glucagon (human recombinant), glucose, glucose, HYDROmorphone, insulin aspart U-100, melatonin, ondansetron, oxyCODONE, promethazine (PHENERGAN) IVPB, sodium chloride 0.9%, sodium chloride 0.9%    PLAN:    DKA  H/o steroid induced DM  - DKA likely triggered by scrotal cellulitis   - HA1c: 13.8  - A --> 13  - betahydroxybutyrate: 4.6  - BG on admit, 615 --> 425  - VBG on admit, pH: 7.29/38  - s/p albuterol, insulin/dextrose in the ED  - DKA pathway initiated   - continue insulin gtt   - CLD (no sugar)  - continue IVF  - clear liquid diet (no sugar)  - endocrinology consulted. followup recs      Crohn colitis  S/p colectomy with end ileostomy  Anorectal fistulizing disease s/p placement of multiple setons  - follows with general surgery (Dr. Blackburn). Last visit on 10/5/2020  - follows with CRS (Dr. Suarez). Last visit 8/15/2020  - Given extent of anorectal disease with fistulae and stenosis, ileostomy will be permanent. Appears not to be a candidate for an attempt at restoring intestinal continuity given the extent of his anal disease, and I suspect he will ultimately require a completion proctectomy Discussed completion proctectomy [facility] patient - he is not ready to proceed at this point.  - ESR: 39, CRP: 35  - continue home Entyvio     KEITH- resolved   - Scr on admit, 1.7--> 1.0  - b/l Scr: 0.9 (2020)  - likely prerenal   - s/p 2 units IVF bolus in ED   - NS @125 cc/hr  - BMP daily      Scrotal cellulitis   - elevated ESR and CRP  - CT A/P with contrast  () showed 'Right perianal fistulous tract extending towards the scrotum with interval placement of a seton, noting the fistulous tract is similar to prior CT 2020. No abnormal fluid collection to suggest an abscess. Marked scrotal skin thickening. Correlate for cellulitis'  - followup  bcx   - continue IV clindamycin (D1 -11/21)     Anemia  - Hb 15.8  - stable  - CBC daily      Protein calorie malnutrition  - Boost TID when DKA resolves     VTE Risk Mitigation (From admission, onward)         Ordered     IP VTE HIGH RISK PATIENT  Once      11/21/20 1407     Place sequential compression device  Until discontinued      11/21/20 1407     Place SHERLEY hose  Until discontinued      11/21/20 1407     Place sequential compression device  Until discontinued      11/21/20 1407              Time spent in care of patient: > 35 minutes       Juan Rowe MD  Department of Hospital Medicine   [facility]

## 2020-11-22 NOTE — PLAN OF CARE
Problem: Fall Injury Risk  Goal: Absence of Fall and Fall-Related Injury  Outcome: Ongoing, Progressing  Intervention: Promote Injury-Free Environment  Flowsheets (Taken 11/22/2020 1742)  Safety Promotion/Fall Prevention:   assistive device/personal item within reach   commode/urinal/bedpan at bedside   side rails raised x 2  Environmental Safety Modification:   assistive device/personal items within reach   clutter free environment maintained   lighting adjusted   room organization consistent     Problem: Diabetic Ketoacidosis  Goal: Fluid and Electrolyte Balance with Absence of Ketosis  Outcome: Ongoing, Progressing  Intervention: Monitor and Manage Ketoacidosis  Flowsheets (Taken 11/22/2020 1742)  Fluid/Electrolyte Management:   intravenous fluids adjusted   electrolyte supplement initiated   fluids provided  Glycemic Management:   blood glucose monitoring   insulin infusion adjusted   oral hydration promoted   supplemental insulin given     Problem: Electrolyte Imbalance (Acute Kidney Injury/Impairment)  Goal: Serum Electrolyte Balance  Outcome: Ongoing, Progressing  Intervention: Monitor and Manage Electrolyte Imbalance  Flowsheets (Taken 11/22/2020 1742)  Fluid/Electrolyte Management:   intravenous fluids adjusted   electrolyte supplement initiated   fluids provided     Pt Aox4. VSS. Q1 hr accuchecks and insulin gtt adjusted to Q4 hr  accuchecks and transitional insulin gtt running @2.4 units/hr. Pt tolerating well. Diabetes education provided. Diet advanced to diabetic diet; pt tolerating well. Pain medicine given Q6 hrs per pt request due to abd pain. Plan of care reviewed with pt. Will continue to monitor.

## 2020-11-22 NOTE — SUBJECTIVE & OBJECTIVE
Interval HPI:   Glucose trend 250s,  Above goal on Insulin drip.  DKA is now resolved.   NO hypoglycemias noted.  NPO     Overnight events: PINO   Eating:   NPO  Nausea: No  Hypoglycemia and intervention: No  Fever: No  TPN and/or TF: No    PMH, PSH, FH, SH updated and reviewed     ROS:    Review of Systems   Constitutional: Positive for fatigue. Negative for chills.   HENT: Negative for rhinorrhea and sore throat.    Eyes: Negative for discharge and visual disturbance.   Respiratory: Negative for shortness of breath and wheezing.    Cardiovascular: Negative for chest pain and leg swelling.   Gastrointestinal: Negative for abdominal distention and abdominal pain.   Endocrine: Positive for polydipsia, polyphagia and polyuria. Negative for cold intolerance.   Musculoskeletal: Negative for arthralgias and myalgias.   Skin: Negative for rash and wound.   Neurological: Negative for dizziness, weakness and light-headedness.   Psychiatric/Behavioral: Negative for agitation and sleep disturbance.            PHYSICAL EXAMINATION:  Vitals:    11/22/20 0849   BP:    Pulse:    Resp: 18   Temp:      Body mass index is 25.64 kg/m².    Physical Exam  Vitals signs reviewed.   Constitutional:       Appearance: He is well-developed.   HENT:      Right Ear: External ear normal.      Left Ear: External ear normal.      Nose: Nose normal.   Neck:      Thyroid: No thyromegaly.      Trachea: No tracheal deviation.   Cardiovascular:      Rate and Rhythm: Normal rate.      Heart sounds: No murmur.   Pulmonary:      Effort: Pulmonary effort is normal.      Breath sounds: Normal breath sounds.   Abdominal:      Palpations: Abdomen is soft. There is no mass.      Hernia: No hernia is present.   Skin:     Findings: No rash.   Neurological:      Mental Status: He is alert.      Cranial Nerves: No cranial nerve deficit.      Sensory: No sensory deficit.      Coordination: Coordination normal.   Psychiatric:         Judgment: Judgment normal.

## 2020-11-22 NOTE — PLAN OF CARE
Problem: Adult Inpatient Plan of Care  Goal: Plan of Care Review  Outcome: Ongoing, Progressing   Recommendations      1. Transition to diabetic 2000 kcal diet as tolerated.  Goals: 1. Pt's intake meals >75% by RD follow up.  Nutrition Goal Status: new  Communication of RD Recs: (POC)

## 2020-11-22 NOTE — HPI
Reason for Consult: Management of DKA, Hyperglycemia     Surgical Procedure and Date: N/A     Diabetes diagnosis year: Newly Dx.  Per patient he had steroid induced DM during childhood and resolved after DC of steroids.     Home Diabetes Medications:  NONE     How often checking glucose at home? N/A    BG readings on regimen: N/a   Hypoglycemia on the regimen?  No  Missed doses on regimen?  No    Diabetes Complications include:     N/A     Complicating diabetes co morbidities:   N/A       HPI:   Patient is a 35 y.o. male with a diagnosis of Steroid induced DM during childhood that resolved after discontinuation of steroids, Chron's disease s/p colectomy and ileostomy that was admitted to [facility] for scrotal cellulitis and KEITH.  During ED course patient was found to be in DKA w/ glucose 600s, BHB 4.6, PH 7.29, HCO3 13 and AG 27.  Pt was started on IV and Insulin infusion and consulted with endocrinology for assistance in management of DKA in the setting of newly Dx. DM.

## 2020-11-22 NOTE — PLAN OF CARE
Pharmacy: [facility] [place] [address]  [phone]    Payor: [facility]anc[facility]    PCP: Celestino Wright MD    SW visited pt at  to complete assessment. Alert and oriented. Stated that they live in home with mother. Pt stated no steps to navigate in home. No hx of dialysis or coumadin.  Stated family will provide ride at d/c.   No further needs at time of assessment.        11/22/20 8492   Discharge Assessment   Assessment Type Discharge Planning Assessment   Confirmed/corrected address and phone number on facesheet? Yes   Assessment information obtained from? Patient   Expected Length of Stay (days) 3   Communicated expected length of stay with patient/caregiver yes   Prior to hospitilization cognitive status: Alert/Oriented   Prior to hospitalization functional status: Independent   Current cognitive status: Alert/Oriented   Current Functional Status: Independent   Facility Arrived From: home   Lives With parent(s)   Able to Return to Prior Arrangements yes   Who are your caregiver(s) and their phone number(s)? Mother; Roseline Yogi; [phone]   Patient's perception of discharge disposition home or selfcare   Readmission Within the Last 30 Days unable to assess   Patient currently being followed by outpatient case management? Unable to determine (comments)   Patient currently receives any other outside agency services? No   Equipment Currently Used at Home   (stomy bag)   Do you have any problems affording any of your prescribed medications? No   Is the patient taking medications as prescribed? yes   Does the patient have transportation home? Yes   Transportation Anticipated family or friend will provide   Dialysis Name and Scheduled days n/a   Does the patient receive services at the Coumadin Clinic? No   Discharge Plan A Home with family   Discharge Plan B Home   DME Needed Upon Discharge    (not yet determined)   Patient/Family in Agreement  with Plan yes       Amalia Fam GIOVANNI  Case Management Social Worker   [facility]

## 2020-11-22 NOTE — ASSESSMENT & PLAN NOTE
-A1C  13.8  (Above goal)     -Newly DX.     -On admission, Glu > 600, HCO3, 13, AG 27, PH 7.29, BHB 4.6 (DKA)     -Labs from 11/22/20  HCO3 21, AG 13, (DKA NOW RESOLVED)     -Glucose trend   250s  (Above goal on Insulin drip)   -Glucose Goal 140-180mg/dL   -No hypoglycemias noted  -On clear liquid sugar free diet     Plan:   -Due to DKA is now resolved and patient requesting diet to be advanced, will recommend the following.     -DC DKA infusion protocol   -Start Transition insulin drip @ 3.2 units/hr w/ step down parameters   -Moderate dose correction insulin   -FS qAC/HS/AM   -Once diet is ADVANCED, PER PRIMARY, START NOVOLOG 7 Pre-meals   -Will monitor requirements and convert to MDI, likely tomorrow

## 2020-11-22 NOTE — CONSULTS
[facility]  Endocrinology  Diabetes Consult Note    Consult Requested by: Juan Rowe MD   Reason for admit: <principal problem not specified>    HISTORY OF PRESENT ILLNESS:  Reason for Consult: Management of DKA, Hyperglycemia     Surgical Procedure and Date: N/A     Diabetes diagnosis year: Newly Dx.  Per patient he had steroid induced DM during childhood and resolved after DC of steroids.     Home Diabetes Medications:  NONE     How often checking glucose at home? N/A    BG readings on regimen: N/a   Hypoglycemia on the regimen?  No  Missed doses on regimen?  No    Diabetes Complications include:     N/A     Complicating diabetes co morbidities:   N/A       HPI:   Patient is a 35 y.o. male with a diagnosis of Steroid induced DM during childhood that resolved after discontinuation of steroids, Chron's disease s/p colectomy and ileostomy that was admitted to [facility] for scrotal cellulitis and KEITH.  During ED course patient was found to be in DKA w/ glucose 600s, BHB 4.6, PH 7.29, HCO3 13 and AG 27.  Pt was started on IV and Insulin infusion and consulted with endocrinology for assistance in management of DKA in the setting of newly Dx. DM.         Interval HPI:   Glucose trend 250s,  Above goal on Insulin drip.  DKA is now resolved.   NO hypoglycemias noted.  NPO     Overnight events: PINO   Eating:   NPO  Nausea: No  Hypoglycemia and intervention: No  Fever: No  TPN and/or TF: No    PMH, PSH, FH, SH updated and reviewed     ROS:    Review of Systems   Constitutional: Positive for fatigue. Negative for chills.   HENT: Negative for rhinorrhea and sore throat.    Eyes: Negative for discharge and visual disturbance.   Respiratory: Negative for shortness of breath and wheezing.    Cardiovascular: Negative for chest pain and leg swelling.   Gastrointestinal: Negative for abdominal distention and abdominal pain.   Endocrine: Positive for polydipsia, polyphagia and polyuria. Negative for cold  intolerance.   Musculoskeletal: Negative for arthralgias and myalgias.   Skin: Negative for rash and wound.   Neurological: Negative for dizziness, weakness and light-headedness.   Psychiatric/Behavioral: Negative for agitation and sleep disturbance.            PHYSICAL EXAMINATION:  Vitals:    11/22/20 0849   BP:    Pulse:    Resp: 18   Temp:      Body mass index is 25.64 kg/m².    Physical Exam  Vitals signs reviewed.   Constitutional:       Appearance: He is well-developed.   HENT:      Right Ear: External ear normal.      Left Ear: External ear normal.      Nose: Nose normal.   Neck:      Thyroid: No thyromegaly.      Trachea: No tracheal deviation.   Cardiovascular:      Rate and Rhythm: Normal rate.      Heart sounds: No murmur.   Pulmonary:      Effort: Pulmonary effort is normal.      Breath sounds: Normal breath sounds.   Abdominal:      Palpations: Abdomen is soft. There is no mass.      Hernia: No hernia is present.   Skin:     Findings: No rash.   Neurological:      Mental Status: He is alert.      Cranial Nerves: No cranial nerve deficit.      Sensory: No sensory deficit.      Coordination: Coordination normal.   Psychiatric:         Judgment: Judgment normal.           Labs Reviewed and Include   Recent Labs   Lab 11/22/20  0528   *   CALCIUM 8.1*      K 3.3*   CO2 21*      BUN 6   CREATININE 1.0     Lab Results   Component Value Date    WBC 7.30 11/22/2020    HGB 13.0 (L) 11/22/2020    HCT 38.9 (L) 11/22/2020    MCV 90 11/22/2020     11/22/2020     No results for input(s): TSH, FREET4 in the last 168 hours.  Lab Results   Component Value Date    HGBA1C 13.8 (H) 11/21/2020       Nutritional status:   Body mass index is 25.64 kg/m².  Lab Results   Component Value Date    ALBUMIN 4.0 11/21/2020    ALBUMIN 3.8 03/18/2020    ALBUMIN 4.2 03/05/2018     Lab Results   Component Value Date    PREALBUMIN 34 03/31/2015    PREALBUMIN 7 (L) 11/11/2014    PREALBUMIN 8 (L) 11/10/2014        Estimated Creatinine Clearance: 100.7 mL/min (based on SCr of 1 mg/dL).    Accu-Checks  Recent Labs     11/21/20  2330 11/22/20  0037 11/22/20  0132 11/22/20  0255 11/22/20  0325 11/22/20  0431 11/22/20  0532 11/22/20  0633 11/22/20  0734 11/22/20  0838   POCTGLUCOSE 254* 263* 233* 244* 241* 290* 288* 283* 256* 275*        ASSESSMENT and PLAN    Diabetic ketoacidosis without coma associated with diabetes mellitus due to underlying condition  -A1C  13.8  (Above goal)     -Newly DX.     -On admission, Glu > 600, HCO3, 13, AG 27, PH 7.29, BHB 4.6 (DKA)     -Labs from 11/22/20  HCO3 21, AG 13, (DKA NOW RESOLVED)     -Glucose trend   250s  (Above goal on Insulin drip)   -Glucose Goal 140-180mg/dL   -No hypoglycemias noted  -On clear liquid sugar free diet     Plan:   -Due to DKA is now resolved and patient requesting diet to be advanced, will recommend the following.     -DC DKA infusion protocol   -Start Transition insulin drip @ 3.2 units/hr w/ step down parameters   -Moderate dose correction insulin   -FS qAC/HS/AM   -Once diet is ADVANCED, PER PRIMARY, START NOVOLOG 7 Pre-meals   -Send FRANCHESCA ab (Rule out DM1)   -Will monitor requirements and convert to MDI, likely tomorrow       Cellulitis of scrotum  -On ABX   -Per primary   -Expect stress hyperglycemia        KEITH (acute kidney injury)  -Per primary   -Avoid insulin stacking       Crohn's disease of both small and large intestine without complication  -Per primary team   -Developed steroid induced DM due to steroids treatments for Chron's           Plan discussed with patient, family, and RN at bedside.     Angel Delaney MD  Endocrinology  [facility]

## 2020-11-22 NOTE — CONSULTS
"  [facility]  Adult Nutrition  Consult Note    SUMMARY     Recommendations     1. Transition to diabetic 2000 kcal diet as tolerated.  Goals: 1. Pt's intake meals >75% by RD follow up.  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 Per pt, he eats only 1 meal/day most days due to his work schedule, but he started losing wt without trying. Pt with multiple symptoms of uncontrolled DM2 (thirst, frequent urination, blurry vision). Pt reports 30 lb (15.2%) wt loss x 2 months even though he feels he was eating about his usual amount until the last week. Completed NFPE today: pt with no notable wasting and does not meet criteria for moderate malnutrition but is at risk with continued wt loss. Educated pt on carb consistent diet using My Plate method; encouraged pt to split his intake into 2 meals/day and discussed carbohydrates and proper portion sizes as well as healthy alternatives to soda and juice. Provided handouts.  Nutrition Discharge Planning: Pt to follow carb consistent diet.    Nutrition Risk Screen    Nutrition Risk Screen: no indicators present    Nutrition/Diet History     general    Anthropometrics    Temp: 98.1 °F (36.7 °C)  Height Method: Stated  Height: 5' 8" (172.7 cm)  Height (inches): 68 in  Weight Method: Standard Scale  Weight: 76.5 kg (168 lb 10.4 oz)  Weight (lb): 168.65 lb  Ideal Body Weight (IBW), Male: 154 lb  % Ideal Body Weight, Male (lb): 109.51 %  BMI (Calculated): 25.6       Lab/Procedures/Meds    Pertinent Labs Reviewed: reviewed  Pertinent Labs Comments: A1c 13.8%, Glu 287, K 3.3, CO2 21  Pertinent Medications Reviewed: " reviewed  Pertinent Medications Comments: regular insulin, IVF      Estimated/Assessed Needs    Weight Used For Calorie Calculations: 76.5 kg (168 lb 10.4 oz)  Energy Calorie Requirements (kcal): 2016 kcal  Energy Need Method: [place]-St Jeor(PAL 1.2)  Protein Requirements: 69-94g/day  Weight Used For Protein Calculations: 76.5 kg (168 lb 10.4 oz)        RDA Method (mL): 2016  CHO Requirement: 252g      Nutrition Prescription Ordered    Current Diet Order: Clear Liquid/bariatric    Evaluation of Received Nutrient/Fluid Intake    Comments: LBM 11/21  % Intake of Estimated Energy Needs: 25 - 50 %  % Meal Intake: 50 - 75 %    Nutrition Risk    Level of Risk/Frequency of Follow-up: low     Assessment and Plan    Nutrition Problem  Unintentional wt loss    Related to (etiology):   Uncontrolled DM    Signs and Symptoms (as evidenced by):   A1c 13.8%, DKA    Interventions(treatment strategy):  Collaboration of care with providers.  Nutrition education: Carb Consistent diet    Nutrition Diagnosis Status:   New       Monitor and Evaluation    Food and Nutrient Intake: energy intake, food and beverage intake  Food and Nutrient Adminstration: diet order  Knowledge/Beliefs/Attitudes: food and nutrition knowledge/skill  Anthropometric Measurements: weight, weight change, body mass index  Biochemical Data, Medical Tests and Procedures: electrolyte and renal panel, gastrointestinal profile, glucose/endocrine profile, inflammatory profile, lipid profile  Nutrition-Focused Physical Findings: overall appearance, extremities, muscles and bones, head and eyes, skin     Malnutrition Assessment             Weight Loss (Malnutrition): (15.2% x 2 months)  Energy Intake (Malnutrition): less than 75% for greater than 7 days   Orbital Region (Subcutaneous Fat Loss): well nourished  Upper Arm Region (Subcutaneous Fat Loss): well nourished  Thoracic and Lumbar Region: well nourished   [place] Region (Muscle Loss): well nourished  Clavicle Bone  Region (Muscle Loss): well nourished  Clavicle and Acromion Bone Region (Muscle Loss): well nourished  Scapular Bone Region (Muscle Loss): well nourished  Dorsal Hand (Muscle Loss): well nourished  Patellar Region (Muscle Loss): mild depletion  Anterior Thigh Region (Muscle Loss): well nourished  Posterior Calf Region (Muscle Loss): well nourished                 Nutrition Follow-Up    RD Follow-up?: Yes

## 2020-11-22 NOTE — PLAN OF CARE
AAOx4. VSS. Blood glucose monitoring performed Q1H per orders. Insulin gtt currently infusing at 3 cc/hr. D5 1/2 NS infusing at 125 cc/hr. Tolerating both gtts well. PRN pain meds administered upon request. Moderate relief noted. No acute needs at the moment. Bed in lowest position, call light and personal items within reach. Will continue to monitor.

## 2020-11-23 LAB
ALBUMIN SERPL BCP-MCNC: 3.3 G/DL (ref 3.5–5.2)
ALP SERPL-CCNC: 76 U/L (ref 55–135)
ALT SERPL W/O P-5'-P-CCNC: 10 U/L (ref 10–44)
ANION GAP SERPL CALC-SCNC: 12 MMOL/L (ref 8–16)
ANISOCYTOSIS BLD QL SMEAR: SLIGHT
AST SERPL-CCNC: 19 U/L (ref 10–40)
BASOPHILS # BLD AUTO: 0.02 K/UL (ref 0–0.2)
BASOPHILS NFR BLD: 0.3 % (ref 0–1.9)
BILIRUB SERPL-MCNC: 0.4 MG/DL (ref 0.1–1)
BUN SERPL-MCNC: 11 MG/DL (ref 6–30)
BUN SERPL-MCNC: 9 MG/DL (ref 6–20)
CALCIUM SERPL-MCNC: 8.7 MG/DL (ref 8.7–10.5)
CHLORIDE SERPL-SCNC: 99 MMOL/L (ref 95–110)
CHLORIDE SERPL-SCNC: 99 MMOL/L (ref 95–110)
CO2 SERPL-SCNC: 22 MMOL/L (ref 23–29)
CREAT SERPL-MCNC: 0.6 MG/DL (ref 0.5–1.4)
CREAT SERPL-MCNC: 0.9 MG/DL (ref 0.5–1.4)
DIFFERENTIAL METHOD: NORMAL
EOSINOPHIL # BLD AUTO: 0.1 K/UL (ref 0–0.5)
EOSINOPHIL NFR BLD: 1.2 % (ref 0–8)
ERYTHROCYTE [DISTWIDTH] IN BLOOD BY AUTOMATED COUNT: 12.5 % (ref 11.5–14.5)
EST. GFR  (AFRICAN AMERICAN): >60 ML/MIN/1.73 M^2
EST. GFR  (NON AFRICAN AMERICAN): >60 ML/MIN/1.73 M^2
GIANT PLATELETS BLD QL SMEAR: PRESENT
GLUCOSE SERPL-MCNC: 308 MG/DL (ref 70–110)
GLUCOSE SERPL-MCNC: 669 MG/DL (ref 70–110)
HCT VFR BLD AUTO: 40.9 % (ref 40–54)
HCT VFR BLD CALC: 50 %PCV (ref 36–54)
HGB BLD-MCNC: 14.1 G/DL (ref 14–18)
HYPOCHROMIA BLD QL SMEAR: NORMAL
IMM GRANULOCYTES # BLD AUTO: 0.02 K/UL (ref 0–0.04)
IMM GRANULOCYTES NFR BLD AUTO: 0.3 % (ref 0–0.5)
LYMPHOCYTES # BLD AUTO: 1.8 K/UL (ref 1–4.8)
LYMPHOCYTES NFR BLD: 23.9 % (ref 18–48)
MCH RBC QN AUTO: 30.4 PG (ref 27–31)
MCHC RBC AUTO-ENTMCNC: 34.5 G/DL (ref 32–36)
MCV RBC AUTO: 88 FL (ref 82–98)
MONOCYTES # BLD AUTO: 0.7 K/UL (ref 0.3–1)
MONOCYTES NFR BLD: 9.5 % (ref 4–15)
NEUTROPHILS # BLD AUTO: 4.9 K/UL (ref 1.8–7.7)
NEUTROPHILS NFR BLD: 64.8 % (ref 38–73)
NRBC BLD-RTO: 0 /100 WBC
OVALOCYTES BLD QL SMEAR: NORMAL
PHOSPHATE SERPL-MCNC: 3 MG/DL (ref 2.7–4.5)
PLATELET # BLD AUTO: 154 K/UL (ref 150–350)
PMV BLD AUTO: NORMAL FL (ref 9.2–12.9)
POC IONIZED CALCIUM: 1.1 MMOL/L (ref 1.06–1.42)
POC TCO2 (MEASURED): 18 MMOL/L (ref 23–29)
POCT GLUCOSE: 104 MG/DL (ref 70–110)
POCT GLUCOSE: 294 MG/DL (ref 70–110)
POCT GLUCOSE: 318 MG/DL (ref 70–110)
POCT GLUCOSE: 356 MG/DL (ref 70–110)
POIKILOCYTOSIS BLD QL SMEAR: SLIGHT
POLYCHROMASIA BLD QL SMEAR: NORMAL
POTASSIUM BLD-SCNC: 5.6 MMOL/L (ref 3.5–5.1)
POTASSIUM SERPL-SCNC: 3.8 MMOL/L (ref 3.5–5.1)
PROT SERPL-MCNC: 6.9 G/DL (ref 6–8.4)
RBC # BLD AUTO: 4.64 M/UL (ref 4.6–6.2)
SAMPLE: ABNORMAL
SODIUM BLD-SCNC: 129 MMOL/L (ref 136–145)
SODIUM SERPL-SCNC: 133 MMOL/L (ref 136–145)
WBC # BLD AUTO: 7.54 K/UL (ref 3.9–12.7)

## 2020-11-23 PROCEDURE — 20600001 HC STEP DOWN PRIVATE ROOM

## 2020-11-23 PROCEDURE — 36415 COLL VENOUS BLD VENIPUNCTURE: CPT

## 2020-11-23 PROCEDURE — 63600175 PHARM REV CODE 636 W HCPCS: Performed by: STUDENT IN AN ORGANIZED HEALTH CARE EDUCATION/TRAINING PROGRAM

## 2020-11-23 PROCEDURE — 99233 SBSQ HOSP IP/OBS HIGH 50: CPT | Mod: ,,, | Performed by: INTERNAL MEDICINE

## 2020-11-23 PROCEDURE — 86341 ISLET CELL ANTIBODY: CPT

## 2020-11-23 PROCEDURE — 25000003 PHARM REV CODE 250: Performed by: INTERNAL MEDICINE

## 2020-11-23 PROCEDURE — 63600175 PHARM REV CODE 636 W HCPCS: Performed by: INTERNAL MEDICINE

## 2020-11-23 PROCEDURE — 85025 COMPLETE CBC W/AUTO DIFF WBC: CPT

## 2020-11-23 PROCEDURE — 99233 PR SUBSEQUENT HOSPITAL CARE,LEVL III: ICD-10-PCS | Mod: ,,, | Performed by: INTERNAL MEDICINE

## 2020-11-23 PROCEDURE — 99232 PR SUBSEQUENT HOSPITAL CARE,LEVL II: ICD-10-PCS | Mod: ,,, | Performed by: INTERNAL MEDICINE

## 2020-11-23 PROCEDURE — 99232 SBSQ HOSP IP/OBS MODERATE 35: CPT | Mod: ,,, | Performed by: INTERNAL MEDICINE

## 2020-11-23 PROCEDURE — 84100 ASSAY OF PHOSPHORUS: CPT

## 2020-11-23 PROCEDURE — 80053 COMPREHEN METABOLIC PANEL: CPT

## 2020-11-23 RX ORDER — INSULIN ASPART 100 [IU]/ML
0-5 INJECTION, SOLUTION INTRAVENOUS; SUBCUTANEOUS
Status: DISCONTINUED | OUTPATIENT
Start: 2020-11-23 | End: 2020-11-25

## 2020-11-23 RX ORDER — INSULIN ASPART 100 [IU]/ML
9 INJECTION, SOLUTION INTRAVENOUS; SUBCUTANEOUS
Status: DISCONTINUED | OUTPATIENT
Start: 2020-11-23 | End: 2020-11-23

## 2020-11-23 RX ORDER — INSULIN ASPART 100 [IU]/ML
7 INJECTION, SOLUTION INTRAVENOUS; SUBCUTANEOUS
Status: DISCONTINUED | OUTPATIENT
Start: 2020-11-23 | End: 2020-11-24

## 2020-11-23 RX ADMIN — OXYCODONE HYDROCHLORIDE 5 MG: 5 TABLET ORAL at 11:11

## 2020-11-23 RX ADMIN — INSULIN ASPART 3 UNITS: 100 INJECTION, SOLUTION INTRAVENOUS; SUBCUTANEOUS at 05:11

## 2020-11-23 RX ADMIN — CLINDAMYCIN IN 5 PERCENT DEXTROSE 900 MG: 18 INJECTION, SOLUTION INTRAVENOUS at 03:11

## 2020-11-23 RX ADMIN — HYDROMORPHONE HYDROCHLORIDE 0.5 MG: 1 INJECTION, SOLUTION INTRAMUSCULAR; INTRAVENOUS; SUBCUTANEOUS at 06:11

## 2020-11-23 RX ADMIN — HYDROMORPHONE HYDROCHLORIDE 0.5 MG: 1 INJECTION, SOLUTION INTRAMUSCULAR; INTRAVENOUS; SUBCUTANEOUS at 12:11

## 2020-11-23 RX ADMIN — CLINDAMYCIN IN 5 PERCENT DEXTROSE 900 MG: 18 INJECTION, SOLUTION INTRAVENOUS at 07:11

## 2020-11-23 RX ADMIN — INSULIN ASPART 5 UNITS: 100 INJECTION, SOLUTION INTRAVENOUS; SUBCUTANEOUS at 08:11

## 2020-11-23 RX ADMIN — OXYCODONE HYDROCHLORIDE 5 MG: 5 TABLET ORAL at 08:11

## 2020-11-23 RX ADMIN — INSULIN ASPART 4 UNITS: 100 INJECTION, SOLUTION INTRAVENOUS; SUBCUTANEOUS at 12:11

## 2020-11-23 RX ADMIN — OXYCODONE HYDROCHLORIDE 5 MG: 5 TABLET ORAL at 04:11

## 2020-11-23 RX ADMIN — INSULIN ASPART 6 UNITS: 100 INJECTION, SOLUTION INTRAVENOUS; SUBCUTANEOUS at 12:11

## 2020-11-23 RX ADMIN — INSULIN ASPART 7 UNITS: 100 INJECTION, SOLUTION INTRAVENOUS; SUBCUTANEOUS at 05:11

## 2020-11-23 RX ADMIN — INSULIN ASPART 6 UNITS: 100 INJECTION, SOLUTION INTRAVENOUS; SUBCUTANEOUS at 08:11

## 2020-11-23 RX ADMIN — CLINDAMYCIN IN 5 PERCENT DEXTROSE 900 MG: 18 INJECTION, SOLUTION INTRAVENOUS at 11:11

## 2020-11-23 RX ADMIN — HYDROMORPHONE HYDROCHLORIDE 0.5 MG: 1 INJECTION, SOLUTION INTRAMUSCULAR; INTRAVENOUS; SUBCUTANEOUS at 05:11

## 2020-11-23 NOTE — PLAN OF CARE
AAOx4. VSS. Insulin gtt infusing at 1 unit/hr. Blood glucose monitoring performed Q4H. Pain meds administered ATC with mild relief noted. No acute needs at the moment. Bed in lowest position, call light and personal items within reach. Will continue to monitor.

## 2020-11-23 NOTE — PROGRESS NOTES
"[facility]  Endocrinology  Progress Note    Admit Date: 2020     Reason for Consult: Management of DKA, Hyperglycemia     Surgical Procedure and Date: N/A     Diabetes diagnosis year: Newly Dx.  Per patient he had steroid induced DM during childhood and resolved after DC of steroids.     Home Diabetes Medications:  NONE     How often checking glucose at home? N/A    BG readings on regimen: N/a   Hypoglycemia on the regimen?  No  Missed doses on regimen?  No    Diabetes Complications include:     N/A     Complicating diabetes co morbidities:   N/A       HPI:   Patient is a 35 y.o. male with a diagnosis of Steroid induced DM during childhood that resolved after discontinuation of steroids, Chron's disease s/p colectomy and ileostomy that was admitted to [facility] for scrotal cellulitis and KEITH.  During ED course patient was found to be in DKA w/ glucose 600s, BHB 4.6, PH 7.29, HCO3 13 and AG 27.  Pt was started on IV and Insulin infusion and consulted with endocrinology for assistance in management of DKA in the setting of newly Dx. DM.         Interval HPI:   Glucose below goal so insulin drip titrated down to 1U/h. Patient now on a diet.     Overnight events: PINO   Eatin%  Nausea: No  Hypoglycemia and intervention: No  Fever: No  TPN and/or TF: No     PMH, PSH, FH, SH updated and reviewed       /72 (BP Location: Left arm, Patient Position: Lying)   Pulse 68   Temp 98.3 °F (36.8 °C) (Oral)   Resp 13   Ht 5' 8" (1.727 m)   Wt 76.5 kg (168 lb 10.4 oz)   SpO2 98%   BMI 25.64 kg/m²     Labs Reviewed and Include    No results for input(s): GLU, CALCIUM, ALBUMIN, PROT, NA, K, CO2, CL, BUN, CREATININE, ALKPHOS, ALT, AST, BILITOT in the last 24 hours.  Lab Results   Component Value Date    WBC 7.30 2020    HGB 13.0 (L) 2020    HCT 38.9 (L) 2020    MCV 90 2020     2020     No results for input(s): TSH, FREET4 in the last 168 hours.  Lab Results "   Component Value Date    HGBA1C 13.8 (H) 11/21/2020       Nutritional status:   Body mass index is 25.64 kg/m².  Lab Results   Component Value Date    ALBUMIN 4.0 11/21/2020    ALBUMIN 3.8 03/18/2020    ALBUMIN 4.2 03/05/2018     Lab Results   Component Value Date    PREALBUMIN 34 03/31/2015    PREALBUMIN 7 (L) 11/11/2014    PREALBUMIN 8 (L) 11/10/2014       Estimated Creatinine Clearance: 100.7 mL/min (based on SCr of 1 mg/dL).    Accu-Checks  Recent Labs     11/22/20  0431 11/22/20  0532 11/22/20  0633 11/22/20  0734 11/22/20  0838 11/22/20  1135 11/22/20  1646 11/22/20  1957 11/22/20  2335 11/23/20  0337   POCTGLUCOSE 290* 288* 283* 256* 275* 169* 174* 357* 128* 104       Current Medications and/or Treatments Impacting Glycemic Control  Immunotherapy:    Immunosuppressants     None        Steroids:   Hormones (From admission, onward)    Start     Stop Route Frequency Ordered    11/21/20 1406  melatonin tablet 6 mg      -- Oral Nightly PRN 11/21/20 1407        Pressors:    Autonomic Drugs (From admission, onward)    None        Hyperglycemia/Diabetes Medications:   Antihyperglycemics (From admission, onward)    Start     Stop Route Frequency Ordered    11/22/20 1400  insulin aspart U-100 pen 6 Units      -- SubQ 3 times daily with meals 11/22/20 1359    11/22/20 1145  insulin regular in 0.9 % NaCl 100 unit/100 mL (1 unit/mL) infusion     Question:  Enter initial dose (Units/hr):  Answer:  2.5    -- IV Continuous 11/22/20 1132    11/22/20 1007  insulin aspart U-100 pen 0-10 Units      -- SubQ As needed (PRN) 11/22/20 0907          ASSESSMENT and PLAN    * Diabetic ketoacidosis without coma associated with diabetes mellitus due to underlying condition  A1c 13% new diagnosis of diabetes    On admission with DKA now resolved    Glucose below goal with transition drip now reduced to 1 U/h  Premeal insulin started as patient is now on a diet  -Glucose Goal 140-180mg/dL   -No hypoglycemias noted      Plan  - Continue  insulin transition protocol at 1 U/h, will determine needs once requirements are stable before transitioning to SQ insulin  - Continue Novolog 6 U AC   - Low dose correction insulin   - FS qAC/HS/AM   - Will monitor requirements and convert to MDI, likely tomorrow     FRANCHESCA pending      Cellulitis of scrotum  -On ABX   -Per primary   -Expect decreased insulin requirements as infection resolves        KEITH (acute kidney injury)  Resolved      Crohn's disease of both small and large intestine without complication  -Per primary team   Not on steroids currently          Pastor Cochran MD  Endocrinology  [facility]

## 2020-11-23 NOTE — SUBJECTIVE & OBJECTIVE
"Interval HPI:   Glucose below goal so insulin drip titrated down to 1U/h. Patient now on a diet.     Overnight events: PINO   Eatin%  Nausea: No  Hypoglycemia and intervention: No  Fever: No  TPN and/or TF: No     PMH, PSH, FH, SH updated and reviewed       /72 (BP Location: Left arm, Patient Position: Lying)   Pulse 68   Temp 98.3 °F (36.8 °C) (Oral)   Resp 13   Ht 5' 8" (1.727 m)   Wt 76.5 kg (168 lb 10.4 oz)   SpO2 98%   BMI 25.64 kg/m²     Labs Reviewed and Include    No results for input(s): GLU, CALCIUM, ALBUMIN, PROT, NA, K, CO2, CL, BUN, CREATININE, ALKPHOS, ALT, AST, BILITOT in the last 24 hours.  Lab Results   Component Value Date    WBC 7.30 2020    HGB 13.0 (L) 2020    HCT 38.9 (L) 2020    MCV 90 2020     2020     No results for input(s): TSH, FREET4 in the last 168 hours.  Lab Results   Component Value Date    HGBA1C 13.8 (H) 2020       Nutritional status:   Body mass index is 25.64 kg/m².  Lab Results   Component Value Date    ALBUMIN 4.0 2020    ALBUMIN 3.8 2020    ALBUMIN 4.2 2018     Lab Results   Component Value Date    PREALBUMIN 34 2015    PREALBUMIN 7 (L) 2014    PREALBUMIN 8 (L) 11/10/2014       Estimated Creatinine Clearance: 100.7 mL/min (based on SCr of 1 mg/dL).    Accu-Checks  Recent Labs     20  0431 20  0532 20  0633 20  0734 20  0838 20  1135 20  1646 20  1957 20  2335 20  0337   POCTGLUCOSE 290* 288* 283* 256* 275* 169* 174* 357* 128* 104       Current Medications and/or Treatments Impacting Glycemic Control  Immunotherapy:    Immunosuppressants     None        Steroids:   Hormones (From admission, onward)    Start     Stop Route Frequency Ordered    20 1406  melatonin tablet 6 mg      -- Oral Nightly PRN 20 1407        Pressors:    Autonomic Drugs (From admission, onward)    None        Hyperglycemia/Diabetes Medications: "   Antihyperglycemics (From admission, onward)    Start     Stop Route Frequency Ordered    11/22/20 1400  insulin aspart U-100 pen 6 Units      -- SubQ 3 times daily with meals 11/22/20 1359    11/22/20 1145  insulin regular in 0.9 % NaCl 100 unit/100 mL (1 unit/mL) infusion     Question:  Enter initial dose (Units/hr):  Answer:  2.5    -- IV Continuous 11/22/20 1132    11/22/20 1007  insulin aspart U-100 pen 0-10 Units      -- SubQ As needed (PRN) 11/22/20 0971

## 2020-11-23 NOTE — ASSESSMENT & PLAN NOTE
A1c 13% new diagnosis of diabetes    On admission with DKA now resolved    Glucose below goal with transition drip now reduced to 1 U/h  Premeal insulin started as patient is now on a diet  -Glucose Goal 140-180mg/dL   -No hypoglycemias noted      Plan  - Continue insulin transition protocol at 1 U/h, will determine needs once requirements are stable before transitioning to SQ insulin  - Continue Novolog 6 U AC   - Low dose correction insulin   - FS qAC/HS/AM   - Will monitor requirements and convert to MDI, likely tomorrow     FRANCHESCA pending  Will obtain C-peptide, if detectable less likely T1DM and likely  Has residual beta cell function, if low will need to be rechecked outpatient as it may be low from glucose toxicity

## 2020-11-23 NOTE — PROGRESS NOTES
[facility] Hospital Medicine  Progress Note    Patient Name: Amando Calix  MRN: [id number]  Patient Class: IP- Inpatient   Admission Date: 11/21/2020  Length of Stay: 2 days  Attending Physician: Juan Rowe MD  Primary Care Provider: Celestino Wright MD    [facility] Medicine Team: [facility] HOSP MED A Juan Rowe MD    HPI:      Mr. Calix is a 35 year old gentleman with PMH of Crohn's colitis (on Entyvio) s/p colectomy with end ileostomy with significant anorectal fistulizing disease s/p placement of multiple setons, h/o c diff infection, anemia and h/o steroid induced diabetes presents for fatigue for about 2 weeks.  His symptoms started around November 4th after he received infusion of Entyvio and also ate a very rare steak.  He reports associated increased urinary frequency as well as polydipsia and an 18 lb weight loss over the past few weeks.  States that he has a good appetite has been eating well, he denies any abdominal pain bloody stool from his ostomy, diarrhea, nausea/vomiting, fevers/chills, hematuria, dysuria, flank pain, chest pain, shortness of breath, cough or sore throat.    Subjective:     Principal Problem:Diabetic ketoacidosis without coma associated with diabetes mellitus due to underlying condition    Interval History: Patient lying in bed, no acute distress. Tolerating diet. Reports scrotal swelling remains unchanged. Continues to report mild fatigue.       Review of Systems   Constitutional: Positive for fatigue. Negative for chills and fever.   HENT: Negative for congestion.    Eyes: Negative for visual disturbance.   Respiratory: Negative for cough and shortness of breath.    Cardiovascular: Negative for chest pain and leg swelling.   Gastrointestinal: Negative for abdominal distention, abdominal pain, nausea and vomiting.   Genitourinary: Positive for scrotal swelling. Negative for dysuria.   Neurological: Negative for dizziness, weakness and  numbness.   Psychiatric/Behavioral: Negative for confusion.        Objective:     Vital Signs (Most Recent):  Temp: 98.3 °F (36.8 °C) (11/23/20 0725)  Pulse: 68 (11/23/20 0725)  Resp: 13 (11/23/20 0725)  BP: 103/72 (11/23/20 0725)  SpO2: 98 % (11/23/20 0725) Vital Signs (24h Range):  Temp:  [98.2 °F (36.8 °C)-98.9 °F (37.2 °C)] 98.3 °F (36.8 °C)  Pulse:  [65-82] 68  Resp:  [13-18] 13  SpO2:  [96 %-99 %] 98 %  BP: (101-121)/(70-84) 103/72     Weight: 76.5 kg (168 lb 10.4 oz)  Body mass index is 25.64 kg/m².    Intake/Output Summary (Last 24 hours) at 11/23/2020 1047  Last data filed at 11/23/2020 0900  Gross per 24 hour   Intake 838.37 ml   Output 660 ml   Net 178.37 ml      Physical Exam  HENT:      Head: Normocephalic.      Mouth/Throat:      Mouth: Mucous membranes are moist.   Eyes:      Extraocular Movements: Extraocular movements intact.      Pupils: Pupils are equal, round, and reactive to light.   Neck:      Musculoskeletal: Normal range of motion.   Cardiovascular:      Rate and Rhythm: Normal rate and regular rhythm.      Pulses: Normal pulses.      Heart sounds: Normal heart sounds.   Pulmonary:      Effort: Pulmonary effort is normal. No respiratory distress.      Breath sounds: Normal breath sounds.   Abdominal:      General: Bowel sounds are normal. There is no distension.      Palpations: Abdomen is soft.      Tenderness: There is abdominal tenderness.      Comments: Colostomy bag with normal output   Genitourinary:     Comments: Scrotal swelling  Musculoskeletal: Normal range of motion.   Skin:     General: Skin is warm.   Neurological:      General: No focal deficit present.      Mental Status: He is alert.   Psychiatric:         Mood and Affect: Mood normal.           Significant Labs:   A1C:   Recent Labs   Lab 11/21/20  0843 11/21/20  1802   HGBA1C 13.3* 13.8*     Blood Culture:   No results for input(s): LABBLOO in the last 48 hours.  CBC:   Recent Labs   Lab 11/22/20  0528   WBC 7.30   HGB 13.0*    HCT 38.9*        CMP:   Recent Labs   Lab 20  1802 20  2112 20  0528    142 137   K 4.0 3.9 3.3*    109 103   CO2 21* 20* 21*   * 232* 287*   BUN 6 6 6   CREATININE 1.2 1.1 1.0   CALCIUM 9.1 8.5* 8.1*   ANIONGAP 14 13 13   EGFRNONAA >60.0 >60.0 >60.0     Lactic Acid:   No results for input(s): LACTATE in the last 48 hours.  Magnesium:   Recent Labs   Lab 20  0528   MG 1.6     POCT Glucose:   Recent Labs   Lab 20  1957 20  2335 20  0337   POCTGLUCOSE 357* 128* 104       Significant Imaging: I have reviewed and interpreted all pertinent imaging results/findings within the past 24 hours.    Assessment/Plan:      Active Diagnoses:    Diagnosis Date Noted POA    PRINCIPAL PROBLEM:  Diabetic ketoacidosis without coma associated with diabetes mellitus due to underlying condition [E08.10] 2020 Yes    KEITH (acute kidney injury) [N17.9] 2020 Yes    Cellulitis of scrotum [N49.2] 2020 Yes    Tachycardia [R00.0]  Yes    Crohn's disease of colon with fistula [K50.113] 2014 Yes    Moderate protein-calorie malnutrition [E44.0] 2014 Yes     Chronic    Anemia [D64.9] 2012 Yes    Crohn's disease of both small and large intestine without complication [K50.80] 2012 Yes      Problems Resolved During this Admission:     Scheduled Meds:   clindamycin (CLEOCIN) IVPB  900 mg Intravenous Q8H    insulin aspart U-100  6 Units Subcutaneous TIDWM     Continuous Infusions:   insulin regular 1 units/mL infusion orderable (TRANSFER) 1 Units/hr (20 0403)     PRN Meds:.dextrose 50%, dextrose 50%, glucagon (human recombinant), glucose, glucose, HYDROmorphone, insulin aspart U-100, melatonin, ondansetron, oxyCODONE, promethazine (PHENERGAN) IVPB, sodium chloride 0.9%, sodium chloride 0.9%    PLAN:    DKA  H/o steroid induced DM  - DKA likely triggered by scrotal cellulitis   - HA1c: 13.8  - A --> 13  - betahydroxybutyrate:  4.6  - BG on admit, 615 --> 425  - VBG on admit, pH: 7.29/38  - s/p albuterol, insulin/dextrose in the ED  - DKA pathway initiated   - continue insulin gtt   - CLD (no sugar)  - continue IVF  - clear liquid diet (no sugar)  - endocrinology consulted. apprec recs   -- s/p transitional insulin gtt  --  plan to switch to Levemir 10 units b.i.d. and NovoLog 6-7 units with meals plus sliding scale tomorrow   -- diabetic diet     Crohn colitis  S/p colectomy with end ileostomy  Anorectal fistulizing disease s/p placement of multiple setons  - follows with general surgery (Dr. Blackburn). Last visit on 10/5/2020  - follows with CRS (Dr. Suarez). Last visit 8/15/2020  - Given extent of anorectal disease with fistulae and stenosis, ileostomy will be permanent. Appears not to be a candidate for an attempt at restoring intestinal continuity given the extent of his anal disease, and I suspect he will ultimately require a completion proctectomy Discussed completion proctectomy [facility] patient - he is not ready to proceed at this point.  - ESR: 39, CRP: 35  - continue home Entyvio     KEITH- resolved   - Scr on admit, 1.7--> 1.0  - b/l Scr: 0.9 (6/2020)  - likely prerenal   - s/p 2 units IVF bolus in ED   - NS @125 cc/hr  - BMP daily      Scrotal cellulitis   - elevated ESR and CRP  - CT A/P with contrast  (11/21) showed 'Right perianal fistulous tract extending towards the scrotum with interval placement of a seton, noting the fistulous tract is similar to prior CT 02/09/2020. No abnormal fluid collection to suggest an abscess. Marked scrotal skin thickening. Correlate for cellulitis'  - followup bcx   - continue IV clindamycin (D1 -11/21)     Anemia  - Hb 15.8  - stable  - CBC daily      Protein calorie malnutrition  - Boost TID when DKA resolves     VTE Risk Mitigation (From admission, onward)         Ordered     IP VTE HIGH RISK PATIENT  Once      11/21/20 1407     Place sequential compression device  Until discontinued      11/21/20  1407     Place SHERLEY hose  Until discontinued      11/21/20 1407     Place sequential compression device  Until discontinued      11/21/20 1407              Time spent in care of patient: > 35 minutes       Juan Rowe MD  Department of Hospital Medicine   [facility]

## 2020-11-23 NOTE — PLAN OF CARE
No acute changes throughout the shift. VSS. AAO x 4. BG monitoring q 4. Insulin infusion remained at 1 unit/hr throughout the shift. Scheduled insulin with meals & sliding scale insulin administered. Pain managed with prn pain medication. Bed locked in lowest position, call light within reach, WCTM.

## 2020-11-24 LAB
ALBUMIN SERPL BCP-MCNC: 3.2 G/DL (ref 3.5–5.2)
ALP SERPL-CCNC: 73 U/L (ref 55–135)
ALT SERPL W/O P-5'-P-CCNC: 11 U/L (ref 10–44)
ANION GAP SERPL CALC-SCNC: 12 MMOL/L (ref 8–16)
AST SERPL-CCNC: 19 U/L (ref 10–40)
BASOPHILS # BLD AUTO: 0.02 K/UL (ref 0–0.2)
BASOPHILS NFR BLD: 0.3 % (ref 0–1.9)
BILIRUB SERPL-MCNC: 0.4 MG/DL (ref 0.1–1)
BUN SERPL-MCNC: 11 MG/DL (ref 6–20)
C PEPTIDE SERPL-MCNC: 0.79 NG/ML (ref 0.78–5.19)
CALCIUM SERPL-MCNC: 9.2 MG/DL (ref 8.7–10.5)
CHLORIDE SERPL-SCNC: 101 MMOL/L (ref 95–110)
CO2 SERPL-SCNC: 25 MMOL/L (ref 23–29)
CREAT SERPL-MCNC: 0.8 MG/DL (ref 0.5–1.4)
DIFFERENTIAL METHOD: ABNORMAL
EOSINOPHIL # BLD AUTO: 0.1 K/UL (ref 0–0.5)
EOSINOPHIL NFR BLD: 1 % (ref 0–8)
ERYTHROCYTE [DISTWIDTH] IN BLOOD BY AUTOMATED COUNT: 12.8 % (ref 11.5–14.5)
EST. GFR  (AFRICAN AMERICAN): >60 ML/MIN/1.73 M^2
EST. GFR  (NON AFRICAN AMERICAN): >60 ML/MIN/1.73 M^2
GLUCOSE SERPL-MCNC: 180 MG/DL (ref 70–110)
HCT VFR BLD AUTO: 40.2 % (ref 40–54)
HGB BLD-MCNC: 13.9 G/DL (ref 14–18)
IMM GRANULOCYTES # BLD AUTO: 0.03 K/UL (ref 0–0.04)
IMM GRANULOCYTES NFR BLD AUTO: 0.4 % (ref 0–0.5)
LYMPHOCYTES # BLD AUTO: 2.3 K/UL (ref 1–4.8)
LYMPHOCYTES NFR BLD: 34.6 % (ref 18–48)
MAGNESIUM SERPL-MCNC: 1.8 MG/DL (ref 1.6–2.6)
MCH RBC QN AUTO: 30.3 PG (ref 27–31)
MCHC RBC AUTO-ENTMCNC: 34.6 G/DL (ref 32–36)
MCV RBC AUTO: 88 FL (ref 82–98)
MONOCYTES # BLD AUTO: 0.7 K/UL (ref 0.3–1)
MONOCYTES NFR BLD: 10.3 % (ref 4–15)
NEUTROPHILS # BLD AUTO: 3.6 K/UL (ref 1.8–7.7)
NEUTROPHILS NFR BLD: 53.4 % (ref 38–73)
NRBC BLD-RTO: 0 /100 WBC
PHOSPHATE SERPL-MCNC: 4.6 MG/DL (ref 2.7–4.5)
PLATELET # BLD AUTO: 145 K/UL (ref 150–350)
PMV BLD AUTO: 13 FL (ref 9.2–12.9)
POCT GLUCOSE: 163 MG/DL (ref 70–110)
POCT GLUCOSE: 195 MG/DL (ref 70–110)
POCT GLUCOSE: 230 MG/DL (ref 70–110)
POCT GLUCOSE: 232 MG/DL (ref 70–110)
POCT GLUCOSE: 246 MG/DL (ref 70–110)
POCT GLUCOSE: 302 MG/DL (ref 70–110)
POCT GLUCOSE: 307 MG/DL (ref 70–110)
POTASSIUM SERPL-SCNC: 4.1 MMOL/L (ref 3.5–5.1)
PROT SERPL-MCNC: 6.9 G/DL (ref 6–8.4)
RBC # BLD AUTO: 4.59 M/UL (ref 4.6–6.2)
SODIUM SERPL-SCNC: 138 MMOL/L (ref 136–145)
WBC # BLD AUTO: 6.7 K/UL (ref 3.9–12.7)

## 2020-11-24 PROCEDURE — 20600001 HC STEP DOWN PRIVATE ROOM

## 2020-11-24 PROCEDURE — 99233 SBSQ HOSP IP/OBS HIGH 50: CPT | Mod: ,,, | Performed by: INTERNAL MEDICINE

## 2020-11-24 PROCEDURE — C9399 UNCLASSIFIED DRUGS OR BIOLOG: HCPCS | Performed by: STUDENT IN AN ORGANIZED HEALTH CARE EDUCATION/TRAINING PROGRAM

## 2020-11-24 PROCEDURE — 25000003 PHARM REV CODE 250: Performed by: STUDENT IN AN ORGANIZED HEALTH CARE EDUCATION/TRAINING PROGRAM

## 2020-11-24 PROCEDURE — 36415 COLL VENOUS BLD VENIPUNCTURE: CPT

## 2020-11-24 PROCEDURE — 84100 ASSAY OF PHOSPHORUS: CPT

## 2020-11-24 PROCEDURE — 83735 ASSAY OF MAGNESIUM: CPT

## 2020-11-24 PROCEDURE — 84681 ASSAY OF C-PEPTIDE: CPT

## 2020-11-24 PROCEDURE — 99232 PR SUBSEQUENT HOSPITAL CARE,LEVL II: ICD-10-PCS | Mod: ,,, | Performed by: INTERNAL MEDICINE

## 2020-11-24 PROCEDURE — 99232 SBSQ HOSP IP/OBS MODERATE 35: CPT | Mod: ,,, | Performed by: INTERNAL MEDICINE

## 2020-11-24 PROCEDURE — 63600175 PHARM REV CODE 636 W HCPCS: Performed by: INTERNAL MEDICINE

## 2020-11-24 PROCEDURE — 85025 COMPLETE CBC W/AUTO DIFF WBC: CPT

## 2020-11-24 PROCEDURE — 80053 COMPREHEN METABOLIC PANEL: CPT

## 2020-11-24 PROCEDURE — 99233 PR SUBSEQUENT HOSPITAL CARE,LEVL III: ICD-10-PCS | Mod: ,,, | Performed by: INTERNAL MEDICINE

## 2020-11-24 PROCEDURE — 25000003 PHARM REV CODE 250: Performed by: INTERNAL MEDICINE

## 2020-11-24 RX ORDER — INSULIN ASPART 100 [IU]/ML
10 INJECTION, SOLUTION INTRAVENOUS; SUBCUTANEOUS
Status: DISCONTINUED | OUTPATIENT
Start: 2020-11-24 | End: 2020-11-25

## 2020-11-24 RX ORDER — CLINDAMYCIN HYDROCHLORIDE 150 MG/1
450 CAPSULE ORAL EVERY 8 HOURS
Status: DISCONTINUED | OUTPATIENT
Start: 2020-11-24 | End: 2020-11-25

## 2020-11-24 RX ADMIN — INSULIN ASPART 7 UNITS: 100 INJECTION, SOLUTION INTRAVENOUS; SUBCUTANEOUS at 08:11

## 2020-11-24 RX ADMIN — CLINDAMYCIN HYDROCHLORIDE 450 MG: 150 CAPSULE ORAL at 01:11

## 2020-11-24 RX ADMIN — HYDROMORPHONE HYDROCHLORIDE 0.5 MG: 1 INJECTION, SOLUTION INTRAMUSCULAR; INTRAVENOUS; SUBCUTANEOUS at 07:11

## 2020-11-24 RX ADMIN — OXYCODONE HYDROCHLORIDE 5 MG: 5 TABLET ORAL at 06:11

## 2020-11-24 RX ADMIN — CLINDAMYCIN HYDROCHLORIDE 450 MG: 150 CAPSULE ORAL at 10:11

## 2020-11-24 RX ADMIN — INSULIN ASPART 4 UNITS: 100 INJECTION, SOLUTION INTRAVENOUS; SUBCUTANEOUS at 12:11

## 2020-11-24 RX ADMIN — INSULIN ASPART 10 UNITS: 100 INJECTION, SOLUTION INTRAVENOUS; SUBCUTANEOUS at 12:11

## 2020-11-24 RX ADMIN — INSULIN HUMAN 2 UNITS/HR: 100 INJECTION, SOLUTION PARENTERAL at 08:11

## 2020-11-24 RX ADMIN — INSULIN ASPART 10 UNITS: 100 INJECTION, SOLUTION INTRAVENOUS; SUBCUTANEOUS at 04:11

## 2020-11-24 RX ADMIN — INSULIN ASPART 5 UNITS: 100 INJECTION, SOLUTION INTRAVENOUS; SUBCUTANEOUS at 04:11

## 2020-11-24 RX ADMIN — OXYCODONE HYDROCHLORIDE 5 MG: 5 TABLET ORAL at 03:11

## 2020-11-24 RX ADMIN — INSULIN ASPART 4 UNITS: 100 INJECTION, SOLUTION INTRAVENOUS; SUBCUTANEOUS at 11:11

## 2020-11-24 RX ADMIN — INSULIN ASPART 1 UNITS: 100 INJECTION, SOLUTION INTRAVENOUS; SUBCUTANEOUS at 08:11

## 2020-11-24 RX ADMIN — INSULIN DETEMIR 15 UNITS: 100 INJECTION, SOLUTION SUBCUTANEOUS at 08:11

## 2020-11-24 RX ADMIN — INSULIN ASPART 1 UNITS: 100 INJECTION, SOLUTION INTRAVENOUS; SUBCUTANEOUS at 03:11

## 2020-11-24 RX ADMIN — OXYCODONE HYDROCHLORIDE 5 MG: 5 TABLET ORAL at 12:11

## 2020-11-24 RX ADMIN — INSULIN ASPART 2 UNITS: 100 INJECTION, SOLUTION INTRAVENOUS; SUBCUTANEOUS at 08:11

## 2020-11-24 RX ADMIN — HYDROMORPHONE HYDROCHLORIDE 0.5 MG: 1 INJECTION, SOLUTION INTRAMUSCULAR; INTRAVENOUS; SUBCUTANEOUS at 02:11

## 2020-11-24 RX ADMIN — HYDROMORPHONE HYDROCHLORIDE 0.5 MG: 1 INJECTION, SOLUTION INTRAMUSCULAR; INTRAVENOUS; SUBCUTANEOUS at 08:11

## 2020-11-24 RX ADMIN — CLINDAMYCIN IN 5 PERCENT DEXTROSE 900 MG: 18 INJECTION, SOLUTION INTRAVENOUS at 03:11

## 2020-11-24 RX ADMIN — INSULIN ASPART 2 UNITS: 100 INJECTION, SOLUTION INTRAVENOUS; SUBCUTANEOUS at 12:11

## 2020-11-24 RX ADMIN — HYDROMORPHONE HYDROCHLORIDE 0.5 MG: 1 INJECTION, SOLUTION INTRAMUSCULAR; INTRAVENOUS; SUBCUTANEOUS at 12:11

## 2020-11-24 NOTE — PROGRESS NOTES
[facility] Hospital Medicine  Progress Note    Patient Name: Amando Calix  MRN: [id number]  Patient Class: IP- Inpatient   Admission Date: 11/21/2020  Length of Stay: 3 days  Attending Physician: Juan Rowe MD  Primary Care Provider: Celestino Wright MD    [facility] Medicine Team: [facility] [place] HOSP MED A Juan Rowe MD    HPI:      Mr. Calix is a 35 year old gentleman with PMH of Crohn's colitis (on Entyvio) s/p colectomy with end ileostomy with significant anorectal fistulizing disease s/p placement of multiple setons, h/o c diff infection, anemia and h/o steroid induced diabetes presents for fatigue for about 2 weeks.  His symptoms started around November 4th after he received infusion of Entyvio and also ate a very rare steak.  He reports associated increased urinary frequency as well as polydipsia and an 18 lb weight loss over the past few weeks.  States that he has a good appetite has been eating well, he denies any abdominal pain bloody stool from his ostomy, diarrhea, nausea/vomiting, fevers/chills, hematuria, dysuria, flank pain, chest pain, shortness of breath, cough or sore throat.    Subjective:     Principal Problem:Diabetic ketoacidosis without coma associated with diabetes mellitus due to underlying condition    Interval History: Patient lying in bed, no acute distress. Reports tolerating diet and fatigue has been improving. Continues to note scrotal swelling has unchanged.       Review of Systems   Constitutional: Positive for fatigue. Negative for chills and fever.   HENT: Negative for congestion.    Eyes: Negative for visual disturbance.   Respiratory: Negative for cough and shortness of breath.    Cardiovascular: Negative for chest pain and leg swelling.   Gastrointestinal: Negative for abdominal distention, abdominal pain, nausea and vomiting.   Genitourinary: Positive for scrotal swelling. Negative for dysuria.   Neurological: Negative for dizziness, weakness  and numbness.   Psychiatric/Behavioral: Negative for confusion.        Objective:     Vital Signs (Most Recent):  Temp: 97.5 °F (36.4 °C) (11/24/20 1114)  Pulse: 93 (11/24/20 1152)  Resp: 18 (11/24/20 1114)  BP: 115/72 (11/24/20 1114)  SpO2: 96 % (11/24/20 1114) Vital Signs (24h Range):  Temp:  [97.5 °F (36.4 °C)-98.6 °F (37 °C)] 97.5 °F (36.4 °C)  Pulse:  [67-93] 93  Resp:  [11-18] 18  SpO2:  [96 %-98 %] 96 %  BP: (101-120)/(65-82) 115/72     Weight: 76.5 kg (168 lb 10.4 oz)  Body mass index is 25.64 kg/m².    Intake/Output Summary (Last 24 hours) at 11/24/2020 1158  Last data filed at 11/24/2020 1107  Gross per 24 hour   Intake 1384.73 ml   Output 1475 ml   Net -90.27 ml      Physical Exam  HENT:      Head: Normocephalic.      Mouth/Throat:      Mouth: Mucous membranes are moist.   Eyes:      Extraocular Movements: Extraocular movements intact.      Pupils: Pupils are equal, round, and reactive to light.   Neck:      Musculoskeletal: Normal range of motion.   Cardiovascular:      Rate and Rhythm: Normal rate and regular rhythm.      Pulses: Normal pulses.      Heart sounds: Normal heart sounds.   Pulmonary:      Effort: Pulmonary effort is normal. No respiratory distress.      Breath sounds: Normal breath sounds.   Abdominal:      General: Bowel sounds are normal. There is no distension.      Palpations: Abdomen is soft.      Tenderness: There is abdominal tenderness.      Comments: Colostomy bag with normal output   Genitourinary:     Comments: Scrotal swelling  Musculoskeletal: Normal range of motion.   Skin:     General: Skin is warm.   Neurological:      General: No focal deficit present.      Mental Status: He is alert.   Psychiatric:         Mood and Affect: Mood normal.           Significant Labs:   A1C:   Recent Labs   Lab 11/21/20  0843 11/21/20  1802   HGBA1C 13.3* 13.8*     Blood Culture:   No results for input(s): LABBLOO in the last 48 hours.  CBC:   Recent Labs   Lab 11/23/20  1137 11/24/20  0357    WBC 7.54 6.70   HGB 14.1 13.9*   HCT 40.9 40.2    145*     CMP:   Recent Labs   Lab 20  1137 20  0352   * 138   K 3.8 4.1   CL 99 101   CO2 22* 25   * 180*   BUN 9 11   CREATININE 0.9 0.8   CALCIUM 8.7 9.2   PROT 6.9 6.9   ALBUMIN 3.3* 3.2*   BILITOT 0.4 0.4   ALKPHOS 76 73   AST 19 19   ALT 10 11   ANIONGAP 12 12   EGFRNONAA >60.0 >60.0     Lactic Acid:   No results for input(s): LACTATE in the last 48 hours.  Magnesium:   Recent Labs   Lab 20  0352   MG 1.8     POCT Glucose:   Recent Labs   Lab 20  0040 20  0321 20  0749   POCTGLUCOSE 307* 195* 163*       Significant Imaging: I have reviewed and interpreted all pertinent imaging results/findings within the past 24 hours.    Assessment/Plan:      Active Diagnoses:    Diagnosis Date Noted POA    PRINCIPAL PROBLEM:  Diabetic ketoacidosis without coma associated with diabetes mellitus due to underlying condition [E08.10] 2020 Yes    KEITH (acute kidney injury) [N17.9] 2020 Yes    Cellulitis of scrotum [N49.2] 2020 Yes    Tachycardia [R00.0]  Yes    Crohn's disease of colon with fistula [K50.113] 2014 Yes    Moderate protein-calorie malnutrition [E44.0] 2014 Yes     Chronic    Anemia [D64.9] 2012 Yes    Crohn's disease of both small and large intestine without complication [K50.80] 2012 Yes      Problems Resolved During this Admission:     Scheduled Meds:   clindamycin  450 mg Oral Q8H    insulin aspart U-100  10 Units Subcutaneous TIDWM    insulin detemir U-100  15 Units Subcutaneous BID     Continuous Infusions:    PRN Meds:.dextrose 50%, dextrose 50%, glucagon (human recombinant), glucose, glucose, HYDROmorphone, insulin aspart U-100, melatonin, ondansetron, oxyCODONE, promethazine (PHENERGAN) IVPB, sodium chloride 0.9%, sodium chloride 0.9%    PLAN:    DKA  H/o steroid induced DM  - DKA likely triggered by scrotal cellulitis   - HA1c: 13.8  - A -->  13  - betahydroxybutyrate: 4.6  - BG on admit, 615 --> 425  - VBG on admit, pH: 7.29/38  - s/p albuterol, insulin/dextrose in the ED  - DKA pathway initiated   - continue insulin gtt   - CLD (no sugar)  - continue IVF  - clear liquid diet (no sugar)  - endocrinology consulted. apprec recs   -- s/p transitional insulin gtt  -- Start Levemir 15 U BID  -- Start Novolog 10 U AC  -- Low dose correction insulin   -- FS qAC/HS/AM   -- diabetic diet     Crohn colitis  S/p colectomy with end ileostomy  Anorectal fistulizing disease s/p placement of multiple setons  - follows with general surgery (Dr. Blackburn). Last visit on 10/5/2020  - follows with CRS (Dr. Suarez). Last visit 8/15/2020  - Given extent of anorectal disease with fistulae and stenosis, ileostomy will be permanent. Appears not to be a candidate for an attempt at restoring intestinal continuity given the extent of his anal disease, and I suspect he will ultimately require a completion proctectomy Discussed completion proctectomy [facility] patient - he is not ready to proceed at this point.  - ESR: 39, CRP: 35  - continue home Entyvio     KEITH- resolved   - Scr on admit, 1.7--> 1.0  - b/l Scr: 0.9 (6/2020)  - likely prerenal   - s/p 2 units IVF bolus in ED   - s/p IVF  - BMP daily      Scrotal cellulitis   - elevated ESR and CRP  - CT A/P with contrast  (11/21) showed 'Right perianal fistulous tract extending towards the scrotum with interval placement of a seton, noting the fistulous tract is similar to prior CT 02/09/2020. No abnormal fluid collection to suggest an abscess. Marked scrotal skin thickening. Correlate for cellulitis'  - followup bcx   - Switched from IV to po clindamycin (D1 -11/21)  - ID consulted. followup recs     Anemia  - Hb 15.8  - stable  - CBC daily      Protein calorie malnutrition  - Boost TID when DKA resolves     VTE Risk Mitigation (From admission, onward)         Ordered     IP VTE HIGH RISK PATIENT  Once      11/21/20 1407     Place  sequential compression device  Until discontinued      11/21/20 1407     Place SHERLEY hose  Until discontinued      11/21/20 1407     Place sequential compression device  Until discontinued      11/21/20 1407              Time spent in care of patient: > 35 minutes       Juan Rowe MD  Department of Hospital Medicine   [facility]

## 2020-11-24 NOTE — PLAN OF CARE
Problem: Fall Injury Risk  Goal: Absence of Fall and Fall-Related Injury  Outcome: Ongoing, Progressing  Intervention: Identify and Manage Contributors to Fall Injury Risk  Flowsheets (Taken 11/24/2020 0532)  Self-Care Promotion: independence encouraged  Medication Review/Management: medications reviewed     Problem: Adult Inpatient Plan of Care  Goal: Plan of Care Review  Outcome: Ongoing, Progressing  Flowsheets (Taken 11/24/2020 0532)  Plan of Care Reviewed With: patient  Goal: Optimal Comfort and Wellbeing  Outcome: Ongoing, Progressing  Intervention: Provide Person-Centered Care  Flowsheets (Taken 11/24/2020 0532)  Trust Relationship/Rapport:   care explained   emotional support provided     Problem: Diabetic Ketoacidosis  Goal: Fluid and Electrolyte Balance with Absence of Ketosis  Outcome: Ongoing, Progressing  Intervention: Monitor and Manage Ketoacidosis  Flowsheets (Taken 11/24/2020 0532)  Fluid/Electrolyte Management: fluids provided  Glycemic Management:   blood glucose monitoring   insulin infusion adjusted

## 2020-11-24 NOTE — PLAN OF CARE
Problem: Adult Inpatient Plan of Care  Goal: Plan of Care Review  Outcome: Ongoing, Progressing  Goal: Optimal Comfort and Wellbeing  Outcome: Ongoing, Progressing     Problem: Diabetic Ketoacidosis  Goal: Fluid and Electrolyte Balance with Absence of Ketosis  Outcome: Ongoing, Progressing  Intervention: Monitor and Manage Ketoacidosis  Flowsheets (Taken 11/24/2020 1545)  Glycemic Management:   blood glucose monitoring   supplemental insulin given     Problem: Fall Injury Risk  Goal: Absence of Fall and Fall-Related Injury  Outcome: Ongoing, Progressing     POC reviewed with patient. Address questions and concerns. AAOX4. VVS. Blood glucose q4, 450 for supper. Med team(A) aware will discuss with Endocrinology. Transition off IV insulin to schedule and s.s. educational material given to pt. For diabetes. Pain control with Dilaudid/Oxy q6,prn for scrotum pain. Safety maintain. Call light in reach. [facility]

## 2020-11-24 NOTE — CONSULTS
Nutrition consult received regarding Diabetic diet education.  Pt educated 11/22 on diabetic diet; please see education tab for details.    Thanks!  NIESHA Cornell, LDN

## 2020-11-24 NOTE — ASSESSMENT & PLAN NOTE
A1c 13% new diagnosis of diabetes    On admission with DKA now resolved    Glucose at goal now, will transition to MDI insulin based on past requirements    FRANCHESCA-65 pending  Noted C-peptide detectable, does not rule out DENIS but helps with knowing he still has beta cell function and has ability to decrease own insulin production protecting him from hypoglycemia      Plan  - Discontinue insulin infusion  - Start Levemir 15 U BID  - Start Novolog 10 U AC  - Low dose correction insulin   - FS qAC/HS/AM

## 2020-11-24 NOTE — PROGRESS NOTES
"[facility]  Endocrinology  Progress Note    Admit Date: 2020     Reason for Consult: Management of DKA, Hyperglycemia     Surgical Procedure and Date: N/A     Diabetes diagnosis year: Newly Dx.  Per patient he had steroid induced DM during childhood and resolved after DC of steroids.     Home Diabetes Medications:  NONE     How often checking glucose at home? N/A    BG readings on regimen: N/a   Hypoglycemia on the regimen?  No  Missed doses on regimen?  No    Diabetes Complications include:     N/A     Complicating diabetes co morbidities:   N/A       HPI:   Patient is a 35 y.o. male with a diagnosis of Steroid induced DM during childhood that resolved after discontinuation of steroids, Chron's disease s/p colectomy and ileostomy that was admitted to [facility] for scrotal cellulitis and KEITH.  During ED course patient was found to be in DKA w/ glucose 600s, BHB 4.6, PH 7.29, HCO3 13 and AG 27.  Pt was started on IV and Insulin infusion and consulted with endocrinology for assistance in management of DKA in the setting of newly Dx. DM.         Interval HPI:   Glucose at goal this morning.     Overnight events: PINO   Eatin%  Nausea: No  Hypoglycemia and intervention: No  Fever: No  TPN and/or TF: No     PMH, PSH, FH, SH updated and reviewed       /72 (BP Location: Right arm, Patient Position: Lying)   Pulse 93   Temp 97.5 °F (36.4 °C) (Oral)   Resp 18   Ht 5' 8" (1.727 m)   Wt 76.5 kg (168 lb 10.4 oz)   SpO2 96%   BMI 25.64 kg/m²     Labs Reviewed and Include    Recent Labs   Lab 20  0352   *   CALCIUM 9.2   ALBUMIN 3.2*   PROT 6.9      K 4.1   CO2 25      BUN 11   CREATININE 0.8   ALKPHOS 73   ALT 11   AST 19   BILITOT 0.4     Lab Results   Component Value Date    WBC 6.70 2020    HGB 13.9 (L) 2020    HCT 40.2 2020    MCV 88 2020     (L) 2020     No results for input(s): TSH, FREET4 in the last 168 hours.  Lab " Results   Component Value Date    HGBA1C 13.8 (H) 11/21/2020       Nutritional status:   Body mass index is 25.64 kg/m².  Lab Results   Component Value Date    ALBUMIN 3.2 (L) 11/24/2020    ALBUMIN 3.3 (L) 11/23/2020    ALBUMIN 4.0 11/21/2020     Lab Results   Component Value Date    PREALBUMIN 34 03/31/2015    PREALBUMIN 7 (L) 11/11/2014    PREALBUMIN 8 (L) 11/10/2014       Estimated Creatinine Clearance: 125.9 mL/min (based on SCr of 0.8 mg/dL).    Accu-Checks  Recent Labs     11/22/20  2335 11/23/20  0337 11/23/20  0743 11/23/20  1112 11/23/20  1612 11/23/20  1957 11/24/20  0040 11/24/20  0321 11/24/20  0749 11/24/20  1152   POCTGLUCOSE 128* 104 232* 318* 294* 356* 307* 195* 163* 246*       Current Medications and/or Treatments Impacting Glycemic Control  Immunotherapy:    Immunosuppressants     None        Steroids:   Hormones (From admission, onward)    Start     Stop Route Frequency Ordered    11/21/20 1406  melatonin tablet 6 mg      -- Oral Nightly PRN 11/21/20 1407        Pressors:    Autonomic Drugs (From admission, onward)    None        Hyperglycemia/Diabetes Medications:   Antihyperglycemics (From admission, onward)    Start     Stop Route Frequency Ordered    11/24/20 1130  insulin aspart U-100 pen 10 Units      -- SubQ 3 times daily with meals 11/24/20 0834    11/24/20 0900  insulin detemir U-100 pen 15 Units      -- SubQ 2 times daily 11/24/20 0836    11/23/20 1153  insulin aspart U-100 pen 0-5 Units      -- SubQ As needed (PRN) 11/23/20 1053          ASSESSMENT and PLAN    * Diabetic ketoacidosis without coma associated with diabetes mellitus due to underlying condition  A1c 13% new diagnosis of diabetes    On admission with DKA now resolved    Glucose at goal now, will transition to MDI insulin based on past requirements    FRANCHESCA-65 pending  Noted C-peptide detectable, does not rule out DENIS but helps with knowing he still has beta cell function and has ability to decrease own insulin production  protecting him from hypoglycemia      Plan  - Discontinue insulin infusion  - Start Levemir 15 U BID  - Start Novolog 10 U AC  - Low dose correction insulin   - FS qAC/HS/AM       Cellulitis of scrotum  -On ABX   -Per primary   -Expect decreased insulin requirements as infection resolves        Crohn's disease of both small and large intestine without complication  -Per primary team   Not on steroids currently          Pastor Cochran MD  Endocrinology  [facility]

## 2020-11-24 NOTE — SUBJECTIVE & OBJECTIVE
"Interval HPI:   Glucose at goal this morning.     Overnight events: PINO   Eatin%  Nausea: No  Hypoglycemia and intervention: No  Fever: No  TPN and/or TF: No     PMH, PSH, FH, SH updated and reviewed       /72 (BP Location: Right arm, Patient Position: Lying)   Pulse 93   Temp 97.5 °F (36.4 °C) (Oral)   Resp 18   Ht 5' 8" (1.727 m)   Wt 76.5 kg (168 lb 10.4 oz)   SpO2 96%   BMI 25.64 kg/m²     Labs Reviewed and Include    Recent Labs   Lab 20  0352   *   CALCIUM 9.2   ALBUMIN 3.2*   PROT 6.9      K 4.1   CO2 25      BUN 11   CREATININE 0.8   ALKPHOS 73   ALT 11   AST 19   BILITOT 0.4     Lab Results   Component Value Date    WBC 6.70 2020    HGB 13.9 (L) 2020    HCT 40.2 2020    MCV 88 2020     (L) 2020     No results for input(s): TSH, FREET4 in the last 168 hours.  Lab Results   Component Value Date    HGBA1C 13.8 (H) 2020       Nutritional status:   Body mass index is 25.64 kg/m².  Lab Results   Component Value Date    ALBUMIN 3.2 (L) 2020    ALBUMIN 3.3 (L) 2020    ALBUMIN 4.0 2020     Lab Results   Component Value Date    PREALBUMIN 34 2015    PREALBUMIN 7 (L) 2014    PREALBUMIN 8 (L) 11/10/2014       Estimated Creatinine Clearance: 125.9 mL/min (based on SCr of 0.8 mg/dL).    Accu-Checks  Recent Labs     20  2335 20  0337 20  0743 20  1112 20  1612 20  1957 20  0040 20  0321 20  0749 20  1152   POCTGLUCOSE 128* 104 232* 318* 294* 356* 307* 195* 163* 246*       Current Medications and/or Treatments Impacting Glycemic Control  Immunotherapy:    Immunosuppressants     None        Steroids:   Hormones (From admission, onward)    Start     Stop Route Frequency Ordered    20 1406  melatonin tablet 6 mg      -- Oral Nightly PRN 20 1407        Pressors:    Autonomic Drugs (From admission, onward)    None    "     Hyperglycemia/Diabetes Medications:   Antihyperglycemics (From admission, onward)    Start     Stop Route Frequency Ordered    11/24/20 1130  insulin aspart U-100 pen 10 Units      -- SubQ 3 times daily with meals 11/24/20 0834    11/24/20 0900  insulin detemir U-100 pen 15 Units      -- SubQ 2 times daily 11/24/20 0836    11/23/20 1153  insulin aspart U-100 pen 0-5 Units      -- SubQ As needed (PRN) 11/23/20 1053

## 2020-11-25 LAB
ALBUMIN SERPL BCP-MCNC: 3 G/DL (ref 3.5–5.2)
ALP SERPL-CCNC: 72 U/L (ref 55–135)
ALT SERPL W/O P-5'-P-CCNC: 11 U/L (ref 10–44)
ANION GAP SERPL CALC-SCNC: 8 MMOL/L (ref 8–16)
AST SERPL-CCNC: 12 U/L (ref 10–40)
BASOPHILS # BLD AUTO: 0.02 K/UL (ref 0–0.2)
BASOPHILS NFR BLD: 0.3 % (ref 0–1.9)
BILIRUB SERPL-MCNC: 0.4 MG/DL (ref 0.1–1)
BUN SERPL-MCNC: 12 MG/DL (ref 6–20)
CALCIUM SERPL-MCNC: 9 MG/DL (ref 8.7–10.5)
CHLORIDE SERPL-SCNC: 100 MMOL/L (ref 95–110)
CO2 SERPL-SCNC: 30 MMOL/L (ref 23–29)
CREAT SERPL-MCNC: 0.8 MG/DL (ref 0.5–1.4)
DIFFERENTIAL METHOD: ABNORMAL
EOSINOPHIL # BLD AUTO: 0.1 K/UL (ref 0–0.5)
EOSINOPHIL NFR BLD: 1.1 % (ref 0–8)
ERYTHROCYTE [DISTWIDTH] IN BLOOD BY AUTOMATED COUNT: 12.6 % (ref 11.5–14.5)
EST. GFR  (AFRICAN AMERICAN): >60 ML/MIN/1.73 M^2
EST. GFR  (NON AFRICAN AMERICAN): >60 ML/MIN/1.73 M^2
GLUCOSE SERPL-MCNC: 202 MG/DL (ref 70–110)
GRAM STN SPEC: NORMAL
HCT VFR BLD AUTO: 36.8 % (ref 40–54)
HGB BLD-MCNC: 12.6 G/DL (ref 14–18)
IMM GRANULOCYTES # BLD AUTO: 0.02 K/UL (ref 0–0.04)
IMM GRANULOCYTES NFR BLD AUTO: 0.3 % (ref 0–0.5)
LYMPHOCYTES # BLD AUTO: 1.6 K/UL (ref 1–4.8)
LYMPHOCYTES NFR BLD: 22.1 % (ref 18–48)
MAGNESIUM SERPL-MCNC: 1.6 MG/DL (ref 1.6–2.6)
MCH RBC QN AUTO: 30.7 PG (ref 27–31)
MCHC RBC AUTO-ENTMCNC: 34.2 G/DL (ref 32–36)
MCV RBC AUTO: 90 FL (ref 82–98)
MONOCYTES # BLD AUTO: 1 K/UL (ref 0.3–1)
MONOCYTES NFR BLD: 13.4 % (ref 4–15)
NEUTROPHILS # BLD AUTO: 4.5 K/UL (ref 1.8–7.7)
NEUTROPHILS NFR BLD: 62.8 % (ref 38–73)
NRBC BLD-RTO: 0 /100 WBC
PHOSPHATE SERPL-MCNC: 4.2 MG/DL (ref 2.7–4.5)
PLATELET # BLD AUTO: 165 K/UL (ref 150–350)
PMV BLD AUTO: 13.5 FL (ref 9.2–12.9)
POCT GLUCOSE: 245 MG/DL (ref 70–110)
POCT GLUCOSE: 251 MG/DL (ref 70–110)
POCT GLUCOSE: 252 MG/DL (ref 70–110)
POCT GLUCOSE: 283 MG/DL (ref 70–110)
POCT GLUCOSE: 430 MG/DL (ref 70–110)
POTASSIUM SERPL-SCNC: 3.2 MMOL/L (ref 3.5–5.1)
PROT SERPL-MCNC: 6.4 G/DL (ref 6–8.4)
RBC # BLD AUTO: 4.1 M/UL (ref 4.6–6.2)
SODIUM SERPL-SCNC: 138 MMOL/L (ref 136–145)
WBC # BLD AUTO: 7.23 K/UL (ref 3.9–12.7)

## 2020-11-25 PROCEDURE — 63600175 PHARM REV CODE 636 W HCPCS: Performed by: STUDENT IN AN ORGANIZED HEALTH CARE EDUCATION/TRAINING PROGRAM

## 2020-11-25 PROCEDURE — 99222 1ST HOSP IP/OBS MODERATE 55: CPT | Mod: 25,,, | Performed by: UROLOGY

## 2020-11-25 PROCEDURE — 63600175 PHARM REV CODE 636 W HCPCS: Performed by: INTERNAL MEDICINE

## 2020-11-25 PROCEDURE — 99232 PR SUBSEQUENT HOSPITAL CARE,LEVL II: ICD-10-PCS | Mod: ,,, | Performed by: INTERNAL MEDICINE

## 2020-11-25 PROCEDURE — 36415 COLL VENOUS BLD VENIPUNCTURE: CPT

## 2020-11-25 PROCEDURE — 87076 CULTURE ANAEROBE IDENT EACH: CPT

## 2020-11-25 PROCEDURE — 99233 SBSQ HOSP IP/OBS HIGH 50: CPT | Mod: ,,, | Performed by: INTERNAL MEDICINE

## 2020-11-25 PROCEDURE — 25000003 PHARM REV CODE 250: Performed by: STUDENT IN AN ORGANIZED HEALTH CARE EDUCATION/TRAINING PROGRAM

## 2020-11-25 PROCEDURE — 99255 PR INITIAL INPATIENT CONSULT,LEVL V: ICD-10-PCS | Mod: ,,, | Performed by: INTERNAL MEDICINE

## 2020-11-25 PROCEDURE — 99233 PR SUBSEQUENT HOSPITAL CARE,LEVL III: ICD-10-PCS | Mod: ,,, | Performed by: INTERNAL MEDICINE

## 2020-11-25 PROCEDURE — 87075 CULTR BACTERIA EXCEPT BLOOD: CPT

## 2020-11-25 PROCEDURE — 87070 CULTURE OTHR SPECIMN AEROBIC: CPT

## 2020-11-25 PROCEDURE — 84100 ASSAY OF PHOSPHORUS: CPT

## 2020-11-25 PROCEDURE — 83735 ASSAY OF MAGNESIUM: CPT

## 2020-11-25 PROCEDURE — 99232 SBSQ HOSP IP/OBS MODERATE 35: CPT | Mod: ,,, | Performed by: INTERNAL MEDICINE

## 2020-11-25 PROCEDURE — 87205 SMEAR GRAM STAIN: CPT

## 2020-11-25 PROCEDURE — 25000003 PHARM REV CODE 250: Performed by: PHYSICIAN ASSISTANT

## 2020-11-25 PROCEDURE — 25000003 PHARM REV CODE 250: Performed by: INTERNAL MEDICINE

## 2020-11-25 PROCEDURE — 85025 COMPLETE CBC W/AUTO DIFF WBC: CPT

## 2020-11-25 PROCEDURE — 55100 DRAINAGE OF SCROTUM ABSCESS: CPT | Mod: ,,, | Performed by: UROLOGY

## 2020-11-25 PROCEDURE — 99255 IP/OBS CONSLTJ NEW/EST HI 80: CPT | Mod: ,,, | Performed by: INTERNAL MEDICINE

## 2020-11-25 PROCEDURE — 55100 PR DRAINAGE SCROTAL WALL ABSCESS: ICD-10-PCS | Mod: ,,, | Performed by: UROLOGY

## 2020-11-25 PROCEDURE — 20600001 HC STEP DOWN PRIVATE ROOM

## 2020-11-25 PROCEDURE — 80053 COMPREHEN METABOLIC PANEL: CPT

## 2020-11-25 PROCEDURE — 87102 FUNGUS ISOLATION CULTURE: CPT

## 2020-11-25 PROCEDURE — 99222 PR INITIAL HOSPITAL CARE,LEVL II: ICD-10-PCS | Mod: 25,,, | Performed by: UROLOGY

## 2020-11-25 RX ORDER — HYDROMORPHONE HYDROCHLORIDE 1 MG/ML
0.5 INJECTION, SOLUTION INTRAMUSCULAR; INTRAVENOUS; SUBCUTANEOUS ONCE
Status: COMPLETED | OUTPATIENT
Start: 2020-11-25 | End: 2020-11-25

## 2020-11-25 RX ORDER — INSULIN ASPART 100 [IU]/ML
1-10 INJECTION, SOLUTION INTRAVENOUS; SUBCUTANEOUS
Status: DISCONTINUED | OUTPATIENT
Start: 2020-11-25 | End: 2020-11-28

## 2020-11-25 RX ORDER — LIDOCAINE HYDROCHLORIDE AND EPINEPHRINE 10; 10 MG/ML; UG/ML
20 INJECTION, SOLUTION INFILTRATION; PERINEURAL ONCE
Status: COMPLETED | OUTPATIENT
Start: 2020-11-25 | End: 2020-11-25

## 2020-11-25 RX ORDER — INSULIN ASPART 100 [IU]/ML
12 INJECTION, SOLUTION INTRAVENOUS; SUBCUTANEOUS
Status: DISCONTINUED | OUTPATIENT
Start: 2020-11-25 | End: 2020-11-26

## 2020-11-25 RX ADMIN — HYDROMORPHONE HYDROCHLORIDE 0.5 MG: 1 INJECTION, SOLUTION INTRAMUSCULAR; INTRAVENOUS; SUBCUTANEOUS at 10:11

## 2020-11-25 RX ADMIN — HYDROMORPHONE HYDROCHLORIDE 0.5 MG: 1 INJECTION, SOLUTION INTRAMUSCULAR; INTRAVENOUS; SUBCUTANEOUS at 08:11

## 2020-11-25 RX ADMIN — HYDROMORPHONE HYDROCHLORIDE 0.5 MG: 1 INJECTION, SOLUTION INTRAMUSCULAR; INTRAVENOUS; SUBCUTANEOUS at 04:11

## 2020-11-25 RX ADMIN — INSULIN ASPART 2 UNITS: 100 INJECTION, SOLUTION INTRAVENOUS; SUBCUTANEOUS at 10:11

## 2020-11-25 RX ADMIN — INSULIN ASPART 12 UNITS: 100 INJECTION, SOLUTION INTRAVENOUS; SUBCUTANEOUS at 05:11

## 2020-11-25 RX ADMIN — OXYCODONE HYDROCHLORIDE 5 MG: 5 TABLET ORAL at 06:11

## 2020-11-25 RX ADMIN — HYDROMORPHONE HYDROCHLORIDE 0.5 MG: 1 INJECTION, SOLUTION INTRAMUSCULAR; INTRAVENOUS; SUBCUTANEOUS at 03:11

## 2020-11-25 RX ADMIN — INSULIN ASPART 6 UNITS: 100 INJECTION, SOLUTION INTRAVENOUS; SUBCUTANEOUS at 12:11

## 2020-11-25 RX ADMIN — MELATONIN TAB 3 MG 6 MG: 3 TAB at 08:11

## 2020-11-25 RX ADMIN — CLINDAMYCIN HYDROCHLORIDE 450 MG: 150 CAPSULE ORAL at 06:11

## 2020-11-25 RX ADMIN — VANCOMYCIN HYDROCHLORIDE 1500 MG: 1.5 INJECTION, POWDER, LYOPHILIZED, FOR SOLUTION INTRAVENOUS at 02:11

## 2020-11-25 RX ADMIN — INSULIN ASPART 3 UNITS: 100 INJECTION, SOLUTION INTRAVENOUS; SUBCUTANEOUS at 03:11

## 2020-11-25 RX ADMIN — LIDOCAINE HYDROCHLORIDE AND EPINEPHRINE 20 ML: 10; 10 INJECTION, SOLUTION INFILTRATION; PERINEURAL at 02:11

## 2020-11-25 RX ADMIN — OXYCODONE HYDROCHLORIDE 5 MG: 5 TABLET ORAL at 08:11

## 2020-11-25 RX ADMIN — PIPERACILLIN AND TAZOBACTAM 4.5 G: 4; .5 INJECTION, POWDER, LYOPHILIZED, FOR SOLUTION INTRAVENOUS; PARENTERAL at 02:11

## 2020-11-25 RX ADMIN — INSULIN ASPART 12 UNITS: 100 INJECTION, SOLUTION INTRAVENOUS; SUBCUTANEOUS at 08:11

## 2020-11-25 RX ADMIN — PIPERACILLIN AND TAZOBACTAM 4.5 G: 4; .5 INJECTION, POWDER, LYOPHILIZED, FOR SOLUTION INTRAVENOUS; PARENTERAL at 08:11

## 2020-11-25 RX ADMIN — INSULIN ASPART 6 UNITS: 100 INJECTION, SOLUTION INTRAVENOUS; SUBCUTANEOUS at 05:11

## 2020-11-25 RX ADMIN — INSULIN ASPART 12 UNITS: 100 INJECTION, SOLUTION INTRAVENOUS; SUBCUTANEOUS at 12:11

## 2020-11-25 RX ADMIN — OXYCODONE HYDROCHLORIDE 5 MG: 5 TABLET ORAL at 12:11

## 2020-11-25 NOTE — PROGRESS NOTES
Pharmacokinetic Initial Assessment: IV Vancomycin    Assessment/Plan:  -skin and soft tissue infection/ scrotal cellulitis  -serum creatinine stable at 0.8 mg/dL with good urine output  -Initiate intravenous vancomycin with loading dose of 1500 mg once followed by a maintenance dose of vancomycin 1250mg IV every 12 hours  Desired empiric serum trough concentration is 10 to 15 mcg/mL  Draw vancomycin trough level 60 min prior to fourth dose on 11/27 at approximately 0100  Pharmacy will continue to follow and monitor vancomycin.      Please contact pharmacy at extension [phone] with any questions regarding this assessment.     Thank you for the consult,   Katalina Lauren       Patient brief summary:  Amando Calix is a 35 y.o. male initiated on antimicrobial therapy with IV Vancomycin for treatment of suspected skin & soft tissue infection    Drug Allergies:   Review of patient's allergies indicates:   Allergen Reactions    Ibuprofen Other (See Comments)     Can't take d/t stomach ulcers       Actual Body Weight:   76.5 kg    Renal Function:   Estimated Creatinine Clearance: 125.9 mL/min (based on SCr of 0.8 mg/dL).,     Dialysis Method (if applicable):  N/A    CBC (last 72 hours):  Recent Labs   Lab Result Units 11/23/20  1137 11/24/20  0352 11/25/20  0544   WBC K/uL 7.54 6.70 7.23   Hemoglobin g/dL 14.1 13.9* 12.6*   Hematocrit % 40.9 40.2 36.8*   Platelets K/uL 154 145* 165   Gran % % 64.8 53.4 62.8   Lymph % % 23.9 34.6 22.1   Mono % % 9.5 10.3 13.4   Eosinophil % % 1.2 1.0 1.1   Basophil % % 0.3 0.3 0.3   Differential Method  Automated Automated Automated       Metabolic Panel (last 72 hours):  Recent Labs   Lab Result Units 11/23/20  0436 11/23/20  1137 11/24/20  0352 11/25/20  0544   Sodium mmol/L  --  133* 138 138   Potassium mmol/L  --  3.8 4.1 3.2*   Chloride mmol/L  --  99 101 100   CO2 mmol/L  --  22* 25 30*   Glucose mg/dL  --  308* 180* 202*   BUN mg/dL  --  9 11 12   Creatinine mg/dL  --  0.9 0.8  0.8   Albumin g/dL  --  3.3* 3.2* 3.0*   Total Bilirubin mg/dL  --  0.4 0.4 0.4   Alkaline Phosphatase U/L  --  76 73 72   AST U/L  --  19 19 12   ALT U/L  --  10 11 11   Magnesium mg/dL  --   --  1.8 1.6   Phosphorus mg/dL 3.0  --  4.6* 4.2       Drug levels (last 3 results):  No results for input(s): VANCOMYCINRA, VANCOMYCINPE, VANCOMYCINTR in the last 72 hours.    Microbiologic Results:  Microbiology Results (last 7 days)     Procedure Component Value Units Date/Time    Blood culture #1 **CANNOT BE ORDERED STAT** [[id number]] Collected: 11/21/20 0934    Order Status: Completed Specimen: Blood from Peripheral, Antecubital, Left Updated: 11/25/20 1012     Blood Culture, Routine No Growth to date      No Growth to date      No Growth to date      No Growth to date      No Growth to date    Blood culture #2 **CANNOT BE ORDERED STAT** [[id number]] Collected: 11/21/20 0946    Order Status: Completed Specimen: Blood from Peripheral, Antecubital, Left Updated: 11/25/20 1012     Blood Culture, Routine No Growth to date      No Growth to date      No Growth to date      No Growth to date      No Growth to date

## 2020-11-25 NOTE — PLAN OF CARE
Step 1 - Admission - Current ([facility] - IP DIABETIC KETOACIDOSIS (DKA/HHS) - [facility])  MD: Glucose less than 200; MD should discontinue insulin drip  11/25/2020 0518 by Esequiel Carson RN  Outcome: Met  11/25/2020 0516 by Esequiel Carson RN  Outcome: Met  MD: CO2 greater than or equal to 20; MD should discontinue insulin drip  11/25/2020 0518 by Esequiel Carson RN  Outcome: Met  11/25/2020 0516 by Esequiel Carson RN  Outcome: Met  MD: Anion gap less than or equal to 12; MD should discontinue insulin drip  11/25/2020 0518 by Esequiel Carson RN  Outcome: Met  11/25/2020 0516 by Esequiel Carson RN  Outcome: Met  MD: POTASSIUM BETWEEN 3.3 AND 5.5  11/25/2020 0518 by Esequiel Carson RN  Outcome: Met  11/25/2020 0516 by Esequiel Carson RN  Outcome: Met  RN: Fluids switched when glucose level is 250  11/25/2020 0518 by Esequiel Carson RN  Outcome: Met  11/25/2020 0516 by Esequiel Carson RN  Outcome: Met  RN:  BMP drawn Q2-Q6  11/25/2020 0518 by Esequiel Carson RN  Outcome: Met  11/25/2020 0516 by Esequiel Carson RN  Outcome: Met  RN: A1C drawn  11/25/2020 0518 by Esequiel Carson RN  Outcome: Met  11/25/2020 0516 by Esequiel Carson RN  Outcome: Met  RN:  Patient free from episodes of hypoglycemia (documented since admission)  11/25/2020 0518 by Esequiel Carson RN  Outcome: Met  11/25/2020 0516 by Esequiel Carson RN  Outcome: Met     Problem: Fall Injury Risk  Goal: Absence of Fall and Fall-Related Injury  Outcome: Ongoing, Progressing  Intervention: Identify and Manage Contributors to Fall Injury Risk  Flowsheets (Taken 11/25/2020 0543)  Self-Care Promotion: independence encouraged  Medication Review/Management: medications reviewed     Problem: Adult Inpatient Plan of Care  Goal: Plan of Care Review  Outcome: Ongoing, Progressing  Flowsheets (Taken 11/25/2020 0543)  Plan of Care Reviewed With: patient  Goal: Optimal Comfort and Wellbeing  Outcome: Ongoing, Progressing  Intervention: Provide Person-Centered Care  Flowsheets (Taken 11/25/2020 0543)  Trust  Relationship/Rapport: care explained

## 2020-11-25 NOTE — PROGRESS NOTES
"[facility]  Endocrinology  Progress Note    Admit Date: 2020     Reason for Consult: Management of DKA, Hyperglycemia     Surgical Procedure and Date: N/A     Diabetes diagnosis year: Newly Dx.  Per patient he had steroid induced DM during childhood and resolved after DC of steroids.     Home Diabetes Medications:  NONE     How often checking glucose at home? N/A    BG readings on regimen: N/a   Hypoglycemia on the regimen?  No  Missed doses on regimen?  No    Diabetes Complications include:     N/A     Complicating diabetes co morbidities:   N/A       HPI:   Patient is a 35 y.o. male with a diagnosis of Steroid induced DM during childhood that resolved after discontinuation of steroids, Chron's disease s/p colectomy and ileostomy that was admitted to [facility] for scrotal cellulitis and KEITH.  During ED course patient was found to be in DKA w/ glucose 600s, BHB 4.6, PH 7.29, HCO3 13 and AG 27.  Pt was started on IV and Insulin infusion and consulted with endocrinology for assistance in management of DKA in the setting of newly Dx. DM.         Interval HPI:   Glucose 200s-400s     Overnight events: PINO   Eatin%  Nausea: No  Hypoglycemia and intervention: No  Fever: No  TPN and/or TF: No      /75 (BP Location: Right arm, Patient Position: Lying)   Pulse 67   Temp 97.7 °F (36.5 °C) (Oral)   Resp 15   Ht 5' 8" (1.727 m)   Wt 76.5 kg (168 lb 10.4 oz)   SpO2 97%   BMI 25.64 kg/m²     Labs Reviewed and Include    No results for input(s): GLU, CALCIUM, ALBUMIN, PROT, NA, K, CO2, CL, BUN, CREATININE, ALKPHOS, ALT, AST, BILITOT in the last 24 hours.  Lab Results   Component Value Date    WBC 6.70 2020    HGB 13.9 (L) 2020    HCT 40.2 2020    MCV 88 2020     (L) 2020     No results for input(s): TSH, FREET4 in the last 168 hours.  Lab Results   Component Value Date    HGBA1C 13.8 (H) 2020       Nutritional status:   Body mass index is 25.64 " kg/m².  Lab Results   Component Value Date    ALBUMIN 3.2 (L) 11/24/2020    ALBUMIN 3.3 (L) 11/23/2020    ALBUMIN 4.0 11/21/2020     Lab Results   Component Value Date    PREALBUMIN 34 03/31/2015    PREALBUMIN 7 (L) 11/11/2014    PREALBUMIN 8 (L) 11/10/2014       Estimated Creatinine Clearance: 125.9 mL/min (based on SCr of 0.8 mg/dL).    Accu-Checks  Recent Labs     11/23/20  1612 11/23/20  1957 11/24/20  0040 11/24/20  0321 11/24/20  0749 11/24/20  1152 11/24/20  1610 11/24/20  2022 11/24/20  2326 11/25/20  0348   POCTGLUCOSE 294* 356* 307* 195* 163* 246* 430* 230* 302* 251*       Current Medications and/or Treatments Impacting Glycemic Control  Immunotherapy:    Immunosuppressants     None        Steroids:   Hormones (From admission, onward)    Start     Stop Route Frequency Ordered    11/21/20 1406  melatonin tablet 6 mg      -- Oral Nightly PRN 11/21/20 1407        Pressors:    Autonomic Drugs (From admission, onward)    None        Hyperglycemia/Diabetes Medications:   Antihyperglycemics (From admission, onward)    Start     Stop Route Frequency Ordered    11/25/20 0900  insulin detemir U-100 pen 18 Units      -- SubQ 2 times daily 11/25/20 0703    11/25/20 0804  insulin aspart U-100 pen 1-10 Units      -- SubQ Before meals & nightly PRN 11/25/20 0704    11/25/20 0715  insulin aspart U-100 pen 12 Units      -- SubQ 3 times daily with meals 11/25/20 0703          ASSESSMENT and PLAN    * Diabetic ketoacidosis without coma associated with diabetes mellitus due to underlying condition  A1c 13% new diagnosis of diabetes    On admission with DKA now resolved    Glucose at goal now, will transition to MDI insulin based on past requirements    FRANCHESCA-65 pending  Noted C-peptide detectable, does not rule out DENIS but helps with knowing he still has beta cell function and has ability to decrease own insulin production protecting him from hypoglycemia    Remains with global hyperglycemia, will increase doses by  20%    Plan:  - Increase insulin Levemir to 18 U BID  - Increase insulin Novolog to 12 U before every meal  - Moderate dose insulin correction scale  - BG checks ACHS  - Inpatient glucose goal 140-180 mg/dL        Cellulitis of scrotum  -On ABX   -Per primary   -Expect decreased insulin requirements as infection resolves        KEITH (acute kidney injury)  Resolved      Crohn's disease of both small and large intestine without complication  -Per primary team   Not on steroids currently          Pastor Cochran MD  Endocrinology  [facility]

## 2020-11-25 NOTE — PLAN OF CARE
Problem: Adult Inpatient Plan of Care  Goal: Plan of Care Review  Outcome: Ongoing, Progressing  Goal: Optimal Comfort and Wellbeing  Outcome: Ongoing, Progressing     Problem: Diabetic Ketoacidosis  Goal: Fluid and Electrolyte Balance with Absence of Ketosis  Outcome: Ongoing, Progressing  Intervention: Monitor and Manage Ketoacidosis  Flowsheets (Taken 11/25/2020 1832)  Glycemic Management:   blood glucose monitoring   supplemental insulin given     Problem: Fall Injury Risk  Goal: Absence of Fall and Fall-Related Injury  Outcome: Ongoing, Progressing     POC reviewed with patient. AAOX4. VVS. Scrotum abscess drained and specimen to lab. Abd pad in place.  Pain manage with Dilaudid,prn. On IV Abx. Encourage po fluids. Safety maintain. Call light in reach. WCTM

## 2020-11-25 NOTE — CONSULTS
[facility]  Urology  Consult Note    Patient Name: Amando Calix  MRN: [id number]  Admission Date: 11/21/2020  Hospital Length of Stay: 4   Code Status: Full Code   Attending Provider: Juan Rowe MD   Consulting Provider: Marie Peterson MD  Primary Care Physician: Celestino Wright MD  Principal Problem:Diabetic ketoacidosis without coma associated with diabetes mellitus due to underlying condition    Inpatient consult to Urology  Consult performed by: Marie Peterson MD  Consult ordered by: Juan Rowe MD          Subjective:     HPI:  34 yo M, with history of Crohn's disease, s/p total abdominal colectomy with end ileostomy and a short rectal stump.  Patient has history of bad perianal disease and has recently undergone placement of 3 setons in his perianal region and perineum to adequately drain the identified fistulas - these were placed in July.  Patient was seen for follow up in clinic in August and states that since the visit, he has had increasing pain and tenderness in his scrotum.  He endorses periodic drainage of pus and blood.  Denies fevers, chills.     Patient was admitted to hospital on 11/21 with DKA. CT scan was performed at that time - did not demonstrate any drainable fluid collections in or around the scrotum.  He has been on antibiotics since admission - currently on vanco and zosyn.     Patient follows with gastroenterology as an outpatient.  He was next scheduled to see them on 12/2.  In terms of his IBD medications, patient is on Entyvio, administered every other month.  His last dose was on 11/4 and he is scheduled for his next dose on 12/30.    Past Medical History:   Diagnosis Date    Anemia     Chronic diarrhea     Clostridium difficile infection     Crohn's disease     Diabetic ketoacidosis without coma associated with diabetes mellitus due to underlying condition 11/21/2020    Protein calorie malnutrition     Steroid-induced diabetes         Past Surgical History:   Procedure Laterality Date    ABSCESS DRAINAGE      COLONOSCOPY      COLOSTOMY      FLEXIBLE SIGMOIDOSCOPY N/A 7/10/2020    Procedure: SIGMOIDOSCOPY, FLEXIBLE;  Surgeon: Robe Suarez MD;  Location: [facility] OR 2ND FLR;  Service: Colon and Rectal;  Laterality: N/A;    HERNIA REPAIR      ILEOSTOMY      INSERTION OF SETON STITCH N/A 7/10/2020    Procedure: PLACEMENT-SETON DRAIN;  Surgeon: Robe Suarez MD;  Location: [facility] OR 2ND FLR;  Service: Colon and Rectal;  Laterality: N/A;    SIGMOIDECTOMY         Review of patient's allergies indicates:   Allergen Reactions    Ibuprofen Other (See Comments)     Can't take d/t stomach ulcers       Family History     Problem Relation (Age of Onset)    Diabetes Father, Mother    Hyperlipidemia Mother          Tobacco Use    Smoking status: Current Every Day Smoker     Packs/day: 0.50     Years: 3.00     Pack years: 1.50     Types: Cigarettes     Last attempt to quit: 2014     Years since quittin.0    Smokeless tobacco: Never Used   Substance and Sexual Activity    Alcohol use: Yes     Comment: socially-weekly    Drug use: No    Sexual activity: Yes     Partners: Female       Review of Systems   Constitutional: Negative for chills and fever.   HENT: Negative for mouth sores.    Eyes: Negative for discharge.   Respiratory: Negative for chest tightness and shortness of breath.    Cardiovascular: Negative for chest pain.   Gastrointestinal: Negative for abdominal pain.   Genitourinary: Positive for scrotal swelling. Negative for decreased urine volume, difficulty urinating, flank pain and hematuria.   Musculoskeletal: Negative for arthralgias.   Neurological: Negative for dizziness.   Psychiatric/Behavioral: Negative for agitation.       Objective:     Temp:  [97.7 °F (36.5 °C)-98.7 °F (37.1 °C)] 98.7 °F (37.1 °C)  Pulse:  [67-96] 82  Resp:  [12-20] 13  SpO2:  [96 %-99 %] 96 %  BP: (107-129)/(75-86) 111/76     Body mass index is 25.64  kg/m².    Date 11/25/20 0700 - 11/26/20 0659   Shift 9972-0973 6808-4099 0431-2145 24 Hour Total   INTAKE   P.O. 360   360   Shift Total(mL/kg) 360(4.7)   360(4.7)   OUTPUT   Urine(mL/kg/hr) 300(0.5)   300   Shift Total(mL/kg) 300(3.9)   300(3.9)   Weight (kg) 76.5 76.5 76.5 76.5          Drains     Drain                 Ileostomy RUQ -- days         Open Drain 07/10/20 0740 Posterior Buttock Other (see comments) 1/4 inch 138 days                Physical Exam  Constitutional:       General: He is not in acute distress.     Appearance: He is well-developed.   HENT:      Head: Normocephalic.   Neck:      Musculoskeletal: Normal range of motion.   Cardiovascular:      Rate and Rhythm: Normal rate.   Pulmonary:      Effort: Pulmonary effort is normal. No respiratory distress.   Abdominal:      General: There is no distension.      Palpations: Abdomen is soft.      Tenderness: There is no abdominal tenderness.   Genitourinary:     Comments: Penis is uncircumcised, there are no lesions, masses, plaques. Scrotum with induration in the inferior portion with a palpable fluid collection. No subcutaneous crepitus.   No necrosis.         Musculoskeletal: Normal range of motion.   Skin:     General: Skin is warm.   Neurological:      Mental Status: He is alert and oriented to person, place, and time.         Significant Labs:    BMP:  Recent Labs   Lab 11/23/20  1137 11/24/20  0352 11/25/20  0544   * 138 138   K 3.8 4.1 3.2*   CL 99 101 100   CO2 22* 25 30*   BUN 9 11 12   CREATININE 0.9 0.8 0.8   CALCIUM 8.7 9.2 9.0       CBC:  Recent Labs   Lab 11/23/20  1137 11/24/20  0352 11/25/20  0544   WBC 7.54 6.70 7.23   HGB 14.1 13.9* 12.6*   HCT 40.9 40.2 36.8*    145* 165       All pertinent labs results from the past 24 hours have been reviewed.    Significant Imaging:  All pertinent imaging results/findings from the past 24 hours have been reviewed.                    Assessment and Plan:     * Diabetic ketoacidosis  without coma associated with diabetes mellitus due to underlying condition  36 yo M with a history of Crohn's disease s/p end colostomy and multiple perianal abscesses who presented to [facility] with DKA on 11/21/2020 who presents with worsening scrotal pain and a physical exam concerning for a scrotal abscess.    - Bedside incision and debridement today.   - Wound cultures sent.   - Packed with 1/2inch iodoform   - Scrotal support  - ice packs  - NSAIDs   - abx per primary  - rest of care per primary        VTE Risk Mitigation (From admission, onward)         Ordered     IP VTE HIGH RISK PATIENT  Once      11/21/20 1407     Place sequential compression device  Until discontinued      11/21/20 1407     Place SHERLEY hose  Until discontinued      11/21/20 1407     Place sequential compression device  Until discontinued      11/21/20 1407                Thank you for your consult. I will follow-up with patient. Please contact us if you have any additional questions.    Marie Peterson MD  Urology  [facility]      Procedure:    20cc 1% lidocaine with epi injected intradermally   Area sterilized with iodine liquid in standard fashion  2cm Incision made just right of the median raphe in the inferior scrotum with instant production of pus.   Abscess tract explored and loculations broken apart with hemostat.  Cultures obtained.  Hemostasis achieved with pressure.   Packing of wound and abscess cavity with 1/2inch iodoform dressing.   Scrotum supported with fluff and mesh panties.

## 2020-11-25 NOTE — SUBJECTIVE & OBJECTIVE
Past Medical History:   Diagnosis Date    Anemia     Chronic diarrhea     Clostridium difficile infection     Crohn's disease     Diabetic ketoacidosis without coma associated with diabetes mellitus due to underlying condition 2020    Protein calorie malnutrition     Steroid-induced diabetes        Past Surgical History:   Procedure Laterality Date    ABSCESS DRAINAGE      COLONOSCOPY      COLOSTOMY      FLEXIBLE SIGMOIDOSCOPY N/A 7/10/2020    Procedure: SIGMOIDOSCOPY, FLEXIBLE;  Surgeon: Robe Suarez MD;  [facility];  Service: Colon and Rectal;  Laterality: N/A;    HERNIA REPAIR      ILEOSTOMY      INSERTION OF SETON STITCH N/A 7/10/2020    Procedure: PLACEMENT-SETON DRAIN;  Surgeon: Robe Suarez MD;  Location: [facility] OR [facility];  Service: Colon and Rectal;  Laterality: N/A;    SIGMOIDECTOMY         Review of patient's allergies indicates:   Allergen Reactions    Ibuprofen Other (See Comments)     Can't take d/t stomach ulcers       Family History     Problem Relation (Age of Onset)    Diabetes Father, Mother    Hyperlipidemia Mother          Tobacco Use    Smoking status: Current Every Day Smoker     Packs/day: 0.50     Years: 3.00     Pack years: 1.50     Types: Cigarettes     Last attempt to quit: 2014     Years since quittin.0    Smokeless tobacco: Never Used   Substance and Sexual Activity    Alcohol use: Yes     Comment: socially-weekly    Drug use: No    Sexual activity: Yes     Partners: Female       Review of Systems   Constitutional: Negative for chills and fever.   HENT: Negative for mouth sores.    Eyes: Negative for discharge.   Respiratory: Negative for chest tightness and shortness of breath.    Cardiovascular: Negative for chest pain.   Gastrointestinal: Negative for abdominal pain.   Genitourinary: Positive for scrotal swelling. Negative for decreased urine volume, difficulty urinating, flank pain and hematuria.   Musculoskeletal: Negative for  arthralgias.   Neurological: Negative for dizziness.   Psychiatric/Behavioral: Negative for agitation.       Objective:     Temp:  [97.7 °F (36.5 °C)-98.7 °F (37.1 °C)] 98.7 °F (37.1 °C)  Pulse:  [67-96] 82  Resp:  [12-20] 13  SpO2:  [96 %-99 %] 96 %  BP: (107-129)/(75-86) 111/76     Body mass index is 25.64 kg/m².    Date 11/25/20 0700 - 11/26/20 0659   Shift 1464-7563 6196-5340 8687-5830 24 Hour Total   INTAKE   P.O. 360   360   Shift Total(mL/kg) 360(4.7)   360(4.7)   OUTPUT   Urine(mL/kg/hr) 300(0.5)   300   Shift Total(mL/kg) 300(3.9)   300(3.9)   Weight (kg) 76.5 76.5 76.5 76.5          Drains     Drain                 Ileostomy RUQ -- days         Open Drain 07/10/20 0740 Posterior Buttock Other (see comments) 1/4 inch 138 days                Physical Exam  Constitutional:       General: He is not in acute distress.     Appearance: He is well-developed.   HENT:      Head: Normocephalic.   Neck:      Musculoskeletal: Normal range of motion.   Cardiovascular:      Rate and Rhythm: Normal rate.   Pulmonary:      Effort: Pulmonary effort is normal. No respiratory distress.   Abdominal:      General: There is no distension.      Palpations: Abdomen is soft.      Tenderness: There is no abdominal tenderness.   Genitourinary:     Comments: Penis is uncircumcised, there are no lesions, masses, plaques. Scrotum with induration in the inferior portion with a palpable fluid collection. No subcutaneous crepitus.   No necrosis.         Musculoskeletal: Normal range of motion.   Skin:     General: Skin is warm.   Neurological:      Mental Status: He is alert and oriented to person, place, and time.         Significant Labs:    BMP:  Recent Labs   Lab 11/23/20  1137 11/24/20  0352 11/25/20  0544   * 138 138   K 3.8 4.1 3.2*   CL 99 101 100   CO2 22* 25 30*   BUN 9 11 12   CREATININE 0.9 0.8 0.8   CALCIUM 8.7 9.2 9.0       CBC:  Recent Labs   Lab 11/23/20  1137 11/24/20  0352 11/25/20  0544   WBC 7.54 6.70 7.23   HGB  14.1 13.9* 12.6*   HCT 40.9 40.2 36.8*    145* 165       All pertinent labs results from the past 24 hours have been reviewed.    Significant Imaging:  All pertinent imaging results/findings from the past 24 hours have been reviewed.

## 2020-11-25 NOTE — SUBJECTIVE & OBJECTIVE
"Interval HPI:   Glucose 200s-400s     Overnight events: PINO   Eatin%  Nausea: No  Hypoglycemia and intervention: No  Fever: No  TPN and/or TF: No      /75 (BP Location: Right arm, Patient Position: Lying)   Pulse 67   Temp 97.7 °F (36.5 °C) (Oral)   Resp 15   Ht 5' 8" (1.727 m)   Wt 76.5 kg (168 lb 10.4 oz)   SpO2 97%   BMI 25.64 kg/m²     Labs Reviewed and Include    No results for input(s): GLU, CALCIUM, ALBUMIN, PROT, NA, K, CO2, CL, BUN, CREATININE, ALKPHOS, ALT, AST, BILITOT in the last 24 hours.  Lab Results   Component Value Date    WBC 6.70 2020    HGB 13.9 (L) 2020    HCT 40.2 2020    MCV 88 2020     (L) 2020     No results for input(s): TSH, FREET4 in the last 168 hours.  Lab Results   Component Value Date    HGBA1C 13.8 (H) 2020       Nutritional status:   Body mass index is 25.64 kg/m².  Lab Results   Component Value Date    ALBUMIN 3.2 (L) 2020    ALBUMIN 3.3 (L) 2020    ALBUMIN 4.0 2020     Lab Results   Component Value Date    PREALBUMIN 34 2015    PREALBUMIN 7 (L) 2014    PREALBUMIN 8 (L) 11/10/2014       Estimated Creatinine Clearance: 125.9 mL/min (based on SCr of 0.8 mg/dL).    Accu-Checks  Recent Labs     20  1612 20  1957 20  0040 20  0321 20  0749 20  1152 20  1610 20  2022 20  2326 20  0348   POCTGLUCOSE 294* 356* 307* 195* 163* 246* 430* 230* 302* 251*       Current Medications and/or Treatments Impacting Glycemic Control  Immunotherapy:    Immunosuppressants     None        Steroids:   Hormones (From admission, onward)    Start     Stop Route Frequency Ordered    20 1406  melatonin tablet 6 mg      -- Oral Nightly PRN 20 1407        Pressors:    Autonomic Drugs (From admission, onward)    None        Hyperglycemia/Diabetes Medications:   Antihyperglycemics (From admission, onward)    Start     Stop Route Frequency Ordered    " 11/25/20 0900  insulin detemir U-100 pen 18 Units      -- SubQ 2 times daily 11/25/20 0703    11/25/20 0804  insulin aspart U-100 pen 1-10 Units      -- SubQ Before meals & nightly PRN 11/25/20 0704    11/25/20 0715  insulin aspart U-100 pen 12 Units      -- SubQ 3 times daily with meals 11/25/20 0703

## 2020-11-25 NOTE — ASSESSMENT & PLAN NOTE
36 yo M with a history of Crohn's disease s/p end colostomy and multiple perianal abscesses who presented to [facility] with DKA on 11/21/2020 who presents with worsening scrotal pain and a physical exam concerning for a scrotal abscess.    - Bedside incision and debridement today.   - Wound cultures sent.   - Packed with 1/2inch iodoform   - Scrotal support  - ice packs  - NSAIDs   - abx per primary  - rest of care per primary

## 2020-11-25 NOTE — CONSULTS
[facility]  Colorectal Surgery  Consult Note    Patient Name: Amando Calix  MRN: [id number]  Admission Date: 11/21/2020  Hospital Length of Stay: 4 days  Attending Physician: Juan Rowe MD  Primary Care Provider: Celestino Wright MD    Inpatient consult to Colorectal Surgery  Consult performed by: Yogi Thomas MD  Consult ordered by: Juan Rowe MD        Subjective:     Chief Complaint/Reason for Admission: scrotal cellulitis    History of Present Illness: 36 yo M, with history of Crohn's disease, s/p total abdominal colectomy with end ileostomy and a short rectal stump.  Patient has history of bad perianal disease and has recently undergone placement of 3 setons in his perianal region and perineum to adequately drain the identified fistulas - these were placed in July.  Patient was seen for follow up in clinic in August and states that since the visit, he has had increasing pain and tenderness in his scrotum.  He endorses periodic drainage of pus and blood.  Denies fevers, chills.    Patient was admitted to hospital on 11/21 with DKA. CT scan was performed at that time - did not demonstrate any drainable fluid collections in or around the scrotum.  He has been on antibiotics since admission - currently on vanco and zosyn.    Patient follows with gastroenterology as an outpatient.  He was next scheduled to see them on 12/2.  In terms of his IBD medications, patient is on Entyvio, administered every other month.  His last dose was on 11/4 and he is scheduled for his next dose on 12/30.    Current Facility-Administered Medications   Medication    dextrose 50% injection 12.5 g    dextrose 50% injection 25 g    glucagon (human recombinant) injection 1 mg    glucose chewable tablet 16 g    glucose chewable tablet 24 g    HYDROmorphone injection 0.5 mg    HYDROmorphone injection 0.5 mg    insulin aspart U-100 pen 1-10 Units    insulin aspart U-100 pen 12 Units     insulin detemir U-100 pen 18 Units    lidocaine-EPINEPHrine 1%-1:100,000 injection 20 mL    melatonin tablet 6 mg    ondansetron injection 4 mg    oxyCODONE immediate release tablet 5 mg    piperacillin-tazobactam 4.5 g in sodium chloride 0.9% 100 mL IVPB (ready to mix system)    promethazine (PHENERGAN) 12.5 mg in dextrose 5 % 50 mL IVPB    sodium chloride 0.9% flush 10 mL    sodium chloride 0.9% flush 10 mL    [START ON 2020] vancomycin 1.25 g in dextrose 5% 250 mL IVPB (ready to mix)    vancomycin 1.5 g in dextrose 5 % 250 mL IVPB (ready to mix)       Review of patient's allergies indicates:   Allergen Reactions    Ibuprofen Other (See Comments)     Can't take d/t stomach ulcers       Past Medical History:   Diagnosis Date    Anemia     Chronic diarrhea     Clostridium difficile infection     Crohn's disease     Diabetic ketoacidosis without coma associated with diabetes mellitus due to underlying condition 2020    Protein calorie malnutrition     Steroid-induced diabetes      Past Surgical History:   Procedure Laterality Date    ABSCESS DRAINAGE      COLONOSCOPY      COLOSTOMY      FLEXIBLE SIGMOIDOSCOPY N/A 7/10/2020    Procedure: SIGMOIDOSCOPY, FLEXIBLE;  Surgeon: Robe Suarez MD;  Location: [facility] OR [address] [place] [address];  Service: Colon and Rectal;  Laterality: N/A;    HERNIA REPAIR      ILEOSTOMY      INSERTION OF SETON STITCH N/A 7/10/2020    Procedure: PLACEMENT-SETON DRAIN;  Surgeon: Robe Suarez MD;  [facility];  Service: Colon and Rectal;  Laterality: N/A;    SIGMOIDECTOMY       Family History     Problem Relation (Age of Onset)    Diabetes Father, Mother    Hyperlipidemia Mother        Tobacco Use    Smoking status: Current Every Day Smoker     Packs/day: 0.50     Years: 3.00     Pack years: 1.50     Types: Cigarettes     Last attempt to quit: 2014     Years since quittin.0    Smokeless tobacco: Never Used   Substance and Sexual Activity    Alcohol  use: Yes     Comment: socially-weekly    Drug use: No    Sexual activity: Yes     Partners: Female     Review of Systems   Constitutional: Negative for chills and fever.   HENT: Negative for dental problem, drooling and ear discharge.    Eyes: Negative for pain, redness and itching.   Respiratory: Negative for cough, choking, chest tightness and shortness of breath.    Cardiovascular: Negative for chest pain and palpitations.   Gastrointestinal: Negative for abdominal distention, abdominal pain, constipation, diarrhea and nausea.   Endocrine: Negative for polydipsia, polyphagia and polyuria.   Genitourinary: Positive for scrotal swelling and testicular pain. Negative for dysuria and hematuria.   Neurological: Negative for dizziness, light-headedness and numbness.   Psychiatric/Behavioral: Negative for agitation, behavioral problems and hallucinations.     Objective:     Vital Signs (Most Recent):  Temp: 98.7 °F (37.1 °C) (11/25/20 1113)  Pulse: 79 (11/25/20 1114)  Resp: 13 (11/25/20 1113)  BP: 111/76 (11/25/20 1113)  SpO2: 96 % (11/25/20 1113) Vital Signs (24h Range):  Temp:  [97.7 °F (36.5 °C)-98.7 °F (37.1 °C)] 98.7 °F (37.1 °C)  Pulse:  [67-96] 79  Resp:  [12-20] 13  SpO2:  [96 %-99 %] 96 %  BP: (107-129)/(75-86) 111/76     Weight: 76.5 kg (168 lb 10.4 oz)  Body mass index is 25.64 kg/m².    Physical Exam  Constitutional:       General: He is not in acute distress.     Appearance: Normal appearance. He is normal weight. He is not ill-appearing.   HENT:      Head: Normocephalic and atraumatic.      Nose: Nose normal.      Mouth/Throat:      Mouth: Mucous membranes are moist.      Pharynx: Oropharynx is clear.   Eyes:      Extraocular Movements: Extraocular movements intact.   Cardiovascular:      Rate and Rhythm: Normal rate and regular rhythm.      Heart sounds: No murmur.   Pulmonary:      Effort: Pulmonary effort is normal. No respiratory distress.      Breath sounds: No stridor. No wheezing or rhonchi.    Abdominal:      General: Abdomen is flat. There is no distension.      Palpations: Abdomen is soft.      Tenderness: There is no abdominal tenderness. There is no guarding or rebound.   Genitourinary:     Comments: Previously placed setons noted in perianal region and perineum. External opening of anterior-most seton tender to palpation. No obvious drainage. Scrotum is erythematous and indurated, tender to palpation. No ballotable fluid collections perceived.  Skin:     General: Skin is warm and dry.      Capillary Refill: Capillary refill takes less than 2 seconds.   Neurological:      General: No focal deficit present.      Mental Status: He is oriented to person, place, and time.   Psychiatric:         Mood and Affect: Mood normal.         Behavior: Behavior normal.           Assessment/Plan:     Active Diagnoses:    Diagnosis Date Noted POA    PRINCIPAL PROBLEM:  Diabetic ketoacidosis without coma associated with diabetes mellitus due to underlying condition [E08.10] 11/21/2020 Yes    KEITH (acute kidney injury) [N17.9] 11/21/2020 Yes    Cellulitis of scrotum [N49.2] 11/21/2020 Yes    Tachycardia [R00.0]  Yes    Crohn's disease of colon with fistula [K50.113] 09/30/2014 Yes    Moderate protein-calorie malnutrition [E44.0] 09/30/2014 Yes     Chronic    Anemia [D64.9] 09/05/2012 Yes    Crohn's disease of both small and large intestine without complication [K50.80] 09/05/2012 Yes      Problems Resolved During this Admission:     Recommend scrotal ultrasound to assess for any drainable fluid collections  Recommend GI consultation as this could be a manifestation of his Crohn's  Continue broad spectrum antibiotics  Continue current cares per primary  Will continue to monitor    Yogi Thomas MD  Colorectal Surgery  [facility]

## 2020-11-25 NOTE — CONSULTS
[facility]  Infectious Disease  Consult Note    Patient Name: Amando Calix  MRN: [id number]  Admission Date: 11/21/2020  Hospital Length of Stay: 4 days  Attending Physician: Juan Rowe MD  Primary Care Provider: Celestino Wright MD     Isolation Status: No active isolations      Inpatient consult to Infectious Diseases  Consult performed by: Saba Wetzel MD  Consult ordered by: Juan Rowe MD        Consult received, full consult to follow

## 2020-11-25 NOTE — HPI
34 yo M, with history of Crohn's disease, s/p total abdominal colectomy with end ileostomy and a short rectal stump.  Patient has history of bad perianal disease and has recently undergone placement of 3 setons in his perianal region and perineum to adequately drain the identified fistulas - these were placed in July.  Patient was seen for follow up in clinic in August and states that since the visit, he has had increasing pain and tenderness in his scrotum.  He endorses periodic drainage of pus and blood.  Denies fevers, chills.     Patient was admitted to hospital on 11/21 with DKA. CT scan was performed at that time - did not demonstrate any drainable fluid collections in or around the scrotum.  He has been on antibiotics since admission - currently on vanco and zosyn.     Patient follows with gastroenterology as an outpatient.  He was next scheduled to see them on 12/2.  In terms of his IBD medications, patient is on Entyvio, administered every other month.  His last dose was on 11/4 and he is scheduled for his next dose on 12/30.

## 2020-11-25 NOTE — ASSESSMENT & PLAN NOTE
A1c 13% new diagnosis of diabetes    On admission with DKA now resolved    Glucose at goal now, will transition to MDI insulin based on past requirements    FRANCHESCA-65 pending  Noted C-peptide detectable, does not rule out DENIS but helps with knowing he still has beta cell function and has ability to decrease own insulin production protecting him from hypoglycemia    Remains with global hyperglycemia, will increase doses by 20%    Plan:  - Increase insulin Levemir to 18 U BID  - Increase insulin Novolog to 12 U before every meal  - Moderate dose insulin correction scale  - BG checks ACHS  - Inpatient glucose goal 140-180 mg/dL

## 2020-11-26 LAB
ALBUMIN SERPL BCP-MCNC: 3 G/DL (ref 3.5–5.2)
ALP SERPL-CCNC: 76 U/L (ref 55–135)
ALT SERPL W/O P-5'-P-CCNC: 12 U/L (ref 10–44)
ANION GAP SERPL CALC-SCNC: 9 MMOL/L (ref 8–16)
AST SERPL-CCNC: 21 U/L (ref 10–40)
BACTERIA BLD CULT: NORMAL
BACTERIA BLD CULT: NORMAL
BASOPHILS # BLD AUTO: 0.01 K/UL (ref 0–0.2)
BASOPHILS NFR BLD: 0.1 % (ref 0–1.9)
BILIRUB SERPL-MCNC: 0.4 MG/DL (ref 0.1–1)
BUN SERPL-MCNC: 11 MG/DL (ref 6–20)
CALCIUM SERPL-MCNC: 8.7 MG/DL (ref 8.7–10.5)
CHLORIDE SERPL-SCNC: 100 MMOL/L (ref 95–110)
CO2 SERPL-SCNC: 28 MMOL/L (ref 23–29)
CREAT SERPL-MCNC: 0.8 MG/DL (ref 0.5–1.4)
DIFFERENTIAL METHOD: ABNORMAL
EOSINOPHIL # BLD AUTO: 0.1 K/UL (ref 0–0.5)
EOSINOPHIL NFR BLD: 0.9 % (ref 0–8)
ERYTHROCYTE [DISTWIDTH] IN BLOOD BY AUTOMATED COUNT: 12.6 % (ref 11.5–14.5)
EST. GFR  (AFRICAN AMERICAN): >60 ML/MIN/1.73 M^2
EST. GFR  (NON AFRICAN AMERICAN): >60 ML/MIN/1.73 M^2
GLUCOSE SERPL-MCNC: 244 MG/DL (ref 70–110)
HCT VFR BLD AUTO: 36.6 % (ref 40–54)
HGB BLD-MCNC: 12.8 G/DL (ref 14–18)
IMM GRANULOCYTES # BLD AUTO: 0.04 K/UL (ref 0–0.04)
IMM GRANULOCYTES NFR BLD AUTO: 0.5 % (ref 0–0.5)
LYMPHOCYTES # BLD AUTO: 1.6 K/UL (ref 1–4.8)
LYMPHOCYTES NFR BLD: 20.1 % (ref 18–48)
MAGNESIUM SERPL-MCNC: 1.6 MG/DL (ref 1.6–2.6)
MCH RBC QN AUTO: 30.8 PG (ref 27–31)
MCHC RBC AUTO-ENTMCNC: 35 G/DL (ref 32–36)
MCV RBC AUTO: 88 FL (ref 82–98)
MONOCYTES # BLD AUTO: 1.1 K/UL (ref 0.3–1)
MONOCYTES NFR BLD: 13.2 % (ref 4–15)
NEUTROPHILS # BLD AUTO: 5.2 K/UL (ref 1.8–7.7)
NEUTROPHILS NFR BLD: 65.2 % (ref 38–73)
NRBC BLD-RTO: 0 /100 WBC
PHOSPHATE SERPL-MCNC: 4.2 MG/DL (ref 2.7–4.5)
PLATELET # BLD AUTO: 175 K/UL (ref 150–350)
PMV BLD AUTO: 13.8 FL (ref 9.2–12.9)
POCT GLUCOSE: 194 MG/DL (ref 70–110)
POCT GLUCOSE: 206 MG/DL (ref 70–110)
POCT GLUCOSE: 265 MG/DL (ref 70–110)
POCT GLUCOSE: 288 MG/DL (ref 70–110)
POTASSIUM SERPL-SCNC: 4.2 MMOL/L (ref 3.5–5.1)
PROT SERPL-MCNC: 6.8 G/DL (ref 6–8.4)
RBC # BLD AUTO: 4.16 M/UL (ref 4.6–6.2)
SODIUM SERPL-SCNC: 137 MMOL/L (ref 136–145)
WBC # BLD AUTO: 7.93 K/UL (ref 3.9–12.7)

## 2020-11-26 PROCEDURE — 63600175 PHARM REV CODE 636 W HCPCS: Performed by: STUDENT IN AN ORGANIZED HEALTH CARE EDUCATION/TRAINING PROGRAM

## 2020-11-26 PROCEDURE — 99232 SBSQ HOSP IP/OBS MODERATE 35: CPT | Mod: ,,, | Performed by: INTERNAL MEDICINE

## 2020-11-26 PROCEDURE — 83735 ASSAY OF MAGNESIUM: CPT

## 2020-11-26 PROCEDURE — 25000003 PHARM REV CODE 250: Performed by: PHYSICIAN ASSISTANT

## 2020-11-26 PROCEDURE — 94761 N-INVAS EAR/PLS OXIMETRY MLT: CPT

## 2020-11-26 PROCEDURE — 99233 SBSQ HOSP IP/OBS HIGH 50: CPT | Mod: ,,, | Performed by: INTERNAL MEDICINE

## 2020-11-26 PROCEDURE — 36415 COLL VENOUS BLD VENIPUNCTURE: CPT

## 2020-11-26 PROCEDURE — 99233 SBSQ HOSP IP/OBS HIGH 50: CPT | Mod: ,,, | Performed by: HOSPITALIST

## 2020-11-26 PROCEDURE — 99233 PR SUBSEQUENT HOSPITAL CARE,LEVL III: ICD-10-PCS | Mod: ,,, | Performed by: INTERNAL MEDICINE

## 2020-11-26 PROCEDURE — 25000003 PHARM REV CODE 250: Performed by: INTERNAL MEDICINE

## 2020-11-26 PROCEDURE — 99254 PR INITIAL INPATIENT CONSULT,LEVL IV: ICD-10-PCS | Mod: ,,, | Performed by: INTERNAL MEDICINE

## 2020-11-26 PROCEDURE — 99233 PR SUBSEQUENT HOSPITAL CARE,LEVL III: ICD-10-PCS | Mod: ,,, | Performed by: HOSPITALIST

## 2020-11-26 PROCEDURE — 99254 IP/OBS CNSLTJ NEW/EST MOD 60: CPT | Mod: ,,, | Performed by: INTERNAL MEDICINE

## 2020-11-26 PROCEDURE — 63600175 PHARM REV CODE 636 W HCPCS: Performed by: INTERNAL MEDICINE

## 2020-11-26 PROCEDURE — 99232 PR SUBSEQUENT HOSPITAL CARE,LEVL II: ICD-10-PCS | Mod: ,,, | Performed by: INTERNAL MEDICINE

## 2020-11-26 PROCEDURE — 20600001 HC STEP DOWN PRIVATE ROOM

## 2020-11-26 PROCEDURE — 84100 ASSAY OF PHOSPHORUS: CPT

## 2020-11-26 PROCEDURE — 80053 COMPREHEN METABOLIC PANEL: CPT

## 2020-11-26 PROCEDURE — 85025 COMPLETE CBC W/AUTO DIFF WBC: CPT

## 2020-11-26 RX ORDER — CLINDAMYCIN PHOSPHATE 900 MG/50ML
900 INJECTION, SOLUTION INTRAVENOUS
Status: DISCONTINUED | OUTPATIENT
Start: 2020-11-26 | End: 2020-11-27

## 2020-11-26 RX ORDER — INSULIN ASPART 100 [IU]/ML
15 INJECTION, SOLUTION INTRAVENOUS; SUBCUTANEOUS
Status: DISCONTINUED | OUTPATIENT
Start: 2020-11-26 | End: 2020-11-28

## 2020-11-26 RX ORDER — HYDROMORPHONE HYDROCHLORIDE 1 MG/ML
0.5 INJECTION, SOLUTION INTRAMUSCULAR; INTRAVENOUS; SUBCUTANEOUS ONCE
Status: COMPLETED | OUTPATIENT
Start: 2020-11-26 | End: 2020-11-26

## 2020-11-26 RX ADMIN — HYDROMORPHONE HYDROCHLORIDE 0.5 MG: 1 INJECTION, SOLUTION INTRAMUSCULAR; INTRAVENOUS; SUBCUTANEOUS at 02:11

## 2020-11-26 RX ADMIN — OXYCODONE HYDROCHLORIDE 5 MG: 5 TABLET ORAL at 08:11

## 2020-11-26 RX ADMIN — INSULIN ASPART 15 UNITS: 100 INJECTION, SOLUTION INTRAVENOUS; SUBCUTANEOUS at 05:11

## 2020-11-26 RX ADMIN — MELATONIN TAB 3 MG 6 MG: 3 TAB at 08:11

## 2020-11-26 RX ADMIN — CLINDAMYCIN IN 5 PERCENT DEXTROSE 900 MG: 18 INJECTION, SOLUTION INTRAVENOUS at 06:11

## 2020-11-26 RX ADMIN — VANCOMYCIN HYDROCHLORIDE 1250 MG: 1.25 INJECTION, POWDER, LYOPHILIZED, FOR SOLUTION INTRAVENOUS at 02:11

## 2020-11-26 RX ADMIN — PIPERACILLIN AND TAZOBACTAM 4.5 G: 4; .5 INJECTION, POWDER, LYOPHILIZED, FOR SOLUTION INTRAVENOUS; PARENTERAL at 06:11

## 2020-11-26 RX ADMIN — INSULIN ASPART 6 UNITS: 100 INJECTION, SOLUTION INTRAVENOUS; SUBCUTANEOUS at 08:11

## 2020-11-26 RX ADMIN — CLINDAMYCIN IN 5 PERCENT DEXTROSE 900 MG: 18 INJECTION, SOLUTION INTRAVENOUS at 12:11

## 2020-11-26 RX ADMIN — OXYCODONE HYDROCHLORIDE 5 MG: 5 TABLET ORAL at 06:11

## 2020-11-26 RX ADMIN — OXYCODONE HYDROCHLORIDE 5 MG: 5 TABLET ORAL at 02:11

## 2020-11-26 RX ADMIN — INSULIN ASPART 15 UNITS: 100 INJECTION, SOLUTION INTRAVENOUS; SUBCUTANEOUS at 12:11

## 2020-11-26 RX ADMIN — HYDROMORPHONE HYDROCHLORIDE 0.5 MG: 1 INJECTION, SOLUTION INTRAMUSCULAR; INTRAVENOUS; SUBCUTANEOUS at 08:11

## 2020-11-26 RX ADMIN — HYDROMORPHONE HYDROCHLORIDE 0.5 MG: 1 INJECTION, SOLUTION INTRAMUSCULAR; INTRAVENOUS; SUBCUTANEOUS at 11:11

## 2020-11-26 RX ADMIN — INSULIN ASPART 2 UNITS: 100 INJECTION, SOLUTION INTRAVENOUS; SUBCUTANEOUS at 12:11

## 2020-11-26 RX ADMIN — PIPERACILLIN AND TAZOBACTAM 4.5 G: 4; .5 INJECTION, POWDER, LYOPHILIZED, FOR SOLUTION INTRAVENOUS; PARENTERAL at 02:11

## 2020-11-26 RX ADMIN — HYDROMORPHONE HYDROCHLORIDE 0.5 MG: 1 INJECTION, SOLUTION INTRAMUSCULAR; INTRAVENOUS; SUBCUTANEOUS at 05:11

## 2020-11-26 RX ADMIN — PIPERACILLIN AND TAZOBACTAM 4.5 G: 4; .5 INJECTION, POWDER, LYOPHILIZED, FOR SOLUTION INTRAVENOUS; PARENTERAL at 08:11

## 2020-11-26 RX ADMIN — HYDROMORPHONE HYDROCHLORIDE 0.5 MG: 1 INJECTION, SOLUTION INTRAMUSCULAR; INTRAVENOUS; SUBCUTANEOUS at 10:11

## 2020-11-26 RX ADMIN — INSULIN ASPART 15 UNITS: 100 INJECTION, SOLUTION INTRAVENOUS; SUBCUTANEOUS at 08:11

## 2020-11-26 RX ADMIN — OXYCODONE HYDROCHLORIDE 5 MG: 5 TABLET ORAL at 12:11

## 2020-11-26 RX ADMIN — INSULIN ASPART 4 UNITS: 100 INJECTION, SOLUTION INTRAVENOUS; SUBCUTANEOUS at 05:11

## 2020-11-26 NOTE — PROGRESS NOTES
[facility]  Urology  Progress Note    Patient Name: Amando Calix  MRN: [id number]  Admission Date: 11/21/2020  Hospital Length of Stay: 5 days  Code Status: Full Code   Attending Provider: Eusebio Berumen MD   Primary Care Physician: Celestino Wright MD    Subjective:     HPI:  36 yo M, with history of Crohn's disease, s/p total abdominal colectomy with end ileostomy and a short rectal stump.  Patient has history of bad perianal disease and has recently undergone placement of 3 setons in his perianal region and perineum to adequately drain the identified fistulas - these were placed in July.  Patient was seen for follow up in clinic in August and states that since the visit, he has had increasing pain and tenderness in his scrotum.  He endorses periodic drainage of pus and blood.  Denies fevers, chills.     Patient was admitted to hospital on 11/21 with DKA. CT scan was performed at that time - did not demonstrate any drainable fluid collections in or around the scrotum.  He has been on antibiotics since admission - currently on vanco and zosyn.     Patient follows with gastroenterology as an outpatient.  He was next scheduled to see them on 12/2.  In terms of his IBD medications, patient is on Entyvio, administered every other month.  His last dose was on 11/4 and he is scheduled for his next dose on 12/30.    Interval History:   Pain in scrotum and perineum s/p I&D yesterday. No fevers overnight. Sugars still elevated.     Review of Systems  Objective:     Temp:  [98 °F (36.7 °C)-98.7 °F (37.1 °C)] 98.2 °F (36.8 °C)  Pulse:  [] 85  Resp:  [12-21] 17  SpO2:  [96 %-98 %] 98 %  BP: (104-127)/(73-89) 120/78     Body mass index is 25.64 kg/m².    Date 11/26/20 0700 - 11/27/20 0659   Shift 8351-0119 8554-2921 6635-8864 24 Hour Total   INTAKE   Shift Total(mL/kg)       OUTPUT   Urine(mL/kg/hr) 300   300   Shift Total(mL/kg) 300(3.9)   300(3.9)   Weight (kg) 76.5 76.5 76.5 76.5           Drains     Drain                 Ileostomy RUQ -- days         Open Drain 07/10/20 0740 Posterior Buttock Other (see comments) 1/4 inch 139 days                Physical Exam  Constitutional:       General: He is not in acute distress.     Appearance: He is well-developed.   HENT:      Head: Normocephalic.   Neck:      Musculoskeletal: Normal range of motion.   Cardiovascular:      Rate and Rhythm: Normal rate.   Pulmonary:      Effort: Pulmonary effort is normal. No respiratory distress.   Abdominal:      General: There is no distension.      Palpations: Abdomen is soft.      Tenderness: There is no abdominal tenderness.   Genitourinary:     Comments: Small incision packed with iodoform  Scrotal rugae present  Extremely tender on exam, patient wont all for sufficient exam        Musculoskeletal: Normal range of motion.   Skin:     General: Skin is warm.   Neurological:      Mental Status: He is alert and oriented to person, place, and time.         Significant Labs:    BMP:  Recent Labs   Lab 11/24/20 0352 11/25/20  0544 11/26/20  0346    138 137   K 4.1 3.2* 4.2    100 100   CO2 25 30* 28   BUN 11 12 11   CREATININE 0.8 0.8 0.8   CALCIUM 9.2 9.0 8.7       CBC:   Recent Labs   Lab 11/24/20 0352 11/25/20  0544 11/26/20  0346   WBC 6.70 7.23 7.93   HGB 13.9* 12.6* 12.8*   HCT 40.2 36.8* 36.6*   * 165 175       All pertinent labs results from the past 24 hours have been reviewed.    Significant Imaging:  All pertinent imaging results/findings from the past 24 hours have been reviewed.                  Assessment/Plan:     * Diabetic ketoacidosis without coma associated with diabetes mellitus due to underlying condition  34 yo M with a history of Crohn's disease s/p end colostomy and multiple perianal abscesses who presented to [facility] with DKA on 11/21/2020 who presents with worsening scrotal pain and a physical exam concerning for a scrotal abscess.    - Underwent I & D yesterday, packing changed  "today. Patient intolerant of any exam without Dilaudid. No additional purulence noted or expressed today.   - Wound cultures sent.   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", However the patient had an I & D of his scrotal wall prior to the US, findings of air on US are more consistent with recent procedure and not Macario's  - Pt to undergo EUA with CRS tomorrow, may be able to get a better examination at that time  - Recommend getting sugars under control to promote healing and avoid worsening infection  - abx per primary  - rest of care per primary        VTE Risk Mitigation (From admission, onward)         Ordered     IP VTE HIGH RISK PATIENT  Once      11/21/20 1407     Place sequential compression device  Until discontinued      11/21/20 1407     Place SHERLEY hose  Until discontinued      11/21/20 1407     Place sequential compression device  Until discontinued      11/21/20 1407                Rob Fischer MD  Urology  [facility]  "

## 2020-11-26 NOTE — ASSESSMENT & PLAN NOTE
35-year-old male with history of Crohn's disease on Entyvio (last 11/2020) s/p total abdominal colectomy with end ileostomy and a short rectal stump, complex right perianal fistula with several subcutaneous tracts and openings s/p placement of 3 setons 7/2020, presented 11/21/2020 with increasing scrotal pain, CT abd/pelvis with no abnormal fluid collection to suggest abscess, found to be in DKA. Patient was started on clindamycin, but there was no clinical improvement. Urology evaluated patient 11/25/2020, performed bedside I&D, cultures sent. 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

## 2020-11-26 NOTE — ASSESSMENT & PLAN NOTE
35-year-old male with history of Crohn's disease on Entyvio (last 11/2020) s/p total abdominal colectomy with end ileostomy and a short rectal stump, complex right perianal fistula with several subcutaneous tracts and openings s/p placement of 3 setons 7/2020, presented 11/21/2020 with increasing scrotal pain, CT abd/pelvis with no abnormal fluid collection to suggest abscess, found to be in DKA. Patient was started on clindamycin, but there was no clinical improvement. Urology evaluated patient 11/25/2020, performed bedside I&D, cultures sent.    Recommendations:  - Continue vanc / pip-tazo   - Follow-up I&D cultures

## 2020-11-26 NOTE — SUBJECTIVE & OBJECTIVE
Subjective:     Interval History:   No acute events overnight  Scrotal abscess drained and packed by urology yesterday  Pain well controlled  Afebrile  Tolerating diet  Ambulating and voiding without issue    Post-Op Info:  Procedure(s) (LRB):  Exam under anesthesia (N/A)          Medications:  Continuous Infusions:  Scheduled Meds:   HYDROmorphone  0.5 mg Intravenous Once    insulin aspart U-100  15 Units Subcutaneous TIDWM    insulin detemir U-100  22 Units Subcutaneous BID    piperacillin-tazobactam (ZOSYN) IVPB  4.5 g Intravenous Q8H    vancomycin (VANCOCIN) IVPB  15 mg/kg Intravenous Q12H     PRN Meds:   dextrose 50%    dextrose 50%    glucagon (human recombinant)    glucose    glucose    HYDROmorphone    insulin aspart U-100    melatonin    ondansetron    oxyCODONE    promethazine (PHENERGAN) IVPB    sodium chloride 0.9%    sodium chloride 0.9%        Objective:     Vital Signs (Most Recent):  Temp: 98.2 °F (36.8 °C) (11/26/20 0740)  Pulse: 85 (11/26/20 0740)  Resp: 17 (11/26/20 0828)  BP: 120/78 (11/26/20 0815)  SpO2: 98 % (11/26/20 0740) Vital Signs (24h Range):  Temp:  [98 °F (36.7 °C)-98.7 °F (37.1 °C)] 98.2 °F (36.8 °C)  Pulse:  [] 85  Resp:  [12-21] 17  SpO2:  [96 %-98 %] 98 %  BP: (104-127)/(73-89) 120/78     Intake/Output - Last 3 Shifts       11/24 0700 - 11/25 0659 11/25 0700 - 11/26 0659 11/26 0700 - 11/27 0659    P.O. 1080 1040     I.V. (mL/kg)       IV Piggyback  450     Total Intake(mL/kg) 1080 (14.1) 1490 (19.5)     Urine (mL/kg/hr) 1200 (0.7) 1100 (0.6) 300 (1.5)    Stool 0 0 0    Total Output 1200 1100 300    Net -120 +390 -300           Stool Occurrence 1 x 1 x 0 x          Physical Exam  Constitutional:       General: He is not in acute distress.     Appearance: Normal appearance. He is normal weight. He is not ill-appearing.   HENT:      Head: Normocephalic and atraumatic.      Nose: Nose normal.      Mouth/Throat:      Mouth: Mucous membranes are moist.       Pharynx: Oropharynx is clear.   Eyes:      Extraocular Movements: Extraocular movements intact.   Cardiovascular:      Rate and Rhythm: Normal rate and regular rhythm.      Heart sounds: No murmur.   Pulmonary:      Effort: Pulmonary effort is normal. No respiratory distress.      Breath sounds: No stridor. No wheezing or rhonchi.   Abdominal:      General: Abdomen is flat. There is no distension.      Palpations: Abdomen is soft.      Tenderness: There is no abdominal tenderness. There is no guarding or rebound.   Genitourinary:     Comments: Previously placed setons noted in perianal region and perineum. Packing in place in scrotum from I&D of scrotal abscess. Decreased scrotal erythema from yesterday  Skin:     General: Skin is warm and dry.      Capillary Refill: Capillary refill takes less than 2 seconds.   Neurological:      General: No focal deficit present.      Mental Status: He is oriented to person, place, and time.   Psychiatric:         Mood and Affect: Mood normal.         Behavior: Behavior normal.

## 2020-11-26 NOTE — SUBJECTIVE & OBJECTIVE
Interval History:  - Yesterday, patient underwent bedside I&D by urology  - Today, US scrotum with air, urology noted to be consistent with post-operative findings    Patient reports ongoing scrotal swelling and pain, but improved compared to yesterday    Review of Systems   Constitutional: Negative for chills, diaphoresis and fever.   HENT: Negative for rhinorrhea and sore throat.    Respiratory: Negative for cough and shortness of breath.    Cardiovascular: Negative for chest pain and leg swelling.   Gastrointestinal: Negative for abdominal pain, diarrhea, nausea and vomiting.   Genitourinary: Positive for scrotal swelling and testicular pain. Negative for dysuria and hematuria.   Musculoskeletal: Negative for arthralgias and myalgias.   Skin: Negative for rash.   Neurological: Negative for headaches.     Objective:     Vital Signs (Most Recent):  Temp: 98.2 °F (36.8 °C) (11/26/20 0740)  Pulse: 85 (11/26/20 0740)  Resp: 17 (11/26/20 0828)  BP: 120/78 (11/26/20 0815)  SpO2: 98 % (11/26/20 0740) Vital Signs (24h Range):  Temp:  [98 °F (36.7 °C)-98.7 °F (37.1 °C)] 98.2 °F (36.8 °C)  Pulse:  [] 85  Resp:  [12-21] 17  SpO2:  [96 %-98 %] 98 %  BP: (104-127)/(73-89) 120/78     Weight: 76.5 kg (168 lb 10.4 oz)  Body mass index is 25.64 kg/m².    Estimated Creatinine Clearance: 125.9 mL/min (based on SCr of 0.8 mg/dL).    Physical Exam  Constitutional:       General: He is not in acute distress.     Appearance: He is well-developed. He is not diaphoretic.   HENT:      Head: Normocephalic and atraumatic.   Eyes:      Conjunctiva/sclera: Conjunctivae normal.   Neck:      Musculoskeletal: Normal range of motion and neck supple.   Pulmonary:      Effort: Pulmonary effort is normal. No respiratory distress.   Abdominal:      General: There is no distension.      Palpations: Abdomen is soft.   Musculoskeletal: Normal range of motion.   Skin:     General: Skin is warm and dry.      Findings: No erythema or rash.    Neurological:      Mental Status: He is alert and oriented to person, place, and time.   Psychiatric:         Behavior: Behavior normal.         Significant Labs: All pertinent labs within the past 24 hours have been reviewed.    Significant Imaging: I have reviewed all pertinent imaging results/findings within the past 24 hours.

## 2020-11-26 NOTE — PROGRESS NOTES
[facility]  Colorectal Surgery  Progress Note    Patient Name: Amando Calix  MRN: [id number]  Admission Date: 11/21/2020  Hospital Length of Stay: 5 days  Attending Physician: Eusebio Berumen MD  No new subjective & objective note has been filed under this hospital service since the last note was generated.    Assessment/Plan:     Cellulitis of scrotum  Scrotal abscess drained by urology yesterday - wound cares per urology  Continue antibiotics  Activity as tolerated  Diet as tolerated  Patient may benefit from EUA to evaluate whether this abscess communicates with previous/known fistulas - will discuss with staff  Continue current cares per primary  Will continue to monitor          Yogi Thomas MD  Colorectal Surgery  [facility]

## 2020-11-26 NOTE — ASSESSMENT & PLAN NOTE
Scrotal abscess drained by urology yesterday - wound cares per urology  Continue antibiotics  Activity as tolerated  Diet as tolerated  Patient may benefit from EUA to evaluate whether this abscess communicates with previous/known fistulas - will discuss with staff  Continue current cares per primary  Will continue to monitor

## 2020-11-26 NOTE — HPI
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 CT abd/pelvis with no abnormal fluid collection to suggest abscess. Patient was started on clindamycin. He was also found to be in DKA. ID consulted given no improvement in scrotal cellulitis on course of clindamycin. Urology evaluated patient today, performed bedside I&D, cultures sent.

## 2020-11-26 NOTE — ASSESSMENT & PLAN NOTE
Scrotal abscess drained by urology yesterday - wound cares per urology  Continue antibiotics  Activity as tolerated  Diet as tolerated  Plan for EUA tomorrow to assess for possible fistula tract between previous fistulas and scrotal abscess  Continue current cares per primary  Will continue to monitor

## 2020-11-26 NOTE — SUBJECTIVE & OBJECTIVE
"Interval HPI:   Glucose 200s. Underwent I&D.     Overnight events: PINO   Eatin%  Nausea: No  Hypoglycemia and intervention: No  Fever: No  TPN and/or TF: No      /80 (BP Location: Left arm, Patient Position: Sitting)   Pulse 85   Temp 98.2 °F (36.8 °C) (Oral)   Resp 16   Ht 5' 8" (1.727 m)   Wt 76.5 kg (168 lb 10.4 oz)   SpO2 98%   BMI 25.64 kg/m²     Labs Reviewed and Include    Recent Labs   Lab 20  0346   *   CALCIUM 8.7   ALBUMIN 3.0*   PROT 6.8      K 4.2   CO2 28      BUN 11   CREATININE 0.8   ALKPHOS 76   ALT 12   AST 21   BILITOT 0.4     Lab Results   Component Value Date    WBC 7.93 2020    HGB 12.8 (L) 2020    HCT 36.6 (L) 2020    MCV 88 2020     2020     No results for input(s): TSH, FREET4 in the last 168 hours.  Lab Results   Component Value Date    HGBA1C 13.8 (H) 2020       Nutritional status:   Body mass index is 25.64 kg/m².  Lab Results   Component Value Date    ALBUMIN 3.0 (L) 2020    ALBUMIN 3.0 (L) 2020    ALBUMIN 3.2 (L) 2020     Lab Results   Component Value Date    PREALBUMIN 34 2015    PREALBUMIN 7 (L) 2014    PREALBUMIN 8 (L) 11/10/2014       Estimated Creatinine Clearance: 125.9 mL/min (based on SCr of 0.8 mg/dL).    Accu-Checks  Recent Labs     20  1152 20  1610 20  2022 20  2326 20  0348 20  0723 20  1127 20  1609 20  2239 20  0744   POCTGLUCOSE 246* 430* 230* 302* 251* 245* 283* 252* 288* 265*       Current Medications and/or Treatments Impacting Glycemic Control  Immunotherapy:    Immunosuppressants     None        Steroids:   Hormones (From admission, onward)    Start     Stop Route Frequency Ordered    20 1406  melatonin tablet 6 mg      -- Oral Nightly PRN 20 1407        Pressors:    Autonomic Drugs (From admission, onward)    None        Hyperglycemia/Diabetes Medications:   Antihyperglycemics " (From admission, onward)    Start     Stop Route Frequency Ordered    11/26/20 0900  insulin detemir U-100 pen 22 Units      -- SubQ 2 times daily 11/26/20 0754    11/26/20 0800  insulin aspart U-100 pen 15 Units      -- SubQ 3 times daily with meals 11/26/20 0754 11/25/20 0804  insulin aspart U-100 pen 1-10 Units      -- SubQ Before meals & nightly PRN 11/25/20 0704

## 2020-11-26 NOTE — ASSESSMENT & PLAN NOTE
"34 yo M with a history of Crohn's disease s/p end colostomy and multiple perianal abscesses who presented to [facility] with DKA on 11/21/2020 who presents with worsening scrotal pain and a physical exam concerning for a scrotal abscess.    - Underwent I & D yesterday, packing changed today. Patient intolerant of any exam without Dilaudid. No additional purulence noted or expressed today.   - Wound cultures sent.   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

## 2020-11-26 NOTE — PROGRESS NOTES
[facility]  Colorectal Surgery  Progress Note    Patient Name: Amando Calix  MRN: [id number]  Admission Date: 11/21/2020  Hospital Length of Stay: 5 days  Attending Physician: Eusebio Berumen MD    Subjective:     Interval History:   No acute events overnight  Scrotal abscess drained and packed by urology yesterday  Pain well controlled  Afebrile  Tolerating diet  Ambulating and voiding without issue    Post-Op Info:  Procedure(s) (LRB):  Exam under anesthesia (N/A)          Medications:  Continuous Infusions:  Scheduled Meds:   HYDROmorphone  0.5 mg Intravenous Once    insulin aspart U-100  15 Units Subcutaneous TIDWM    insulin detemir U-100  22 Units Subcutaneous BID    piperacillin-tazobactam (ZOSYN) IVPB  4.5 g Intravenous Q8H    vancomycin (VANCOCIN) IVPB  15 mg/kg Intravenous Q12H     PRN Meds:   dextrose 50%    dextrose 50%    glucagon (human recombinant)    glucose    glucose    HYDROmorphone    insulin aspart U-100    melatonin    ondansetron    oxyCODONE    promethazine (PHENERGAN) IVPB    sodium chloride 0.9%    sodium chloride 0.9%        Objective:     Vital Signs (Most Recent):  Temp: 98.2 °F (36.8 °C) (11/26/20 0740)  Pulse: 85 (11/26/20 0740)  Resp: 17 (11/26/20 0828)  BP: 120/78 (11/26/20 0815)  SpO2: 98 % (11/26/20 0740) Vital Signs (24h Range):  Temp:  [98 °F (36.7 °C)-98.7 °F (37.1 °C)] 98.2 °F (36.8 °C)  Pulse:  [] 85  Resp:  [12-21] 17  SpO2:  [96 %-98 %] 98 %  BP: (104-127)/(73-89) 120/78     Intake/Output - Last 3 Shifts       11/24 0700 - 11/25 0659 11/25 0700 - 11/26 0659 11/26 0700 - 11/27 0659    P.O. 1080 1040     I.V. (mL/kg)       IV Piggyback  450     Total Intake(mL/kg) 1080 (14.1) 1490 (19.5)     Urine (mL/kg/hr) 1200 (0.7) 1100 (0.6) 300 (1.5)    Stool 0 0 0    Total Output 1200 1100 300    Net -120 +390 -300           Stool Occurrence 1 x 1 x 0 x          Physical Exam  Constitutional:       General: He is not in acute distress.      Appearance: Normal appearance. He is normal weight. He is not ill-appearing.   HENT:      Head: Normocephalic and atraumatic.      Nose: Nose normal.      Mouth/Throat:      Mouth: Mucous membranes are moist.      Pharynx: Oropharynx is clear.   Eyes:      Extraocular Movements: Extraocular movements intact.   Cardiovascular:      Rate and Rhythm: Normal rate and regular rhythm.      Heart sounds: No murmur.   Pulmonary:      Effort: Pulmonary effort is normal. No respiratory distress.      Breath sounds: No stridor. No wheezing or rhonchi.   Abdominal:      General: Abdomen is flat. There is no distension.      Palpations: Abdomen is soft.      Tenderness: There is no abdominal tenderness. There is no guarding or rebound.   Genitourinary:     Comments: Previously placed setons noted in perianal region and perineum. Packing in place in scrotum from I&D of scrotal abscess. Decreased scrotal erythema from yesterday  Skin:     General: Skin is warm and dry.      Capillary Refill: Capillary refill takes less than 2 seconds.   Neurological:      General: No focal deficit present.      Mental Status: He is oriented to person, place, and time.   Psychiatric:         Mood and Affect: Mood normal.         Behavior: Behavior normal.     Assessment/Plan:     Cellulitis of scrotum  Scrotal abscess drained by urology yesterday - wound cares per urology  Continue antibiotics  Activity as tolerated  Diet as tolerated  Plan for EUA tomorrow to assess for possible fistula tract between previous fistulas and scrotal abscess  Continue current cares per primary  Will continue to monitor          Yogi Thomas MD  Colorectal Surgery  [facility]

## 2020-11-26 NOTE — PROGRESS NOTES
[facility] Hospital Medicine  Progress Note    Patient Name: Amando Calix  MRN: [id number]  Patient Class: IP- Inpatient   Admission Date: 11/21/2020  Length of Stay: 5 days  Attending Physician: Eusebio Berumen MD  Primary Care Provider: Celestino Wright MD    [facility] Medicine Team: [facility] [place] [place] HOSP MED A Juan Rowe MD    HPI:      Mr. Calix is a 35 year old gentleman with PMH of Crohn's colitis (on Entyvio) s/p colectomy with end ileostomy with significant anorectal fistulizing disease s/p placement of multiple setons, h/o c diff infection, anemia and h/o steroid induced diabetes presents for fatigue for about 2 weeks.  His symptoms started around November 4th after he received infusion of Entyvio and also ate a very rare steak.  He reports associated increased urinary frequency as well as polydipsia and an 18 lb weight loss over the past few weeks.  States that he has a good appetite has been eating well, he denies any abdominal pain bloody stool from his ostomy, diarrhea, nausea/vomiting, fevers/chills, hematuria, dysuria, flank pain, chest pain, shortness of breath, cough or sore throat.    Subjective:     Principal Problem:Diabetic ketoacidosis without coma associated with diabetes mellitus due to underlying condition    Interval History: Patient lying in bed, no acute distress. Reports tolerating diet and fatigue has been improving. Continues to note scrotal swelling has unchanged. Increased scrotal pain after I&D.       Review of Systems   Constitutional: Positive for fatigue. Negative for chills and fever.   HENT: Negative for congestion.    Eyes: Negative for visual disturbance.   Respiratory: Negative for cough and shortness of breath.    Cardiovascular: Negative for chest pain and leg swelling.   Gastrointestinal: Negative for abdominal distention, abdominal pain, nausea and vomiting.   Genitourinary: Positive for scrotal swelling. Negative for dysuria.   Neurological:  Negative for dizziness, weakness and numbness.   Psychiatric/Behavioral: Negative for confusion.        Objective:     Vital Signs (Most Recent):  Temp: 98.1 °F (36.7 °C) (11/26/20 0300)  Pulse: 101 (11/26/20 0305)  Resp: 20 (11/26/20 0611)  BP: 116/81 (11/26/20 0300)  SpO2: 98 % (11/26/20 0300) Vital Signs (24h Range):  Temp:  [98 °F (36.7 °C)-98.7 °F (37.1 °C)] 98.1 °F (36.7 °C)  Pulse:  [] 101  Resp:  [12-21] 20  SpO2:  [96 %-98 %] 98 %  BP: (104-127)/(73-89) 116/81     Weight: 76.5 kg (168 lb 10.4 oz)  Body mass index is 25.64 kg/m².    Intake/Output Summary (Last 24 hours) at 11/26/2020 0707  Last data filed at 11/26/2020 0612  Gross per 24 hour   Intake 1490 ml   Output 1100 ml   Net 390 ml      Physical Exam  HENT:      Head: Normocephalic.      Mouth/Throat:      Mouth: Mucous membranes are moist.   Eyes:      Extraocular Movements: Extraocular movements intact.      Pupils: Pupils are equal, round, and reactive to light.   Neck:      Musculoskeletal: Normal range of motion.   Cardiovascular:      Rate and Rhythm: Normal rate and regular rhythm.      Pulses: Normal pulses.      Heart sounds: Normal heart sounds.   Pulmonary:      Effort: Pulmonary effort is normal. No respiratory distress.      Breath sounds: Normal breath sounds.   Abdominal:      General: Bowel sounds are normal. There is no distension.      Palpations: Abdomen is soft.      Tenderness: There is abdominal tenderness.      Comments: Colostomy bag with normal output   Genitourinary:     Comments: Scrotal swelling  Musculoskeletal: Normal range of motion.   Skin:     General: Skin is warm.   Neurological:      General: No focal deficit present.      Mental Status: He is alert.   Psychiatric:         Mood and Affect: Mood normal.           Significant Labs:   A1C:   Recent Labs   Lab 11/21/20  0843 11/21/20  1802   HGBA1C 13.3* 13.8*     Blood Culture:   No results for input(s): LABBLOO in the last 48 hours.  CBC:   Recent Labs   Lab  11/25/20  0544 11/26/20  0346   WBC 7.23 7.93   HGB 12.6* 12.8*   HCT 36.8* 36.6*    175     CMP:   Recent Labs   Lab 11/25/20  0544 11/26/20  0346    137   K 3.2* 4.2    100   CO2 30* 28   * 244*   BUN 12 11   CREATININE 0.8 0.8   CALCIUM 9.0 8.7   PROT 6.4 6.8   ALBUMIN 3.0* 3.0*   BILITOT 0.4 0.4   ALKPHOS 72 76   AST 12 21   ALT 11 12   ANIONGAP 8 9   EGFRNONAA >60.0 >60.0     Lactic Acid:   No results for input(s): LACTATE in the last 48 hours.  Magnesium:   Recent Labs   Lab 11/25/20  0544 11/26/20  0346   MG 1.6 1.6     POCT Glucose:   Recent Labs   Lab 11/25/20  1127 11/25/20  1609 11/25/20  2239   POCTGLUCOSE 283* 252* 288*       Significant Imaging: I have reviewed and interpreted all pertinent imaging results/findings within the past 24 hours.    Assessment/Plan:      Active Diagnoses:    Diagnosis Date Noted POA    PRINCIPAL PROBLEM:  Diabetic ketoacidosis without coma associated with diabetes mellitus due to underlying condition [E08.10] 11/21/2020 Yes    KEITH (acute kidney injury) [N17.9] 11/21/2020 Yes    Cellulitis of scrotum [N49.2] 11/21/2020 Yes    Tachycardia [R00.0]  Yes    Crohn's disease of colon with fistula [K50.113] 09/30/2014 Yes    Moderate protein-calorie malnutrition [E44.0] 09/30/2014 Yes     Chronic    Anemia [D64.9] 09/05/2012 Yes    Crohn's disease of both small and large intestine without complication [K50.80] 09/05/2012 Yes      Problems Resolved During this Admission:     Scheduled Meds:   insulin aspart U-100  12 Units Subcutaneous TIDWM    insulin detemir U-100  18 Units Subcutaneous BID    piperacillin-tazobactam (ZOSYN) IVPB  4.5 g Intravenous Q8H    vancomycin (VANCOCIN) IVPB  15 mg/kg Intravenous Q12H     Continuous Infusions:    PRN Meds:.dextrose 50%, dextrose 50%, glucagon (human recombinant), glucose, glucose, HYDROmorphone, insulin aspart U-100, melatonin, ondansetron, oxyCODONE, promethazine (PHENERGAN) IVPB, sodium chloride 0.9%,  sodium chloride 0.9%    PLAN:    DKA- resolved   H/o steroid induced DM  - DKA likely triggered by scrotal cellulitis   - HA1c: 13.8  - A --> 13  - betahydroxybutyrate: 4.6  - BG on admit, 615 --> 425  - VBG on admit, pH: 7.29/38  - s/p albuterol, insulin/dextrose in the ED  - DKA pathway initiated   - continue insulin gtt   - CLD (no sugar)  - continue IVF  - clear liquid diet (no sugar)  - endocrinology consulted. apprec recs   -- s/p transitional insulin gtt  -- Start Levemir 18 U BID  -- Start Novolog 12 U AC  -- Low dose correction insulin   -- FS qAC/HS/AM   -- diabetic diet     Crohn colitis  S/p colectomy with end ileostomy  Anorectal fistulizing disease s/p placement of multiple setons  - follows with general surgery (Dr. Blackburn). Last visit on 10/5/2020  - follows with CRS (Dr. Suarez). Last visit 8/15/2020  - Given extent of anorectal disease with fistulae and stenosis, ileostomy will be permanent. Appears not to be a candidate for an attempt at restoring intestinal continuity given the extent of his anal disease, and I suspect he will ultimately require a completion proctectomy Discussed completion proctectomy [facility] patient - he is not ready to proceed at this point.  - ESR: 39, CRP: 35  - continue home Entyvio     KEITH- resolved   - Scr on admit, 1.7--> 1.0  - b/l Scr: 0.9 (2020)  - likely prerenal   - s/p 2 units IVF bolus in ED   - s/p IVF  - BMP daily      Scrotal cellulitis   - elevated ESR and CRP  - CT A/P with contrast  () showed 'Right perianal fistulous tract extending towards the scrotum with interval placement of a seton, noting the fistulous tract is similar to prior CT 2020. No abnormal fluid collection to suggest an abscess. Marked scrotal skin thickening. Correlate for cellulitis'  - followup bcx   - ID consulted. apprec recs  -- escalated from clindamycin to vancomycin and zosyn   - Urology consulted. apprec recs   -- s/p bedside I&D and packing on . followup I&D  cultures  -- scrotal support, ice packs  -- can't give NSAIDs due to h/o ulcers  - CRS consulted. followup recs      Anemia  - Hb 15.8  - stable  - CBC daily      Protein calorie malnutrition  - Boost TID when DKA resolves     VTE Risk Mitigation (From admission, onward)         Ordered     IP VTE HIGH RISK PATIENT  Once      11/21/20 1407     Place sequential compression device  Until discontinued      11/21/20 1407     Place SHERLEY hose  Until discontinued      11/21/20 1407     Place sequential compression device  Until discontinued      11/21/20 1407              Time spent in care of patient: > 35 minutes       Juan Rowe MD  Department of Hospital Medicine   [facility]

## 2020-11-26 NOTE — CONSULTS
[facility]  Infectious Disease  Consult Note    Patient Name: Amando Calix  MRN: [id number]  Admission Date: 11/21/2020  Hospital Length of Stay: 4 days  Attending Physician: Juan Rowe MD  Primary Care Provider: Celestino Wright MD     Isolation Status: No active isolations    Patient information was obtained from patient, past medical records and ER records.      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 Patient was started on clindamycin, but there was no clinical improvement. Urology evaluated patient 11/25/2020, performed bedside I&D, cultures sent.    Recommendations:  - Continue vanc / pip-tazo   - Follow-up I&D cultures           Thank you for your consult. I will follow-up with patient. Please contact us if you have any additional questions.    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 CT abd/pelvis with no abnormal fluid collection to suggest abscess. Patient was started on clindamycin. He was also found to be in DKA. ID consulted given no improvement in scrotal cellulitis on course of  clindamycin. Urology evaluated patient today, performed bedside I&D, cultures sent.       Past Medical History:   Diagnosis Date    Anemia     Chronic diarrhea     Clostridium difficile infection     Crohn's disease     Diabetic ketoacidosis without coma associated with diabetes mellitus due to underlying condition 11/21/2020    Protein calorie malnutrition     Steroid-induced diabetes        Past Surgical History:   Procedure Laterality Date    ABSCESS DRAINAGE      COLONOSCOPY      COLOSTOMY      FLEXIBLE SIGMOIDOSCOPY N/A 7/10/2020    Procedure: SIGMOIDOSCOPY, FLEXIBLE;  Surgeon: Robe Suarez MD;  [facility];  Service: Colon and Rectal;  Laterality: N/A;    HERNIA REPAIR      ILEOSTOMY      INSERTION OF SETON STITCH N/A 7/10/2020    Procedure: PLACEMENT-SETON DRAIN;  Surgeon: Robe Suarez MD;  [facility];  Service: Colon and Rectal;  Laterality: N/A;    SIGMOIDECTOMY         Review of patient's allergies indicates:   Allergen Reactions    Ibuprofen Other (See Comments)     Can't take d/t stomach ulcers       Medications:  Facility-Administered Medications Prior to Admission   Medication    vedolizumab (ENTYVIO) 300 mg in sodium chloride 0.9% 250 mL     Medications Prior to Admission   Medication Sig    acetaminophen (TYLENOL ORAL) Take by mouth as needed.    VEDOLIZUMAB (ENTYVIO IV) Inject into the vein.     Antibiotics (From admission, onward)    Start     Stop Route Frequency Ordered    11/26/20 0200  vancomycin 1.25 g in dextrose 5% 250 mL IVPB (ready to mix)      -- IV Every 12 hours (non-standard times) 11/25/20 1257    11/25/20 1345  piperacillin-tazobactam 4.5 g in sodium chloride 0.9% 100 mL IVPB (ready to mix system)      -- IV Every 8 hours (non-standard times) 11/25/20 1232        Antifungals (From admission, onward)    None        Antivirals (From admission, onward)    None           Immunization History   Administered Date(s) Administered    PPD  Test 06/15/2015       Family History     Problem Relation (Age of Onset)    Diabetes Father, Mother    Hyperlipidemia Mother        Social History     Socioeconomic History    Marital status: Single     Spouse name: Not on file    Number of children: Not on file    Years of education: Not on file    Highest education level: Not on file   Occupational History    Not on file   Social Needs    Financial resource strain: Not on file    Food insecurity     Worry: Not on file     Inability: Not on file    Transportation needs     Medical: Not on file     Non-medical: Not on file   Tobacco Use    Smoking status: Current Every Day Smoker     Packs/day: 0.50     Years: 3.00     Pack years: 1.50     Types: Cigarettes     Last attempt to quit: 2014     Years since quittin.0    Smokeless tobacco: Never Used   Substance and Sexual Activity    Alcohol use: Yes     Comment: socially-weekly    Drug use: No    Sexual activity: Yes     Partners: Female   Lifestyle    Physical activity     Days per week: Not on file     Minutes per session: Not on file    Stress: Not on file   Relationships    Social connections     Talks on phone: Not on file     Gets together: Not on file     Attends Bahai service: Not on file     Active member of club or organization: Not on file     Attends meetings of clubs or organizations: Not on file     Relationship status: Not on file   Other Topics Concern    Not on file   Social History Narrative    Not on file     Review of Systems   Constitutional: Negative for chills, diaphoresis and fever.   HENT: Negative for rhinorrhea and sore throat.    Respiratory: Negative for cough and shortness of breath.    Cardiovascular: Negative for chest pain and leg swelling.   Gastrointestinal: Negative for abdominal pain, diarrhea, nausea and vomiting.   Genitourinary: Positive for scrotal swelling and testicular pain. Negative for dysuria and hematuria.   Musculoskeletal: Negative for  arthralgias and myalgias.   Skin: Negative for rash.   Neurological: Negative for headaches.     Objective:     Vital Signs (Most Recent):  Temp: 98.1 °F (36.7 °C) (11/25/20 1550)  Pulse: 90 (11/25/20 1550)  Resp: 18 (11/25/20 1831)  BP: 117/82 (11/25/20 1550)  SpO2: 97 % (11/25/20 1550) Vital Signs (24h Range):  Temp:  [97.7 °F (36.5 °C)-98.7 °F (37.1 °C)] 98.1 °F (36.7 °C)  Pulse:  [67-96] 90  Resp:  [12-20] 18  SpO2:  [96 %-99 %] 97 %  BP: (107-119)/(75-82) 117/82     Weight: 76.5 kg (168 lb 10.4 oz)  Body mass index is 25.64 kg/m².    Estimated Creatinine Clearance: 125.9 mL/min (based on SCr of 0.8 mg/dL).    Physical Exam  Constitutional:       General: He is not in acute distress.     Appearance: He is well-developed. He is not diaphoretic.   HENT:      Head: Normocephalic and atraumatic.   Eyes:      Conjunctiva/sclera: Conjunctivae normal.   Neck:      Musculoskeletal: Normal range of motion and neck supple.   Pulmonary:      Effort: Pulmonary effort is normal. No respiratory distress.   Abdominal:      General: There is no distension.      Palpations: Abdomen is soft.   Musculoskeletal: Normal range of motion.   Skin:     General: Skin is warm and dry.      Findings: No erythema or rash.   Neurological:      Mental Status: He is alert and oriented to person, place, and time.   Psychiatric:         Behavior: Behavior normal.         Significant Labs: All pertinent labs within the past 24 hours have been reviewed.    Significant Imaging: I have reviewed all pertinent imaging results/findings within the past 24 hours.

## 2020-11-26 NOTE — PROGRESS NOTES
[facility]  Infectious Disease  Progress Note    Patient Name: Amando Calix  MRN: [id number]  Admission Date: 11/21/2020  Length of Stay: 5 days  Attending Physician: Eusebio Berumen MD  Primary Care Provider: Celestino Wright MD    Isolation Status: No active isolations  Assessment/Plan:      Cellulitis of scrotum  35-year-old male with history of Crohn's disease on Entyvio (last 11/2020) s/p total abdominal colectomy with end ileostomy and a short rectal stump, complex right perianal fistula with several subcutaneous tracts and openings s/p placement of 3 setons 7/2020, presented 11/21/2020 with increasing scrotal pain, CT abd/pelvis with no abnormal fluid collection to suggest abscess, found to be in DKA. Patient was started on clindamycin, but there was no clinical improvement. Urology evaluated patient 11/25/2020, performed bedside I&D, cultures sent. 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 I will follow-up with patient. Please contact us if you have any additional questions.    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 CT abd/pelvis with no abnormal fluid  collection to suggest abscess. Patient was started on clindamycin. He was also found to be in DKA. ID consulted given no improvement in scrotal cellulitis on course of clindamycin. Urology evaluated patient today, performed bedside I&D, cultures sent.     Interval History:  - Yesterday, patient underwent bedside I&D by urology  - Today, US scrotum with air, urology noted to be consistent with post-operative findings    Patient reports ongoing scrotal swelling and pain, but improved compared to yesterday    Review of Systems   Constitutional: Negative for chills, diaphoresis and fever.   HENT: Negative for rhinorrhea and sore throat.    Respiratory: Negative for cough and shortness of breath.    Cardiovascular: Negative for chest pain and leg swelling.   Gastrointestinal: Negative for abdominal pain, diarrhea, nausea and vomiting.   Genitourinary: Positive for scrotal swelling and testicular pain. Negative for dysuria and hematuria.   Musculoskeletal: Negative for arthralgias and myalgias.   Skin: Negative for rash.   Neurological: Negative for headaches.     Objective:     Vital Signs (Most Recent):  Temp: 98.2 °F (36.8 °C) (11/26/20 0740)  Pulse: 85 (11/26/20 0740)  Resp: 17 (11/26/20 0828)  BP: 120/78 (11/26/20 0815)  SpO2: 98 % (11/26/20 0740) Vital Signs (24h Range):  Temp:  [98 °F (36.7 °C)-98.7 °F (37.1 °C)] 98.2 °F (36.8 °C)  Pulse:  [] 85  Resp:  [12-21] 17  SpO2:  [96 %-98 %] 98 %  BP: (104-127)/(73-89) 120/78     Weight: 76.5 kg (168 lb 10.4 oz)  Body mass index is 25.64 kg/m².    Estimated Creatinine Clearance: 125.9 mL/min (based on SCr of 0.8 mg/dL).    Physical Exam  Constitutional:       General: He is not in acute distress.     Appearance: He is well-developed. He is not diaphoretic.   HENT:      Head: Normocephalic and atraumatic.   Eyes:      Conjunctiva/sclera: Conjunctivae normal.   Neck:      Musculoskeletal: Normal range of motion and neck supple.   Pulmonary:      Effort: Pulmonary  effort is normal. No respiratory distress.   Abdominal:      General: There is no distension.      Palpations: Abdomen is soft.   Musculoskeletal: Normal range of motion.   Skin:     General: Skin is warm and dry.      Findings: No erythema or rash.   Neurological:      Mental Status: He is alert and oriented to person, place, and time.   Psychiatric:         Behavior: Behavior normal.         Significant Labs: All pertinent labs within the past 24 hours have been reviewed.    Significant Imaging: I have reviewed all pertinent imaging results/findings within the past 24 hours.

## 2020-11-26 NOTE — SUBJECTIVE & OBJECTIVE
Interval History:   Pain in scrotum and perineum s/p I&D yesterday. No fevers overnight. Sugars still elevated.     Review of Systems  Objective:     Temp:  [98 °F (36.7 °C)-98.7 °F (37.1 °C)] 98.2 °F (36.8 °C)  Pulse:  [] 85  Resp:  [12-21] 17  SpO2:  [96 %-98 %] 98 %  BP: (104-127)/(73-89) 120/78     Body mass index is 25.64 kg/m².    Date 11/26/20 0700 - 11/27/20 0659   Shift 0508-8643 1983-9454 [id number] 24 Hour Total   INTAKE   Shift Total(mL/kg)       OUTPUT   Urine(mL/kg/hr) 300   300   Shift Total(mL/kg) 300(3.9)   300(3.9)   Weight (kg) 76.5 76.5 76.5 76.5          Drains     Drain                 Ileostomy RUQ -- days         Open Drain 07/10/20 0740 Posterior Buttock Other (see comments) 1/4 inch 139 days                Physical Exam  Constitutional:       General: He is not in acute distress.     Appearance: He is well-developed.   HENT:      Head: Normocephalic.   Neck:      Musculoskeletal: Normal range of motion.   Cardiovascular:      Rate and Rhythm: Normal rate.   Pulmonary:      Effort: Pulmonary effort is normal. No respiratory distress.   Abdominal:      General: There is no distension.      Palpations: Abdomen is soft.      Tenderness: There is no abdominal tenderness.   Genitourinary:     Comments: Small incision packed with iodoform  Scrotal rugae present  Extremely tender on exam, patient wont all for sufficient exam        Musculoskeletal: Normal range of motion.   Skin:     General: Skin is warm.   Neurological:      Mental Status: He is alert and oriented to person, place, and time.         Significant Labs:    BMP:  Recent Labs   Lab 11/24/20  0352 11/25/20  0544 11/26/20  0346    138 137   K 4.1 3.2* 4.2    100 100   CO2 25 30* 28   BUN 11 12 11   CREATININE 0.8 0.8 0.8   CALCIUM 9.2 9.0 8.7       CBC:   Recent Labs   Lab 11/24/20  0352 11/25/20  0544 11/26/20  0346   WBC 6.70 7.23 7.93   HGB 13.9* 12.6* 12.8*   HCT 40.2 36.8* 36.6*   * 165 175       All  pertinent labs results from the past 24 hours have been reviewed.    Significant Imaging:  All pertinent imaging results/findings from the past 24 hours have been reviewed.

## 2020-11-26 NOTE — PROGRESS NOTES
"[facility]  Endocrinology  Progress Note    Admit Date: 2020     Reason for Consult: Management of DKA, Hyperglycemia     Surgical Procedure and Date: N/A     Diabetes diagnosis year: Newly Dx.  Per patient he had steroid induced DM during childhood and resolved after DC of steroids.     Home Diabetes Medications:  NONE     How often checking glucose at home? N/A    BG readings on regimen: N/a   Hypoglycemia on the regimen?  No  Missed doses on regimen?  No    Diabetes Complications include:     N/A     Complicating diabetes co morbidities:   N/A       HPI:   Patient is a 35 y.o. male with a diagnosis of Steroid induced DM during childhood that resolved after discontinuation of steroids, Chron's disease s/p colectomy and ileostomy that was admitted to [facility] for scrotal cellulitis and KEITH.  During ED course patient was found to be in DKA w/ glucose 600s, BHB 4.6, PH 7.29, HCO3 13 and AG 27.  Pt was started on IV and Insulin infusion and consulted with endocrinology for assistance in management of DKA in the setting of newly Dx. DM.         Interval HPI:   Glucose 200s. Underwent I&D.     Overnight events: PINO   Eatin%  Nausea: No  Hypoglycemia and intervention: No  Fever: No  TPN and/or TF: No      /80 (BP Location: Left arm, Patient Position: Sitting)   Pulse 85   Temp 98.2 °F (36.8 °C) (Oral)   Resp 16   Ht 5' 8" (1.727 m)   Wt 76.5 kg (168 lb 10.4 oz)   SpO2 98%   BMI 25.64 kg/m²     Labs Reviewed and Include    Recent Labs   Lab 20  0346   *   CALCIUM 8.7   ALBUMIN 3.0*   PROT 6.8      K 4.2   CO2 28      BUN 11   CREATININE 0.8   ALKPHOS 76   ALT 12   AST 21   BILITOT 0.4     Lab Results   Component Value Date    WBC 7.93 2020    HGB 12.8 (L) 2020    HCT 36.6 (L) 2020    MCV 88 2020     2020     No results for input(s): TSH, FREET4 in the last 168 hours.  Lab Results   Component Value Date    HGBA1C 13.8 " (H) 11/21/2020       Nutritional status:   Body mass index is 25.64 kg/m².  Lab Results   Component Value Date    ALBUMIN 3.0 (L) 11/26/2020    ALBUMIN 3.0 (L) 11/25/2020    ALBUMIN 3.2 (L) 11/24/2020     Lab Results   Component Value Date    PREALBUMIN 34 03/31/2015    PREALBUMIN 7 (L) 11/11/2014    PREALBUMIN 8 (L) 11/10/2014       Estimated Creatinine Clearance: 125.9 mL/min (based on SCr of 0.8 mg/dL).    Accu-Checks  Recent Labs     11/24/20  1152 11/24/20  1610 11/24/20  2022 11/24/20  2326 11/25/20  0348 11/25/20  0723 11/25/20  1127 11/25/20  1609 11/25/20  2239 11/26/20  0744   POCTGLUCOSE 246* 430* 230* 302* 251* 245* 283* 252* 288* 265*       Current Medications and/or Treatments Impacting Glycemic Control  Immunotherapy:    Immunosuppressants     None        Steroids:   Hormones (From admission, onward)    Start     Stop Route Frequency Ordered    11/21/20 1406  melatonin tablet 6 mg      -- Oral Nightly PRN 11/21/20 1407        Pressors:    Autonomic Drugs (From admission, onward)    None        Hyperglycemia/Diabetes Medications:   Antihyperglycemics (From admission, onward)    Start     Stop Route Frequency Ordered    11/26/20 0900  insulin detemir U-100 pen 22 Units      -- SubQ 2 times daily 11/26/20 0754    11/26/20 0800  insulin aspart U-100 pen 15 Units      -- SubQ 3 times daily with meals 11/26/20 0754    11/25/20 0804  insulin aspart U-100 pen 1-10 Units      -- SubQ Before meals & nightly PRN 11/25/20 0704          ASSESSMENT and PLAN    * Diabetic ketoacidosis without coma associated with diabetes mellitus due to underlying condition  A1c 13% new diagnosis of diabetes    On admission with DKA now resolved    FRANCHESCA-65 pending  Noted C-peptide detectable, does not rule out DENIS but helps with knowing he still has beta cell function and has ability to decrease own insulin production protecting him from hypoglycemia    Remains with global hyperglycemia, will increase doses by 20%    Plan:  -  Increase insulin Levemir to 22 U BID  - Increase insulin Novolog to 15 U before every meal  - Moderate dose insulin correction scale  - BG checks ACHS  - Inpatient glucose goal 140-180 mg/dL        Cellulitis of scrotum  -On ABX   -Per primary   -Expect decreased insulin requirements as infection resolves        Crohn's disease of both small and large intestine without complication  -Per primary team   Not on steroids currently          Pastor Cochran MD  Endocrinology  [facility]

## 2020-11-26 NOTE — ASSESSMENT & PLAN NOTE
A1c 13% new diagnosis of diabetes    On admission with DKA now resolved    FRANCHESCA-65 pending  Noted C-peptide detectable, does not rule out DENIS but helps with knowing he still has beta cell function and has ability to decrease own insulin production protecting him from hypoglycemia    Remains with global hyperglycemia, will increase doses by 20%    Plan:  - Increase insulin Levemir to 22 U BID  - Increase insulin Novolog to 15 U before every meal  - Moderate dose insulin correction scale  - BG checks ACHS  - Inpatient glucose goal 140-180 mg/dL

## 2020-11-26 NOTE — PROGRESS NOTES
Progress Note  Hospital Medicine    Admit Date: 11/21/2020  Length of Stay:  LOS: 5 days     SUBJECTIVE:         Follow-up For:  Diabetic ketoacidosis without coma associated with diabetes mellitus due to underlying condition    HPI/Interval history (See H&P for complete P,F,SHx) :     Mr. Calix is a 35 year old gentleman with PMH of Crohn's colitis (on Entyvio) s/p colectomy with end ileostomy with significant anorectal fistulizing disease s/p placement of multiple setons, h/o c diff infection, anemia and h/o steroid induced diabetes presents for fatigue for about 2 weeks.  His symptoms started around November 4th after he received infusion of Entyvio and also ate a very rare steak.  He reports associated increased urinary frequency as well as polydipsia and an 18 lb weight loss over the past few weeks.  States that he has a good appetite has been eating well, he denies any abdominal pain bloody stool from his ostomy, diarrhea, nausea/vomiting, fevers/chills, hematuria, dysuria, flank pain, chest pain, shortness of breath, cough or sore throat.       Interval History:   11/25 Patient lying in bed, no acute distress. Reports tolerating diet and fatigue has been improving. Continues to note scrotal swelling has unchanged. Increased scrotal pain after I&D.   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 Urology updated , findings likely from bedside debridement. no surgical intervention.  EUA with CRS tomorrow to assess for possible fistula tract between previous fistulas and scrotal abscess. started on clindamycin IV    Review of Systems:   Pain scale: Constitutional: Positive for fatigue. Negative for chills and fever.   HENT: Negative for congestion.    Eyes: Negative for visual disturbance.   Respiratory: Negative for cough and shortness of breath.    Cardiovascular: Negative for chest pain and leg swelling.   Gastrointestinal: Negative for abdominal distention, abdominal pain, nausea and  "vomiting.   Genitourinary: Positive for scrotal swelling. Negative for dysuria.   Neurological: Negative for dizziness, weakness and numbness.   Psychiatric/Behavioral: Negative for confusion.     OBJECTIVE:     Vital Signs Range (Last 24H):  Temp:  [98 °F (36.7 °C)-98.6 °F (37 °C)]   Pulse:  []   Resp:  [11-21]   BP: (104-130)/(73-89)   SpO2:  [96 %-99 %]     Physical Exam:  Constitutional: Appears well-developed and well-nourished.   Head: Normocephalic and atraumatic.   Neck: Normal range of motion. Neck supple.   Cardiovascular: Normal heart rate.  Regular heart rhythm.  Pulmonary/Chest: Effort normal.   Abdominal: + tenderness.  ileostomy  Musculoskeletal: Normal range of motion. No edema.    -Scrotal swelling  Neurological: Alert and oriented to person, place, and time.   Skin: Skin is warm and dry.   Psychiatric: Normal mood and affect. Behavior is normal.         Estimated body mass index is 25.64 kg/m² as calculated from the following:    Height as of this encounter: 5' 8" (1.727 m).    Weight as of this encounter: 76.5 kg (168 lb 10.4 oz).    I & O (Last 24H):    Intake/Output Summary (Last 24 hours) at 11/26/2020 1831  Last data filed at 11/26/2020 1819  Gross per 24 hour   Intake 1850 ml   Output 1100 ml   Net 750 ml       Body mass index is 25.64 kg/m².    Estimated Creatinine Clearance: 125.9 mL/min (based on SCr of 0.8 mg/dL).        Laboratory/Diagnostic Data:    Imaging Results          CT Abdomen Pelvis With Contrast (Final result)  Result time 11/21/20 12:07:12    Final result by Nabil Cooper Jr., MD (11/21/20 12:07:12)             Impression:      Right perianal fistulous tract extending towards the scrotum with interval placement of a seton, noting the fistulous tract is similar to prior CT 02/09/2020.  There is a slightly more cephalad fistulous track in relation to the seton again noted.  No abnormal fluid collection to suggest an abscess.    Marked scrotal skin thickening.  " Correlate for cellulitis.    Stable postoperative changes of a total colectomy and right lower quadrant end ileostomy in this patient with reported history of Crohn's disease.    Electronically signed by resident: Rosio Freeman  Date:    11/21/2020  Time:    11:30    Electronically signed by: Nabil Cooper MD  Date:    11/21/2020  Time:    12:07           Narrative:    EXAMINATION:  CT ABDOMEN PELVIS WITH CONTRAST    CLINICAL HISTORY:  Abdominal pain, fever;Low abd pain and testicular pain/ swelling.  Patient with Crohn's disease, anal fistula with seat on and scrotal stent.  Concern for scrotal cellulitis/abscess/fistula;    TECHNIQUE:  Low dose axial images were obtained from the lung bases through the proximal thighs following the intravenous administration of 75 cc of Omnipaque 350.  Sagittal and coronal reformats were provided.    COMPARISON:  CT pelvis 02/09/2020    FINDINGS:  Heart: Normal in size.  No pericardial effusion.    Lung bases: Symmetrically expanded.  No consolidation, pleural effusion, mass, or pneumothorax.    Liver: Normal in size and attenuation without focal hepatic abnormality.    Gallbladder: Normal in appearance without evidence for cholecystitis.    Bile Ducts: No intra or extrahepatic biliary ductal dilation.    Pancreas: No pancreatic mass lesion or peripancreatic inflammatory change.    Spleen: Unremarkable.    Adrenals: Unremarkable.    Kidneys/ Ureters: Normal in size and location.  Kidneys enhance normally.  No focal renal abnormality or nephrolithiasis.  No hydroureteronephrosis.    Bladder: Smooth contours without bladder wall thickening.    Reproductive organs: Unremarkable.    GI Tract/Mesentery: The stomach is unremarkable.  Postoperative changes of a total colectomy and right lower quadrant end ileostomy.  Fatty deposition within the wall with a few loops of small bowel in the pelvis, favored to reflect chronic inflammation.    Peritoneal Space: No intraperitoneal free  fluid or free air. No pathologically enlarged lymph nodes.    Retroperitoneum: No significant adenopathy.    Abdominal wall/extraperitoneal soft tissues: Soft tissue thickening again seen extending from the right perianal region along the inner right medial gluteal fold into the perineum and right scrotum with interval placement of a Seton in this patient with known perianal fistula, similar in appearance to prior CT 02/09/2020.  Increased soft tissue and skin induration within the scrotum, new from prior CT 02/09/2020.  No peripherally enhancing fluid collections to suggest an abscess.    Vasculature: Abdominal aorta is normal in caliber, contour, and course without significant calcific atherosclerosis.    Bones: Unremarkable without acute fracture or bone destructive process.                             X-Ray Chest PA And Lateral (Final result)  Result time 11/21/20 10:29:32    Final result by Randy Amador DO (11/21/20 10:29:32)             Impression:      No acute abnormality.      Electronically signed by: Randy Amador  Date:    11/21/2020  Time:    10:29           Narrative:    EXAMINATION:  XR CHEST PA AND LATERAL    CLINICAL HISTORY:  Other fatigue.    TECHNIQUE:  PA and lateral views of the chest were performed.    COMPARISON:  Multiple prior radiographs of the chest, most recent from 04/01/2016.    FINDINGS:  The lungs are well expanded and clear. No focal opacities are seen. The pleural spaces are clear.  The cardiac silhouette is unremarkable.  The visualized osseous structures are unremarkable.                                Reviewed and noted in plan where applicable- Please see chart for full lab data.    Recent Labs   Lab 11/24/20  0352 11/25/20  0544 11/26/20  0346   WBC 6.70 7.23 7.93   HGB 13.9* 12.6* 12.8*   HCT 40.2 36.8* 36.6*   * 165 175       Recent Labs   Lab 11/24/20  0352 11/25/20  0544 11/26/20  0346    138 137   K 4.1 3.2* 4.2    100 100   CO2 25 30* 28   BUN 11  12 11   CREATININE 0.8 0.8 0.8   * 202* 244*   CALCIUM 9.2 9.0 8.7   MG 1.8 1.6 1.6   PHOS 4.6* 4.2 4.2       Recent Labs   Lab 11/24/20  0352 11/25/20  0544 11/26/20  0346   ALKPHOS 73 72 76   ALT 11 11 12   AST 19 12 21   ALBUMIN 3.2* 3.0* 3.0*   PROT 6.9 6.4 6.8   BILITOT 0.4 0.4 0.4        Microbiology labs for the last week  Microbiology Results (last 7 days)     Procedure Component Value Units Date/Time    Blood culture #1 **CANNOT BE ORDERED STAT** [[id number]] Collected: 11/21/20 0934    Order Status: Completed Specimen: Blood from Peripheral, Antecubital, Left Updated: 11/26/20 1012     Blood Culture, Routine No growth after 5 days.    Blood culture #2 **CANNOT BE ORDERED STAT** [[id number]] Collected: 11/21/20 0946    Order Status: Completed Specimen: Blood from Peripheral, Antecubital, Left Updated: 11/26/20 1012     Blood Culture, Routine No growth after 5 days.    Aerobic culture [[id number]] Collected: 11/25/20 1540    Order Status: Completed Specimen: Abscess from Groin Updated: 11/26/20 0721     Aerobic Bacterial Culture No growth    Gram stain [[id number]] Collected: 11/25/20 1540    Order Status: Completed Specimen: Abscess from Groin Updated: 11/25/20 1917     Gram Stain Result Rare WBC's      Rare Gram positive cocci      Rare Gram negative rods    Culture, Anaerobe [[id number]] Collected: 11/25/20 1540    Order Status: Sent Specimen: Abscess from Groin Updated: 11/25/20 1659    Fungus culture [[id number]] Collected: 11/25/20 1540    Order Status: Sent Specimen: Abscess from Groin Updated: 11/25/20 1658           Medications:  Medication list was reviewed and changes noted under Assessment/Plan.        Current Facility-Administered Medications:     clindamycin in D5W 900 mg/50 mL IVPB 900 mg, 900 mg, Intravenous, Q8H, Saba Wetzel MD, Last Rate: 50 mL/hr at 11/26/20 1823, 900 mg at 11/26/20 1823    dextrose 50% injection 12.5 g, 12.5 g, Intravenous, PRN, Max Linn Delaney MD    dextrose 50%  injection 25 g, 25 g, Intravenous, PRN, Angel Delaney MD    glucagon (human recombinant) injection 1 mg, 1 mg, Intramuscular, PRN, Angel Delaney MD    glucose chewable tablet 16 g, 16 g, Oral, PRN, Angel Delaney MD    glucose chewable tablet 24 g, 24 g, Oral, PRN, Angel Delaney MD    HYDROmorphone injection 0.5 mg, 0.5 mg, Intravenous, Q6H PRN, Juan Rowe MD, 0.5 mg at 20 1711    insulin aspart U-100 pen 1-10 Units, 1-10 Units, Subcutaneous, QID (AC + HS) PRN, Pastor Cochran MD, 4 Units at 20 1710    insulin aspart U-100 pen 15 Units, 15 Units, Subcutaneous, TIDWM, Pastor Cochran MD, 15 Units at 20 1709    insulin detemir U-100 pen 22 Units, 22 Units, Subcutaneous, BID, Pastor Cochran MD, 22 Units at 20 0833    melatonin tablet 6 mg, 6 mg, Oral, Nightly PRN, Leena Turner PA-C, 6 mg at 20 2030    ondansetron injection 4 mg, 4 mg, Intravenous, Q6H PRN, Juan Rowe MD    oxyCODONE immediate release tablet 5 mg, 5 mg, Oral, Q6H PRN, Juan Rowe MD, 5 mg at 20 1409    piperacillin-tazobactam 4.5 g in sodium chloride 0.9% 100 mL IVPB (ready to mix system), 4.5 g, Intravenous, Q8H, Juan Rowe MD, Last Rate: 25 mL/hr at 20 1401, 4.5 g at 20 1401    promethazine (PHENERGAN) 12.5 mg in dextrose 5 % 50 mL IVPB, 12.5 mg, Intravenous, Q6H PRN, Juan Rowe MD    sodium chloride 0.9% flush 10 mL, 10 mL, Intravenous, PRN, Leena Turner PA-C    sodium chloride 0.9% flush 10 mL, 10 mL, Intravenous, PRN, Juan Rowe MD    vancomycin 1.25 g in dextrose 5% 250 mL IVPB (ready to mix), 15 mg/kg, Intravenous, Q12H, Juan Rowe MD, Last Rate: 166.7 mL/hr at 20 1454, 1,250 mg at 20 1454        ASSESSMENT/PLAN:     Active Problems:     DKA- resolved   H/o steroid induced DM  - DKA likely triggered by scrotal cellulitis   - HA1c: 13.8  - A --> 13  - betahydroxybutyrate:  4.6  - BG on admit, 615 --> 425  - VBG on admit, pH: 7.29/38  - s/p albuterol, insulin/dextrose in the ED  - DKA pathway initiated   - continue insulin gtt   - CLD (no sugar)  - continue IVF  - clear liquid diet (no sugar)  - endocrinology consulted. apprec recs   -- s/p transitional insulin gtt  -- Start Levemir 22 U BID  -- Start Novolog 15 U AC  -- Low dose correction insulin   -- FS qAC/HS/AM   -- diabetic diet  11/26  Patient's FSGs are not controlled on current hypoglycemics.   Last A1c reviewed-   Lab Results   Component Value Date    HGBA1C 13.8 (H) 11/21/2020    HGBA1C 13.3 (H) 11/21/2020    HGBA1C 6.0 06/23/2011     Most recent fingerstick glucose reviewed-   Recent Labs   Lab 11/25/20  2239 11/26/20  0744 11/26/20  1113 11/26/20  1658   POCTGLUCOSE 288* 265* 194* 206*        Crohn colitis  S/p colectomy with end ileostomy  Anorectal fistulizing disease s/p placement of multiple setons  - follows with general surgery (Dr. Blackburn). Last visit on 10/5/2020  - follows with CRS (Dr. Suarez). Last visit 8/15/2020  - Given extent of anorectal disease with fistulae and stenosis, ileostomy will be permanent. Appears not to be a candidate for an attempt at restoring intestinal continuity given the extent of his anal disease, and I suspect he will ultimately require a completion proctectomy Discussed completion proctectomy [facility] patient - he is not ready to proceed at this point.  - ESR: 39, CRP: 35  - continue home Entyvio  11/26 GI consulted - no medication changes recommended at this time     KEITH- resolved   - Scr on admit, 1.7--> 1.0  - b/l Scr: 0.9 (6/2020)  - likely prerenal   - s/p 2 units IVF bolus in ED   - s/p IVF  - BMP daily      Scrotal cellulitis   - elevated ESR and CRP  - CT A/P with contrast  (11/21) showed 'Right perianal fistulous tract extending towards the scrotum with interval placement of a seton, noting the fistulous tract is similar to prior CT 02/09/2020. No abnormal fluid collection to  suggest an abscess. Marked scrotal skin thickening. Correlate for cellulitis'  - followup bcx   - ID consulted. apprec recs  -- escalated from clindamycin to vancomycin and zosyn   - Urology consulted. apprec recs   -- s/p bedside I&D and packing on 11/25. followup I&D cultures  -- scrotal support, ice packs  -- can't give NSAIDs due to h/o ulcers  - CRS consulted. 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 Urology updated , findings likely from bedside debridement. no surgical intervention.  EUA with CRS tomorrow to assess for possible fistula tract between previous fistulas and scrotal abscess. started on clindamycin IV    Anemia   Patient's with Normocyticanemia.. Hemoglobin stable. Etiology likely due to chronic disease .  Current CBC reviewed-    Recent Labs   Lab 11/24/20  0352 11/25/20  0544 11/26/20  0346   HGB 13.9* 12.6* 12.8*     Monitor serial CBC and transfuse if H/H drops below 7/21.       Component Value Date/Time    MCV 88 11/26/2020 0346    RDW 12.6 11/26/2020 0346    IRON 21 (L) 09/28/2010 1531    FERRITIN 23 03/11/2015 1725    FOLATE 8.3 12/29/2012 0614    PYOLHNEY06 884 12/29/2012 0614    OCCULTBLOOD Positive (A) 09/29/2014 2245        Protein calorie malnutrition  - Boost TID when DKA resolves        Disposition- Home    Discharge Planning   MIGUEL: 11/27/2020     Code Status: Full Code   Is the patient medically ready for discharge?: (No Documentation)    Reason for patient still in hospital (select all that apply): Treatment  Discharge Plan A: Home with family   Discharge Delays: None known at this time     DVT prophylaxis addressed with:  SCDs.          Subsequent Hospital Care    Level 3 68511 Total visit time was 35 minutes or greater with greater than 50% of time spent in counseling and coordination of care. .    We discussed in detail the plan of care, the patient's response to treatment, the discharge plan.  Total time includes time spent  reviewing the medical record, examining the patient, writing notes and communicating with other professionals.         Eusebio Berumen MD  Attending Staff Physician  [facility]  pager- [phone] [id number] - [phone]

## 2020-11-26 NOTE — SUBJECTIVE & OBJECTIVE
Past Medical History:   Diagnosis Date    Anemia     Chronic diarrhea     Clostridium difficile infection     Crohn's disease     Diabetic ketoacidosis without coma associated with diabetes mellitus due to underlying condition 11/21/2020    Protein calorie malnutrition     Steroid-induced diabetes        Past Surgical History:   Procedure Laterality Date    ABSCESS DRAINAGE      COLONOSCOPY      COLOSTOMY      FLEXIBLE SIGMOIDOSCOPY N/A 7/10/2020    Procedure: SIGMOIDOSCOPY, FLEXIBLE;  Surgeon: Robe Suarez MD;  Location: [facility] OR [address] [place] [address];  Service: Colon and Rectal;  Laterality: N/A;    HERNIA REPAIR      ILEOSTOMY      INSERTION OF SETON STITCH N/A 7/10/2020    Procedure: PLACEMENT-SETON DRAIN;  Surgeon: Robe Suarez MD;  Location: [facility] OR [address] [place] [address];  Service: Colon and Rectal;  Laterality: N/A;    SIGMOIDECTOMY         Review of patient's allergies indicates:   Allergen Reactions    Ibuprofen Other (See Comments)     Can't take d/t stomach ulcers       Medications:  Facility-Administered Medications Prior to Admission   Medication    vedolizumab (ENTYVIO) 300 mg in sodium chloride 0.9% 250 mL     Medications Prior to Admission   Medication Sig    acetaminophen (TYLENOL ORAL) Take by mouth as needed.    VEDOLIZUMAB (ENTYVIO IV) Inject into the vein.     Antibiotics (From admission, onward)    Start     Stop Route Frequency Ordered    11/26/20 0200  vancomycin 1.25 g in dextrose 5% 250 mL IVPB (ready to mix)      -- IV Every 12 hours (non-standard times) 11/25/20 1257    11/25/20 1345  piperacillin-tazobactam 4.5 g in sodium chloride 0.9% 100 mL IVPB (ready to mix system)      -- IV Every 8 hours (non-standard times) 11/25/20 1232        Antifungals (From admission, onward)    None        Antivirals (From admission, onward)    None           Immunization History   Administered Date(s) Administered    PPD Test 06/15/2015       Family History     Problem Relation (Age of Onset)    Diabetes  Father, Mother    Hyperlipidemia Mother        Social History     Socioeconomic History    Marital status: Single     Spouse name: Not on file    Number of children: Not on file    Years of education: Not on file    Highest education level: Not on file   Occupational History    Not on file   Social Needs    Financial resource strain: Not on file    Food insecurity     Worry: Not on file     Inability: Not on file    Transportation needs     Medical: Not on file     Non-medical: Not on file   Tobacco Use    Smoking status: Current Every Day Smoker     Packs/day: 0.50     Years: 3.00     Pack years: 1.50     Types: Cigarettes     Last attempt to quit: 2014     Years since quittin.0    Smokeless tobacco: Never Used   Substance and Sexual Activity    Alcohol use: Yes     Comment: socially-weekly    Drug use: No    Sexual activity: Yes     Partners: Female   Lifestyle    Physical activity     Days per week: Not on file     Minutes per session: Not on file    Stress: Not on file   Relationships    Social connections     Talks on phone: Not on file     Gets together: Not on file     Attends Alevism service: Not on file     Active member of club or organization: Not on file     Attends meetings of clubs or organizations: Not on file     Relationship status: Not on file   Other Topics Concern    Not on file   Social History Narrative    Not on file     Review of Systems   Constitutional: Negative for chills, diaphoresis and fever.   HENT: Negative for rhinorrhea and sore throat.    Respiratory: Negative for cough and shortness of breath.    Cardiovascular: Negative for chest pain and leg swelling.   Gastrointestinal: Negative for abdominal pain, diarrhea, nausea and vomiting.   Genitourinary: Positive for scrotal swelling and testicular pain. Negative for dysuria and hematuria.   Musculoskeletal: Negative for arthralgias and myalgias.   Skin: Negative for rash.   Neurological: Negative for  headaches.     Objective:     Vital Signs (Most Recent):  Temp: 98.1 °F (36.7 °C) (11/25/20 1550)  Pulse: 90 (11/25/20 1550)  Resp: 18 (11/25/20 1831)  BP: 117/82 (11/25/20 1550)  SpO2: 97 % (11/25/20 1550) Vital Signs (24h Range):  Temp:  [97.7 °F (36.5 °C)-98.7 °F (37.1 °C)] 98.1 °F (36.7 °C)  Pulse:  [67-96] 90  Resp:  [12-20] 18  SpO2:  [96 %-99 %] 97 %  BP: (107-119)/(75-82) 117/82     Weight: 76.5 kg (168 lb 10.4 oz)  Body mass index is 25.64 kg/m².    Estimated Creatinine Clearance: 125.9 mL/min (based on SCr of 0.8 mg/dL).    Physical Exam  Constitutional:       General: He is not in acute distress.     Appearance: He is well-developed. He is not diaphoretic.   HENT:      Head: Normocephalic and atraumatic.   Eyes:      Conjunctiva/sclera: Conjunctivae normal.   Neck:      Musculoskeletal: Normal range of motion and neck supple.   Pulmonary:      Effort: Pulmonary effort is normal. No respiratory distress.   Abdominal:      General: There is no distension.      Palpations: Abdomen is soft.   Musculoskeletal: Normal range of motion.   Skin:     General: Skin is warm and dry.      Findings: No erythema or rash.   Neurological:      Mental Status: He is alert and oriented to person, place, and time.   Psychiatric:         Behavior: Behavior normal.         Significant Labs: All pertinent labs within the past 24 hours have been reviewed.    Significant Imaging: I have reviewed all pertinent imaging results/findings within the past 24 hours.

## 2020-11-27 ENCOUNTER — ANESTHESIA (OUTPATIENT)
Dept: SURGERY | Facility: HOSPITAL | Age: 35
DRG: 982 | End: 2020-11-27
Payer: OTHER GOVERNMENT

## 2020-11-27 ENCOUNTER — ANESTHESIA EVENT (OUTPATIENT)
Dept: SURGERY | Facility: HOSPITAL | Age: 35
DRG: 982 | End: 2020-11-27

## 2020-11-27 LAB
ALBUMIN SERPL BCP-MCNC: 3 G/DL (ref 3.5–5.2)
ALP SERPL-CCNC: 90 U/L (ref 55–135)
ALT SERPL W/O P-5'-P-CCNC: 24 U/L (ref 10–44)
ANION GAP SERPL CALC-SCNC: 13 MMOL/L (ref 8–16)
ANISOCYTOSIS BLD QL SMEAR: SLIGHT
AST SERPL-CCNC: 38 U/L (ref 10–40)
BASOPHILS # BLD AUTO: 0.02 K/UL (ref 0–0.2)
BASOPHILS NFR BLD: 0.2 % (ref 0–1.9)
BILIRUB SERPL-MCNC: 0.5 MG/DL (ref 0.1–1)
BUN SERPL-MCNC: 14 MG/DL (ref 6–20)
CALCIUM SERPL-MCNC: 9.1 MG/DL (ref 8.7–10.5)
CHLORIDE SERPL-SCNC: 103 MMOL/L (ref 95–110)
CO2 SERPL-SCNC: 24 MMOL/L (ref 23–29)
CREAT SERPL-MCNC: 1.7 MG/DL (ref 0.5–1.4)
DIFFERENTIAL METHOD: ABNORMAL
EOSINOPHIL # BLD AUTO: 0 K/UL (ref 0–0.5)
EOSINOPHIL NFR BLD: 0.4 % (ref 0–8)
ERYTHROCYTE [DISTWIDTH] IN BLOOD BY AUTOMATED COUNT: 12.8 % (ref 11.5–14.5)
EST. GFR  (AFRICAN AMERICAN): 59.5 ML/MIN/1.73 M^2
EST. GFR  (NON AFRICAN AMERICAN): 51.5 ML/MIN/1.73 M^2
GAD65 AB SER-SCNC: 0 NMOL/L
GLUCOSE SERPL-MCNC: 185 MG/DL (ref 70–110)
HBV SURFACE AB SER-ACNC: ABNORMAL M[IU]/ML
HBV SURFACE AG SERPL QL IA: NEGATIVE
HCT VFR BLD AUTO: 38.7 % (ref 40–54)
HCV AB SERPL QL IA: NEGATIVE
HGB BLD-MCNC: 13.1 G/DL (ref 14–18)
HIV 1+2 AB+HIV1 P24 AG SERPL QL IA: NEGATIVE
IMM GRANULOCYTES # BLD AUTO: 0.05 K/UL (ref 0–0.04)
IMM GRANULOCYTES NFR BLD AUTO: 0.5 % (ref 0–0.5)
LYMPHOCYTES # BLD AUTO: 1.4 K/UL (ref 1–4.8)
LYMPHOCYTES NFR BLD: 15.5 % (ref 18–48)
MAGNESIUM SERPL-MCNC: 1.7 MG/DL (ref 1.6–2.6)
MCH RBC QN AUTO: 30 PG (ref 27–31)
MCHC RBC AUTO-ENTMCNC: 33.9 G/DL (ref 32–36)
MCV RBC AUTO: 89 FL (ref 82–98)
MONOCYTES # BLD AUTO: 1.6 K/UL (ref 0.3–1)
MONOCYTES NFR BLD: 16.9 % (ref 4–15)
NEUTROPHILS # BLD AUTO: 6.2 K/UL (ref 1.8–7.7)
NEUTROPHILS NFR BLD: 66.5 % (ref 38–73)
NRBC BLD-RTO: 0 /100 WBC
PHOSPHATE SERPL-MCNC: 4.2 MG/DL (ref 2.7–4.5)
PLATELET # BLD AUTO: 170 K/UL (ref 150–350)
PLATELET BLD QL SMEAR: ABNORMAL
PMV BLD AUTO: 13.4 FL (ref 9.2–12.9)
POCT GLUCOSE: 142 MG/DL (ref 70–110)
POCT GLUCOSE: 171 MG/DL (ref 70–110)
POCT GLUCOSE: 201 MG/DL (ref 70–110)
POCT GLUCOSE: 236 MG/DL (ref 70–110)
POCT GLUCOSE: 252 MG/DL (ref 70–110)
POCT GLUCOSE: 252 MG/DL (ref 70–110)
POTASSIUM SERPL-SCNC: 4.1 MMOL/L (ref 3.5–5.1)
PROT SERPL-MCNC: 6.9 G/DL (ref 6–8.4)
RBC # BLD AUTO: 4.37 M/UL (ref 4.6–6.2)
RPR SER QL: REACTIVE
RPR SER-TITR: ABNORMAL {TITER}
SARS-COV-2 RDRP RESP QL NAA+PROBE: NEGATIVE
SODIUM SERPL-SCNC: 140 MMOL/L (ref 136–145)
VANCOMYCIN TROUGH SERPL-MCNC: 17.1 UG/ML (ref 10–22)
VANCOMYCIN TROUGH SERPL-MCNC: 32.8 UG/ML (ref 10–22)
WBC # BLD AUTO: 9.28 K/UL (ref 3.9–12.7)

## 2020-11-27 PROCEDURE — U0002 COVID-19 LAB TEST NON-CDC: HCPCS

## 2020-11-27 PROCEDURE — 25000003 PHARM REV CODE 250: Performed by: HOSPITALIST

## 2020-11-27 PROCEDURE — 36000705 HC OR TIME LEV I EA ADD 15 MIN: Performed by: COLON & RECTAL SURGERY

## 2020-11-27 PROCEDURE — 99233 PR SUBSEQUENT HOSPITAL CARE,LEVL III: ICD-10-PCS | Mod: ,,, | Performed by: HOSPITALIST

## 2020-11-27 PROCEDURE — 99233 PR SUBSEQUENT HOSPITAL CARE,LEVL III: ICD-10-PCS | Mod: ,,, | Performed by: INTERNAL MEDICINE

## 2020-11-27 PROCEDURE — 63600175 PHARM REV CODE 636 W HCPCS: Performed by: ANESTHESIOLOGY

## 2020-11-27 PROCEDURE — 63600175 PHARM REV CODE 636 W HCPCS: Performed by: SURGERY

## 2020-11-27 PROCEDURE — 46020 PLACEMENT OF SETON: CPT | Mod: ,,, | Performed by: COLON & RECTAL SURGERY

## 2020-11-27 PROCEDURE — 82962 GLUCOSE BLOOD TEST: CPT | Performed by: COLON & RECTAL SURGERY

## 2020-11-27 PROCEDURE — 63600175 PHARM REV CODE 636 W HCPCS: Performed by: HOSPITALIST

## 2020-11-27 PROCEDURE — 80053 COMPREHEN METABOLIC PANEL: CPT

## 2020-11-27 PROCEDURE — 87340 HEPATITIS B SURFACE AG IA: CPT

## 2020-11-27 PROCEDURE — 71000015 HC POSTOP RECOV 1ST HR: Performed by: COLON & RECTAL SURGERY

## 2020-11-27 PROCEDURE — 25000003 PHARM REV CODE 250: Performed by: INTERNAL MEDICINE

## 2020-11-27 PROCEDURE — 99231 PR SUBSEQUENT HOSPITAL CARE,LEVL I: ICD-10-PCS | Mod: ,,, | Performed by: INTERNAL MEDICINE

## 2020-11-27 PROCEDURE — 36415 COLL VENOUS BLD VENIPUNCTURE: CPT

## 2020-11-27 PROCEDURE — 80202 ASSAY OF VANCOMYCIN: CPT | Mod: 91

## 2020-11-27 PROCEDURE — 84100 ASSAY OF PHOSPHORUS: CPT

## 2020-11-27 PROCEDURE — 80202 ASSAY OF VANCOMYCIN: CPT

## 2020-11-27 PROCEDURE — 71000033 HC RECOVERY, INTIAL HOUR: Performed by: COLON & RECTAL SURGERY

## 2020-11-27 PROCEDURE — 37000008 HC ANESTHESIA 1ST 15 MINUTES: Performed by: COLON & RECTAL SURGERY

## 2020-11-27 PROCEDURE — 25000003 PHARM REV CODE 250: Performed by: SURGERY

## 2020-11-27 PROCEDURE — 94761 N-INVAS EAR/PLS OXIMETRY MLT: CPT

## 2020-11-27 PROCEDURE — 86706 HEP B SURFACE ANTIBODY: CPT

## 2020-11-27 PROCEDURE — 83735 ASSAY OF MAGNESIUM: CPT

## 2020-11-27 PROCEDURE — 36000704 HC OR TIME LEV I 1ST 15 MIN: Performed by: COLON & RECTAL SURGERY

## 2020-11-27 PROCEDURE — 99233 SBSQ HOSP IP/OBS HIGH 50: CPT | Mod: ,,, | Performed by: HOSPITALIST

## 2020-11-27 PROCEDURE — 86593 SYPHILIS TEST NON-TREP QUANT: CPT

## 2020-11-27 PROCEDURE — D9220A PRA ANESTHESIA: ICD-10-PCS | Mod: ,,, | Performed by: ANESTHESIOLOGY

## 2020-11-27 PROCEDURE — 63600175 PHARM REV CODE 636 W HCPCS: Performed by: STUDENT IN AN ORGANIZED HEALTH CARE EDUCATION/TRAINING PROGRAM

## 2020-11-27 PROCEDURE — 86703 HIV-1/HIV-2 1 RESULT ANTBDY: CPT

## 2020-11-27 PROCEDURE — 25000003 PHARM REV CODE 250: Performed by: STUDENT IN AN ORGANIZED HEALTH CARE EDUCATION/TRAINING PROGRAM

## 2020-11-27 PROCEDURE — 85025 COMPLETE CBC W/AUTO DIFF WBC: CPT

## 2020-11-27 PROCEDURE — 63600175 PHARM REV CODE 636 W HCPCS: Performed by: INTERNAL MEDICINE

## 2020-11-27 PROCEDURE — 99231 SBSQ HOSP IP/OBS SF/LOW 25: CPT | Mod: ,,, | Performed by: INTERNAL MEDICINE

## 2020-11-27 PROCEDURE — 99233 SBSQ HOSP IP/OBS HIGH 50: CPT | Mod: ,,, | Performed by: INTERNAL MEDICINE

## 2020-11-27 PROCEDURE — 46020 PR PLACEMENT,SETON: ICD-10-PCS | Mod: ,,, | Performed by: COLON & RECTAL SURGERY

## 2020-11-27 PROCEDURE — 86592 SYPHILIS TEST NON-TREP QUAL: CPT

## 2020-11-27 PROCEDURE — 37000009 HC ANESTHESIA EA ADD 15 MINS: Performed by: COLON & RECTAL SURGERY

## 2020-11-27 PROCEDURE — 86803 HEPATITIS C AB TEST: CPT

## 2020-11-27 PROCEDURE — D9220A PRA ANESTHESIA: Mod: ,,, | Performed by: ANESTHESIOLOGY

## 2020-11-27 PROCEDURE — 20600001 HC STEP DOWN PRIVATE ROOM

## 2020-11-27 PROCEDURE — 86780 TREPONEMA PALLIDUM: CPT

## 2020-11-27 RX ORDER — HYDROMORPHONE HYDROCHLORIDE 1 MG/ML
0.2 INJECTION, SOLUTION INTRAMUSCULAR; INTRAVENOUS; SUBCUTANEOUS EVERY 5 MIN PRN
Status: DISCONTINUED | OUTPATIENT
Start: 2020-11-27 | End: 2020-11-27 | Stop reason: HOSPADM

## 2020-11-27 RX ORDER — SODIUM CHLORIDE 0.9 % (FLUSH) 0.9 %
10 SYRINGE (ML) INJECTION
Status: DISCONTINUED | OUTPATIENT
Start: 2020-11-27 | End: 2020-11-27 | Stop reason: HOSPADM

## 2020-11-27 RX ORDER — PROPOFOL 10 MG/ML
VIAL (ML) INTRAVENOUS
Status: DISCONTINUED | OUTPATIENT
Start: 2020-11-27 | End: 2020-11-27

## 2020-11-27 RX ORDER — MIDAZOLAM HYDROCHLORIDE 1 MG/ML
INJECTION, SOLUTION INTRAMUSCULAR; INTRAVENOUS
Status: DISCONTINUED | OUTPATIENT
Start: 2020-11-27 | End: 2020-11-27

## 2020-11-27 RX ORDER — ROCURONIUM BROMIDE 10 MG/ML
INJECTION, SOLUTION INTRAVENOUS
Status: DISCONTINUED | OUTPATIENT
Start: 2020-11-27 | End: 2020-11-27

## 2020-11-27 RX ORDER — FENTANYL CITRATE 50 UG/ML
25 INJECTION, SOLUTION INTRAMUSCULAR; INTRAVENOUS EVERY 5 MIN PRN
Status: DISCONTINUED | OUTPATIENT
Start: 2020-11-27 | End: 2020-11-27 | Stop reason: HOSPADM

## 2020-11-27 RX ORDER — SUCCINYLCHOLINE CHLORIDE 20 MG/ML
INJECTION INTRAMUSCULAR; INTRAVENOUS
Status: DISCONTINUED | OUTPATIENT
Start: 2020-11-27 | End: 2020-11-27

## 2020-11-27 RX ORDER — ONDANSETRON 2 MG/ML
INJECTION INTRAMUSCULAR; INTRAVENOUS
Status: DISCONTINUED | OUTPATIENT
Start: 2020-11-27 | End: 2020-11-27

## 2020-11-27 RX ORDER — KETAMINE HCL IN 0.9 % NACL 50 MG/5 ML
SYRINGE (ML) INTRAVENOUS
Status: DISCONTINUED | OUTPATIENT
Start: 2020-11-27 | End: 2020-11-27

## 2020-11-27 RX ORDER — SODIUM CHLORIDE, SODIUM LACTATE, POTASSIUM CHLORIDE, CALCIUM CHLORIDE 600; 310; 30; 20 MG/100ML; MG/100ML; MG/100ML; MG/100ML
INJECTION, SOLUTION INTRAVENOUS CONTINUOUS
Status: DISCONTINUED | OUTPATIENT
Start: 2020-11-27 | End: 2020-11-28

## 2020-11-27 RX ORDER — SODIUM CHLORIDE 9 MG/ML
INJECTION, SOLUTION INTRAVENOUS CONTINUOUS PRN
Status: DISCONTINUED | OUTPATIENT
Start: 2020-11-27 | End: 2020-11-27

## 2020-11-27 RX ORDER — FENTANYL CITRATE 50 UG/ML
INJECTION, SOLUTION INTRAMUSCULAR; INTRAVENOUS
Status: DISCONTINUED | OUTPATIENT
Start: 2020-11-27 | End: 2020-11-27

## 2020-11-27 RX ADMIN — MELATONIN TAB 3 MG 6 MG: 3 TAB at 10:11

## 2020-11-27 RX ADMIN — SUGAMMADEX 200 MG: 100 INJECTION, SOLUTION INTRAVENOUS at 10:11

## 2020-11-27 RX ADMIN — ROCURONIUM BROMIDE 25 MG: 10 INJECTION, SOLUTION INTRAVENOUS at 09:11

## 2020-11-27 RX ADMIN — ROCURONIUM BROMIDE 5 MG: 10 INJECTION, SOLUTION INTRAVENOUS at 09:11

## 2020-11-27 RX ADMIN — CLINDAMYCIN IN 5 PERCENT DEXTROSE 900 MG: 18 INJECTION, SOLUTION INTRAVENOUS at 05:11

## 2020-11-27 RX ADMIN — SODIUM CHLORIDE, SODIUM LACTATE, POTASSIUM CHLORIDE, AND CALCIUM CHLORIDE: .6; .31; .03; .02 INJECTION, SOLUTION INTRAVENOUS at 10:11

## 2020-11-27 RX ADMIN — VANCOMYCIN HYDROCHLORIDE 1250 MG: 1.25 INJECTION, POWDER, LYOPHILIZED, FOR SOLUTION INTRAVENOUS at 02:11

## 2020-11-27 RX ADMIN — PIPERACILLIN AND TAZOBACTAM 4.5 G: 4; .5 INJECTION, POWDER, LYOPHILIZED, FOR SOLUTION INTRAVENOUS; PARENTERAL at 09:11

## 2020-11-27 RX ADMIN — OXYCODONE HYDROCHLORIDE 5 MG: 5 TABLET ORAL at 02:11

## 2020-11-27 RX ADMIN — FENTANYL CITRATE 50 MCG: 50 INJECTION, SOLUTION INTRAMUSCULAR; INTRAVENOUS at 09:11

## 2020-11-27 RX ADMIN — PROPOFOL 40 MG: 10 INJECTION, EMULSION INTRAVENOUS at 09:11

## 2020-11-27 RX ADMIN — OXYCODONE HYDROCHLORIDE 5 MG: 5 TABLET ORAL at 10:11

## 2020-11-27 RX ADMIN — MIDAZOLAM HYDROCHLORIDE 2 MG: 1 INJECTION, SOLUTION INTRAMUSCULAR; INTRAVENOUS at 09:11

## 2020-11-27 RX ADMIN — HYDROMORPHONE HYDROCHLORIDE 0.2 MG: 1 INJECTION, SOLUTION INTRAMUSCULAR; INTRAVENOUS; SUBCUTANEOUS at 10:11

## 2020-11-27 RX ADMIN — Medication 40 MG: at 09:11

## 2020-11-27 RX ADMIN — SUCCINYLCHOLINE CHLORIDE 160 MG: 20 INJECTION, SOLUTION INTRAMUSCULAR; INTRAVENOUS at 09:11

## 2020-11-27 RX ADMIN — HYDROMORPHONE HYDROCHLORIDE 0.5 MG: 1 INJECTION, SOLUTION INTRAMUSCULAR; INTRAVENOUS; SUBCUTANEOUS at 08:11

## 2020-11-27 RX ADMIN — PROPOFOL 180 MG: 10 INJECTION, EMULSION INTRAVENOUS at 09:11

## 2020-11-27 RX ADMIN — INSULIN ASPART 4 UNITS: 100 INJECTION, SOLUTION INTRAVENOUS; SUBCUTANEOUS at 10:11

## 2020-11-27 RX ADMIN — SODIUM CHLORIDE: 0.9 INJECTION, SOLUTION INTRAVENOUS at 09:11

## 2020-11-27 RX ADMIN — VANCOMYCIN HYDROCHLORIDE 1250 MG: 1.25 INJECTION, POWDER, LYOPHILIZED, FOR SOLUTION INTRAVENOUS at 08:11

## 2020-11-27 RX ADMIN — ROCURONIUM BROMIDE 10 MG: 10 INJECTION, SOLUTION INTRAVENOUS at 09:11

## 2020-11-27 RX ADMIN — INSULIN ASPART 6 UNITS: 100 INJECTION, SOLUTION INTRAVENOUS; SUBCUTANEOUS at 05:11

## 2020-11-27 RX ADMIN — INSULIN ASPART 15 UNITS: 100 INJECTION, SOLUTION INTRAVENOUS; SUBCUTANEOUS at 12:11

## 2020-11-27 RX ADMIN — HYDROMORPHONE HYDROCHLORIDE 0.5 MG: 1 INJECTION, SOLUTION INTRAMUSCULAR; INTRAVENOUS; SUBCUTANEOUS at 04:11

## 2020-11-27 RX ADMIN — OXYCODONE HYDROCHLORIDE 5 MG: 5 TABLET ORAL at 06:11

## 2020-11-27 RX ADMIN — PIPERACILLIN AND TAZOBACTAM 4.5 G: 4; .5 INJECTION, POWDER, LYOPHILIZED, FOR SOLUTION INTRAVENOUS; PARENTERAL at 02:11

## 2020-11-27 RX ADMIN — PIPERACILLIN AND TAZOBACTAM 4.5 G: 4; .5 INJECTION, POWDER, LYOPHILIZED, FOR SOLUTION INTRAVENOUS; PARENTERAL at 06:11

## 2020-11-27 RX ADMIN — INSULIN ASPART 15 UNITS: 100 INJECTION, SOLUTION INTRAVENOUS; SUBCUTANEOUS at 05:11

## 2020-11-27 RX ADMIN — ONDANSETRON 4 MG: 2 INJECTION, SOLUTION INTRAMUSCULAR; INTRAVENOUS at 10:11

## 2020-11-27 RX ADMIN — HYDROMORPHONE HYDROCHLORIDE 0.5 MG: 1 INJECTION, SOLUTION INTRAMUSCULAR; INTRAVENOUS; SUBCUTANEOUS at 10:11

## 2020-11-27 NOTE — PLAN OF CARE
Problem: Adult Inpatient Plan of Care  Goal: Plan of Care Review  Outcome: Ongoing, Progressing  Goal: Optimal Comfort and Wellbeing  Outcome: Ongoing, Progressing     Problem: Fall Injury Risk  Goal: Absence of Fall and Fall-Related Injury  Outcome: Ongoing, Progressing     POC review with patient. AAOX4. VVS. Procedure to replace seton drain in scrotum. Drsg with mesh underwear in place. Pain management with Dilaudid IV or PO Oxy. Safety maintain. No acute changes. WCTM

## 2020-11-27 NOTE — PROGRESS NOTES
Progress Note  Hospital Medicine    Admit Date: 11/21/2020  Length of Stay:  LOS: 6 days     SUBJECTIVE:         Follow-up For:  Diabetic ketoacidosis without coma associated with diabetes mellitus due to underlying condition    HPI/Interval history (See H&P for complete P,F,SHx) :     Mr. Calix is a 35 year old gentleman with PMH of Crohn's colitis (on Entyvio) s/p colectomy with end ileostomy with significant anorectal fistulizing disease s/p placement of multiple setons, h/o c diff infection, anemia and h/o steroid induced diabetes presents for fatigue for about 2 weeks.  His symptoms started around November 4th after he received infusion of Entyvio and also ate a very rare steak.  He reports associated increased urinary frequency as well as polydipsia and an 18 lb weight loss over the past few weeks.  States that he has a good appetite has been eating well, he denies any abdominal pain bloody stool from his ostomy, diarrhea, nausea/vomiting, fevers/chills, hematuria, dysuria, flank pain, chest pain, shortness of breath, cough or sore throat.       Interval History:   11/25 Patient lying in bed, no acute distress. Reports tolerating diet and fatigue has been improving. Continues to note scrotal swelling has unchanged. Increased scrotal pain after I&D.   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 Urology updated , findings likely from bedside debridement. no surgical intervention.  EUA with CRS tomorrow to assess for possible fistula tract between previous fistulas and scrotal abscess. started on clindamycin IV  11/27 EUA today -identification of fistula tract from previously known fistula to base of scrotum - seton placed.  Discontinued clindamycin    Review of Systems:   Pain scale: Constitutional: Positive for fatigue. Negative for chills and fever.   HENT: Negative for congestion.    Eyes: Negative for visual disturbance.   Respiratory: Negative for cough and shortness of breath.   "  Cardiovascular: Negative for chest pain and leg swelling.   Gastrointestinal: Negative for abdominal distention, abdominal pain, nausea and vomiting.   Genitourinary: Positive for scrotal swelling. Negative for dysuria.   Neurological: Negative for dizziness, weakness and numbness.   Psychiatric/Behavioral: Negative for confusion.     OBJECTIVE:     Vital Signs Range (Last 24H):  Temp:  [98 °F (36.7 °C)-98.6 °F (37 °C)]   Pulse:  []   Resp:  [11-20]   BP: (113-141)/(69-81)   SpO2:  [95 %-100 %]     Physical Exam:  Constitutional: Appears well-developed and well-nourished.   Head: Normocephalic and atraumatic.   Neck: Normal range of motion. Neck supple.   Cardiovascular: Normal heart rate.  Regular heart rhythm.  Pulmonary/Chest: Effort normal.   Abdominal: + tenderness.  ileostomy  Musculoskeletal: Normal range of motion. No edema.    -Scrotal swelling  Neurological: Alert and oriented to person, place, and time.   Skin: Skin is warm and dry.   Psychiatric: Normal mood and affect. Behavior is normal.         Estimated body mass index is 25.64 kg/m² as calculated from the following:    Height as of this encounter: 5' 8" (1.727 m).    Weight as of this encounter: 76.5 kg (168 lb 10.4 oz).    I & O (Last 24H):    Intake/Output Summary (Last 24 hours) at 11/27/2020 1033  Last data filed at 11/27/2020 0942  Gross per 24 hour   Intake 1540 ml   Output 1400 ml   Net 140 ml       Body mass index is 25.64 kg/m².    Estimated Creatinine Clearance: 59.2 mL/min (A) (based on SCr of 1.7 mg/dL (H)).        Laboratory/Diagnostic Data:    Imaging Results          CT Abdomen Pelvis With Contrast (Final result)  Result time 11/21/20 12:07:12    Final result by Nabil Cooper Jr., MD (11/21/20 12:07:12)             Impression:      Right perianal fistulous tract extending towards the scrotum with interval placement of a seton, noting the fistulous tract is similar to prior CT 02/09/2020.  There is a slightly more cephalad " fistulous track in relation to the seton again noted.  No abnormal fluid collection to suggest an abscess.    Marked scrotal skin thickening.  Correlate for cellulitis.    Stable postoperative changes of a total colectomy and right lower quadrant end ileostomy in this patient with reported history of Crohn's disease.    Electronically signed by resident: Rosio Freeman  Date:    11/21/2020  Time:    11:30    Electronically signed by: Nabil Cooper MD  Date:    11/21/2020  Time:    12:07           Narrative:    EXAMINATION:  CT ABDOMEN PELVIS WITH CONTRAST    CLINICAL HISTORY:  Abdominal pain, fever;Low abd pain and testicular pain/ swelling.  Patient with Crohn's disease, anal fistula with seat on and scrotal stent.  Concern for scrotal cellulitis/abscess/fistula;    TECHNIQUE:  Low dose axial images were obtained from the lung bases through the proximal thighs following the intravenous administration of 75 cc of Omnipaque 350.  Sagittal and coronal reformats were provided.    COMPARISON:  CT pelvis 02/09/2020    FINDINGS:  Heart: Normal in size.  No pericardial effusion.    Lung bases: Symmetrically expanded.  No consolidation, pleural effusion, mass, or pneumothorax.    Liver: Normal in size and attenuation without focal hepatic abnormality.    Gallbladder: Normal in appearance without evidence for cholecystitis.    Bile Ducts: No intra or extrahepatic biliary ductal dilation.    Pancreas: No pancreatic mass lesion or peripancreatic inflammatory change.    Spleen: Unremarkable.    Adrenals: Unremarkable.    Kidneys/ Ureters: Normal in size and location.  Kidneys enhance normally.  No focal renal abnormality or nephrolithiasis.  No hydroureteronephrosis.    Bladder: Smooth contours without bladder wall thickening.    Reproductive organs: Unremarkable.    GI Tract/Mesentery: The stomach is unremarkable.  Postoperative changes of a total colectomy and right lower quadrant end ileostomy.  Fatty deposition within  the wall with a few loops of small bowel in the pelvis, favored to reflect chronic inflammation.    Peritoneal Space: No intraperitoneal free fluid or free air. No pathologically enlarged lymph nodes.    Retroperitoneum: No significant adenopathy.    Abdominal wall/extraperitoneal soft tissues: Soft tissue thickening again seen extending from the right perianal region along the inner right medial gluteal fold into the perineum and right scrotum with interval placement of a Seton in this patient with known perianal fistula, similar in appearance to prior CT 02/09/2020.  Increased soft tissue and skin induration within the scrotum, new from prior CT 02/09/2020.  No peripherally enhancing fluid collections to suggest an abscess.    Vasculature: Abdominal aorta is normal in caliber, contour, and course without significant calcific atherosclerosis.    Bones: Unremarkable without acute fracture or bone destructive process.                             X-Ray Chest PA And Lateral (Final result)  Result time 11/21/20 10:29:32    Final result by Randy Amador DO (11/21/20 10:29:32)             Impression:      No acute abnormality.      Electronically signed by: Randy Amador  Date:    11/21/2020  Time:    10:29           Narrative:    EXAMINATION:  XR CHEST PA AND LATERAL    CLINICAL HISTORY:  Other fatigue.    TECHNIQUE:  PA and lateral views of the chest were performed.    COMPARISON:  Multiple prior radiographs of the chest, most recent from 04/01/2016.    FINDINGS:  The lungs are well expanded and clear. No focal opacities are seen. The pleural spaces are clear.  The cardiac silhouette is unremarkable.  The visualized osseous structures are unremarkable.                                Reviewed and noted in plan where applicable- Please see chart for full lab data.    Recent Labs   Lab 11/25/20  0544 11/26/20  0346 11/27/20  0327   WBC 7.23 7.93 9.28   HGB 12.6* 12.8* 13.1*   HCT 36.8* 36.6* 38.7*    175 170        Recent Labs   Lab 11/25/20  0544 11/26/20 0346 11/27/20  0327    137 140   K 3.2* 4.2 4.1    100 103   CO2 30* 28 24   BUN 12 11 14   CREATININE 0.8 0.8 1.7*   * 244* 185*   CALCIUM 9.0 8.7 9.1   MG 1.6 1.6 1.7   PHOS 4.2 4.2 4.2       Recent Labs   Lab 11/25/20  0544 11/26/20  0346 11/27/20  0327   ALKPHOS 72 76 90   ALT 11 12 24   AST 12 21 38   ALBUMIN 3.0* 3.0* 3.0*   PROT 6.4 6.8 6.9   BILITOT 0.4 0.4 0.5        Microbiology labs for the last week  Microbiology Results (last 7 days)     Procedure Component Value Units Date/Time    Culture, Anaerobe [[id number]] Collected: 11/25/20 1540    Order Status: Completed Specimen: Abscess from Groin Updated: 11/27/20 0717     Anaerobic Culture Culture in progress    Blood culture #1 **CANNOT BE ORDERED STAT** [[id number]] Collected: 11/21/20 0934    Order Status: Completed Specimen: Blood from Peripheral, Antecubital, Left Updated: 11/26/20 1012     Blood Culture, Routine No growth after 5 days.    Blood culture #2 **CANNOT BE ORDERED STAT** [[id number]] Collected: 11/21/20 0946    Order Status: Completed Specimen: Blood from Peripheral, Antecubital, Left Updated: 11/26/20 1012     Blood Culture, Routine No growth after 5 days.    Aerobic culture [[id number]] Collected: 11/25/20 1540    Order Status: Completed Specimen: Abscess from Groin Updated: 11/26/20 0721     Aerobic Bacterial Culture No growth    Gram stain [[id number]] Collected: 11/25/20 1540    Order Status: Completed Specimen: Abscess from Groin Updated: 11/25/20 1917     Gram Stain Result Rare WBC's      Rare Gram positive cocci      Rare Gram negative rods    Fungus culture [[id number]] Collected: 11/25/20 1540    Order Status: Sent Specimen: Abscess from Groin Updated: 11/25/20 7494           Medications:  Medication list was reviewed and changes noted under Assessment/Plan.        Current Facility-Administered Medications:     clindamycin in D5W 900 mg/50 mL IVPB 900 mg, 900 mg,  Intravenous, Q8H, Yogi Thomas MD, Last Rate: 50 mL/hr at 11/27/20 0514, 900 mg at 11/27/20 0514    dextrose 50% injection 12.5 g, 12.5 g, Intravenous, PRN, Yogi Thomas MD    dextrose 50% injection 25 g, 25 g, Intravenous, PRN, Yogi Thomas MD    fentaNYL injection 25 mcg, 25 mcg, Intravenous, Q5 Min PRN, Harsh Ashley MD    glucagon (human recombinant) injection 1 mg, 1 mg, Intramuscular, PRN, Yogi Thomas MD    glucose chewable tablet 16 g, 16 g, Oral, PRN, Yogi Thomas MD    glucose chewable tablet 24 g, 24 g, Oral, PRN, Yogi Thomas MD    HYDROmorphone injection 0.2 mg, 0.2 mg, Intravenous, Q5 Min PRN, Elicia Limon MD, 0.2 mg at 11/27/20 1030    HYDROmorphone injection 0.5 mg, 0.5 mg, Intravenous, Q6H PRN, Yogi Thomas MD, 0.5 mg at 11/27/20 0821    insulin aspart U-100 pen 1-10 Units, 1-10 Units, Subcutaneous, QID (AC + HS) PRN, Yogi Thomas MD, 4 Units at 11/26/20 1710    insulin aspart U-100 pen 15 Units, 15 Units, Subcutaneous, TIDWM, Yogi Thomas MD, 15 Units at 11/26/20 1709    insulin detemir U-100 pen 22 Units, 22 Units, Subcutaneous, BID, Yogi Thomas MD, 22 Units at 11/26/20 2032    melatonin tablet 6 mg, 6 mg, Oral, Nightly PRN, Yogi Thomas MD, 6 mg at 11/26/20 2031    ondansetron injection 4 mg, 4 mg, Intravenous, Q6H PRN, Yogi Thomas MD    oxyCODONE immediate release tablet 5 mg, 5 mg, Oral, Q6H PRN, Yogi Thomas MD, 5 mg at 11/27/20 1030    piperacillin-tazobactam 4.5 g in sodium chloride 0.9% 100 mL IVPB (ready to mix system), 4.5 g, Intravenous, Q8H, Yogi Thomas MD, Last Rate: 25 mL/hr at 11/27/20 0607, 4.5 g at 11/27/20 0607    promethazine (PHENERGAN) 12.5 mg in dextrose 5 % 50 mL IVPB, 12.5 mg, Intravenous, Q6H PRN, Yogi Thomas MD    sodium chloride 0.9% flush 10 mL, 10 mL, Intravenous, PRN, Yogi Thomas MD    sodium chloride 0.9% flush 10 mL, 10 mL, Intravenous, PRN, Yogi Thomas MD    sodium  chloride 0.9% flush 10 mL, 10 mL, Intravenous, PRN, Elicia Limon MD    sodium chloride 0.9% flush 10 mL, 10 mL, Intravenous, PRN, Harsh Ashley MD    vancomycin 1.25 g in dextrose 5% 250 mL IVPB (ready to mix), 15 mg/kg, Intravenous, Q12H, Yogi Thomas MD, Last Rate: 166.7 mL/hr at 20, 1,250 mg at 20        ASSESSMENT/PLAN:     Active Problems:     DKA- resolved   H/o steroid induced DM  - DKA likely triggered by scrotal cellulitis   - HA1c: 13.8  - A --> 13  - betahydroxybutyrate: 4.6  - BG on admit, 615 --> 425  - VBG on admit, pH: 7.29/38  - s/p albuterol, insulin/dextrose in the ED  - DKA pathway initiated   - continue insulin gtt   - CLD (no sugar)  - continue IVF  - clear liquid diet (no sugar)  - endocrinology consulted. apprec recs   -- s/p transitional insulin gtt  -- Start Levemir 22 U BID  -- Start Novolog 15 U AC  -- Low dose correction insulin   -- FS qAC/HS/AM   -- diabetic diet    Patient's FSGs are not controlled on current hypoglycemics.   Last A1c reviewed-   Lab Results   Component Value Date    HGBA1C 13.8 (H) 2020    HGBA1C 13.3 (H) 2020    HGBA1C 6.0 2011     Most recent fingerstick glucose reviewed-   Recent Labs   Lab 20  1113 20  1658 20  2217 20  1024   POCTGLUCOSE 194* 206* 142* 236*        Crohn colitis  S/p colectomy with end ileostomy  Anorectal fistulizing disease s/p placement of multiple setons  - follows with general surgery (Dr. Blackburn). Last visit on 10/5/2020  - follows with CRS (Dr. Suarez). Last visit 8/15/2020  - Given extent of anorectal disease with fistulae and stenosis, ileostomy will be permanent. Appears not to be a candidate for an attempt at restoring intestinal continuity given the extent of his anal disease, and I suspect he will ultimately require a completion proctectomy Discussed completion proctectomy [facility] patient - he is not ready to proceed at this point.  - ESR: 39, CRP:  35  - continue home Entyvio  11/26 GI consulted - no medication changes recommended at this time       KEITH    Patient with  acute kidney injury.Serum BUN/Cr uptrending.  Strict I/O monitor .   Recent Labs   Lab 11/25/20  0544 11/26/20  0346 11/27/20  0327   BUN 12 11 14   CREATININE 0.8 0.8 1.7*   started on RL hydration         Scrotal cellulitis   - elevated ESR and CRP  - CT A/P with contrast  (11/21) showed 'Right perianal fistulous tract extending towards the scrotum with interval placement of a seton, noting the fistulous tract is similar to prior CT 02/09/2020. No abnormal fluid collection to suggest an abscess. Marked scrotal skin thickening. Correlate for cellulitis'  - followup bcx   - ID consulted. apprec recs  -- escalated from clindamycin to vancomycin and zosyn   - Urology consulted. apprec recs   -- s/p bedside I&D and packing on 11/25. followup I&D cultures  -- scrotal support, ice packs  -- can't give NSAIDs due to h/o ulcers  - CRS consulted. 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 Urology updated , findings likely from bedside debridement. no surgical intervention.  EUA with CRS tomorrow to assess for possible fistula tract between previous fistulas and scrotal abscess. started on clindamycin IV  11/27 EUA today -identification of fistula tract from previously known fistula to base of scrotum - seton placed.  Discontinued clindamycin.  Monitor for vancomycin toxicity      Anemia   Patient's with Normocyticanemia.. Hemoglobin stable. Etiology likely due to chronic disease .  Current CBC reviewed-    Recent Labs   Lab 11/25/20  0544 11/26/20  0346 11/27/20  0327   HGB 12.6* 12.8* 13.1*     Monitor serial CBC and transfuse if H/H drops below 7/21.       Component Value Date/Time    MCV 89 11/27/2020 0327    RDW 12.8 11/27/2020 0327    IRON 21 (L) 09/28/2010 1531    FERRITIN 23 03/11/2015 1725    FOLATE 8.3 12/29/2012 0614    BAHBATEJ45 884 12/29/2012  0614    OCCULTBLOOD Positive (A) 09/29/2014 2245        Protein calorie malnutrition  - Boost TID when DKA resolves        Disposition- Home    Discharge Planning   MIGUEL: 11/27/2020     Code Status: Full Code   Is the patient medically ready for discharge?: (No Documentation)    Reason for patient still in hospital (select all that apply): Treatment  Discharge Plan A: Home with family   Discharge Delays: None known at this time     DVT prophylaxis addressed with:  SCDs.          Subsequent Hospital Care    Level 3 [id number] Total visit time was 35 minutes or greater with greater than 50% of time spent in counseling and coordination of care. .    We discussed in detail the plan of care, the patient's response to treatment, the discharge plan.  Total time includes time spent reviewing the medical record, examining the patient, writing notes and communicating with other professionals.         Eusebio Berumen MD  Attending Staff Physician  [facility]  pager- [phone]  [employer] - [phone]

## 2020-11-27 NOTE — SUBJECTIVE & OBJECTIVE
Interval History:  Today, patient went to OR   OR findings as follows:  1.  Severe anal stenosis  2.  Three pre-existing extra sphincteric setons along the right side of the anal margin  3.  Open wound at the base of the scrotum where prior incision and drainage had been performed by Urology  4.  Fistulous connection from the open wound at the base of the scrotum to the most anterior external fistula opening in the perineum  5.  No residual abscess  Patient reports ongoing scrotal swelling and pain, but improved compared to yesterday    Review of Systems   Constitutional: Negative for chills, diaphoresis and fever.   HENT: Negative for rhinorrhea and sore throat.    Respiratory: Negative for cough and shortness of breath.    Cardiovascular: Negative for chest pain and leg swelling.   Gastrointestinal: Positive for rectal pain. Negative for abdominal pain, diarrhea, nausea and vomiting.   Genitourinary: Positive for scrotal swelling. Negative for dysuria, hematuria and testicular pain.   Musculoskeletal: Negative for arthralgias and myalgias.   Skin: Negative for rash.   Neurological: Negative for headaches.     Objective:     Vital Signs (Most Recent):  Temp: 96.5 °F (35.8 °C) (11/27/20 1126)  Pulse: 84 (11/27/20 1126)  Resp: 18 (11/27/20 1126)  BP: 127/77 (11/27/20 1126)  SpO2: 96 % (11/27/20 1126) Vital Signs (24h Range):  Temp:  [96.5 °F (35.8 °C)-98.6 °F (37 °C)] 96.5 °F (35.8 °C)  Pulse:  [] 84  Resp:  [12-20] 18  SpO2:  [95 %-100 %] 96 %  BP: (112-141)/(60-81) 127/77     Weight: 76.5 kg (168 lb 10.4 oz)  Body mass index is 25.64 kg/m².    Estimated Creatinine Clearance: 59.2 mL/min (A) (based on SCr of 1.7 mg/dL (H)).    Physical Exam  Constitutional:       General: He is not in acute distress.     Appearance: He is well-developed. He is not diaphoretic.   HENT:      Head: Normocephalic and atraumatic.   Eyes:      Conjunctiva/sclera: Conjunctivae normal.   Neck:      Musculoskeletal: Normal range of  motion and neck supple.   Pulmonary:      Effort: Pulmonary effort is normal. No respiratory distress.   Abdominal:      General: There is no distension.      Palpations: Abdomen is soft.   Musculoskeletal: Normal range of motion.   Skin:     General: Skin is warm and dry.      Findings: No erythema or rash.   Neurological:      Mental Status: He is alert and oriented to person, place, and time.   Psychiatric:         Behavior: Behavior normal.         Significant Labs: All pertinent labs within the past 24 hours have been reviewed.    Significant Imaging: I have reviewed all pertinent imaging results/findings within the past 24 hours.

## 2020-11-27 NOTE — SUBJECTIVE & OBJECTIVE
"Interval HPI:   Glucose at goal now. OR today.     Overnight events: PINO   Eating:   NPO for OR  Nausea: No  Hypoglycemia and intervention: No  Fever: No  TPN and/or TF: No      /70 (BP Location: Left arm, Patient Position: Lying)   Pulse 85   Temp 98.1 °F (36.7 °C) (Oral)   Resp 18   Ht 5' 8" (1.727 m)   Wt 76.5 kg (168 lb 10.4 oz)   SpO2 97%   BMI 25.64 kg/m²     Labs Reviewed and Include    Recent Labs   Lab 11/27/20  0327   *   CALCIUM 9.1   ALBUMIN 3.0*   PROT 6.9      K 4.1   CO2 24      BUN 14   CREATININE 1.7*   ALKPHOS 90   ALT 24   AST 38   BILITOT 0.5     Lab Results   Component Value Date    WBC 9.28 11/27/2020    HGB 13.1 (L) 11/27/2020    HCT 38.7 (L) 11/27/2020    MCV 89 11/27/2020     11/27/2020     No results for input(s): TSH, FREET4 in the last 168 hours.  Lab Results   Component Value Date    HGBA1C 13.8 (H) 11/21/2020       Nutritional status:   Body mass index is 25.64 kg/m².  Lab Results   Component Value Date    ALBUMIN 3.0 (L) 11/27/2020    ALBUMIN 3.0 (L) 11/26/2020    ALBUMIN 3.0 (L) 11/25/2020     Lab Results   Component Value Date    PREALBUMIN 34 03/31/2015    PREALBUMIN 7 (L) 11/11/2014    PREALBUMIN 8 (L) 11/10/2014       Estimated Creatinine Clearance: 59.2 mL/min (A) (based on SCr of 1.7 mg/dL (H)).    Accu-Checks  Recent Labs     11/24/20  2326 11/25/20  0348 11/25/20  0723 11/25/20  1127 11/25/20  1609 11/25/20  2239 11/26/20  0744 11/26/20  1113 11/26/20  1658 11/26/20  2217   POCTGLUCOSE 302* 251* 245* 283* 252* 288* 265* 194* 206* 142*       Current Medications and/or Treatments Impacting Glycemic Control  Immunotherapy:    Immunosuppressants     None        Steroids:   Hormones (From admission, onward)    Start     Stop Route Frequency Ordered    11/21/20 1406  melatonin tablet 6 mg      -- Oral Nightly PRN 11/21/20 1407        Pressors:    Autonomic Drugs (From admission, onward)    None        Hyperglycemia/Diabetes Medications: "   Antihyperglycemics (From admission, onward)    Start     Stop Route Frequency Ordered    11/26/20 0900  insulin detemir U-100 pen 22 Units      -- SubQ 2 times daily 11/26/20 0754    11/26/20 0800  insulin aspart U-100 pen 15 Units      -- SubQ 3 times daily with meals 11/26/20 0754    11/25/20 0804  insulin aspart U-100 pen 1-10 Units      -- SubQ Before meals & nightly PRN 11/25/20 0704

## 2020-11-27 NOTE — PLAN OF CARE
11/27/20 1313   Discharge Reassessment   Assessment Type Discharge Planning Reassessment   Discharge Plan A Home with family   Discharge Plan B Home Health   DME Needed Upon Discharge  other (see comments)  (TBD)   Anticipated Discharge Disposition Home   Post-Acute Status   Post-Acute Authorization Other   Other Status No Post-Acute Service Needs   Discharge Delays None known at this time

## 2020-11-27 NOTE — OP NOTE
DATE OF PROCEDURE: 11/27/2020     PREOPERATIVE DIAGNOSES:   1.  Crohn's disease  2.  Multiple anal fistulae  3.  Scrotal abscess     POSTOPERATIVE DIAGNOSES:   1.  Crohn's disease  2.  Multiple anal fistulae  3.  Scrotal abscess with connection to pre-existing fistulae    PROCEDURES PERFORMED:  Examination under anesthesia with draining seton placement     SURGEON:  Robe Suarez M.D.     ASSISTANT:  Yogi Thomas M.D. [RES]     ANESTHESIA:  General endotracheal     IV FLUIDS:  1000 mL of crystalloid.     ESTIMATED BLOOD LOSS:  <5 mL.     DRAINS:  One new draining seton, two previously placed draining setons     SPECIMENS:  None    COMPLICATIONS:  None.     OPERATIVE FINDINGS:   1.  Severe anal stenosis  2.  Three pre-existing extra sphincteric setons along the right side of the anal margin  3.  Open wound at the base of the scrotum where prior incision and drainage had been performed by Urology  4.  Fistulous connection from the open wound at the base of the scrotum to the most anterior external fistula opening in the perineum  5.  No residual abscess     INDICATIONS:  The patient is a 35-year-old male with a history of Crohn's disease having undergone a prior total abdominal colectomy with end ileostomy.  He has a short anorectal stump that is not continuity, and has anal stenosis with severe fistulizing perianal Crohn's disease.  He has undergone multiple prior drainage procedures and currently has 3 draining setons in place.  He was admitted to the hospital with what appeared to be a scrotal abscess.  Bedside incision and drainage was performed by Urology.  He is being brought to the operating room today to further evaluate the wound for possible fistula.     DESCRIPTION OF PROCEDURE:  The patient was identified, brought to the Operating Room and placed on the stretcher in a supine position after obtaining informed   consent.  Venous sequential stockings were placed.  After induction of general endotracheal  anesthesia, he was repositioned on the operating table in a prone position using chest rolls and adequate padding of all pressure points.  The table was jackknifed and the buttocks were taped apart to efface the anal verge.  The perianal region was prepped and draped in the usual sterile fashion.      On initial inspection, he had stigmata of severe perianal Crohn's disease.  There were 3 extra sphincteric draining setons in place along the right side of the anal margin.  There was an open wound at the base of the scrotum where prior incision and drainage had been performed by Urology.  We attempted to perform digital rectal examination and anoscopy but he had a tight anal stricture.  The tissues around the 3 extra sphincteric setons on the right side appeared soft, noninflamed, and non indurated.  The middle seton was through a very short tract that was well-formed, so the seton was cut and removed.  We then probed the area the base of the scrotum more prior drainage had been performed.  This was found to track posteriorly to connect with the anterior-most external fistula opening at the level of the perineum.  A vessel loop was secured to the fistula probe with a silk tie, and the probe was withdrawn, pulling the vessel loop through the fistula tract.  The ends of the vessel loop were secured to each other with 0 silk ties for it to be left in place as a draining seton.  The open wound was probed and there was no evidence of undrained abscess at the base of the scrotum.  Once we were ensured of adequate drainage, sterile dressings were applied.     The patient tolerated the procedure well with no complications.  He was extubated in the Operating Room and taken to Recovery in satisfactory condition.  All needle, instrument and sponge counts were correct at the end of the case.  I was present throughout the entire procedure.    Robe Suarez MD, FACS, FASCRS  Senior Staff Surgeon  Department of Colon & Rectal  Surgery     This note was created using voice recognition software, and may contain some unrecognized transcriptional errors.

## 2020-11-27 NOTE — CONSULTS
Pharmacokinetic Assessment Follow Up: IV Vancomycin    Vancomycin serum concentration assessment(s)/plan:  · Vancomycin level= 32.8 mcg/mL (3.5 hr level). Repeat level =17.1 mcg/mL; goal 10-20   · Serum creatinine increased from 0.8 to 1.7 mg/dL in 24 hours. Urine output = 1700 mL  · Discontinued scheduled regimen and will implement pulse dosing  · Vancomycin 1250 mg IV once today  · Continue to dose by level when the random is less than 20 mcg/mL  · Next level due on 11/28@0430    Drug levels (last 3 results):  Recent Labs   Lab Result Units 11/27/20  0603 11/27/20  1340   Vancomycin-Trough ug/mL 32.8* 17.1     Pharmacy will continue to follow and monitor vancomycin.    Please contact pharmacy at extension [phone] for questions regarding this assessment.    Thank you for the consult,   Rosario Marshall     Patient brief summary:  Amando Calix is a 35 y.o. male initiated on antimicrobial therapy with IV Vancomycin for treatment of skin & soft tissue infection    The patient's current regimen is 1250 mg IV q12h    Drug Allergies:   Review of patient's allergies indicates:   Allergen Reactions    Ibuprofen Other (See Comments)     Can't take d/t stomach ulcers     Actual Body Weight:   76.5 kg    Renal Function:   Estimated Creatinine Clearance: 59.2 mL/min (A) (based on SCr of 1.7 mg/dL (H)).,     Dialysis Method (if applicable):  N/A    CBC (last 72 hours):  Recent Labs   Lab Result Units 11/25/20  0544 11/26/20  0346 11/27/20  0327   WBC K/uL 7.23 7.93 9.28   Hemoglobin g/dL 12.6* 12.8* 13.1*   Hematocrit % 36.8* 36.6* 38.7*   Platelets K/uL 165 175 170   Gran % % 62.8 65.2 66.5   Lymph % % 22.1 20.1 15.5*   Mono % % 13.4 13.2 16.9*   Eosinophil % % 1.1 0.9 0.4   Basophil % % 0.3 0.1 0.2   Differential Method  Automated Automated Automated       Metabolic Panel (last 72 hours):  Recent Labs   Lab Result Units 11/25/20  0544 11/26/20  0346 11/27/20  0327   Sodium mmol/L 138 137 140   Potassium mmol/L 3.2* 4.2  4.1   Chloride mmol/L 100 100 103   CO2 mmol/L 30* 28 24   Glucose mg/dL 202* 244* 185*   BUN mg/dL 12 11 14   Creatinine mg/dL 0.8 0.8 1.7*   Albumin g/dL 3.0* 3.0* 3.0*   Total Bilirubin mg/dL 0.4 0.4 0.5   Alkaline Phosphatase U/L 72 76 90   AST U/L 12 21 38   ALT U/L 11 12 24   Magnesium mg/dL 1.6 1.6 1.7   Phosphorus mg/dL 4.2 4.2 4.2       Vancomycin Administrations:  vancomycin given in the last 96 hours                   vancomycin 1.25 g in dextrose 5% 250 mL IVPB (ready to mix) (mg) 1,250 mg New Bag 11/27/20 0229     1,250 mg New Bag 11/26/20 1454     1,250 mg New Bag  0202    vancomycin 1.5 g in dextrose 5 % 250 mL IVPB (ready to mix) (mg) 1,500 mg New Bag 11/25/20 1425                Microbiologic Results:  Microbiology Results (last 7 days)     Procedure Component Value Units Date/Time    Culture, Anaerobe [[id number]] Collected: 11/25/20 1540    Order Status: Completed Specimen: Abscess from Groin Updated: 11/27/20 1041     Anaerobic Culture Culture in progress    Blood culture #1 **CANNOT BE ORDERED STAT** [[id number]] Collected: 11/21/20 0934    Order Status: Completed Specimen: Blood from Peripheral, Antecubital, Left Updated: 11/26/20 1012     Blood Culture, Routine No growth after 5 days.    Blood culture #2 **CANNOT BE ORDERED STAT** [[id number]] Collected: 11/21/20 0946    Order Status: Completed Specimen: Blood from Peripheral, Antecubital, Left Updated: 11/26/20 1012     Blood Culture, Routine No growth after 5 days.    Aerobic culture [[id number]] Collected: 11/25/20 1540    Order Status: Completed Specimen: Abscess from Groin Updated: 11/26/20 0721     Aerobic Bacterial Culture No growth    Gram stain [[id number]] Collected: 11/25/20 1540    Order Status: Completed Specimen: Abscess from Groin Updated: 11/25/20 1917     Gram Stain Result Rare WBC's      Rare Gram positive cocci      Rare Gram negative rods    Fungus culture [[id number]] Collected: 11/25/20 1540    Order Status: Sent Specimen:  Abscess from Groin Updated: 11/25/20 6386

## 2020-11-27 NOTE — BRIEF OP NOTE
[facility]  Brief Operative Note    SUMMARY     Surgery Date: 11/27/2020     Surgeon(s) and Role:     * Robe Suarez MD - Primary     * Yogi Thomas MD - Fellow    Assisting Surgeon: None    Pre-op Diagnosis:  Crohn's disease of both small and large intestine without complication [K50.80]    Post-op Diagnosis:  Post-Op Diagnosis Codes:     * Crohn's disease of both small and large intestine without complication [K50.80]    Procedure(s) (LRB):  Exam under anesthesia and seton placement (N/A)    Anesthesia: GETA    Description of Procedure: identification of fistula tract from previously known fistula to base of scrotum - seton placed    Description of the findings of the procedure: As above    Estimated Blood Loss: Minimal    Estimated Blood Loss has been documented.         Specimens:   Specimen (12h ago, onward)    None          [id number]

## 2020-11-27 NOTE — NURSING TRANSFER
Nursing Transfer Note      11/27/2020     Transfer To: [id number] from PACU    Transfer via stretcher    Transfer with cardiac monitoring    Transported by Transport    Medicines sent: Novolog pen    Chart send with patient: Yes    Notified: Mother    Patient reassessed at: 11/27/20 @1100    Personal belongings ( phone+clothing) sent with pateint

## 2020-11-27 NOTE — PROGRESS NOTES
[facility]  General Surgery  Progress Note    Subjective:     History of Present Illness:  No notes on file    Post-Op Info:  Procedure(s) (LRB):  Exam under anesthesia (N/A)   Day of Surgery     Interval History: NAEON. Packing changed at bedside yesterday by urology. Patient reports improved pain.     Medications:  Continuous Infusions:  Scheduled Meds:   clindamycin (CLEOCIN) IVPB  900 mg Intravenous Q8H    insulin aspart U-100  15 Units Subcutaneous TIDWM    insulin detemir U-100  22 Units Subcutaneous BID    piperacillin-tazobactam (ZOSYN) IVPB  4.5 g Intravenous Q8H    vancomycin (VANCOCIN) IVPB  15 mg/kg Intravenous Q12H     PRN Meds:dextrose 50%, dextrose 50%, glucagon (human recombinant), glucose, glucose, HYDROmorphone, insulin aspart U-100, melatonin, ondansetron, oxyCODONE, promethazine (PHENERGAN) IVPB, sodium chloride 0.9%, sodium chloride 0.9%     Review of patient's allergies indicates:   Allergen Reactions    Ibuprofen Other (See Comments)     Can't take d/t stomach ulcers     Objective:     Vital Signs (Most Recent):  Temp: 98.1 °F (36.7 °C) (11/27/20 0859)  Pulse: 85 (11/27/20 0859)  Resp: 18 (11/27/20 0859)  BP: 132/70 (11/27/20 0859)  SpO2: 97 % (11/27/20 0859) Vital Signs (24h Range):  Temp:  [98 °F (36.7 °C)-98.6 °F (37 °C)] 98.1 °F (36.7 °C)  Pulse:  [] 85  Resp:  [11-20] 18  SpO2:  [95 %-99 %] 97 %  BP: (113-141)/(69-85) 132/70     Weight: 76.5 kg (168 lb 10.4 oz)  Body mass index is 25.64 kg/m².    Intake/Output - Last 3 Shifts       11/25 0700 - 11/26 0659 11/26 0700 - 11/27 0659 11/27 0700 - 11/28 0659    P.O. 1040 1200     IV Piggyback 450 500     Total Intake(mL/kg) 1490 (19.5) 1700 (22.2)     Urine (mL/kg/hr) 1100 (0.6) 1700 (0.9)     Emesis/NG output  0     Other  0     Stool 0 0     Blood  0     Total Output 1100 1700     Net +390 0            Stool Occurrence 1 x 0 x 0 x          Physical Exam  Vitals signs and nursing note reviewed.   Constitutional:        Appearance: Normal appearance.   HENT:      Head: Normocephalic and atraumatic.   Cardiovascular:      Rate and Rhythm: Normal rate and regular rhythm.   Pulmonary:      Effort: Pulmonary effort is normal. No respiratory distress.   Abdominal:      General: Abdomen is flat.      Palpations: Abdomen is soft.   Genitourinary:     Comments: Scrotal swelling with packing in place  Tender to palpation  Reduced induration of perineal region  Skin:     General: Skin is warm and dry.   Neurological:      General: No focal deficit present.      Mental Status: He is alert and oriented to person, place, and time.   Psychiatric:         Mood and Affect: Mood normal.         Behavior: Behavior normal.         Significant Labs:  CBC:   Recent Labs   Lab 11/27/20  0327   WBC 9.28   RBC 4.37*   HGB 13.1*   HCT 38.7*      MCV 89   MCH 30.0   MCHC 33.9     CMP:   Recent Labs   Lab 11/27/20 0327   *   CALCIUM 9.1   ALBUMIN 3.0*   PROT 6.9      K 4.1   CO2 24      BUN 14   CREATININE 1.7*   ALKPHOS 90   ALT 24   AST 38   BILITOT 0.5       Significant Diagnostics:  I have reviewed all pertinent imaging results/findings within the past 24 hours.    Assessment/Plan:     Cellulitis of scrotum  34 yo M, with history of Crohn's disease, s/p total abdominal colectomy with end ileostomy and a short rectal stump, multiple fistulas in the past requiring seton placement, admitted to hospital medicine in DKA with scrotal swelling vs abscess, s/p bedside I&D with urology 11/25.    - To OR todau for EUA, will evaluate for potential fistula communicating with scrotum.  - Consent obtained  - continue abx  - Remainder of care per primary            Digna Stafford MD  General Surgery  [facility]

## 2020-11-27 NOTE — SUBJECTIVE & OBJECTIVE
Interval History: NAEON. Packing changed at bedside yesterday by urology. Patient reports improved pain.     Medications:  Continuous Infusions:  Scheduled Meds:   clindamycin (CLEOCIN) IVPB  900 mg Intravenous Q8H    insulin aspart U-100  15 Units Subcutaneous TIDWM    insulin detemir U-100  22 Units Subcutaneous BID    piperacillin-tazobactam (ZOSYN) IVPB  4.5 g Intravenous Q8H    vancomycin (VANCOCIN) IVPB  15 mg/kg Intravenous Q12H     PRN Meds:dextrose 50%, dextrose 50%, glucagon (human recombinant), glucose, glucose, HYDROmorphone, insulin aspart U-100, melatonin, ondansetron, oxyCODONE, promethazine (PHENERGAN) IVPB, sodium chloride 0.9%, sodium chloride 0.9%     Review of patient's allergies indicates:   Allergen Reactions    Ibuprofen Other (See Comments)     Can't take d/t stomach ulcers     Objective:     Vital Signs (Most Recent):  Temp: 98.1 °F (36.7 °C) (11/27/20 0859)  Pulse: 85 (11/27/20 0859)  Resp: 18 (11/27/20 0859)  BP: 132/70 (11/27/20 0859)  SpO2: 97 % (11/27/20 0859) Vital Signs (24h Range):  Temp:  [98 °F (36.7 °C)-98.6 °F (37 °C)] 98.1 °F (36.7 °C)  Pulse:  [] 85  Resp:  [11-20] 18  SpO2:  [95 %-99 %] 97 %  BP: (113-141)/(69-85) 132/70     Weight: 76.5 kg (168 lb 10.4 oz)  Body mass index is 25.64 kg/m².    Intake/Output - Last 3 Shifts       11/25 0700 - 11/26 0659 11/26 0700 - 11/27 0659 11/27 0700 - 11/28 0659    P.O. 1040 1200     IV Piggyback 450 500     Total Intake(mL/kg) 1490 (19.5) 1700 (22.2)     Urine (mL/kg/hr) 1100 (0.6) 1700 (0.9)     Emesis/NG output  0     Other  0     Stool 0 0     Blood  0     Total Output 1100 1700     Net +390 0            Stool Occurrence 1 x 0 x 0 x          Physical Exam  Vitals signs and nursing note reviewed.   Constitutional:       Appearance: Normal appearance.   HENT:      Head: Normocephalic and atraumatic.   Cardiovascular:      Rate and Rhythm: Normal rate and regular rhythm.   Pulmonary:      Effort: Pulmonary effort is normal. No  respiratory distress.   Abdominal:      General: Abdomen is flat.      Palpations: Abdomen is soft.   Genitourinary:     Comments: Scrotal swelling with packing in place  Tender to palpation  Reduced induration of perineal region  Skin:     General: Skin is warm and dry.   Neurological:      General: No focal deficit present.      Mental Status: He is alert and oriented to person, place, and time.   Psychiatric:         Mood and Affect: Mood normal.         Behavior: Behavior normal.         Significant Labs:  CBC:   Recent Labs   Lab 11/27/20  0327   WBC 9.28   RBC 4.37*   HGB 13.1*   HCT 38.7*      MCV 89   MCH 30.0   MCHC 33.9     CMP:   Recent Labs   Lab 11/27/20 0327   *   CALCIUM 9.1   ALBUMIN 3.0*   PROT 6.9      K 4.1   CO2 24      BUN 14   CREATININE 1.7*   ALKPHOS 90   ALT 24   AST 38   BILITOT 0.5       Significant Diagnostics:  I have reviewed all pertinent imaging results/findings within the past 24 hours.

## 2020-11-27 NOTE — ASSESSMENT & PLAN NOTE
34 yo M, with history of Crohn's disease, s/p total abdominal colectomy with end ileostomy and a short rectal stump, multiple fistulas in the past requiring seton placement, admitted to hospital medicine in DKA with scrotal swelling vs abscess, s/p bedside I&D with urology 11/25.    - To OR todau for EUA, will evaluate for potential fistula communicating with scrotum.  - Consent obtained  - continue abx  - Remainder of care per primary

## 2020-11-27 NOTE — ASSESSMENT & PLAN NOTE
A1c 13% new diagnosis of diabetes    On admission with DKA now resolved    FRANCHESCA-65 pending  Noted C-peptide detectable, does not rule out DENIS but helps with knowing he still has beta cell function and has ability to decrease own insulin production protecting him from hypoglycemia    Glucose at goal. Continue same regimen. Avoid prandial doses while NPO.    Plan:  - Insulin Levemir to 22 U BID  - Insulin Novolog to 15 U before every meal  - Moderate dose insulin correction scale  - BG checks ACHS  - Inpatient glucose goal 140-180 mg/dL

## 2020-11-27 NOTE — CONSULTS
[facility]  Gastroenterology  Consult Note    Patient Name: Amando Calix  MRN: [id number]  Admission Date: 11/21/2020  Hospital Length of Stay: 5 days  Code Status: Full Code   Attending Provider: Eusebio Berumen MD   Consulting Provider: Love Trevino MD  Primary Care Physician: Celestino Wright MD  Principal Problem:Diabetic ketoacidosis without coma associated with diabetes mellitus due to underlying condition    Inpatient consult to Gastroenterology  Consult performed by: Love Trevino MD  Consult ordered by: Eusebio Berumen MD        Subjective:     HPI: Amando Calix is a 35 y.o. male with history of Crohn's disease since childhood. He was originally diagnosed and treated at [facility] and that is where he probably got his 1st Remicade treatment.  He has failed treatment with Remicade Cimzia Humira azathioprine and methotrexate.  He had severe colonic disease.  He had 1 earlier surgery as well in about 2012, he was started on the stelara experimental protocol with notable improvement.  He was probably better for about a year and a half however he developed C difficile.  It seemed like the disease never returned to its improved state after that period and ultimately at the end of 2014 he had a subtotal colectomy with preservation of the rectum.     At that time he was started on entyvio.  Since then, he has had intermittent perianal abscesses treated with antibitoics, improved after his entyvio infusions. He lost his insurance 10/2019-3/2020 so he did not receive entyvio at that time. He was re-induced in 4/2020. Since then he was referred to CRS and had setons placed in July for perianal fistulas which develop intermittent drainage. Proctectomy was discussed but the patient wanted to defer that due to work and personal issues.     This admission, the patient presents for weight loss and fatigue for a few weeks prior to admission.  He reports that he lost  about 20 lb since November 6th.  He denies any abdominal pain, change in his ostomy output, blood, decreased appetite, nausea or vomiting.  His Crohn's flares usually manifest with decreased appetite and diarrhea.  He reports intermittent drainage from his perianal fistulas but no significant change.  His last Entyvio dose was on 11/04 and he received them every 8 weeks.  On admission, he was found to be in DKA (newly diagnosed diabetes).  He was also found to have a scrotal abscess which was drained with Urology on 11/25.  Colorectal surgery was consulted for perianal disease and he is to undergo EUA tomorrow.  GI was consulted to way and on medical management of Crohn's disease.        Past Medical History:   Diagnosis Date    Anemia     Chronic diarrhea     Clostridium difficile infection     Crohn's disease     Diabetic ketoacidosis without coma associated with diabetes mellitus due to underlying condition 11/21/2020    Protein calorie malnutrition     Steroid-induced diabetes        Past Surgical History:   Procedure Laterality Date    ABSCESS DRAINAGE      COLONOSCOPY      COLOSTOMY      FLEXIBLE SIGMOIDOSCOPY N/A 7/10/2020    Procedure: SIGMOIDOSCOPY, FLEXIBLE;  Surgeon: Robe Suarez MD;  Location: [facility] OR [address] [place] [address];  Service: Colon and Rectal;  Laterality: N/A;    HERNIA REPAIR      ILEOSTOMY      INSERTION OF SETON STITCH N/A 7/10/2020    Procedure: PLACEMENT-SETON DRAIN;  Surgeon: Robe Suarez MD;  Location: [facility] OR [address] [place] [address];  Service: Colon and Rectal;  Laterality: N/A;    SIGMOIDECTOMY         Family History   Problem Relation Age of Onset    Diabetes Father     Hyperlipidemia Mother     Diabetes Mother     Crohn's disease Neg Hx     Celiac disease Neg Hx     Cirrhosis Neg Hx     Colon cancer Neg Hx     Esophageal cancer Neg Hx     Irritable bowel syndrome Neg Hx     Liver cancer Neg Hx     Rectal cancer Neg Hx     Stomach cancer Neg Hx     Ulcerative colitis Neg Hx         Social History     Socioeconomic History    Marital status: Single     Spouse name: Not on file    Number of children: Not on file    Years of education: Not on file    Highest education level: Not on file   Occupational History    Not on file   Social Needs    Financial resource strain: Not on file    Food insecurity     Worry: Not on file     Inability: Not on file    Transportation needs     Medical: Not on file     Non-medical: Not on file   Tobacco Use    Smoking status: Current Every Day Smoker     Packs/day: 0.50     Years: 3.00     Pack years: 1.50     Types: Cigarettes     Last attempt to quit: 2014     Years since quittin.0    Smokeless tobacco: Never Used   Substance and Sexual Activity    Alcohol use: Yes     Comment: socially-weekly    Drug use: No    Sexual activity: Yes     Partners: Female   Lifestyle    Physical activity     Days per week: Not on file     Minutes per session: Not on file    Stress: Not on file   Relationships    Social connections     Talks on phone: Not on file     Gets together: Not on file     Attends Mu-ism service: Not on file     Active member of club or organization: Not on file     Attends meetings of clubs or organizations: Not on file     Relationship status: Not on file   Other Topics Concern    Not on file   Social History Narrative    Not on file       No current facility-administered medications on file prior to encounter.      Current Outpatient Medications on File Prior to Encounter   Medication Sig Dispense Refill    acetaminophen (TYLENOL ORAL) Take by mouth as needed.      VEDOLIZUMAB (ENTYVIO IV) Inject into the vein.         Review of patient's allergies indicates:   Allergen Reactions    Ibuprofen Other (See Comments)     Can't take d/t stomach ulcers       Review of Systems   Constitutional: Positive for malaise/fatigue and weight loss. Negative for chills and fever.   HENT: Negative.    Eyes: Negative.    Respiratory:  Negative.    Cardiovascular: Negative.    Gastrointestinal: Negative for abdominal pain, blood in stool, constipation, diarrhea, melena, nausea and vomiting.        Perianal drainage   Genitourinary:        Polyuria, scrotal tenderness   Musculoskeletal: Negative.    Endo/Heme/Allergies: Positive for polydipsia.   Psychiatric/Behavioral: Negative.         Objective:     Vitals:    11/26/20 1716   BP:    Pulse: 86   Resp: 18   Temp:          Constitutional:  not in acute distress and well developed  HENT: Head: Normal, normocephalic, atraumatic.  Eyes: conjunctiva clear and sclera nonicteric  Cardiovascular: regular rate and rhythm  Respiratory: normal chest expansion & respiratory effort   and no accessory muscle use  GI: soft, non-tender, without masses or organomegaly and Ileostomy bag in place, brown stool, no peristomal tenderness  Musculoskeletal: no muscular tenderness noted  Skin: normal color  Neurological: alert, oriented x3  Psychiatric: mood and affect are within normal limits, pt is a good historian; no memory problems were noted    Significant Labs:  Recent Labs   Lab 11/24/20  0352 11/25/20  0544 11/26/20  0346   HGB 13.9* 12.6* 12.8*       Lab Results   Component Value Date    WBC 7.93 11/26/2020    HGB 12.8 (L) 11/26/2020    HCT 36.6 (L) 11/26/2020    MCV 88 11/26/2020     11/26/2020       Lab Results   Component Value Date     11/26/2020    K 4.2 11/26/2020     11/26/2020    CO2 28 11/26/2020    BUN 11 11/26/2020    CREATININE 0.8 11/26/2020    CALCIUM 8.7 11/26/2020    ANIONGAP 9 11/26/2020    ESTGFRAFRICA >60.0 11/26/2020    EGFRNONAA >60.0 11/26/2020       Lab Results   Component Value Date    ALT 12 11/26/2020    AST 21 11/26/2020     (H) 07/30/2010    ALKPHOS 76 11/26/2020    BILITOT 0.4 11/26/2020       Lab Results   Component Value Date    INR 1.1 11/18/2014    INR 1.1 09/18/2012    INR 1.6 (H) 03/24/2012       Significant Imaging:  Reviewed pertinent radiology  findings.       Assessment/Plan:     Amando Calix is a 35 y.o. male with history of Crohn's disease who was admitted for weakness and weight loss, found to be in DKA.  Also found to have scrotal abscess.  GI consulted for evaluation of Crohn's disease and recommendations on medical management.    Problem List:  1. Crohn's disease  2. Scrotal abscess and cellulitis  3. Diabetes newly diagnosed    The patient appears to have recurrent perianal disease, which will be evaluated by colorectal surgery tomorrow with EUA.  No small bowel involvement apparent at this time.  He has failed multiple therapies in the past including Remicade, Cimzia, Stelara and is currently on Entyvio which he has been on since 2015, with a hiatus 10/2019-4/2020 therefore he has been on it for about 6 months.  The patient would benefit from proactive drug level monitoring, therefore will obtain Entyvio levels prior to next infusion.  Proctectomy is another possibility due to recurrent perianal disease, however the patient would like to defer at this time due to personal reasons.  It is unclear whether the scrotal cellulitis and abscess is an extension of his perianal disease vs infection unrelated to Crohn's disease in the setting of diabetes and immunocompromise.    Recommendations:  - no medication changes recommended at this time  - agree with EUA with Colorectal surgery and management of perianal disease if he has a recurrent fistula  - will obtain Entyvio drug levels prior to next infusion at the end of December    Thank you for involving us in the care of Amando Calix. Please call with any additional questions, concerns or changes in the patient's clinical status. We will continue to follow.    Love Trevino MD  Gastroenterology Fellow PGY IV   [facility]

## 2020-11-27 NOTE — ANESTHESIA PROCEDURE NOTES
Intubation  Performed by: Harsh Ashley MD  Authorized by: Elicia Limon MD     Intubation:     Induction:  Intravenous    Intubated:  Postinduction    Mask Ventilation:  Easy with oral airway    Attempts:  2    Attempted By:  Resident anesthesiologist    Blade:  Jarred 3    Laryngeal View Grade: Grade IIb - only the arytenoids and epiglottis seen      Attempted By (2nd Attempt):  Staff anesthesiologist    Blade (2nd Attempt):  Olivier 2    Laryngeal View Grade (2nd Attempt): Grade IIa - cords partially seen      Difficult Airway Encountered?: No      Complications:  Soft tissue trauma    Airway Device:  Oral endotracheal tube    Airway Device Size:  7.5    Style/Cuff Inflation:  Cuffed    Inflation Amount (mL):  8    Tube secured:  22    Secured at:  The lips    Placement Verified By:  Capnometry    Complicating Factors:  Anterior larynx (chipped front teeth of maxilla. )    Findings Post-Intubation:  BS equal bilateral

## 2020-11-27 NOTE — ASSESSMENT & PLAN NOTE
35-year-old male with history of Crohn's disease on Entyvio (last 11/2020) s/p total abdominal colectomy with end ileostomy and a short rectal stump, complex right perianal fistula with several subcutaneous tracts and openings s/p placement of 3 setons 7/2020, presented 11/21/2020 with increasing scrotal pain, CT abd/pelvis with no abnormal fluid collection to suggest abscess, found to be in DKA. Patient was started on clindamycin, but there was no clinical improvement. Urology evaluated patient 11/25/2020, performed bedside I&D, cultures pendings. On 11/27/2020, patient brought to OR, found to have severe anal stenosis and fistulous connection from the open wound at the base of the scrotum to the most anterior external fistula opening in the perineum.    Recommendations:  - Discontinue clinda IV  - Continue vanc / pip-tazo   - Follow-up I&D cultures  - Appreciate urology and colorectal surgery recommendations

## 2020-11-27 NOTE — NURSING
Patient for debridement of scrotum wound via w/c with IV Abx. In progress. AAOX4. Telemetry monitor. Spoke with JERICHO Wheeler report given

## 2020-11-27 NOTE — PROGRESS NOTES
"[facility]  Endocrinology  Progress Note    Admit Date: 11/21/2020     Reason for Consult: Management of DKA, Hyperglycemia     Surgical Procedure and Date: N/A     Diabetes diagnosis year: Newly Dx.  Per patient he had steroid induced DM during childhood and resolved after DC of steroids.     Home Diabetes Medications:  NONE     How often checking glucose at home? N/A    BG readings on regimen: N/a   Hypoglycemia on the regimen?  No  Missed doses on regimen?  No    Diabetes Complications include:     N/A     Complicating diabetes co morbidities:   N/A       HPI:   Patient is a 35 y.o. male with a diagnosis of Steroid induced DM during childhood that resolved after discontinuation of steroids, Chron's disease s/p colectomy and ileostomy that was admitted to [facility] [place]rotal cellulitis and KEITH.  During ED course patient was found to be in DKA w/ glucose 600s, BHB 4.6, PH 7.29, HCO3 13 and AG 27.  Pt was started on IV and Insulin infusion and consulted with endocrinology for assistance in management of DKA in the setting of newly Dx. DM.         Interval HPI:   Glucose at goal now. OR today.     Overnight events: PINO   Eating:   NPO for OR  Nausea: No  Hypoglycemia and intervention: No  Fever: No  TPN and/or TF: No      /70 (BP Location: Left arm, Patient Position: Lying)   Pulse 85   Temp 98.1 °F (36.7 °C) (Oral)   Resp 18   Ht 5' 8" (1.727 m)   Wt 76.5 kg (168 lb 10.4 oz)   SpO2 97%   BMI 25.64 kg/m²     Labs Reviewed and Include    Recent Labs   Lab 11/27/20  0327   *   CALCIUM 9.1   ALBUMIN 3.0*   PROT 6.9      K 4.1   CO2 24      BUN 14   CREATININE 1.7*   ALKPHOS 90   ALT 24   AST 38   BILITOT 0.5     Lab Results   Component Value Date    WBC 9.28 11/27/2020    HGB 13.1 (L) 11/27/2020    HCT 38.7 (L) 11/27/2020    MCV 89 11/27/2020     11/27/2020     No results for input(s): TSH, FREET4 in the last 168 hours.  Lab Results   Component Value Date    HGBA1C " 13.8 (H) 11/21/2020       Nutritional status:   Body mass index is 25.64 kg/m².  Lab Results   Component Value Date    ALBUMIN 3.0 (L) 11/27/2020    ALBUMIN 3.0 (L) 11/26/2020    ALBUMIN 3.0 (L) 11/25/2020     Lab Results   Component Value Date    PREALBUMIN 34 03/31/2015    PREALBUMIN 7 (L) 11/11/2014    PREALBUMIN 8 (L) 11/10/2014       Estimated Creatinine Clearance: 59.2 mL/min (A) (based on SCr of 1.7 mg/dL (H)).    Accu-Checks  Recent Labs     11/24/20  2326 11/25/20  0348 11/25/20  0723 11/25/20  1127 11/25/20  1609 11/25/20  2239 11/26/20  0744 11/26/20  1113 11/26/20  1658 11/26/20  2217   POCTGLUCOSE 302* 251* 245* 283* 252* 288* 265* 194* 206* 142*       Current Medications and/or Treatments Impacting Glycemic Control  Immunotherapy:    Immunosuppressants     None        Steroids:   Hormones (From admission, onward)    Start     Stop Route Frequency Ordered    11/21/20 1406  melatonin tablet 6 mg      -- Oral Nightly PRN 11/21/20 1407        Pressors:    Autonomic Drugs (From admission, onward)    None        Hyperglycemia/Diabetes Medications:   Antihyperglycemics (From admission, onward)    Start     Stop Route Frequency Ordered    11/26/20 0900  insulin detemir U-100 pen 22 Units      -- SubQ 2 times daily 11/26/20 0754    11/26/20 0800  insulin aspart U-100 pen 15 Units      -- SubQ 3 times daily with meals 11/26/20 0754    11/25/20 0804  insulin aspart U-100 pen 1-10 Units      -- SubQ Before meals & nightly PRN 11/25/20 0704          ASSESSMENT and PLAN    * Diabetic ketoacidosis without coma associated with diabetes mellitus due to underlying condition  A1c 13% new diagnosis of diabetes    On admission with DKA now resolved    FRANCHESCA-65 pending  Noted C-peptide detectable, does not rule out DENIS but helps with knowing he still has beta cell function and has ability to decrease own insulin production protecting him from hypoglycemia    Glucose at goal. Continue same regimen. Avoid prandial doses while  NPO.    Plan:  - Insulin Levemir to 22 U BID  - Insulin Novolog to 15 U before every meal  - Moderate dose insulin correction scale  - BG checks ACHS  - Inpatient glucose goal 140-180 mg/dL        Cellulitis of scrotum  OR today for evaluation of fistulas  On antibiotics per primary        Crohn's disease of both small and large intestine without complication  Per primary team   Not on steroids currently  OR today for evaluation of fistulas        Pastor Cochran MD  Endocrinology  [facility]

## 2020-11-27 NOTE — TRANSFER OF CARE
"Anesthesia Transfer of Care Note    Patient: Amando Calix    Procedure(s) Performed: Procedure(s) (LRB):  Exam under anesthesia and seton placement (N/A)    Patient location: PACU    Anesthesia Type: general    Transport from OR: Transported from OR on 6-10 L/min O2 by face mask with adequate spontaneous ventilation    Post pain: adequate analgesia    Post assessment: tolerated procedure well and no apparent anesthetic complications    Post vital signs: stable    Level of consciousness: awake, alert and oriented    Nausea/Vomiting: no nausea/vomiting    Complications: Chipped tooth on oral airway during emergence.     Transfer of care protocol was followed      Last vitals:   Visit Vitals  /70 (BP Location: Left arm, Patient Position: Lying)   Pulse 85   Temp 36.7 °C (98.1 °F) (Oral)   Resp 18   Ht 5' 8" (1.727 m)   Wt 76.5 kg (168 lb 10.4 oz)   SpO2 97%   BMI 25.64 kg/m²     "

## 2020-11-27 NOTE — ANESTHESIA PREPROCEDURE EVALUATION
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 Urology updated , findings likely from bedside debridement. no surgical intervention deemed necessary.  EUA with CRS today to assess for possible fistula tract between previous fistulas and scrotal abscess. started on clindamycin IV yesterday.    Patient Active Problem List   Diagnosis    Crohn's disease of both small and large intestine without complication    Protein calorie malnutrition    Anemia    Encounter for long-term (current) use of high-risk medication    Protein calorie malnutrition    Exacerbation of Crohn's disease    Anemia of other chronic disease    Abdominal pain, LLQ (left lower quadrant)    Perirectal abscess    Abdominal pain    Crohn disease    Crohn's disease    Cigarette smoker    Crohn's disease of colon with fistula    Stricture of sigmoid colon    Moderate protein-calorie malnutrition    Bleeding gastric ulcer    Tachycardia    Bleeding gastric ulcer    Gastric ulcer    Esophagitis, reflux    Special screening for malignant neoplasms, colon    Attention to ileostomy    Anal fistula    Diabetic ketoacidosis without coma associated with diabetes mellitus due to underlying condition    KEITH (acute kidney injury)    Cellulitis of scrotum     Past Surgical History:   Procedure Laterality Date    ABSCESS DRAINAGE      COLONOSCOPY      COLOSTOMY      FLEXIBLE SIGMOIDOSCOPY N/A 7/10/2020    Procedure: SIGMOIDOSCOPY, FLEXIBLE;  Surgeon: Robe Suarez MD;  Location: [facility] OR [address] [place] [address];  Service: Colon and Rectal;  Laterality: N/A;     HERNIA REPAIR      ILEOSTOMY      INSERTION OF SETON STITCH N/A 7/10/2020    Procedure: PLACEMENT-SETON DRAIN;  Surgeon: Robe Suarez MD;  Location: [facility] OR [address] [place] [address];  Service: Colon and Rectal;  Laterality: N/A;    SIGMOIDECTOMY         Anesthesia Evaluation    I have reviewed the Patient Summary Reports.    I have reviewed the Nursing Notes. I have reviewed the NPO Status.      Review of Systems      Physical Exam  General:  Well nourished    Airway/Jaw/Neck:  Airway Findings: Mouth Opening: Normal General Airway Assessment: Adult  Mallampati: II  Improves to II with phonation.  Jaw/Neck Findings:  Limited Ability to Prognath  Neck ROM: Normal ROM     Eyes/Ears/Nose:  Eyes/Ears/Nose Findings:    Dental:  Dental Findings: In tact   Chest/Lungs:  Chest/Lungs Findings: Clear to auscultation, Normal Respiratory Rate     Heart/Vascular:  Heart Findings: Rate: Normal  Rhythm: Regular Rhythm  Sounds: Normal     Abdomen:  Abdomen Findings:  Normal     Musculoskeletal:  Musculoskeletal Findings:    Skin:  Skin Findings:     Mental Status:  Mental Status Findings:  Cooperative, Alert and Oriented         Anesthesia Plan  Type of Anesthesia, risks & benefits discussed:  Anesthesia Type:  general, MAC  Patient's Preference:   Intra-op Monitoring Plan:   Intra-op Monitoring Plan Comments:   Post Op Pain Control Plan:   Post Op Pain Control Plan Comments:   Induction:   IV  Beta Blocker:  Patient is not currently on a Beta-Blocker (No further documentation required).       Informed Consent: Patient understands risks and agrees with Anesthesia plan.  Questions answered. Anesthesia consent signed with patient.  ASA Score: 3     Day of Surgery Review of History & Physical:    H&P update referred to the surgeon.         Ready For Surgery From Anesthesia Perspective.

## 2020-11-27 NOTE — PLAN OF CARE
Problem: Adult Inpatient Plan of Care  Goal: Plan of Care Review  Outcome: Ongoing, Progressing  Goal: Optimal Comfort and Wellbeing  Outcome: Ongoing, Progressing     Problem: Diabetic Ketoacidosis  Goal: Fluid and Electrolyte Balance with Absence of Ketosis  Outcome: Ongoing, Progressing  Intervention: Monitor and Manage Ketoacidosis  Flowsheets (Taken 11/26/2020 3461)  Glycemic Management:   blood glucose monitoring   supplemental insulin given     Problem: Fall Injury Risk  Goal: Absence of Fall and Fall-Related Injury  Outcome: Ongoing, Progressing     POC reviewed with patient. AAOX4. VVS. Remain IV Abx. Pain manage with Dilaudid/Oxy,prn for scrotum pain. NPO at MN for procedure in am. Drsg to scrotum performed by md. Safety maintain. Call light in reach.WCTM

## 2020-11-28 PROBLEM — E11.65 TYPE 2 DIABETES MELLITUS WITH HYPERGLYCEMIA: Status: ACTIVE | Noted: 2020-11-28

## 2020-11-28 LAB
ALBUMIN SERPL BCP-MCNC: 2.7 G/DL (ref 3.5–5.2)
ALBUMIN SERPL BCP-MCNC: 2.8 G/DL (ref 3.5–5.2)
ALP SERPL-CCNC: 108 U/L (ref 55–135)
ALT SERPL W/O P-5'-P-CCNC: 47 U/L (ref 10–44)
ANION GAP SERPL CALC-SCNC: 11 MMOL/L (ref 8–16)
ANION GAP SERPL CALC-SCNC: 13 MMOL/L (ref 8–16)
ANISOCYTOSIS BLD QL SMEAR: SLIGHT
AST SERPL-CCNC: 47 U/L (ref 10–40)
BACTERIA SPEC AEROBE CULT: NO GROWTH
BASOPHILS # BLD AUTO: 0.03 K/UL (ref 0–0.2)
BASOPHILS NFR BLD: 0.3 % (ref 0–1.9)
BILIRUB SERPL-MCNC: 0.4 MG/DL (ref 0.1–1)
BUN SERPL-MCNC: 13 MG/DL (ref 6–20)
BUN SERPL-MCNC: 15 MG/DL (ref 6–20)
CALCIUM SERPL-MCNC: 9.2 MG/DL (ref 8.7–10.5)
CALCIUM SERPL-MCNC: 9.4 MG/DL (ref 8.7–10.5)
CHLORIDE SERPL-SCNC: 103 MMOL/L (ref 95–110)
CHLORIDE SERPL-SCNC: 109 MMOL/L (ref 95–110)
CO2 SERPL-SCNC: 28 MMOL/L (ref 23–29)
CO2 SERPL-SCNC: 32 MMOL/L (ref 23–29)
CREAT SERPL-MCNC: 1.9 MG/DL (ref 0.5–1.4)
CREAT SERPL-MCNC: 1.9 MG/DL (ref 0.5–1.4)
DIFFERENTIAL METHOD: ABNORMAL
EOSINOPHIL # BLD AUTO: 0.1 K/UL (ref 0–0.5)
EOSINOPHIL NFR BLD: 0.6 % (ref 0–8)
ERYTHROCYTE [DISTWIDTH] IN BLOOD BY AUTOMATED COUNT: 12.8 % (ref 11.5–14.5)
EST. GFR  (AFRICAN AMERICAN): 52 ML/MIN/1.73 M^2
EST. GFR  (AFRICAN AMERICAN): 52 ML/MIN/1.73 M^2
EST. GFR  (NON AFRICAN AMERICAN): 45 ML/MIN/1.73 M^2
EST. GFR  (NON AFRICAN AMERICAN): 45 ML/MIN/1.73 M^2
GLUCOSE SERPL-MCNC: 103 MG/DL (ref 70–110)
GLUCOSE SERPL-MCNC: 198 MG/DL (ref 70–110)
HCT VFR BLD AUTO: 39.4 % (ref 40–54)
HGB BLD-MCNC: 12.9 G/DL (ref 14–18)
HYPOCHROMIA BLD QL SMEAR: ABNORMAL
IMM GRANULOCYTES # BLD AUTO: 0.02 K/UL (ref 0–0.04)
IMM GRANULOCYTES NFR BLD AUTO: 0.2 % (ref 0–0.5)
LYMPHOCYTES # BLD AUTO: 1.8 K/UL (ref 1–4.8)
LYMPHOCYTES NFR BLD: 17.1 % (ref 18–48)
MAGNESIUM SERPL-MCNC: 1.9 MG/DL (ref 1.6–2.6)
MCH RBC QN AUTO: 29.7 PG (ref 27–31)
MCHC RBC AUTO-ENTMCNC: 32.7 G/DL (ref 32–36)
MCV RBC AUTO: 91 FL (ref 82–98)
MONOCYTES # BLD AUTO: 1.9 K/UL (ref 0.3–1)
MONOCYTES NFR BLD: 17.7 % (ref 4–15)
NEUTROPHILS # BLD AUTO: 6.8 K/UL (ref 1.8–7.7)
NEUTROPHILS NFR BLD: 64.1 % (ref 38–73)
NRBC BLD-RTO: 0 /100 WBC
PHOSPHATE SERPL-MCNC: 4.3 MG/DL (ref 2.7–4.5)
PHOSPHATE SERPL-MCNC: 4.6 MG/DL (ref 2.7–4.5)
PLATELET # BLD AUTO: 156 K/UL (ref 150–350)
PLATELET BLD QL SMEAR: ABNORMAL
PMV BLD AUTO: 14.5 FL (ref 9.2–12.9)
POCT GLUCOSE: 113 MG/DL (ref 70–110)
POCT GLUCOSE: 158 MG/DL (ref 70–110)
POCT GLUCOSE: 255 MG/DL (ref 70–110)
POTASSIUM SERPL-SCNC: 4 MMOL/L (ref 3.5–5.1)
POTASSIUM SERPL-SCNC: 4.3 MMOL/L (ref 3.5–5.1)
PROT SERPL-MCNC: 6.7 G/DL (ref 6–8.4)
RBC # BLD AUTO: 4.34 M/UL (ref 4.6–6.2)
SODIUM SERPL-SCNC: 146 MMOL/L (ref 136–145)
SODIUM SERPL-SCNC: 150 MMOL/L (ref 136–145)
TARGETS BLD QL SMEAR: ABNORMAL
VANCOMYCIN SERPL-MCNC: 22.4 UG/ML
WBC # BLD AUTO: 10.61 K/UL (ref 3.9–12.7)

## 2020-11-28 PROCEDURE — 63600175 PHARM REV CODE 636 W HCPCS: Performed by: STUDENT IN AN ORGANIZED HEALTH CARE EDUCATION/TRAINING PROGRAM

## 2020-11-28 PROCEDURE — 85025 COMPLETE CBC W/AUTO DIFF WBC: CPT

## 2020-11-28 PROCEDURE — 80069 RENAL FUNCTION PANEL: CPT

## 2020-11-28 PROCEDURE — 63600175 PHARM REV CODE 636 W HCPCS: Performed by: SURGERY

## 2020-11-28 PROCEDURE — 20600001 HC STEP DOWN PRIVATE ROOM

## 2020-11-28 PROCEDURE — 80053 COMPREHEN METABOLIC PANEL: CPT

## 2020-11-28 PROCEDURE — 25000003 PHARM REV CODE 250: Performed by: SURGERY

## 2020-11-28 PROCEDURE — 99232 PR SUBSEQUENT HOSPITAL CARE,LEVL II: ICD-10-PCS | Mod: ,,, | Performed by: HOSPITALIST

## 2020-11-28 PROCEDURE — 36415 COLL VENOUS BLD VENIPUNCTURE: CPT

## 2020-11-28 PROCEDURE — 99232 SBSQ HOSP IP/OBS MODERATE 35: CPT | Mod: ,,, | Performed by: INTERNAL MEDICINE

## 2020-11-28 PROCEDURE — 63600175 PHARM REV CODE 636 W HCPCS: Performed by: HOSPITALIST

## 2020-11-28 PROCEDURE — 80202 ASSAY OF VANCOMYCIN: CPT

## 2020-11-28 PROCEDURE — 25000003 PHARM REV CODE 250: Performed by: HOSPITALIST

## 2020-11-28 PROCEDURE — 99233 PR SUBSEQUENT HOSPITAL CARE,LEVL III: ICD-10-PCS | Mod: ,,, | Performed by: INTERNAL MEDICINE

## 2020-11-28 PROCEDURE — 83735 ASSAY OF MAGNESIUM: CPT

## 2020-11-28 PROCEDURE — 99232 PR SUBSEQUENT HOSPITAL CARE,LEVL II: ICD-10-PCS | Mod: ,,, | Performed by: INTERNAL MEDICINE

## 2020-11-28 PROCEDURE — 99233 SBSQ HOSP IP/OBS HIGH 50: CPT | Mod: ,,, | Performed by: INTERNAL MEDICINE

## 2020-11-28 PROCEDURE — 99232 SBSQ HOSP IP/OBS MODERATE 35: CPT | Mod: ,,, | Performed by: HOSPITALIST

## 2020-11-28 PROCEDURE — 84100 ASSAY OF PHOSPHORUS: CPT

## 2020-11-28 RX ORDER — METRONIDAZOLE 500 MG/1
500 TABLET ORAL EVERY 8 HOURS
Status: DISCONTINUED | OUTPATIENT
Start: 2020-11-28 | End: 2020-12-04 | Stop reason: HOSPADM

## 2020-11-28 RX ORDER — SODIUM CHLORIDE 450 MG/100ML
INJECTION, SOLUTION INTRAVENOUS CONTINUOUS
Status: DISCONTINUED | OUTPATIENT
Start: 2020-11-28 | End: 2020-11-29

## 2020-11-28 RX ORDER — INSULIN ASPART 100 [IU]/ML
0-5 INJECTION, SOLUTION INTRAVENOUS; SUBCUTANEOUS
Status: DISCONTINUED | OUTPATIENT
Start: 2020-11-28 | End: 2020-12-04 | Stop reason: HOSPADM

## 2020-11-28 RX ORDER — INSULIN ASPART 100 [IU]/ML
12 INJECTION, SOLUTION INTRAVENOUS; SUBCUTANEOUS
Status: DISCONTINUED | OUTPATIENT
Start: 2020-11-28 | End: 2020-11-28

## 2020-11-28 RX ORDER — CIPROFLOXACIN 500 MG/1
500 TABLET ORAL EVERY 12 HOURS
Status: DISCONTINUED | OUTPATIENT
Start: 2020-11-28 | End: 2020-12-04 | Stop reason: HOSPADM

## 2020-11-28 RX ORDER — INSULIN ASPART 100 [IU]/ML
10 INJECTION, SOLUTION INTRAVENOUS; SUBCUTANEOUS
Status: DISCONTINUED | OUTPATIENT
Start: 2020-11-28 | End: 2020-11-28

## 2020-11-28 RX ORDER — INSULIN ASPART 100 [IU]/ML
15 INJECTION, SOLUTION INTRAVENOUS; SUBCUTANEOUS
Status: DISCONTINUED | OUTPATIENT
Start: 2020-11-28 | End: 2020-11-30

## 2020-11-28 RX ADMIN — METRONIDAZOLE 500 MG: 500 TABLET ORAL at 01:11

## 2020-11-28 RX ADMIN — INSULIN ASPART 3 UNITS: 100 INJECTION, SOLUTION INTRAVENOUS; SUBCUTANEOUS at 11:11

## 2020-11-28 RX ADMIN — OXYCODONE HYDROCHLORIDE 5 MG: 5 TABLET ORAL at 08:11

## 2020-11-28 RX ADMIN — HYDROMORPHONE HYDROCHLORIDE 0.5 MG: 1 INJECTION, SOLUTION INTRAMUSCULAR; INTRAVENOUS; SUBCUTANEOUS at 06:11

## 2020-11-28 RX ADMIN — CIPROFLOXACIN HYDROCHLORIDE 500 MG: 500 TABLET, FILM COATED ORAL at 09:11

## 2020-11-28 RX ADMIN — INSULIN ASPART 10 UNITS: 100 INJECTION, SOLUTION INTRAVENOUS; SUBCUTANEOUS at 08:11

## 2020-11-28 RX ADMIN — OXYCODONE HYDROCHLORIDE 5 MG: 5 TABLET ORAL at 03:11

## 2020-11-28 RX ADMIN — INSULIN ASPART 12 UNITS: 100 INJECTION, SOLUTION INTRAVENOUS; SUBCUTANEOUS at 11:11

## 2020-11-28 RX ADMIN — SODIUM CHLORIDE, SODIUM LACTATE, POTASSIUM CHLORIDE, AND CALCIUM CHLORIDE: .6; .31; .03; .02 INJECTION, SOLUTION INTRAVENOUS at 12:11

## 2020-11-28 RX ADMIN — SODIUM CHLORIDE: 450 INJECTION, SOLUTION INTRAVENOUS at 01:11

## 2020-11-28 RX ADMIN — OXYCODONE HYDROCHLORIDE 5 MG: 5 TABLET ORAL at 09:11

## 2020-11-28 RX ADMIN — INSULIN DETEMIR 15 UNITS: 100 INJECTION, SOLUTION SUBCUTANEOUS at 09:11

## 2020-11-28 RX ADMIN — MELATONIN TAB 3 MG 6 MG: 3 TAB at 08:11

## 2020-11-28 RX ADMIN — INSULIN DETEMIR 15 UNITS: 100 INJECTION, SOLUTION SUBCUTANEOUS at 08:11

## 2020-11-28 RX ADMIN — METRONIDAZOLE 500 MG: 500 TABLET ORAL at 09:11

## 2020-11-28 RX ADMIN — CIPROFLOXACIN HYDROCHLORIDE 500 MG: 500 TABLET, FILM COATED ORAL at 08:11

## 2020-11-28 RX ADMIN — HYDROMORPHONE HYDROCHLORIDE 0.5 MG: 1 INJECTION, SOLUTION INTRAMUSCULAR; INTRAVENOUS; SUBCUTANEOUS at 12:11

## 2020-11-28 RX ADMIN — INSULIN ASPART 15 UNITS: 100 INJECTION, SOLUTION INTRAVENOUS; SUBCUTANEOUS at 04:11

## 2020-11-28 RX ADMIN — PIPERACILLIN AND TAZOBACTAM 4.5 G: 4; .5 INJECTION, POWDER, LYOPHILIZED, FOR SOLUTION INTRAVENOUS; PARENTERAL at 05:11

## 2020-11-28 NOTE — ASSESSMENT & PLAN NOTE
35-year-old male with history of Crohn's disease on Entyvio (last 11/2020) s/p total abdominal colectomy with end ileostomy and a short rectal stump, complex right perianal fistula with several subcutaneous tracts and openings s/p placement of 3 setons 7/2020, presented 11/21/2020 with increasing scrotal pain, CT abd/pelvis with no abnormal fluid collection to suggest abscess, found to be in DKA. Patient was started on clindamycin, but there was no clinical improvement. Urology evaluated patient 11/25/2020, performed bedside I&D, cultures with no growth. On 11/27/2020, patient brought to OR, found to have severe anal stenosis and fistulous connection from the open wound at the base of the scrotum to the most anterior external fistula opening in the perineum.    Patient screened for STI - RPR positive at 1:2, but FTA-Abs pending - awaiting results.    Recommendations:  - Agree with colorectal recommendations for cipro / metronidazole  - Follow-up FTA-Abs - if positive, will need to be treated for latent syphilis

## 2020-11-28 NOTE — ASSESSMENT & PLAN NOTE
A1c 13% new diagnosis of diabetes    On admission with DKA now resolved    FRANCHESCA-65 negative and detectable C-peptide. Likely type 2 diabetes.    Glucose below goal on current regimen. Decreased insulin requirements likely from KEITH, resolving infection, decreased appetite.    Plan:  - Insulin Levemir 15 U BID  - Insulin Novolog 10 U before every meal  - Low dose insulin correction scale  - BG checks ACHS  - Inpatient glucose goal 140-180 mg/dL

## 2020-11-28 NOTE — PROGRESS NOTES
"[facility]  Endocrinology  Progress Note    Admit Date: 2020     Reason for Consult: Management of DKA, Hyperglycemia     Surgical Procedure and Date: N/A     Diabetes diagnosis year: Newly Dx.  Per patient he had steroid induced DM during childhood and resolved after DC of steroids.     Home Diabetes Medications:  NONE     How often checking glucose at home? N/A    BG readings on regimen: N/a   Hypoglycemia on the regimen?  No  Missed doses on regimen?  No    Diabetes Complications include:     N/A     Complicating diabetes co morbidities:   N/A       HPI:   Patient is a 35 y.o. male with a diagnosis of Steroid induced DM during childhood that resolved after discontinuation of steroids, Chron's disease s/p colectomy and ileostomy that [place] [facility] [place] and KEITH.  During ED course patient was found to be in DKA w/ glucose 600s, BHB 4.6, PH 7.29, HCO3 13 and AG 27.  Pt was started on IV and Insulin infusion and consulted with endocrinology for assistance in management of DKA in the setting of newly Dx. DM.         Interval HPI:   Glucose below goal with current regimen. Noted KEITH.     Overnight events: PINO   Eatin%  Nausea: No  Hypoglycemia and intervention: No  Fever: No  TPN and/or TF: No      /64 (BP Location: Right arm, Patient Position: Lying)   Pulse 76   Temp 98.6 °F (37 °C) (Oral)   Resp 18   Ht 5' 8" (1.727 m)   Wt 76.5 kg (168 lb 10.4 oz)   SpO2 97%   BMI 25.64 kg/m²     Labs Reviewed and Include    Recent Labs   Lab 20  0450      CALCIUM 9.4   ALBUMIN 2.8*   PROT 6.7   *   K 4.3   CO2 28      BUN 15   CREATININE 1.9*   ALKPHOS 108   ALT 47*   AST 47*   BILITOT 0.4     Lab Results   Component Value Date    WBC 10.61 2020    HGB 12.9 (L) 2020    HCT 39.4 (L) 2020    MCV 91 2020     2020     No results for input(s): TSH, FREET4 in the last 168 hours.  Lab Results   Component " Value Date    HGBA1C 13.8 (H) 11/21/2020       Nutritional status:   Body mass index is 25.64 kg/m².  Lab Results   Component Value Date    ALBUMIN 2.8 (L) 11/28/2020    ALBUMIN 3.0 (L) 11/27/2020    ALBUMIN 3.0 (L) 11/26/2020     Lab Results   Component Value Date    PREALBUMIN 34 03/31/2015    PREALBUMIN 7 (L) 11/11/2014    PREALBUMIN 8 (L) 11/10/2014       Estimated Creatinine Clearance: 53 mL/min (A) (based on SCr of 1.9 mg/dL (H)).    Accu-Checks  Recent Labs     11/26/20  0744 11/26/20  1113 11/26/20  1658 11/26/20  2217 11/27/20  0811 11/27/20  1024 11/27/20  1127 11/27/20  1643 11/27/20  2058 11/28/20  0751   POCTGLUCOSE 265* 194* 206* 142* 201* 236* 252* 252* 171* 113*       Current Medications and/or Treatments Impacting Glycemic Control  Immunotherapy:    Immunosuppressants     None        Steroids:   Hormones (From admission, onward)    Start     Stop Route Frequency Ordered    11/21/20 1406  melatonin tablet 6 mg      -- Oral Nightly PRN 11/21/20 1407        Pressors:    Autonomic Drugs (From admission, onward)    None        Hyperglycemia/Diabetes Medications:   Antihyperglycemics (From admission, onward)    Start     Stop Route Frequency Ordered    11/28/20 0900  insulin detemir U-100 pen 15 Units      -- SubQ 2 times daily 11/28/20 0741    11/28/20 0745  insulin aspart U-100 pen 10 Units      -- SubQ 3 times daily with meals 11/28/20 0741    11/25/20 0804  insulin aspart U-100 pen 1-10 Units      -- SubQ Before meals & nightly PRN 11/25/20 0704          ASSESSMENT and PLAN    * Diabetic ketoacidosis without coma associated with diabetes mellitus due to underlying condition  Resolved. See diabetes.    Type 2 diabetes mellitus with hyperglycemia  A1c 13% new diagnosis of diabetes    On admission with DKA now resolved    FRANCHESCA-65 negative and detectable C-peptide. Likely type 2 diabetes.    Glucose below goal on current regimen. Decreased insulin requirements likely from KEITH, resolving infection,  decreased appetite.    Plan:  - Insulin Levemir 15 U BID  - Insulin Novolog 10 U before every meal  - Low dose insulin correction scale  - BG checks ACHS  - Inpatient glucose goal 140-180 mg/dL          Cellulitis of scrotum  On antibiotics per primary        KEITH (acute kidney injury)  KEITH since 11/27, can cause insulin stacking. Insulin decreased as above.    Crohn's disease of both small and large intestine without complication  Per primary team   Not on steroids currently        Pastor Cochran MD  Endocrinology  [facility]

## 2020-11-28 NOTE — PROGRESS NOTES
[facility]  Infectious Disease  Progress Note    Patient Name: Amando Calix  MRN: [id number]  Admission Date: 11/21/2020  Length of Stay: 7 days  Attending Physician: Eusebio Berumen MD  Primary Care Provider: Celestino Wright MD    Isolation Status: No active isolations  Assessment/Plan:      Cellulitis of scrotum  35-year-old male with history of Crohn's disease on Entyvio (last 11/2020) s/p total abdominal colectomy with end ileostomy and a short rectal stump, complex right perianal fistula with several subcutaneous tracts and openings s/p placement of 3 setons 7/2020, presented 11/21/2020 with increasing scrotal pain, CT abd/pelvis with no abnormal fluid collection to suggest abscess, found to be in DKA. Patient was started on clindamycin, but there was no clinical improvement. Urology evaluated patient 11/25/2020, performed bedside I&D, cultures with no growth. On 11/27/2020, patient brought to OR, found to have severe anal stenosis and fistulous connection from the open wound at the base of the scrotum to the most anterior external fistula opening in the perineum.    Patient screened for STI - RPR positive at 1:2, but FTA-Abs pending - awaiting results.    Recommendations:  - Agree with colorectal recommendations for cipro / metronidazole  - Follow-up FTA-Abs - if positive, will need to be treated for latent syphilis             Thank you for your consult. I will follow-up with patient. Please contact us if you have any additional questions.    Saba Wetzel MD  Infectious Disease  [facility]    Subjective:     Principal Problem:Diabetic ketoacidosis without coma associated with diabetes mellitus due to underlying condition    HPI: 35-year-old male with history of Crohn's disease on Entyvio (last 11/2020) s/p total abdominal colectomy with end ileostomy and a short rectal stump, complex right perianal fistula with several subcutaneous tracts and openings s/p  placement of 3 setons 7/2020, presented 11/21/2020 with increasing scrotal pain. CT abd/pelvis with no abnormal fluid collection to suggest abscess. Patient was started on clindamycin. He was also found to be in DKA. ID consulted given no improvement in scrotal cellulitis on course of clindamycin. Urology evaluated patient today, performed bedside I&D, cultures sent.     Interval History:  No fevers overnight  Reports pain has improved  No sexual activity in a year  Never diagnosed with STI before    Review of Systems   Constitutional: Negative for chills, diaphoresis and fever.   HENT: Negative for rhinorrhea and sore throat.    Respiratory: Negative for cough and shortness of breath.    Cardiovascular: Negative for chest pain and leg swelling.   Gastrointestinal: Negative for abdominal pain, diarrhea, nausea and vomiting.   Genitourinary: Negative for dysuria and hematuria.   Musculoskeletal: Negative for arthralgias and myalgias.   Skin: Negative for rash.   Neurological: Negative for headaches.     Objective:     Vital Signs (Most Recent):  Temp: 98 °F (36.7 °C) (11/28/20 1211)  Pulse: 80 (11/28/20 1219)  Resp: 19 (11/28/20 1211)  BP: 122/74 (11/28/20 1211)  SpO2: (!) 94 % (11/28/20 1211) Vital Signs (24h Range):  Temp:  [96.4 °F (35.8 °C)-98.9 °F (37.2 °C)] 98 °F (36.7 °C)  Pulse:  [76-94] 80  Resp:  [16-20] 19  SpO2:  [94 %-97 %] 94 %  BP: (122-137)/(64-77) 122/74     Weight: 76.5 kg (168 lb 10.4 oz)  Body mass index is 25.64 kg/m².    Estimated Creatinine Clearance: 53 mL/min (A) (based on SCr of 1.9 mg/dL (H)).    Physical Exam  Constitutional:       General: He is not in acute distress.     Appearance: He is well-developed. He is not diaphoretic.   HENT:      Head: Normocephalic and atraumatic.   Eyes:      Conjunctiva/sclera: Conjunctivae normal.   Neck:      Musculoskeletal: Normal range of motion and neck supple.   Pulmonary:      Effort: Pulmonary effort is normal. No respiratory distress.   Abdominal:       General: There is no distension.      Palpations: Abdomen is soft.   Musculoskeletal: Normal range of motion.   Skin:     General: Skin is warm and dry.      Findings: No erythema or rash.   Neurological:      Mental Status: He is alert and oriented to person, place, and time.   Psychiatric:         Behavior: Behavior normal.         Significant Labs: All pertinent labs within the past 24 hours have been reviewed.    Significant Imaging: I have reviewed all pertinent imaging results/findings within the past 24 hours.

## 2020-11-28 NOTE — PLAN OF CARE
Cultures with no growth  Patient received optimal treatment for infection with I&D, additional EUA to assess for any additional sources of infection  Agree with colorectal recommendations for cipro metronidazole for 14 days    ID will sign off

## 2020-11-28 NOTE — SUBJECTIVE & OBJECTIVE
Interval History:  No fevers overnight  Reports pain has improved  No sexual activity in a year  Never diagnosed with STI before    Review of Systems   Constitutional: Negative for chills, diaphoresis and fever.   HENT: Negative for rhinorrhea and sore throat.    Respiratory: Negative for cough and shortness of breath.    Cardiovascular: Negative for chest pain and leg swelling.   Gastrointestinal: Negative for abdominal pain, diarrhea, nausea and vomiting.   Genitourinary: Negative for dysuria and hematuria.   Musculoskeletal: Negative for arthralgias and myalgias.   Skin: Negative for rash.   Neurological: Negative for headaches.     Objective:     Vital Signs (Most Recent):  Temp: 98 °F (36.7 °C) (11/28/20 1211)  Pulse: 80 (11/28/20 1219)  Resp: 19 (11/28/20 1211)  BP: 122/74 (11/28/20 1211)  SpO2: (!) 94 % (11/28/20 1211) Vital Signs (24h Range):  Temp:  [96.4 °F (35.8 °C)-98.9 °F (37.2 °C)] 98 °F (36.7 °C)  Pulse:  [76-94] 80  Resp:  [16-20] 19  SpO2:  [94 %-97 %] 94 %  BP: (122-137)/(64-77) 122/74     Weight: 76.5 kg (168 lb 10.4 oz)  Body mass index is 25.64 kg/m².    Estimated Creatinine Clearance: 53 mL/min (A) (based on SCr of 1.9 mg/dL (H)).    Physical Exam  Constitutional:       General: He is not in acute distress.     Appearance: He is well-developed. He is not diaphoretic.   HENT:      Head: Normocephalic and atraumatic.   Eyes:      Conjunctiva/sclera: Conjunctivae normal.   Neck:      Musculoskeletal: Normal range of motion and neck supple.   Pulmonary:      Effort: Pulmonary effort is normal. No respiratory distress.   Abdominal:      General: There is no distension.      Palpations: Abdomen is soft.   Musculoskeletal: Normal range of motion.   Skin:     General: Skin is warm and dry.      Findings: No erythema or rash.   Neurological:      Mental Status: He is alert and oriented to person, place, and time.   Psychiatric:         Behavior: Behavior normal.         Significant Labs: All pertinent  labs within the past 24 hours have been reviewed.    Significant Imaging: I have reviewed all pertinent imaging results/findings within the past 24 hours.

## 2020-11-28 NOTE — SUBJECTIVE & OBJECTIVE
Subjective:     Interval History:   POD 1, s/p EUA, seton placement to perineum/scrotum  Afebrile  Tolerating diet  Ambulating and voiding without issue  Pain well controlled    Post-Op Info:  Procedure(s) (LRB):  Exam under anesthesia and seton placement (N/A)   1 Day Post-Op      Medications:  Continuous Infusions:   lactated ringers 75 mL/hr at 11/28/20 0044     Scheduled Meds:   insulin aspart U-100  15 Units Subcutaneous TIDWM    insulin detemir U-100  22 Units Subcutaneous BID    piperacillin-tazobactam (ZOSYN) IVPB  4.5 g Intravenous Q8H     PRN Meds:   dextrose 50%    dextrose 50%    glucagon (human recombinant)    glucose    glucose    HYDROmorphone    insulin aspart U-100    melatonin    ondansetron    oxyCODONE    promethazine (PHENERGAN) IVPB    sodium chloride 0.9%    sodium chloride 0.9%    vancomycin - pharmacy to dose        Objective:     Vital Signs (Most Recent):  Temp: 97.6 °F (36.4 °C) (11/28/20 0423)  Pulse: 87 (11/28/20 0423)  Resp: 17 (11/28/20 0604)  BP: 130/67 (11/28/20 0423)  SpO2: 97 % (11/27/20 2325) Vital Signs (24h Range):  Temp:  [96.4 °F (35.8 °C)-98.9 °F (37.2 °C)] 97.6 °F (36.4 °C)  Pulse:  [81-94] 87  Resp:  [12-20] 17  SpO2:  [95 %-100 %] 97 %  BP: (112-141)/(60-77) 130/67     Intake/Output - Last 3 Shifts       11/26 0700 - 11/27 0659 11/27 0700 - 11/28 0659 11/28 0700 - 11/29 0659    P.O. 1200 720     I.V. (mL/kg)  200 (2.6)     IV Piggyback 500      Total Intake(mL/kg) 1700 (22.2) 920 (12)     Urine (mL/kg/hr) 1700 (0.9) 2350 (1.3)     Emesis/NG output 0      Other 0      Stool 0      Blood 0      Total Output 1700 2350     Net 0 -1430            Urine Occurrence  2 x     Stool Occurrence 0 x 0 x           Physical Exam  Constitutional:       General: He is not in acute distress.     Appearance: Normal appearance. He is normal weight. He is not ill-appearing.   HENT:      Head: Normocephalic and atraumatic.      Nose: Nose normal.      Mouth/Throat:       Mouth: Mucous membranes are moist.      Pharynx: Oropharynx is clear.   Eyes:      Extraocular Movements: Extraocular movements intact.   Cardiovascular:      Rate and Rhythm: Normal rate and regular rhythm.      Heart sounds: No murmur.   Pulmonary:      Effort: Pulmonary effort is normal. No respiratory distress.      Breath sounds: No stridor. No wheezing or rhonchi.   Abdominal:      General: Abdomen is flat. There is no distension.      Palpations: Abdomen is soft.      Tenderness: There is no abdominal tenderness. There is no guarding or rebound.   Genitourinary:     Comments: Previously placed setons noted in perianal region and perineum. New seton noted to traverse from scrotum to mid perineum. Less tender than previous, Less erythema than previous.  Skin:     General: Skin is warm and dry.      Capillary Refill: Capillary refill takes less than 2 seconds.   Neurological:      General: No focal deficit present.      Mental Status: He is oriented to person, place, and time.   Psychiatric:         Mood and Affect: Mood normal.         Behavior: Behavior normal.

## 2020-11-28 NOTE — SUBJECTIVE & OBJECTIVE
"Interval HPI:   Glucose below goal with current regimen. Noted KEITH.     Overnight events: PINO   Eatin%  Nausea: No  Hypoglycemia and intervention: No  Fever: No  TPN and/or TF: No      /64 (BP Location: Right arm, Patient Position: Lying)   Pulse 76   Temp 98.6 °F (37 °C) (Oral)   Resp 18   Ht 5' 8" (1.727 m)   Wt 76.5 kg (168 lb 10.4 oz)   SpO2 97%   BMI 25.64 kg/m²     Labs Reviewed and Include    Recent Labs   Lab 20  0450      CALCIUM 9.4   ALBUMIN 2.8*   PROT 6.7   *   K 4.3   CO2 28      BUN 15   CREATININE 1.9*   ALKPHOS 108   ALT 47*   AST 47*   BILITOT 0.4     Lab Results   Component Value Date    WBC 10.61 2020    HGB 12.9 (L) 2020    HCT 39.4 (L) 2020    MCV 91 2020     2020     No results for input(s): TSH, FREET4 in the last 168 hours.  Lab Results   Component Value Date    HGBA1C 13.8 (H) 2020       Nutritional status:   Body mass index is 25.64 kg/m².  Lab Results   Component Value Date    ALBUMIN 2.8 (L) 2020    ALBUMIN 3.0 (L) 2020    ALBUMIN 3.0 (L) 2020     Lab Results   Component Value Date    PREALBUMIN 34 2015    PREALBUMIN 7 (L) 2014    PREALBUMIN 8 (L) 11/10/2014       Estimated Creatinine Clearance: 53 mL/min (A) (based on SCr of 1.9 mg/dL (H)).    Accu-Checks  Recent Labs     20  0744 20  1113 20  1658 20  2217 20  0811 20  1024 20  1127 20  1643 20  2058 20  0751   POCTGLUCOSE 265* 194* 206* 142* 201* 236* 252* 252* 171* 113*       Current Medications and/or Treatments Impacting Glycemic Control  Immunotherapy:    Immunosuppressants     None        Steroids:   Hormones (From admission, onward)    Start     Stop Route Frequency Ordered    20 1406  melatonin tablet 6 mg      -- Oral Nightly PRN 20 1407        Pressors:    Autonomic Drugs (From admission, onward)    None        Hyperglycemia/Diabetes " Medications:   Antihyperglycemics (From admission, onward)    Start     Stop Route Frequency Ordered    11/28/20 0900  insulin detemir U-100 pen 15 Units      -- SubQ 2 times daily 11/28/20 0741    11/28/20 0745  insulin aspart U-100 pen 10 Units      -- SubQ 3 times daily with meals 11/28/20 0741    11/25/20 0804  insulin aspart U-100 pen 1-10 Units      -- SubQ Before meals & nightly PRN 11/25/20 0704         normal...

## 2020-11-28 NOTE — PROGRESS NOTES
[facility]  Infectious Disease  Progress Note    Patient Name: Amando Calix  MRN: [id number]  Admission Date: 11/21/2020  Length of Stay: 6 days  Attending Physician: Eusebio Berumen MD  Primary Care Provider: Celestino Wright MD    Isolation Status: No active isolations  Assessment/Plan:      Cellulitis of scrotum  35-year-old male with history of Crohn's disease on Entyvio (last 11/2020) s/p total abdominal colectomy with end ileostomy and a short rectal stump, complex right perianal fistula with several subcutaneous tracts and openings s/p placement of 3 setons 7/2020, presented 11/21/2020 with increasing scrotal pain, CT abd/pelvis with no abnormal fluid collection to suggest abscess, found to be in DKA. Patient was started on clindamycin, but there was no clinical improvement. Urology evaluated patient 11/25/2020, performed bedside I&D, cultures pendings. On 11/27/2020, patient brought to OR, found to have severe anal stenosis and fistulous connection from the open wound at the base of the scrotum to the most anterior external fistula opening in the perineum.    Recommendations:  - Discontinue clinda IV  - Continue vanc / pip-tazo   - Follow-up I&D cultures  - Appreciate urology and colorectal surgery recommendations             Thank you for your consult. I will follow-up with patient. Please contact us if you have any additional questions.    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 CT abd/pelvis with no  abnormal fluid collection to suggest abscess. Patient was started on clindamycin. He was also found to be in DKA. ID consulted given no improvement in scrotal cellulitis on course of clindamycin. Urology evaluated patient today, performed bedside I&D, cultures sent.     Interval History:  Today, patient went to OR   OR findings as follows:  1.  Severe anal stenosis  2.  Three pre-existing extra sphincteric setons along the right side of the anal margin  3.  Open wound at the base of the scrotum where prior incision and drainage had been performed by Urology  4.  Fistulous connection from the open wound at the base of the scrotum to the most anterior external fistula opening in the perineum  5.  No residual abscess  Patient reports ongoing scrotal swelling and pain, but improved compared to yesterday    Review of Systems   Constitutional: Negative for chills, diaphoresis and fever.   HENT: Negative for rhinorrhea and sore throat.    Respiratory: Negative for cough and shortness of breath.    Cardiovascular: Negative for chest pain and leg swelling.   Gastrointestinal: Positive for rectal pain. Negative for abdominal pain, diarrhea, nausea and vomiting.   Genitourinary: Positive for scrotal swelling. Negative for dysuria, hematuria and testicular pain.   Musculoskeletal: Negative for arthralgias and myalgias.   Skin: Negative for rash.   Neurological: Negative for headaches.     Objective:     Vital Signs (Most Recent):  Temp: 96.5 °F (35.8 °C) (11/27/20 1126)  Pulse: 84 (11/27/20 1126)  Resp: 18 (11/27/20 1126)  BP: 127/77 (11/27/20 1126)  SpO2: 96 % (11/27/20 1126) Vital Signs (24h Range):  Temp:  [96.5 °F (35.8 °C)-98.6 °F (37 °C)] 96.5 °F (35.8 °C)  Pulse:  [] 84  Resp:  [12-20] 18  SpO2:  [95 %-100 %] 96 %  BP: (112-141)/(60-81) 127/77     Weight: 76.5 kg (168 lb 10.4 oz)  Body mass index is 25.64 kg/m².    Estimated Creatinine Clearance: 59.2 mL/min (A) (based on SCr of 1.7 mg/dL (H)).    Physical  Exam  Constitutional:       General: He is not in acute distress.     Appearance: He is well-developed. He is not diaphoretic.   HENT:      Head: Normocephalic and atraumatic.   Eyes:      Conjunctiva/sclera: Conjunctivae normal.   Neck:      Musculoskeletal: Normal range of motion and neck supple.   Pulmonary:      Effort: Pulmonary effort is normal. No respiratory distress.   Abdominal:      General: There is no distension.      Palpations: Abdomen is soft.   Musculoskeletal: Normal range of motion.   Skin:     General: Skin is warm and dry.      Findings: No erythema or rash.   Neurological:      Mental Status: He is alert and oriented to person, place, and time.   Psychiatric:         Behavior: Behavior normal.         Significant Labs: All pertinent labs within the past 24 hours have been reviewed.    Significant Imaging: I have reviewed all pertinent imaging results/findings within the past 24 hours.

## 2020-11-28 NOTE — ASSESSMENT & PLAN NOTE
Continue seton drainage of fistula tract and scrotal abscess  Activity as tolerated  Diet as tolerated  Antibiotics per primary  Continue current cares per primary  Discharge planning per primary  Patient should follow up in clinic with Dr. Suarez in three weeks upon discharge

## 2020-11-28 NOTE — PROGRESS NOTES
[facility]  Colorectal Surgery  Progress Note    Patient Name: Amando Calix  MRN: [id number]  Admission Date: 11/21/2020  Hospital Length of Stay: 7 days  Attending Physician: Eusebio Berumen MD    Subjective:     Interval History:   POD 1, s/p EUA, seton placement to perineum/scrotum  Afebrile  Tolerating diet  Ambulating and voiding without issue  Pain well controlled    Post-Op Info:  Procedure(s) (LRB):  Exam under anesthesia and seton placement (N/A)   1 Day Post-Op      Medications:  Continuous Infusions:   lactated ringers 75 mL/hr at 11/28/20 0044     Scheduled Meds:   insulin aspart U-100  15 Units Subcutaneous TIDWM    insulin detemir U-100  22 Units Subcutaneous BID    piperacillin-tazobactam (ZOSYN) IVPB  4.5 g Intravenous Q8H     PRN Meds:   dextrose 50%    dextrose 50%    glucagon (human recombinant)    glucose    glucose    HYDROmorphone    insulin aspart U-100    melatonin    ondansetron    oxyCODONE    promethazine (PHENERGAN) IVPB    sodium chloride 0.9%    sodium chloride 0.9%    vancomycin - pharmacy to dose        Objective:     Vital Signs (Most Recent):  Temp: 97.6 °F (36.4 °C) (11/28/20 0423)  Pulse: 87 (11/28/20 0423)  Resp: 17 (11/28/20 0604)  BP: 130/67 (11/28/20 0423)  SpO2: 97 % (11/27/20 2325) Vital Signs (24h Range):  Temp:  [96.4 °F (35.8 °C)-98.9 °F (37.2 °C)] 97.6 °F (36.4 °C)  Pulse:  [81-94] 87  Resp:  [12-20] 17  SpO2:  [95 %-100 %] 97 %  BP: (112-141)/(60-77) 130/67     Intake/Output - Last 3 Shifts       11/26 0700 - 11/27 0659 11/27 0700 - 11/28 0659 11/28 0700 - 11/29 0659    P.O. 1200 720     I.V. (mL/kg)  200 (2.6)     IV Piggyback 500      Total Intake(mL/kg) 1700 (22.2) 920 (12)     Urine (mL/kg/hr) 1700 (0.9) 2350 (1.3)     Emesis/NG output 0      Other 0      Stool 0      Blood 0      Total Output 1700 2350     Net 0 -1430            Urine Occurrence  2 x     Stool Occurrence 0 x 0 x           Physical Exam  Constitutional:        General: He is not in acute distress.     Appearance: Normal appearance. He is normal weight. He is not ill-appearing.   HENT:      Head: Normocephalic and atraumatic.      Nose: Nose normal.      Mouth/Throat:      Mouth: Mucous membranes are moist.      Pharynx: Oropharynx is clear.   Eyes:      Extraocular Movements: Extraocular movements intact.   Cardiovascular:      Rate and Rhythm: Normal rate and regular rhythm.      Heart sounds: No murmur.   Pulmonary:      Effort: Pulmonary effort is normal. No respiratory distress.      Breath sounds: No stridor. No wheezing or rhonchi.   Abdominal:      General: Abdomen is flat. There is no distension.      Palpations: Abdomen is soft.      Tenderness: There is no abdominal tenderness. There is no guarding or rebound.   Genitourinary:     Comments: Previously placed setons noted in perianal region and perineum. New seton noted to traverse from scrotum to mid perineum. Less tender than previous, Less erythema than previous.  Skin:     General: Skin is warm and dry.      Capillary Refill: Capillary refill takes less than 2 seconds.   Neurological:      General: No focal deficit present.      Mental Status: He is oriented to person, place, and time.   Psychiatric:         Mood and Affect: Mood normal.         Behavior: Behavior normal.       Assessment/Plan:     Continue seton drainage of fistula tract and scrotal abscess  Activity as tolerated  Diet as tolerated  Patient should be discharged on 14 days of cipro/flagyl when ultimately sent home.  Continue current cares per primary  Discharge planning per primary  Patient should follow up in clinic with Dr. Suarez in two weeks.      Yogi Thomas MD  Colorectal Surgery  [facility]

## 2020-11-28 NOTE — PROGRESS NOTES
Therapy with Vancomycin complete and/or consult discontinued by provider.  Pharmacy will sign off, please re-consult as needed.    Thank you,  Vero Malave, PharmD  PGY-2 Internal Medicine Pharmacy Resident  [id number]

## 2020-11-28 NOTE — PLAN OF CARE
Pt Aax4, breathing even and unlabored on RA, call light in reach, bed in lowest position. Strict I and O maintained. Pt ambulates in room independently with no issues. Plan of care reviewed with pt, pt stated understanding. Administered medications per orders, pt tolerated well. Administered pain medication per pt reports of pain. VSS, frequent rounding completed. No other significant events throughout shift. Will continue to monitor.

## 2020-11-28 NOTE — PLAN OF CARE
Problem: Fall Injury Risk  Goal: Absence of Fall and Fall-Related Injury  Outcome: Ongoing, Progressing  Intervention: Identify and Manage Contributors to Fall Injury Risk  Flowsheets (Taken 11/28/2020 0629)  Self-Care Promotion:   independence encouraged   BADL personal objects within reach  Medication Review/Management:   medications reviewed   high risk medications identified  Intervention: Promote Injury-Free Environment  Flowsheets (Taken 11/28/2020 0629)  Safety Promotion/Fall Prevention:   assistive device/personal item within reach   nonskid shoes/socks when out of bed   side rails raised x 2  Environmental Safety Modification:   assistive device/personal items within reach   lighting adjusted   clutter free environment maintained     Problem: Adult Inpatient Plan of Care  Goal: Optimal Comfort and Wellbeing  Outcome: Ongoing, Progressing  Intervention: Provide Person-Centered Care  Flowsheets (Taken 11/28/2020 0629)  Trust Relationship/Rapport:   care explained   choices provided   emotional support provided   empathic listening provided   questions answered   questions encouraged   reassurance provided   thoughts/feelings acknowledged  Goal: Readiness for Transition of Care  Outcome: Ongoing, Progressing   PT is aao x4. NAD noted this shift. Cont to have c/o pain and requiring PRN pain medication. VS have remained stable. HE has remained free of falls and injuries. Bed is low and locked with call light with in easy reach. POC was 171 at HS.

## 2020-11-28 NOTE — PROGRESS NOTES
Progress Note  Hospital Medicine    Admit Date: 11/21/2020  Length of Stay:  LOS: 7 days     SUBJECTIVE:         Follow-up For:  Diabetic ketoacidosis without coma associated with diabetes mellitus due to underlying condition    HPI/Interval history (See H&P for complete P,F,SHx) :     Mr. Calix is a 35 year old gentleman with PMH of Crohn's colitis (on Entyvio) s/p colectomy with end ileostomy with significant anorectal fistulizing disease s/p placement of multiple setons, h/o c diff infection, anemia and h/o steroid induced diabetes presents for fatigue for about 2 weeks.  His symptoms started around November 4th after he received infusion of Entyvio and also ate a very rare steak.  He reports associated increased urinary frequency as well as polydipsia and an 18 lb weight loss over the past few weeks.  States that he has a good appetite has been eating well, he denies any abdominal pain bloody stool from his ostomy, diarrhea, nausea/vomiting, fevers/chills, hematuria, dysuria, flank pain, chest pain, shortness of breath, cough or sore throat.       Interval History:   11/25 Patient lying in bed, no acute distress. Reports tolerating diet and fatigue has been improving. Continues to note scrotal swelling has unchanged. Increased scrotal pain after I&D.   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 Urology updated , findings likely from bedside debridement. no surgical intervention.  EUA with CRS tomorrow to assess for possible fistula tract between previous fistulas and scrotal abscess. started on clindamycin IV  11/27 EUA today -identification of fistula tract from previously known fistula to base of scrotum - seton placed.  Discontinued clindamycin  11/28 s/p EUA, seton placement to perineum/scrotum .  .transitioned to Cipro/Flagylx 14 days,    cultures NGTD,  RPR + , FTA abs pending    Review of Systems:   Pain scale: Constitutional: Positive for fatigue. Negative for chills and fever.  "  HENT: Negative for congestion.    Eyes: Negative for visual disturbance.   Respiratory: Negative for cough and shortness of breath.    Cardiovascular: Negative for chest pain and leg swelling.   Gastrointestinal: Negative for abdominal distention, abdominal pain, nausea and vomiting.   Genitourinary: Positive for scrotal swelling. Negative for dysuria.   Neurological: Negative for dizziness, weakness and numbness.   Psychiatric/Behavioral: Negative for confusion.     OBJECTIVE:     Vital Signs Range (Last 24H):  Temp:  [96.4 °F (35.8 °C)-98.9 °F (37.2 °C)]   Pulse:  [76-94]   Resp:  [12-20]   BP: (112-137)/(60-77)   SpO2:  [95 %-100 %]     Physical Exam:  Constitutional: Appears well-developed and well-nourished.   Head: Normocephalic and atraumatic.   Neck: Normal range of motion. Neck supple.   Cardiovascular: Normal heart rate.  Regular heart rhythm.  Pulmonary/Chest: Effort normal.   Abdominal: + tenderness.  ileostomy  Musculoskeletal: Normal range of motion. No edema.    -Scrotal swellingand erythema improved  Neurological: Alert and oriented to person, place, and time.   Skin: Skin is warm and dry.   Psychiatric: Normal mood and affect. Behavior is normal.         Estimated body mass index is 25.64 kg/m² as calculated from the following:    Height as of this encounter: 5' 8" (1.727 m).    Weight as of this encounter: 76.5 kg (168 lb 10.4 oz).    I & O (Last 24H):    Intake/Output Summary (Last 24 hours) at 11/28/2020 0935  Last data filed at 11/28/2020 0900  Gross per 24 hour   Intake 1420 ml   Output 2750 ml   Net -1330 ml       Body mass index is 25.64 kg/m².    Estimated Creatinine Clearance: 53 mL/min (A) (based on SCr of 1.9 mg/dL (H)).        Laboratory/Diagnostic Data:    Imaging Results          CT Abdomen Pelvis With Contrast (Final result)  Result time 11/21/20 12:07:12    Final result by Nabil Cooper Jr., MD (11/21/20 12:07:12)             Impression:      Right perianal fistulous tract " extending towards the scrotum with interval placement of a seton, noting the fistulous tract is similar to prior CT 02/09/2020.  There is a slightly more cephalad fistulous track in relation to the seton again noted.  No abnormal fluid collection to suggest an abscess.    Marked scrotal skin thickening.  Correlate for cellulitis.    Stable postoperative changes of a total colectomy and right lower quadrant end ileostomy in this patient with reported history of Crohn's disease.    Electronically signed by resident: Rosio Freeman  Date:    11/21/2020  Time:    11:30    Electronically signed by: Nabil Cooper MD  Date:    11/21/2020  Time:    12:07           Narrative:    EXAMINATION:  CT ABDOMEN PELVIS WITH CONTRAST    CLINICAL HISTORY:  Abdominal pain, fever;Low abd pain and testicular pain/ swelling.  Patient with Crohn's disease, anal fistula with seat on and scrotal stent.  Concern for scrotal cellulitis/abscess/fistula;    TECHNIQUE:  Low dose axial images were obtained from the lung bases through the proximal thighs following the intravenous administration of 75 cc of Omnipaque 350.  Sagittal and coronal reformats were provided.    COMPARISON:  CT pelvis 02/09/2020    FINDINGS:  Heart: Normal in size.  No pericardial effusion.    Lung bases: Symmetrically expanded.  No consolidation, pleural effusion, mass, or pneumothorax.    Liver: Normal in size and attenuation without focal hepatic abnormality.    Gallbladder: Normal in appearance without evidence for cholecystitis.    Bile Ducts: No intra or extrahepatic biliary ductal dilation.    Pancreas: No pancreatic mass lesion or peripancreatic inflammatory change.    Spleen: Unremarkable.    Adrenals: Unremarkable.    Kidneys/ Ureters: Normal in size and location.  Kidneys enhance normally.  No focal renal abnormality or nephrolithiasis.  No hydroureteronephrosis.    Bladder: Smooth contours without bladder wall thickening.    Reproductive organs:  Unremarkable.    GI Tract/Mesentery: The stomach is unremarkable.  Postoperative changes of a total colectomy and right lower quadrant end ileostomy.  Fatty deposition within the wall with a few loops of small bowel in the pelvis, favored to reflect chronic inflammation.    Peritoneal Space: No intraperitoneal free fluid or free air. No pathologically enlarged lymph nodes.    Retroperitoneum: No significant adenopathy.    Abdominal wall/extraperitoneal soft tissues: Soft tissue thickening again seen extending from the right perianal region along the inner right medial gluteal fold into the perineum and right scrotum with interval placement of a Seton in this patient with known perianal fistula, similar in appearance to prior CT 02/09/2020.  Increased soft tissue and skin induration within the scrotum, new from prior CT 02/09/2020.  No peripherally enhancing fluid collections to suggest an abscess.    Vasculature: Abdominal aorta is normal in caliber, contour, and course without significant calcific atherosclerosis.    Bones: Unremarkable without acute fracture or bone destructive process.                             X-Ray Chest PA And Lateral (Final result)  Result time 11/21/20 10:29:32    Final result by Randy Amador DO (11/21/20 10:29:32)             Impression:      No acute abnormality.      Electronically signed by: Randy Amador  Date:    11/21/2020  Time:    10:29           Narrative:    EXAMINATION:  XR CHEST PA AND LATERAL    CLINICAL HISTORY:  Other fatigue.    TECHNIQUE:  PA and lateral views of the chest were performed.    COMPARISON:  Multiple prior radiographs of the chest, most recent from 04/01/2016.    FINDINGS:  The lungs are well expanded and clear. No focal opacities are seen. The pleural spaces are clear.  The cardiac silhouette is unremarkable.  The visualized osseous structures are unremarkable.                                Reviewed and noted in plan where applicable- Please see chart  for full lab data.    Recent Labs   Lab 11/26/20 0346 11/27/20 0327 11/28/20  0450   WBC 7.93 9.28 10.61   HGB 12.8* 13.1* 12.9*   HCT 36.6* 38.7* 39.4*    170 156       Recent Labs   Lab 11/26/20 0346 11/27/20 0327 11/28/20  0450    140 150*   K 4.2 4.1 4.3    103 109   CO2 28 24 28   BUN 11 14 15   CREATININE 0.8 1.7* 1.9*   * 185* 103   CALCIUM 8.7 9.1 9.4   MG 1.6 1.7 1.9   PHOS 4.2 4.2 4.6*       Recent Labs   Lab 11/26/20 0346 11/27/20 0327 11/28/20  0450   ALKPHOS 76 90 108   ALT 12 24 47*   AST 21 38 47*   ALBUMIN 3.0* 3.0* 2.8*   PROT 6.8 6.9 6.7   BILITOT 0.4 0.5 0.4        Microbiology labs for the last week  Microbiology Results (last 7 days)     Procedure Component Value Units Date/Time    Culture, Anaerobe [[id number]] Collected: 11/25/20 1540    Order Status: Completed Specimen: Abscess from Groin Updated: 11/27/20 1041     Anaerobic Culture Culture in progress    Blood culture #1 **CANNOT BE ORDERED STAT** [[id number]] Collected: 11/21/20 0934    Order Status: Completed Specimen: Blood from Peripheral, Antecubital, Left Updated: 11/26/20 1012     Blood Culture, Routine No growth after 5 days.    Blood culture #2 **CANNOT BE ORDERED STAT** [[id number]] Collected: 11/21/20 0946    Order Status: Completed Specimen: Blood from Peripheral, Antecubital, Left Updated: 11/26/20 1012     Blood Culture, Routine No growth after 5 days.    Aerobic culture [[id number]] Collected: 11/25/20 1540    Order Status: Completed Specimen: Abscess from Groin Updated: 11/26/20 0721     Aerobic Bacterial Culture No growth    Gram stain [[id number]] Collected: 11/25/20 1540    Order Status: Completed Specimen: Abscess from Groin Updated: 11/25/20 1917     Gram Stain Result Rare WBC's      Rare Gram positive cocci      Rare Gram negative rods    Fungus culture [[id number]] Collected: 11/25/20 1540    Order Status: Sent Specimen: Abscess from Groin Updated: 11/25/20 3145            Medications:  Medication list was reviewed and changes noted under Assessment/Plan.        Current Facility-Administered Medications:     ciprofloxacin HCl tablet 500 mg, 500 mg, Oral, Q12H, Yogi Thomas MD, 500 mg at 11/28/20 0927    dextrose 50% injection 12.5 g, 12.5 g, Intravenous, PRN, Yogi Thomas MD    dextrose 50% injection 25 g, 25 g, Intravenous, PRN, Yogi Thomas MD    glucagon (human recombinant) injection 1 mg, 1 mg, Intramuscular, PRN, Yogi Thomas MD    glucose chewable tablet 16 g, 16 g, Oral, PRN, Yogi Thomas MD    glucose chewable tablet 24 g, 24 g, Oral, PRN, Yogi Thomas MD    HYDROmorphone injection 0.5 mg, 0.5 mg, Intravenous, Q6H PRN, Yogi Thomas MD, 0.5 mg at 11/28/20 0604    insulin aspart U-100 pen 1-10 Units, 1-10 Units, Subcutaneous, QID (AC + HS) PRN, Yogi Thomas MD, 6 Units at 11/27/20 1705    insulin aspart U-100 pen 10 Units, 10 Units, Subcutaneous, TIDWM, Pastor Cochran MD, 10 Units at 11/28/20 0804    insulin detemir U-100 pen 15 Units, 15 Units, Subcutaneous, BID, Pastor Cochran MD, 15 Units at 11/28/20 0927    lactated ringers infusion, , Intravenous, Continuous, Eusebio Berumen MD, Stopped at 11/28/20 1000    melatonin tablet 6 mg, 6 mg, Oral, Nightly PRN, Yogi Thomas MD, 6 mg at 11/27/20 2206    metroNIDAZOLE tablet 500 mg, 500 mg, Oral, Q8H, Yogi Thomas MD    ondansetron injection 4 mg, 4 mg, Intravenous, Q6H PRN, Yogi Thomas MD    oxyCODONE immediate release tablet 5 mg, 5 mg, Oral, Q6H PRN, Yogi Thomas MD, 5 mg at 11/28/20 0930    promethazine (PHENERGAN) 12.5 mg in dextrose 5 % 50 mL IVPB, 12.5 mg, Intravenous, Q6H PRN, Yogi Thomas MD    sodium chloride 0.9% flush 10 mL, 10 mL, Intravenous, PRN, Yogi Thomas MD    sodium chloride 0.9% flush 10 mL, 10 mL, Intravenous, PRN, Yogi Thomas MD    vancomycin - pharmacy to dose, , Intravenous, pharmacy to manage frequency,  Eusebio Beruemn MD    Estimated Creatinine Clearance: 53 mL/min (A) (based on SCr of 1.9 mg/dL (H)).    ASSESSMENT/PLAN:     Active Problems:     DKA- resolved   H/o steroid induced DM  - DKA likely triggered by scrotal cellulitis   - HA1c: 13.8  - A --> 13  - betahydroxybutyrate: 4.6  - BG on admit, 615 --> 425  - VBG on admit, pH: 7.29/38  - s/p albuterol, insulin/dextrose in the ED  - DKA pathway initiated   - continue insulin gtt   - CLD (no sugar)  - continue IVF  - clear liquid diet (no sugar)  - endocrinology consulted. apprec recs   -- s/p transitional insulin gtt  -- Start Levemir 22 U BID  -- Start Novolog 15 U AC  -- Low dose correction insulin   -- FS qAC/HS/AM   -- diabetic diet    Patient's FSGs are not controlled on current hypoglycemics.   Last A1c reviewed-   Lab Results   Component Value Date    HGBA1C 13.8 (H) 2020    HGBA1C 13.3 (H) 2020    HGBA1C 6.0 2011     Most recent fingerstick glucose reviewed-   Recent Labs   Lab 20  1127 20  1643 20  2058 20  0751   POCTGLUCOSE 252* 252* 171* 113*        Crohn colitis  S/p colectomy with end ileostomy  Anorectal fistulizing disease s/p placement of multiple setons  - follows with general surgery (Dr. Blackburn). Last visit on 10/5/2020  - follows with CRS (Dr. Suarez). Last visit 8/15/2020  - Given extent of anorectal disease with fistulae and stenosis, ileostomy will be permanent. Appears not to be a candidate for an attempt at restoring intestinal continuity given the extent of his anal disease, and I suspect he will ultimately require a completion proctectomy Discussed completion proctectomy [facility] patient - he is not ready to proceed at this point.  - ESR: 39, CRP: 35  - continue home Entyvio   GI consulted - no medication changes recommended at this time       KEITH    Patient with  acute kidney injury.Serum BUN/Cr uptrending.  Strict I/O monitor .   Recent Labs   Lab 20  0346 20  0023  11/28/20  0450   BUN 11 14 15   CREATININE 0.8 1.7* 1.9*   started on RL hydration         Scrotal cellulitis   - elevated ESR and CRP  - CT A/P with contrast  (11/21) showed 'Right perianal fistulous tract extending towards the scrotum with interval placement of a seton, noting the fistulous tract is similar to prior CT 02/09/2020. No abnormal fluid collection to suggest an abscess. Marked scrotal skin thickening. Correlate for cellulitis'  - followup bcx   - ID consulted. apprec recs  -- escalated from clindamycin to vancomycin and zosyn   - Urology consulted. apprec recs   -- s/p bedside I&D and packing on 11/25. followup I&D cultures  -- scrotal support, ice packs  -- can't give NSAIDs due to h/o ulcers  - CRS consulted. 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 Urology updated , findings likely from bedside debridement. no surgical intervention.  EUA with CRS tomorrow to assess for possible fistula tract between previous fistulas and scrotal abscess. started on clindamycin IV  11/27 EUA today -identification of fistula tract from previously known fistula to base of scrotum - seton placed.  Discontinued clindamycin.  Monitor for vancomycin toxicity  11/28 s/p EUA, seton placement to perineum/scrotum .transitioned to Cipro/Flagylx 14 days, cultures NGTD,        Anemia   Patient's with Normocyticanemia.. Hemoglobin stable. Etiology likely due to chronic disease .  Current CBC reviewed-    Recent Labs   Lab 11/26/20  0346 11/27/20  0327 11/28/20  0450   HGB 12.8* 13.1* 12.9*     Monitor serial CBC and transfuse if H/H drops below 7/21.       Component Value Date/Time    MCV 91 11/28/2020 0450    RDW 12.8 11/28/2020 0450    IRON 21 (L) 09/28/2010 1531    FERRITIN 23 03/11/2015 1725    FOLATE 8.3 12/29/2012 0614    [id number] 884 12/29/2012 0614    OCCULTBLOOD Positive (A) 09/29/2014 2245        Protein calorie malnutrition  - Boost TID when DKA resolves        RPR + ,  FTA abs pending. ID to follow up     Disposition- Home    Discharge Planning   MIGUEL: 12/1/2020     Code Status: Full Code   Is the patient medically ready for discharge?: (No Documentation)    Reason for patient still in hospital (select all that apply): Treatment  Discharge Plan A: Home with family   Discharge Delays: None known at this time     DVT prophylaxis addressed with:  SCDs.          Subsequent Hospital Care   Level 2 86512 Total visit time was 25 minutes or greater with greater than 50% of time spent in counseling and coordination of care.       We discussed in detail the plan of care, the patient's response to treatment, the discharge plan,.  Total time includes time spent reviewing the medical record, examining the patient, writing notes and communicating with other professionals.    Eusebio Berumen MD  Attending Staff Physician  [facility] Medicine  pager- [phone]  [facility] - [phone]

## 2020-11-29 DIAGNOSIS — K50.113 CROHN'S DISEASE OF COLON WITH FISTULA: Primary | ICD-10-CM

## 2020-11-29 LAB
ALBUMIN SERPL BCP-MCNC: 2.7 G/DL (ref 3.5–5.2)
ALP SERPL-CCNC: 129 U/L (ref 55–135)
ALT SERPL W/O P-5'-P-CCNC: 50 U/L (ref 10–44)
ANION GAP SERPL CALC-SCNC: 11 MMOL/L (ref 8–16)
AST SERPL-CCNC: 29 U/L (ref 10–40)
BASOPHILS # BLD AUTO: 0.04 K/UL (ref 0–0.2)
BASOPHILS NFR BLD: 0.4 % (ref 0–1.9)
BILIRUB SERPL-MCNC: 0.3 MG/DL (ref 0.1–1)
BUN SERPL-MCNC: 10 MG/DL (ref 6–20)
CALCIUM SERPL-MCNC: 9.1 MG/DL (ref 8.7–10.5)
CHLORIDE SERPL-SCNC: 107 MMOL/L (ref 95–110)
CO2 SERPL-SCNC: 29 MMOL/L (ref 23–29)
CREAT SERPL-MCNC: 1.6 MG/DL (ref 0.5–1.4)
DIFFERENTIAL METHOD: ABNORMAL
EOSINOPHIL # BLD AUTO: 0.1 K/UL (ref 0–0.5)
EOSINOPHIL NFR BLD: 1.2 % (ref 0–8)
ERYTHROCYTE [DISTWIDTH] IN BLOOD BY AUTOMATED COUNT: 13.2 % (ref 11.5–14.5)
EST. GFR  (AFRICAN AMERICAN): >60 ML/MIN/1.73 M^2
EST. GFR  (NON AFRICAN AMERICAN): 55.4 ML/MIN/1.73 M^2
GLUCOSE SERPL-MCNC: 119 MG/DL (ref 70–110)
HCT VFR BLD AUTO: 34.8 % (ref 40–54)
HGB BLD-MCNC: 11.6 G/DL (ref 14–18)
IMM GRANULOCYTES # BLD AUTO: 0.03 K/UL (ref 0–0.04)
IMM GRANULOCYTES NFR BLD AUTO: 0.3 % (ref 0–0.5)
LYMPHOCYTES # BLD AUTO: 2.1 K/UL (ref 1–4.8)
LYMPHOCYTES NFR BLD: 23.1 % (ref 18–48)
MAGNESIUM SERPL-MCNC: 1.9 MG/DL (ref 1.6–2.6)
MCH RBC QN AUTO: 29.9 PG (ref 27–31)
MCHC RBC AUTO-ENTMCNC: 33.3 G/DL (ref 32–36)
MCV RBC AUTO: 90 FL (ref 82–98)
MONOCYTES # BLD AUTO: 1.6 K/UL (ref 0.3–1)
MONOCYTES NFR BLD: 18 % (ref 4–15)
NEUTROPHILS # BLD AUTO: 5.2 K/UL (ref 1.8–7.7)
NEUTROPHILS NFR BLD: 57 % (ref 38–73)
NRBC BLD-RTO: 0 /100 WBC
PHOSPHATE SERPL-MCNC: 4.3 MG/DL (ref 2.7–4.5)
PLATELET # BLD AUTO: 216 K/UL (ref 150–350)
PMV BLD AUTO: 13.5 FL (ref 9.2–12.9)
POCT GLUCOSE: 120 MG/DL (ref 70–110)
POCT GLUCOSE: 136 MG/DL (ref 70–110)
POCT GLUCOSE: 179 MG/DL (ref 70–110)
POCT GLUCOSE: 219 MG/DL (ref 70–110)
POTASSIUM SERPL-SCNC: 3.9 MMOL/L (ref 3.5–5.1)
PROT SERPL-MCNC: 6.8 G/DL (ref 6–8.4)
RBC # BLD AUTO: 3.88 M/UL (ref 4.6–6.2)
SODIUM SERPL-SCNC: 147 MMOL/L (ref 136–145)
WBC # BLD AUTO: 9.13 K/UL (ref 3.9–12.7)

## 2020-11-29 PROCEDURE — 63600175 PHARM REV CODE 636 W HCPCS: Performed by: SURGERY

## 2020-11-29 PROCEDURE — 80053 COMPREHEN METABOLIC PANEL: CPT

## 2020-11-29 PROCEDURE — 99232 SBSQ HOSP IP/OBS MODERATE 35: CPT | Mod: ,,, | Performed by: HOSPITALIST

## 2020-11-29 PROCEDURE — 84100 ASSAY OF PHOSPHORUS: CPT

## 2020-11-29 PROCEDURE — 99232 PR SUBSEQUENT HOSPITAL CARE,LEVL II: ICD-10-PCS | Mod: ,,, | Performed by: INTERNAL MEDICINE

## 2020-11-29 PROCEDURE — 85025 COMPLETE CBC W/AUTO DIFF WBC: CPT

## 2020-11-29 PROCEDURE — 99232 PR SUBSEQUENT HOSPITAL CARE,LEVL II: ICD-10-PCS | Mod: ,,, | Performed by: HOSPITALIST

## 2020-11-29 PROCEDURE — 99232 SBSQ HOSP IP/OBS MODERATE 35: CPT | Mod: ,,, | Performed by: INTERNAL MEDICINE

## 2020-11-29 PROCEDURE — 83735 ASSAY OF MAGNESIUM: CPT

## 2020-11-29 PROCEDURE — 99233 PR SUBSEQUENT HOSPITAL CARE,LEVL III: ICD-10-PCS | Mod: ,,, | Performed by: INTERNAL MEDICINE

## 2020-11-29 PROCEDURE — 25000003 PHARM REV CODE 250: Performed by: SURGERY

## 2020-11-29 PROCEDURE — 25000003 PHARM REV CODE 250: Performed by: HOSPITALIST

## 2020-11-29 PROCEDURE — 36415 COLL VENOUS BLD VENIPUNCTURE: CPT

## 2020-11-29 PROCEDURE — 99233 SBSQ HOSP IP/OBS HIGH 50: CPT | Mod: ,,, | Performed by: INTERNAL MEDICINE

## 2020-11-29 PROCEDURE — 20600001 HC STEP DOWN PRIVATE ROOM

## 2020-11-29 RX ORDER — SODIUM CHLORIDE 450 MG/100ML
INJECTION, SOLUTION INTRAVENOUS CONTINUOUS
Status: ACTIVE | OUTPATIENT
Start: 2020-11-29 | End: 2020-11-30

## 2020-11-29 RX ADMIN — METRONIDAZOLE 500 MG: 500 TABLET ORAL at 02:11

## 2020-11-29 RX ADMIN — METRONIDAZOLE 500 MG: 500 TABLET ORAL at 09:11

## 2020-11-29 RX ADMIN — HYDROMORPHONE HYDROCHLORIDE 0.5 MG: 1 INJECTION, SOLUTION INTRAMUSCULAR; INTRAVENOUS; SUBCUTANEOUS at 06:11

## 2020-11-29 RX ADMIN — INSULIN ASPART 15 UNITS: 100 INJECTION, SOLUTION INTRAVENOUS; SUBCUTANEOUS at 11:11

## 2020-11-29 RX ADMIN — OXYCODONE HYDROCHLORIDE 5 MG: 5 TABLET ORAL at 09:11

## 2020-11-29 RX ADMIN — OXYCODONE HYDROCHLORIDE 5 MG: 5 TABLET ORAL at 08:11

## 2020-11-29 RX ADMIN — INSULIN ASPART 1 UNITS: 100 INJECTION, SOLUTION INTRAVENOUS; SUBCUTANEOUS at 09:11

## 2020-11-29 RX ADMIN — HYDROMORPHONE HYDROCHLORIDE 0.5 MG: 1 INJECTION, SOLUTION INTRAMUSCULAR; INTRAVENOUS; SUBCUTANEOUS at 12:11

## 2020-11-29 RX ADMIN — INSULIN ASPART 15 UNITS: 100 INJECTION, SOLUTION INTRAVENOUS; SUBCUTANEOUS at 03:11

## 2020-11-29 RX ADMIN — INSULIN ASPART 15 UNITS: 100 INJECTION, SOLUTION INTRAVENOUS; SUBCUTANEOUS at 07:11

## 2020-11-29 RX ADMIN — SODIUM CHLORIDE: 450 INJECTION, SOLUTION INTRAVENOUS at 03:11

## 2020-11-29 RX ADMIN — SODIUM CHLORIDE: 0.45 INJECTION, SOLUTION INTRAVENOUS at 09:11

## 2020-11-29 RX ADMIN — INSULIN DETEMIR 15 UNITS: 100 INJECTION, SOLUTION SUBCUTANEOUS at 08:11

## 2020-11-29 RX ADMIN — MELATONIN TAB 3 MG 6 MG: 3 TAB at 09:11

## 2020-11-29 RX ADMIN — CIPROFLOXACIN HYDROCHLORIDE 500 MG: 500 TABLET, FILM COATED ORAL at 09:11

## 2020-11-29 RX ADMIN — SODIUM CHLORIDE: 0.45 INJECTION, SOLUTION INTRAVENOUS at 03:11

## 2020-11-29 RX ADMIN — METRONIDAZOLE 500 MG: 500 TABLET ORAL at 06:11

## 2020-11-29 RX ADMIN — INSULIN DETEMIR 15 UNITS: 100 INJECTION, SOLUTION SUBCUTANEOUS at 09:11

## 2020-11-29 RX ADMIN — CIPROFLOXACIN HYDROCHLORIDE 500 MG: 500 TABLET, FILM COATED ORAL at 08:11

## 2020-11-29 RX ADMIN — OXYCODONE HYDROCHLORIDE 5 MG: 5 TABLET ORAL at 03:11

## 2020-11-29 NOTE — ASSESSMENT & PLAN NOTE
KEITH since 11/27, can cause insulin stacking.   Avoid metformin at discharge, may consider outpatient if renal function normalizes

## 2020-11-29 NOTE — PLAN OF CARE
Problem: Fall Injury Risk  Goal: Absence of Fall and Fall-Related Injury  Outcome: Ongoing, Progressing     Problem: Adult Inpatient Plan of Care  Goal: Absence of Hospital-Acquired Illness or Injury  Outcome: Ongoing, Progressing     Problem: Adult Inpatient Plan of Care  Goal: Optimal Comfort and Wellbeing  Outcome: Ongoing, Progressing     Problem: Fluid Imbalance (Acute Kidney Injury/Impairment)  Goal: Optimal Fluid Balance  Outcome: Ongoing, Progressing   PT is aao x 4. Very Pleasant. NAD noted. Cont to have c/o pain and receives PRN medication as ordered. HE cont to be able to ambulate in room without difficulty. VSS. Cont to maintain strict I & O with use of urinal. He has remained free of falls and injuries. Bed is low and locked with call light with in easy reach.

## 2020-11-29 NOTE — SUBJECTIVE & OBJECTIVE
Subjective:     Interval History:   POD 2, s/p EUA, seton placement to perineum/scrotum  Afebrile  Tolerating diet  Ambulating and voiding without issue  Pain well controlled    Post-Op Info:  Procedure(s) (LRB):  Exam under anesthesia and seton placement (N/A)   2 Days Post-Op      Medications:  Continuous Infusions:   sodium chloride 0.45% 75 mL/hr at 11/29/20 0300     Scheduled Meds:   ciprofloxacin HCl  500 mg Oral Q12H    insulin aspart U-100  15 Units Subcutaneous TIDWM    insulin detemir U-100  15 Units Subcutaneous BID    metroNIDAZOLE  500 mg Oral Q8H     PRN Meds:   dextrose 50%    dextrose 50%    glucagon (human recombinant)    glucose    glucose    HYDROmorphone    insulin aspart U-100    melatonin    ondansetron    oxyCODONE    promethazine (PHENERGAN) IVPB    sodium chloride 0.9%    sodium chloride 0.9%        Objective:     Vital Signs (Most Recent):  Temp: 98.3 °F (36.8 °C) (11/29/20 0436)  Pulse: 82 (11/29/20 0436)  Resp: 18 (11/29/20 0647)  BP: 131/78 (11/29/20 0436)  SpO2: 97 % (11/29/20 0436) Vital Signs (24h Range):  Temp:  [97.9 °F (36.6 °C)-98.6 °F (37 °C)] 98.3 °F (36.8 °C)  Pulse:  [62-89] 82  Resp:  [18-19] 18  SpO2:  [94 %-98 %] 97 %  BP: (119-138)/(64-83) 131/78     Intake/Output - Last 3 Shifts       11/27 0700 - 11/28 0659 11/28 0700 - 11/29 0659 11/29 0700 - 11/30 0659    P.O. 720 1160     I.V. (mL/kg) 200 (2.6) 2038.8 (26.7)     IV Piggyback       Total Intake(mL/kg) 920 (12) 3198.8 (41.8)     Urine (mL/kg/hr) 2350 (1.3) 3150 (1.7)     Emesis/NG output       Other       Stool       Blood       Total Output 2350 3150     Net -1430 +48.8            Urine Occurrence 2 x      Stool Occurrence 0 x            Physical Exam  Constitutional:       General: He is not in acute distress.     Appearance: Normal appearance. He is normal weight. He is not ill-appearing.   HENT:      Head: Normocephalic and atraumatic.      Nose: Nose normal.      Mouth/Throat:      Mouth: Mucous  membranes are moist.      Pharynx: Oropharynx is clear.   Eyes:      Extraocular Movements: Extraocular movements intact.   Cardiovascular:      Rate and Rhythm: Normal rate and regular rhythm.      Heart sounds: No murmur.   Pulmonary:      Effort: Pulmonary effort is normal. No respiratory distress.      Breath sounds: No stridor. No wheezing or rhonchi.   Abdominal:      General: Abdomen is flat. There is no distension.      Palpations: Abdomen is soft.      Tenderness: There is no abdominal tenderness. There is no guarding or rebound.   Genitourinary:     Comments: Previously placed setons noted in perianal region and perineum. New seton noted to traverse from scrotum to mid perineum. Significantly less tenderness and erythema from previous.  Skin:     General: Skin is warm and dry.      Capillary Refill: Capillary refill takes less than 2 seconds.   Neurological:      General: No focal deficit present.      Mental Status: He is oriented to person, place, and time.   Psychiatric:         Mood and Affect: Mood normal.         Behavior: Behavior normal.

## 2020-11-29 NOTE — PROGRESS NOTES
Progress Note  Hospital Medicine    Admit Date: 11/21/2020  Length of Stay:  LOS: 8 days     SUBJECTIVE:         Follow-up For:  Diabetic ketoacidosis without coma associated with diabetes mellitus due to underlying condition    HPI/Interval history (See H&P for complete P,F,SHx) :     Mr. Calix is a 35 year old gentleman with PMH of Crohn's colitis (on Entyvio) s/p colectomy with end ileostomy with significant anorectal fistulizing disease s/p placement of multiple setons, h/o c diff infection, anemia and h/o steroid induced diabetes presents for fatigue for about 2 weeks.  His symptoms started around November 4th after he received infusion of Entyvio and also ate a very rare steak.  He reports associated increased urinary frequency as well as polydipsia and an 18 lb weight loss over the past few weeks.  States that he has a good appetite has been eating well, he denies any abdominal pain bloody stool from his ostomy, diarrhea, nausea/vomiting, fevers/chills, hematuria, dysuria, flank pain, chest pain, shortness of breath, cough or sore throat.       Interval History:   11/25 Patient lying in bed, no acute distress. Reports tolerating diet and fatigue has been improving. Continues to note scrotal swelling has unchanged. Increased scrotal pain after I&D.   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 Urology updated , findings likely from bedside debridement. no surgical intervention.  EUA with CRS tomorrow to assess for possible fistula tract between previous fistulas and scrotal abscess. started on clindamycin IV  11/27 EUA today -identification of fistula tract from previously known fistula to base of scrotum - seton placed.  Discontinued clindamycin  11/28 s/p EUA, seton placement to perineum/scrotum .  .transitioned to Cipro/Flagylx 14 days,    cultures NGTD,  RPR + , FTA abs pending  11/29 sodium 147 . Cr 1.6 . started on 1/2 NS @100ml     Review of Systems:   Pain scale:  "Constitutional: Positive for fatigue. Negative for chills and fever.   HENT: Negative for congestion.    Eyes: Negative for visual disturbance.   Respiratory: Negative for cough and shortness of breath.    Cardiovascular: Negative for chest pain and leg swelling.   Gastrointestinal: Negative for abdominal distention, abdominal pain, nausea and vomiting.   Genitourinary: Positive for scrotal swelling. Negative for dysuria.   Neurological: Negative for dizziness, weakness and numbness.   Psychiatric/Behavioral: Negative for confusion.     OBJECTIVE:     Vital Signs Range (Last 24H):  Temp:  [96.7 °F (35.9 °C)-98.3 °F (36.8 °C)]   Pulse:  [62-89]   Resp:  [18-19]   BP: (119-138)/(67-83)   SpO2:  [94 %-98 %]     Physical Exam:  Constitutional: Appears well-developed and well-nourished.   Head: Normocephalic and atraumatic.   Neck: Normal range of motion. Neck supple.   Cardiovascular: Normal heart rate.  Regular heart rhythm.  Pulmonary/Chest: Effort normal.   Abdominal: + tenderness.  ileostomy  Musculoskeletal: Normal range of motion. No edema.    -Scrotal swellingand erythema improved  Neurological: Alert and oriented to person, place, and time.   Skin: Skin is warm and dry.   Psychiatric: Normal mood and affect. Behavior is normal.         Estimated body mass index is 25.64 kg/m² as calculated from the following:    Height as of this encounter: 5' 8" (1.727 m).    Weight as of this encounter: 76.5 kg (168 lb 10.4 oz).    I & O (Last 24H):    Intake/Output Summary (Last 24 hours) at 11/29/2020 0904  Last data filed at 11/29/2020 0600  Gross per 24 hour   Intake 2698.75 ml   Output 2750 ml   Net -51.25 ml       Body mass index is 25.64 kg/m².    Estimated Creatinine Clearance: 62.9 mL/min (A) (based on SCr of 1.6 mg/dL (H)).        Laboratory/Diagnostic Data:    Imaging Results          CT Abdomen Pelvis With Contrast (Final result)  Result time 11/21/20 12:07:12    Final result by Nabil Cooper Jr., MD (11/21/20 " 12:07:12)             Impression:      Right perianal fistulous tract extending towards the scrotum with interval placement of a seton, noting the fistulous tract is similar to prior CT 02/09/2020.  There is a slightly more cephalad fistulous track in relation to the seton again noted.  No abnormal fluid collection to suggest an abscess.    Marked scrotal skin thickening.  Correlate for cellulitis.    Stable postoperative changes of a total colectomy and right lower quadrant end ileostomy in this patient with reported history of Crohn's disease.    Electronically signed by resident: Rosio Freeman  Date:    11/21/2020  Time:    11:30    Electronically signed by: Nabil Cooper MD  Date:    11/21/2020  Time:    12:07           Narrative:    EXAMINATION:  CT ABDOMEN PELVIS WITH CONTRAST    CLINICAL HISTORY:  Abdominal pain, fever;Low abd pain and testicular pain/ swelling.  Patient with Crohn's disease, anal fistula with seat on and scrotal stent.  Concern for scrotal cellulitis/abscess/fistula;    TECHNIQUE:  Low dose axial images were obtained from the lung bases through the proximal thighs following the intravenous administration of 75 cc of Omnipaque 350.  Sagittal and coronal reformats were provided.    COMPARISON:  CT pelvis 02/09/2020    FINDINGS:  Heart: Normal in size.  No pericardial effusion.    Lung bases: Symmetrically expanded.  No consolidation, pleural effusion, mass, or pneumothorax.    Liver: Normal in size and attenuation without focal hepatic abnormality.    Gallbladder: Normal in appearance without evidence for cholecystitis.    Bile Ducts: No intra or extrahepatic biliary ductal dilation.    Pancreas: No pancreatic mass lesion or peripancreatic inflammatory change.    Spleen: Unremarkable.    Adrenals: Unremarkable.    Kidneys/ Ureters: Normal in size and location.  Kidneys enhance normally.  No focal renal abnormality or nephrolithiasis.  No hydroureteronephrosis.    Bladder: Smooth contours  without bladder wall thickening.    Reproductive organs: Unremarkable.    GI Tract/Mesentery: The stomach is unremarkable.  Postoperative changes of a total colectomy and right lower quadrant end ileostomy.  Fatty deposition within the wall with a few loops of small bowel in the pelvis, favored to reflect chronic inflammation.    Peritoneal Space: No intraperitoneal free fluid or free air. No pathologically enlarged lymph nodes.    Retroperitoneum: No significant adenopathy.    Abdominal wall/extraperitoneal soft tissues: Soft tissue thickening again seen extending from the right perianal region along the inner right medial gluteal fold into the perineum and right scrotum with interval placement of a Seton in this patient with known perianal fistula, similar in appearance to prior CT 02/09/2020.  Increased soft tissue and skin induration within the scrotum, new from prior CT 02/09/2020.  No peripherally enhancing fluid collections to suggest an abscess.    Vasculature: Abdominal aorta is normal in caliber, contour, and course without significant calcific atherosclerosis.    Bones: Unremarkable without acute fracture or bone destructive process.                             X-Ray Chest PA And Lateral (Final result)  Result time 11/21/20 10:29:32    Final result by Randy Amador DO (11/21/20 10:29:32)             Impression:      No acute abnormality.      Electronically signed by: Randy Amador  Date:    11/21/2020  Time:    10:29           Narrative:    EXAMINATION:  XR CHEST PA AND LATERAL    CLINICAL HISTORY:  Other fatigue.    TECHNIQUE:  PA and lateral views of the chest were performed.    COMPARISON:  Multiple prior radiographs of the chest, most recent from 04/01/2016.    FINDINGS:  The lungs are well expanded and clear. No focal opacities are seen. The pleural spaces are clear.  The cardiac silhouette is unremarkable.  The visualized osseous structures are unremarkable.                                 Reviewed and noted in plan where applicable- Please see chart for full lab data.    Recent Labs   Lab 11/27/20 0327 11/28/20  0450 11/29/20  0608   WBC 9.28 10.61 9.13   HGB 13.1* 12.9* 11.6*   HCT 38.7* 39.4* 34.8*    156 216       Recent Labs   Lab 11/27/20 0327 11/28/20  0450 11/28/20  1020 11/29/20  0608    150* 146* 147*   K 4.1 4.3 4.0 3.9    109 103 107   CO2 24 28 32* 29   BUN 14 15 13 10   CREATININE 1.7* 1.9* 1.9* 1.6*   * 103 198* 119*   CALCIUM 9.1 9.4 9.2 9.1   MG 1.7 1.9  --  1.9   PHOS 4.2 4.6* 4.3 4.3       Recent Labs   Lab 11/27/20 0327 11/28/20 0450 11/28/20  1020 11/29/20  0608   ALKPHOS 90 108  --  129   ALT 24 47*  --  50*   AST 38 47*  --  29   ALBUMIN 3.0* 2.8* 2.7* 2.7*   PROT 6.9 6.7  --  6.8   BILITOT 0.5 0.4  --  0.3        Microbiology labs for the last week  Microbiology Results (last 7 days)     Procedure Component Value Units Date/Time    Aerobic culture [[id number]] Collected: 11/25/20 1540    Order Status: Completed Specimen: Abscess from Groin Updated: 11/28/20 1011     Aerobic Bacterial Culture No growth    Culture, Anaerobe [[id number]] Collected: 11/25/20 1540    Order Status: Completed Specimen: Abscess from Groin Updated: 11/27/20 1041     Anaerobic Culture Culture in progress    Blood culture #1 **CANNOT BE ORDERED STAT** [[id number]] Collected: 11/21/20 0934    Order Status: Completed Specimen: Blood from Peripheral, Antecubital, Left Updated: 11/26/20 1012     Blood Culture, Routine No growth after 5 days.    Blood culture #2 **CANNOT BE ORDERED STAT** [[id number]] Collected: 11/21/20 0946    Order Status: Completed Specimen: Blood from Peripheral, Antecubital, Left Updated: 11/26/20 1012     Blood Culture, Routine No growth after 5 days.    Gram stain [[id number]] Collected: 11/25/20 1540    Order Status: Completed Specimen: Abscess from Groin Updated: 11/25/20 1917     Gram Stain Result Rare WBC's      Rare Gram positive cocci      Rare  Gram negative rods    Fungus culture [[id number]] Collected: 11/25/20 1540    Order Status: Sent Specimen: Abscess from Groin Updated: 11/25/20 6881           Medications:  Medication list was reviewed and changes noted under Assessment/Plan.        Current Facility-Administered Medications:     0.45% NaCl infusion, , Intravenous, Continuous, Eusebio Berumen MD    ciprofloxacin HCl tablet 500 mg, 500 mg, Oral, Q12H, Yogi Thomas MD, 500 mg at 11/29/20 0857    dextrose 50% injection 12.5 g, 12.5 g, Intravenous, PRN, Yogi Thomas MD    dextrose 50% injection 25 g, 25 g, Intravenous, PRN, Yogi Thomas MD    glucagon (human recombinant) injection 1 mg, 1 mg, Intramuscular, PRN, Yogi Thomas MD    glucose chewable tablet 16 g, 16 g, Oral, PRN, Yogi Thomas MD    glucose chewable tablet 24 g, 24 g, Oral, PRN, Yogi Thomas MD    HYDROmorphone injection 0.5 mg, 0.5 mg, Intravenous, Q6H PRN, Yogi Thomas MD, 0.5 mg at 11/29/20 0647    insulin aspart U-100 pen 0-5 Units, 0-5 Units, Subcutaneous, QID (AC + HS) PRN, Pastor Cochran MD, 3 Units at 11/28/20 1145    insulin aspart U-100 pen 15 Units, 15 Units, Subcutaneous, TIDWM, Pastor Cochran MD, 15 Units at 11/29/20 0737    insulin detemir U-100 pen 15 Units, 15 Units, Subcutaneous, BID, Pastor Cochran MD, 15 Units at 11/29/20 0858    melatonin tablet 6 mg, 6 mg, Oral, Nightly PRN, Yogi Thomas MD, 6 mg at 11/28/20 2033    metroNIDAZOLE tablet 500 mg, 500 mg, Oral, Q8H, Yogi Thomas MD, 500 mg at 11/29/20 0608    ondansetron injection 4 mg, 4 mg, Intravenous, Q6H PRN, Yogi Thomas MD    oxyCODONE immediate release tablet 5 mg, 5 mg, Oral, Q6H PRN, Yogi Thomas MD, 5 mg at 11/29/20 0857    promethazine (PHENERGAN) 12.5 mg in dextrose 5 % 50 mL IVPB, 12.5 mg, Intravenous, Q6H PRN, Yogi Thomas MD    sodium chloride 0.9% flush 10 mL, 10 mL, Intravenous, PRN, Yogi Thomas MD    sodium  chloride 0.9% flush 10 mL, 10 mL, Intravenous, PRN, Yogi Thomas MD    Estimated Creatinine Clearance: 62.9 mL/min (A) (based on SCr of 1.6 mg/dL (H)).    ASSESSMENT/PLAN:     Active Problems:     DKA- resolved   H/o steroid induced DM  - DKA likely triggered by scrotal cellulitis   - HA1c: 13.8  - A --> 13  - betahydroxybutyrate: 4.6  - BG on admit, 615 --> 425  - VBG on admit, pH: 7.29/38  - s/p albuterol, insulin/dextrose in the ED  - DKA pathway initiated   - continue insulin gtt   - CLD (no sugar)  - continue IVF  - clear liquid diet (no sugar)  - endocrinology consulted. apprec recs   -- s/p transitional insulin gtt  -- Start Levemir 22 U BID  -- Start Novolog 15 U AC  -- Low dose correction insulin   -- FS qAC/HS/AM   -- diabetic diet    Patient's FSGs are not controlled on current hypoglycemics.   Last A1c reviewed-   Lab Results   Component Value Date    HGBA1C 13.8 (H) 2020    HGBA1C 13.3 (H) 2020    HGBA1C 6.0 2011     Most recent fingerstick glucose reviewed-   Recent Labs   Lab 20  1143 20  1629 20  0735   POCTGLUCOSE 255* 158* 120*   FRANCHESCA-ab negative and detectable C-peptide. Likely type 2 diabetes.       Crohn colitis  S/p colectomy with end ileostomy  Anorectal fistulizing disease s/p placement of multiple setons  - follows with general surgery (Dr. Blackburn). Last visit on 10/5/2020  - follows with CRS (Dr. Suarez). Last visit 8/15/2020  - Given extent of anorectal disease with fistulae and stenosis, ileostomy will be permanent. Appears not to be a candidate for an attempt at restoring intestinal continuity given the extent of his anal disease, and I suspect he will ultimately require a completion proctectomy Discussed completion proctectomy [facility] patient - he is not ready to proceed at this point.  - ESR: 39, CRP: 35  - continue home Entyvio   GI consulted - no medication changes recommended at this time       KEITH  baseline cr 0.8   Patient with   acute kidney injury.Serum BUN/Cr uptrending.  Strict I/O monitor .   Recent Labs   Lab 11/28/20  0450 11/28/20  1020 11/29/20  0608   BUN 15 13 10   CREATININE 1.9* 1.9* 1.6*   started on RL hydration  11/29 started on 1/2 NS @100ml       Hypernatremia - Patient with sodium   Recent Labs   Lab 11/28/20  0450 11/28/20  1020 11/29/20  0608   * 146* 147*   . sodium trending up  . 11/29 . started on 1/2 NS @100ml        Scrotal cellulitis   - elevated ESR and CRP  - CT A/P with contrast  (11/21) showed 'Right perianal fistulous tract extending towards the scrotum with interval placement of a seton, noting the fistulous tract is similar to prior CT 02/09/2020. No abnormal fluid collection to suggest an abscess. Marked scrotal skin thickening. Correlate for cellulitis'  - followup bcx   - ID consulted. apprec recs  -- escalated from clindamycin to vancomycin and zosyn   - Urology consulted. apprec recs   -- s/p bedside I&D and packing on 11/25. followup I&D cultures  -- scrotal support, ice packs  -- can't give NSAIDs due to h/o ulcers  - CRS consulted. 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 Urology updated , findings likely from bedside debridement. no surgical intervention.  EUA with CRS tomorrow to assess for possible fistula tract between previous fistulas and scrotal abscess. started on clindamycin IV  11/27 EUA today -identification of fistula tract from previously known fistula to base of scrotum - seton placed.  Discontinued clindamycin.  Monitor for vancomycin toxicity  11/28 s/p EUA, seton placement to perineum/scrotum .transitioned to Cipro/Flagylx 14 days, cultures NGTD,        Anemia   Patient's with Normocyticanemia.. Hemoglobin stable. Etiology likely due to chronic disease .  Current CBC reviewed-    Recent Labs   Lab 11/27/20  0327 11/28/20  0450 11/29/20  0608   HGB 13.1* 12.9* 11.6*     Monitor serial CBC and transfuse if H/H drops below 7/21.        Component Value Date/Time    MCV 90 11/29/2020 0608    RDW 13.2 11/29/2020 0608    IRON 21 (L) 09/28/2010 1531    FERRITIN 23 03/11/2015 1725    FOLATE 8.3 12/29/2012 0614    [id number] 884 12/29/2012 0614    OCCULTBLOOD Positive (A) 09/29/2014 2245        Protein calorie malnutrition  - Boost TID when DKA resolves        RPR + , FTA abs pending. ID to follow up . - if positive, will need to be treated for latent syphilis    Disposition- Home    Discharge Planning   MIGUEL: 12/1/2020     Code Status: Full Code   Is the patient medically ready for discharge?: (No Documentation)    Reason for patient still in hospital (select all that apply): Treatment  Discharge Plan A: Home with family   Discharge Delays: None known at this time     DVT prophylaxis addressed with:  SCDs.          Subsequent Hospital Care   Level 2 79786 Total visit time was 25 minutes or greater with greater than 50% of time spent in counseling and coordination of care.       We discussed in detail the plan of care, the patient's response to treatment, the discharge plan,.  Total time includes time spent reviewing the medical record, examining the patient, writing notes and communicating with other professionals.    Eusebio Berumen MD  Attending Staff Physician  [facility] Medicine  pager- [phone] [id number] [phone]

## 2020-11-29 NOTE — PROGRESS NOTES
GI Treatment Plan    Amando Calix is a 35 y.o. male admitted to hospital 11/21/2020 (Hospital Day: 9) due to Diabetic ketoacidosis without coma associated with diabetes mellitus due to underlying condition.     HPI: Amando Calix is a 35 y.o. male with history of Crohn's disease since childhood. He was originally diagnosed and treated at [facility] and that is where he probably got his 1st Remicade treatment.  He has failed treatment with Remicade Cimzia Humira azathioprine and methotrexate.  He had severe colonic disease.  He had 1 earlier surgery as well in about 2012, he was started on the stelara experimental protocol with notable improvement.  He was probably better for about a year and a half however he developed C difficile.  It seemed like the disease never returned to its improved state after that period and ultimately at the end of 2014 he had a subtotal colectomy with preservation of the rectum.     At that time he was started on entyvio.  Since then, he has had intermittent perianal abscesses treated with antibitoics, improved after his entyvio infusions. He lost his insurance 10/2019-3/2020 so he did not receive entyvio at that time. He was re-induced in 4/2020. Since then he was referred to CRS and had setons placed in July for perianal fistulas which develop intermittent drainage. Proctectomy was discussed but the patient wanted to defer that due to work and personal issues.      This admission, the patient presents for weight loss and fatigue for a few weeks prior to admission.  He reports that he lost about 20 lb since November 6th.  He denies any abdominal pain, change in his ostomy output, blood, decreased appetite, nausea or vomiting.  His Crohn's flares usually manifest with decreased appetite and diarrhea.  He reports intermittent drainage from his perianal fistulas but no significant change.  His last Entyvio dose was on 11/04 and he received them every 8 weeks.  On admission,  he was found to be in DKA (newly diagnosed diabetes).  He was also found to have a scrotal abscess which was drained with Urology on 11/25.  Colorectal surgery was consulted for perianal disease and he is to undergo EUA tomorrow.  GI was consulted to way and on medical management of Crohn's disease.    Interval History  The patient underwent EUA and found to have extension of perianal fistula into the scrotum causing an abscess. Seton placed.    Objective  Temp:  [97.9 °F (36.6 °C)-98.6 °F (37 °C)] 98.3 °F (36.8 °C) (11/29 0436)  Pulse:  [62-89] 82 (11/29 0436)  BP: (119-138)/(64-83) 131/78 (11/29 0436)  Resp:  [18-19] 18 (11/29 0647)  SpO2:  [94 %-98 %] 97 % (11/29 0436)    Laboratory    Recent Labs   Lab 11/27/20  0327 11/28/20  0450 11/29/20  0608   HGB 13.1* 12.9* 11.6*         Assessment and Plan    Problem List:  1. Crohn's disease  2. Scrotal abscess and cellulitis  3. Diabetes newly diagnosed     The patient appears to have recurrent perianal disease with extension into the scrotum suggestive of active Crohn's.  No small bowel involvement apparent at this time.  He has failed multiple therapies in the past including Remicade, Cimzia, Stelara and is currently on Entyvio which he has been on since 2015, with a hiatus 10/2019-4/2020 therefore he has been on it for about 6 months.  The patient would benefit from proactive drug level monitoring, therefore will obtain Entyvio levels prior to next infusion, however even with optimization of Entyvio there is a high likelihood that he will need a proctectomy to definitively treat his perianal disease due to limited efficacy of medical management in the past.     Recommendations:  - no medication changes recommended at this time  - will obtain Entyvio drug levels prior to next infusion at the end of December  - Recommend follow up with CRS as outpatient for further discussions in regards to proctectomy  - We are signing-off. Please call with any questions.    Thank  you for involving us in the care of Amando Calix. Please call with any additional questions, concerns or changes in the patient's clinical status.    Lvoe Trevino MD  Gastroenterology Fellow, PGY IV

## 2020-11-29 NOTE — ASSESSMENT & PLAN NOTE
35-year-old male with history of Crohn's disease on Entyvio (last 11/2020) s/p total abdominal colectomy with end ileostomy and a short rectal stump, complex right perianal fistula with several subcutaneous tracts and openings s/p placement of 3 setons 7/2020, presented 11/21/2020 with increasing scrotal pain, CT abd/pelvis with no abnormal fluid collection to suggest abscess, found to be in DKA. Patient was started on clindamycin, but there was no clinical improvement. Urology evaluated patient 11/25/2020, performed bedside I&D, cultures with no growth. On 11/27/2020, patient brought to OR, found to have severe anal stenosis and fistulous connection from the open wound at the base of the scrotum to the most anterior external fistula opening in the perineum.    Patient screened for STI - RPR positive at 1:2, but FTA-Abs pending - awaiting results.    Recommendations:  - Agree with colorectal recommendations for cipro / metronidazole  - If FTA-Abs returns positive prior to discharge, please administer PenG 2.4 million units IM as inpatient, will arrange for dose 2 and 3 as outpatient.  - If FTA-Abs returns negative, positive RPR test is a false positive, known to occur in patients with autoimmune disease

## 2020-11-29 NOTE — ASSESSMENT & PLAN NOTE
Continue seton drainage of fistula tract and scrotal abscess  Activity as tolerated  Diabetic diet  Continue current cares per primary  Discharge planning per primary  Patient should follow up in clinic with Dr. Suarez in two weeks upon discharge  Should be sent home with two weeks of antibiotics - cipro/flagyl - on discharge

## 2020-11-29 NOTE — PROGRESS NOTES
"[facility]  Endocrinology  Progress Note    Admit Date: 2020     Reason for Consult: Management of DKA, Hyperglycemia     Surgical Procedure and Date: N/A     Diabetes diagnosis year: Newly Dx.  Per patient he had steroid induced DM during childhood and resolved after DC of steroids.     Home Diabetes Medications:  NONE     How often checking glucose at home? N/A    BG readings on regimen: N/a   Hypoglycemia on the regimen?  No  Missed doses on regimen?  No    Diabetes Complications include:     N/A     Complicating diabetes co morbidities:   N/A       HPI:   Patient is a 35 y.o. male with a diagnosis of Steroid induced DM during childhood that resolved after discontinuation of steroids, Chron's disease s/p colectomy and ileostomy that was admitted to [facility] for scrotal cellulitis and KEITH.  During ED course patient was found to be in DKA w/ glucose 600s, BHB 4.6, PH 7.29, HCO3 13 and AG 27.  Pt was started on IV and Insulin infusion and consulted with endocrinology for assistance in management of DKA in the setting of newly Dx. DM.         Interval HPI: Glucose at goal.  Overnight events: NAEON  Eatin%  Nausea: No  Hypoglycemia and intervention: No  Fever: No  TPN and/or TF: No    /77 (BP Location: Right arm, Patient Position: Lying)   Pulse 67   Temp 96.7 °F (35.9 °C) (Oral)   Resp 18   Ht 5' 8" (1.727 m)   Wt 76.5 kg (168 lb 10.4 oz)   SpO2 (!) 94%   BMI 25.64 kg/m²     Labs Reviewed and Include    Recent Labs   Lab 20  0608   *   CALCIUM 9.1   ALBUMIN 2.7*   PROT 6.8   *   K 3.9   CO2 29      BUN 10   CREATININE 1.6*   ALKPHOS 129   ALT 50*   AST 29   BILITOT 0.3     Lab Results   Component Value Date    WBC 9.13 2020    HGB 11.6 (L) 2020    HCT 34.8 (L) 2020    MCV 90 2020     2020     No results for input(s): TSH, FREET4 in the last 168 hours.  Lab Results   Component Value Date    HGBA1C 13.8 (H) " 11/21/2020       Nutritional status:   Body mass index is 25.64 kg/m².  Lab Results   Component Value Date    ALBUMIN 2.7 (L) 11/29/2020    ALBUMIN 2.7 (L) 11/28/2020    ALBUMIN 2.8 (L) 11/28/2020     Lab Results   Component Value Date    PREALBUMIN 34 03/31/2015    PREALBUMIN 7 (L) 11/11/2014    PREALBUMIN 8 (L) 11/10/2014       Estimated Creatinine Clearance: 62.9 mL/min (A) (based on SCr of 1.6 mg/dL (H)).    Accu-Checks  Recent Labs     11/26/20  2217 11/27/20  0811 11/27/20  1024 11/27/20  1127 11/27/20  1643 11/27/20  2058 11/28/20  0751 11/28/20  1143 11/28/20  1629 11/29/20  0735   POCTGLUCOSE 142* 201* 236* 252* 252* 171* 113* 255* 158* 120*       Current Medications and/or Treatments Impacting Glycemic Control  Immunotherapy:    Immunosuppressants     None        Steroids:   Hormones (From admission, onward)    Start     Stop Route Frequency Ordered    11/21/20 1406  melatonin tablet 6 mg      -- Oral Nightly PRN 11/21/20 1407        Pressors:    Autonomic Drugs (From admission, onward)    None        Hyperglycemia/Diabetes Medications:   Antihyperglycemics (From admission, onward)    Start     Stop Route Frequency Ordered    11/28/20 1645  insulin aspart U-100 pen 15 Units      -- SubQ 3 times daily with meals 11/28/20 1230    11/28/20 1104  insulin aspart U-100 pen 0-5 Units      -- SubQ Before meals & nightly PRN 11/28/20 1004    11/28/20 0900  insulin detemir U-100 pen 15 Units      -- SubQ 2 times daily 11/28/20 0741          ASSESSMENT and PLAN    * Diabetic ketoacidosis without coma associated with diabetes mellitus due to underlying condition  Resolved. See diabetes.    Type 2 diabetes mellitus with hyperglycemia  A1c 13% new diagnosis of diabetes    On admission with DKA now resolved    FRANCHESCA-65 negative and detectable C-peptide. Likely type 2 diabetes.    Glucose mostly at goal on current regimen will continue.    Plan:  - Insulin Levemir 15 U BID  - Insulin Novolog 15 U before every meal  - Low  dose insulin correction scale  - BG checks ACHS  - Inpatient glucose goal 140-180 mg/dL    If discharged:  Suggest above regimen          Cellulitis of scrotum  On antibiotics per primary        KEITH (acute kidney injury)  KEITH since 11/27, can cause insulin stacking.   Avoid metformin at discharge, may consider outpatient if renal function normalizes    Crohn's disease of both small and large intestine without complication  Per primary team   Not on steroids currently        Pastor Cochran MD  Endocrinology  [facility]

## 2020-11-29 NOTE — SUBJECTIVE & OBJECTIVE
"Interval HPI: Glucose at goal.  Overnight events: NAEON  Eatin%  Nausea: No  Hypoglycemia and intervention: No  Fever: No  TPN and/or TF: No    /77 (BP Location: Right arm, Patient Position: Lying)   Pulse 67   Temp 96.7 °F (35.9 °C) (Oral)   Resp 18   Ht 5' 8" (1.727 m)   Wt 76.5 kg (168 lb 10.4 oz)   SpO2 (!) 94%   BMI 25.64 kg/m²     Labs Reviewed and Include    Recent Labs   Lab 20  0608   *   CALCIUM 9.1   ALBUMIN 2.7*   PROT 6.8   *   K 3.9   CO2 29      BUN 10   CREATININE 1.6*   ALKPHOS 129   ALT 50*   AST 29   BILITOT 0.3     Lab Results   Component Value Date    WBC 9.13 2020    HGB 11.6 (L) 2020    HCT 34.8 (L) 2020    MCV 90 2020     2020     No results for input(s): TSH, FREET4 in the last 168 hours.  Lab Results   Component Value Date    HGBA1C 13.8 (H) 2020       Nutritional status:   Body mass index is 25.64 kg/m².  Lab Results   Component Value Date    ALBUMIN 2.7 (L) 2020    ALBUMIN 2.7 (L) 2020    ALBUMIN 2.8 (L) 2020     Lab Results   Component Value Date    PREALBUMIN 34 2015    PREALBUMIN 7 (L) 2014    PREALBUMIN 8 (L) 11/10/2014       Estimated Creatinine Clearance: 62.9 mL/min (A) (based on SCr of 1.6 mg/dL (H)).    Accu-Checks  Recent Labs     20  2217 20  0811 20  1024 20  1127 20  1643 20  2058 20  0751 20  1143 20  1629 20  0735   POCTGLUCOSE 142* 201* 236* 252* 252* 171* 113* 255* 158* 120*       Current Medications and/or Treatments Impacting Glycemic Control  Immunotherapy:    Immunosuppressants     None        Steroids:   Hormones (From admission, onward)    Start     Stop Route Frequency Ordered    20 1406  melatonin tablet 6 mg      -- Oral Nightly PRN 20 1407        Pressors:    Autonomic Drugs (From admission, onward)    None        Hyperglycemia/Diabetes Medications:   Antihyperglycemics " (From admission, onward)    Start     Stop Route Frequency Ordered    11/28/20 1645  insulin aspart U-100 pen 15 Units      -- SubQ 3 times daily with meals 11/28/20 1230    11/28/20 1104  insulin aspart U-100 pen 0-5 Units      -- SubQ Before meals & nightly PRN 11/28/20 1004    11/28/20 0900  insulin detemir U-100 pen 15 Units      -- SubQ 2 times daily 11/28/20 0741

## 2020-11-29 NOTE — PROGRESS NOTES
[facility]  Colorectal Surgery  Progress Note    Patient Name: Amando Calix  MRN: [id number]  Admission Date: 11/21/2020  Hospital Length of Stay: 8 days  Attending Physician: Eusebio Berumen MD    Subjective:     Interval History:   POD 2, s/p EUA, seton placement to perineum/scrotum  Afebrile  Tolerating diet  Ambulating and voiding without issue  Pain well controlled    Post-Op Info:  Procedure(s) (LRB):  Exam under anesthesia and seton placement (N/A)   2 Days Post-Op      Medications:  Continuous Infusions:   sodium chloride 0.45% 75 mL/hr at 11/29/20 0300     Scheduled Meds:   ciprofloxacin HCl  500 mg Oral Q12H    insulin aspart U-100  15 Units Subcutaneous TIDWM    insulin detemir U-100  15 Units Subcutaneous BID    metroNIDAZOLE  500 mg Oral Q8H     PRN Meds:   dextrose 50%    dextrose 50%    glucagon (human recombinant)    glucose    glucose    HYDROmorphone    insulin aspart U-100    melatonin    ondansetron    oxyCODONE    promethazine (PHENERGAN) IVPB    sodium chloride 0.9%    sodium chloride 0.9%        Objective:     Vital Signs (Most Recent):  Temp: 98.3 °F (36.8 °C) (11/29/20 0436)  Pulse: 82 (11/29/20 0436)  Resp: 18 (11/29/20 0647)  BP: 131/78 (11/29/20 0436)  SpO2: 97 % (11/29/20 0436) Vital Signs (24h Range):  Temp:  [97.9 °F (36.6 °C)-98.6 °F (37 °C)] 98.3 °F (36.8 °C)  Pulse:  [62-89] 82  Resp:  [18-19] 18  SpO2:  [94 %-98 %] 97 %  BP: (119-138)/(64-83) 131/78     Intake/Output - Last 3 Shifts       11/27 0700 - 11/28 0659 11/28 0700 - 11/29 0659 11/29 0700 - 11/30 0659    P.O. 720 1160     I.V. (mL/kg) 200 (2.6) 2038.8 (26.7)     IV Piggyback       Total Intake(mL/kg) 920 (12) 3198.8 (41.8)     Urine (mL/kg/hr) 2350 (1.3) 3150 (1.7)     Emesis/NG output       Other       Stool       Blood       Total Output 2350 3150     Net -1430 +48.8            Urine Occurrence 2 x      Stool Occurrence 0 x            Physical Exam  Constitutional:       General:  He is not in acute distress.     Appearance: Normal appearance. He is normal weight. He is not ill-appearing.   HENT:      Head: Normocephalic and atraumatic.      Nose: Nose normal.      Mouth/Throat:      Mouth: Mucous membranes are moist.      Pharynx: Oropharynx is clear.   Eyes:      Extraocular Movements: Extraocular movements intact.   Cardiovascular:      Rate and Rhythm: Normal rate and regular rhythm.      Heart sounds: No murmur.   Pulmonary:      Effort: Pulmonary effort is normal. No respiratory distress.      Breath sounds: No stridor. No wheezing or rhonchi.   Abdominal:      General: Abdomen is flat. There is no distension.      Palpations: Abdomen is soft.      Tenderness: There is no abdominal tenderness. There is no guarding or rebound.   Genitourinary:     Comments: Previously placed setons noted in perianal region and perineum. New seton noted to traverse from scrotum to mid perineum. Significantly less tenderness and erythema from previous.  Skin:     General: Skin is warm and dry.      Capillary Refill: Capillary refill takes less than 2 seconds.   Neurological:      General: No focal deficit present.      Mental Status: He is oriented to person, place, and time.   Psychiatric:         Mood and Affect: Mood normal.         Behavior: Behavior normal.     Assessment/Plan:     Cellulitis of scrotum  Continue seton drainage of fistula tract and scrotal abscess  Activity as tolerated  Diabetic diet  Continue current cares per primary  Discharge planning per primary  Patient should follow up in clinic with Dr. Suarez in two weeks upon discharge  Should be sent home with two weeks of antibiotics - cipro/flagyl - on discharge          Yogi Thomas MD  Colorectal Surgery  [facility]

## 2020-11-29 NOTE — ASSESSMENT & PLAN NOTE
A1c 13% new diagnosis of diabetes    On admission with DKA now resolved    FRANCHESCA-65 negative and detectable C-peptide. Likely type 2 diabetes.    Glucose mostly at goal on current regimen will continue.    Plan:  - Insulin Levemir 15 U BID  - Insulin Novolog 15 U before every meal  - Low dose insulin correction scale  - BG checks ACHS  - Inpatient glucose goal 140-180 mg/dL    If discharged:  Suggest above regimen

## 2020-11-29 NOTE — SUBJECTIVE & OBJECTIVE
Interval History:  No fevers overnight  Reports pain has improved      Review of Systems   Constitutional: Negative for chills, diaphoresis and fever.   HENT: Negative for rhinorrhea and sore throat.    Respiratory: Negative for cough and shortness of breath.    Cardiovascular: Negative for chest pain and leg swelling.   Gastrointestinal: Negative for abdominal pain, diarrhea, nausea and vomiting.   Genitourinary: Negative for dysuria and hematuria.   Musculoskeletal: Negative for arthralgias and myalgias.   Skin: Negative for rash.   Neurological: Negative for headaches.     Objective:     Vital Signs (Most Recent):  Temp: 97.8 °F (36.6 °C) (11/29/20 1157)  Pulse: 78 (11/29/20 1157)  Resp: 18 (11/29/20 1204)  BP: (!) 140/76 (11/29/20 1157)  SpO2: 96 % (11/29/20 1157) Vital Signs (24h Range):  Temp:  [96.7 °F (35.9 °C)-98.3 °F (36.8 °C)] 97.8 °F (36.6 °C)  Pulse:  [62-89] 78  Resp:  [18-19] 18  SpO2:  [94 %-98 %] 96 %  BP: (119-140)/(67-83) 140/76     Weight: 76.5 kg (168 lb 10.4 oz)  Body mass index is 25.64 kg/m².    Estimated Creatinine Clearance: 62.9 mL/min (A) (based on SCr of 1.6 mg/dL (H)).    Physical Exam  Constitutional:       General: He is not in acute distress.     Appearance: He is well-developed. He is not diaphoretic.   HENT:      Head: Normocephalic and atraumatic.   Eyes:      Conjunctiva/sclera: Conjunctivae normal.   Neck:      Musculoskeletal: Normal range of motion and neck supple.   Pulmonary:      Effort: Pulmonary effort is normal. No respiratory distress.   Abdominal:      General: There is no distension.      Palpations: Abdomen is soft.   Musculoskeletal: Normal range of motion.   Skin:     General: Skin is warm and dry.      Findings: No erythema or rash.   Neurological:      Mental Status: He is alert and oriented to person, place, and time.   Psychiatric:         Behavior: Behavior normal.         Significant Labs: All pertinent labs within the past 24 hours have been  reviewed.    Significant Imaging: I have reviewed all pertinent imaging results/findings within the past 24 hours.

## 2020-11-29 NOTE — CONSULTS
[facility] Hospital Medicine  Telemedicine Consult Note    Patient Name: Amando Calix  MRN: [id number]  Admission Date: 11/21/2020  Hospital Length of Stay: 7 days  Attending Physician: Eusebio Berumen MD   Primary Care Provider: Celestino Wright MD         [facility] Medicine consulted for Amando Calix to be followed through telemedicine modalities.  The patient is currently not accepted for virtual visits due to [facility] service capacity limitations.      Katalina Alexander MD  Department of Hospital Medicine   [facility]

## 2020-11-29 NOTE — PROGRESS NOTES
[facility]  Infectious Disease  Progress Note    Patient Name: Amando Calix  MRN: [id number]  Admission Date: 11/21/2020  Length of Stay: 8 days  Attending Physician: Eusebio Berumen MD  Primary Care Provider: Celestino Wright MD    Isolation Status: No active isolations  Assessment/Plan:      Cellulitis of scrotum  35-year-old male with history of Crohn's disease on Entyvio (last 11/2020) s/p total abdominal colectomy with end ileostomy and a short rectal stump, complex right perianal fistula with several subcutaneous tracts and openings s/p placement of 3 setons 7/2020, presented 11/21/2020 with increasing scrotal pain, CT abd/pelvis with no abnormal fluid collection to suggest abscess, found to be in DKA. Patient was started on clindamycin, but there was no clinical improvement. Urology evaluated patient 11/25/2020, performed bedside I&D, cultures with no growth. On 11/27/2020, patient brought to OR, found to have severe anal stenosis and fistulous connection from the open wound at the base of the scrotum to the most anterior external fistula opening in the perineum.    Patient screened for STI - RPR positive at 1:2, but FTA-Abs pending - awaiting results.    Recommendations:  - Agree with colorectal recommendations for cipro / metronidazole  - If FTA-Abs returns positive prior to discharge, please administer PenG 2.4 million units IM as inpatient, will arrange for dose 2 and 3 as outpatient.  - If FTA-Abs returns negative, positive RPR test is a false positive, known to occur in patients with autoimmune disease            Thank you for your consult. I will sign off. Please contact us if you have any additional questions.    Saba Wetzel MD  Infectious Disease  [facility]    Subjective:     Principal Problem:Diabetic ketoacidosis without coma associated with diabetes mellitus due to underlying condition    HPI: 35-year-old male with history of Crohn's disease on  Entyvio (last 11/2020) s/p total abdominal colectomy with end ileostomy and a short rectal stump, complex right perianal fistula with several subcutaneous tracts and openings s/p placement of 3 setons 7/2020, presented 11/21/2020 with increasing scrotal pain. CT abd/pelvis with no abnormal fluid collection to suggest abscess. Patient was started on clindamycin. He was also found to be in DKA. ID consulted given no improvement in scrotal cellulitis on course of clindamycin. Urology evaluated patient today, performed bedside I&D, cultures sent.     Interval History:  No fevers overnight  Reports pain has improved      Review of Systems   Constitutional: Negative for chills, diaphoresis and fever.   HENT: Negative for rhinorrhea and sore throat.    Respiratory: Negative for cough and shortness of breath.    Cardiovascular: Negative for chest pain and leg swelling.   Gastrointestinal: Negative for abdominal pain, diarrhea, nausea and vomiting.   Genitourinary: Negative for dysuria and hematuria.   Musculoskeletal: Negative for arthralgias and myalgias.   Skin: Negative for rash.   Neurological: Negative for headaches.     Objective:     Vital Signs (Most Recent):  Temp: 97.8 °F (36.6 °C) (11/29/20 1157)  Pulse: 78 (11/29/20 1157)  Resp: 18 (11/29/20 1204)  BP: (!) 140/76 (11/29/20 1157)  SpO2: 96 % (11/29/20 1157) Vital Signs (24h Range):  Temp:  [96.7 °F (35.9 °C)-98.3 °F (36.8 °C)] 97.8 °F (36.6 °C)  Pulse:  [62-89] 78  Resp:  [18-19] 18  SpO2:  [94 %-98 %] 96 %  BP: (119-140)/(67-83) 140/76     Weight: 76.5 kg (168 lb 10.4 oz)  Body mass index is 25.64 kg/m².    Estimated Creatinine Clearance: 62.9 mL/min (A) (based on SCr of 1.6 mg/dL (H)).    Physical Exam  Constitutional:       General: He is not in acute distress.     Appearance: He is well-developed. He is not diaphoretic.   HENT:      Head: Normocephalic and atraumatic.   Eyes:      Conjunctiva/sclera: Conjunctivae normal.   Neck:      Musculoskeletal: Normal  range of motion and neck supple.   Pulmonary:      Effort: Pulmonary effort is normal. No respiratory distress.   Abdominal:      General: There is no distension.      Palpations: Abdomen is soft.   Musculoskeletal: Normal range of motion.   Skin:     General: Skin is warm and dry.      Findings: No erythema or rash.   Neurological:      Mental Status: He is alert and oriented to person, place, and time.   Psychiatric:         Behavior: Behavior normal.         Significant Labs: All pertinent labs within the past 24 hours have been reviewed.    Significant Imaging: I have reviewed all pertinent imaging results/findings within the past 24 hours.

## 2020-11-30 LAB
ALBUMIN SERPL BCP-MCNC: 2.4 G/DL (ref 3.5–5.2)
ALP SERPL-CCNC: 111 U/L (ref 55–135)
ALT SERPL W/O P-5'-P-CCNC: 39 U/L (ref 10–44)
ANION GAP SERPL CALC-SCNC: 8 MMOL/L (ref 8–16)
AST SERPL-CCNC: 20 U/L (ref 10–40)
BACTERIA SPEC ANAEROBE CULT: ABNORMAL
BACTERIA SPEC ANAEROBE CULT: ABNORMAL
BASOPHILS # BLD AUTO: 0.02 K/UL (ref 0–0.2)
BASOPHILS NFR BLD: 0.2 % (ref 0–1.9)
BILIRUB SERPL-MCNC: 0.3 MG/DL (ref 0.1–1)
BUN SERPL-MCNC: 7 MG/DL (ref 6–20)
CALCIUM SERPL-MCNC: 8.3 MG/DL (ref 8.7–10.5)
CHLORIDE SERPL-SCNC: 104 MMOL/L (ref 95–110)
CO2 SERPL-SCNC: 31 MMOL/L (ref 23–29)
CREAT SERPL-MCNC: 1.5 MG/DL (ref 0.5–1.4)
DIFFERENTIAL METHOD: ABNORMAL
EOSINOPHIL # BLD AUTO: 0.1 K/UL (ref 0–0.5)
EOSINOPHIL NFR BLD: 0.9 % (ref 0–8)
ERYTHROCYTE [DISTWIDTH] IN BLOOD BY AUTOMATED COUNT: 13.1 % (ref 11.5–14.5)
EST. GFR  (AFRICAN AMERICAN): >60 ML/MIN/1.73 M^2
EST. GFR  (NON AFRICAN AMERICAN): 59.9 ML/MIN/1.73 M^2
GLUCOSE SERPL-MCNC: 163 MG/DL (ref 70–110)
HCT VFR BLD AUTO: 32.2 % (ref 40–54)
HGB BLD-MCNC: 10.8 G/DL (ref 14–18)
IMM GRANULOCYTES # BLD AUTO: 0.03 K/UL (ref 0–0.04)
IMM GRANULOCYTES NFR BLD AUTO: 0.3 % (ref 0–0.5)
LYMPHOCYTES # BLD AUTO: 2 K/UL (ref 1–4.8)
LYMPHOCYTES NFR BLD: 22.2 % (ref 18–48)
MAGNESIUM SERPL-MCNC: 1.7 MG/DL (ref 1.6–2.6)
MCH RBC QN AUTO: 29.9 PG (ref 27–31)
MCHC RBC AUTO-ENTMCNC: 33.5 G/DL (ref 32–36)
MCV RBC AUTO: 89 FL (ref 82–98)
MONOCYTES # BLD AUTO: 1.8 K/UL (ref 0.3–1)
MONOCYTES NFR BLD: 19.7 % (ref 4–15)
NEUTROPHILS # BLD AUTO: 5 K/UL (ref 1.8–7.7)
NEUTROPHILS NFR BLD: 56.7 % (ref 38–73)
NRBC BLD-RTO: 0 /100 WBC
PHOSPHATE SERPL-MCNC: 4.1 MG/DL (ref 2.7–4.5)
PLATELET # BLD AUTO: 220 K/UL (ref 150–350)
PMV BLD AUTO: 13 FL (ref 9.2–12.9)
POCT GLUCOSE: 106 MG/DL (ref 70–110)
POCT GLUCOSE: 179 MG/DL (ref 70–110)
POCT GLUCOSE: 81 MG/DL (ref 70–110)
POTASSIUM SERPL-SCNC: 3.5 MMOL/L (ref 3.5–5.1)
PROT SERPL-MCNC: 6.2 G/DL (ref 6–8.4)
RBC # BLD AUTO: 3.61 M/UL (ref 4.6–6.2)
SODIUM SERPL-SCNC: 143 MMOL/L (ref 136–145)
T PALLIDUM AB SER QL IF: NORMAL
WBC # BLD AUTO: 8.9 K/UL (ref 3.9–12.7)

## 2020-11-30 PROCEDURE — C9399 UNCLASSIFIED DRUGS OR BIOLOG: HCPCS | Performed by: STUDENT IN AN ORGANIZED HEALTH CARE EDUCATION/TRAINING PROGRAM

## 2020-11-30 PROCEDURE — 25000003 PHARM REV CODE 250: Performed by: SURGERY

## 2020-11-30 PROCEDURE — 97803 MED NUTRITION INDIV SUBSEQ: CPT

## 2020-11-30 PROCEDURE — 99232 PR SUBSEQUENT HOSPITAL CARE,LEVL II: ICD-10-PCS | Mod: ,,, | Performed by: HOSPITALIST

## 2020-11-30 PROCEDURE — 99232 SBSQ HOSP IP/OBS MODERATE 35: CPT | Mod: ,,, | Performed by: INTERNAL MEDICINE

## 2020-11-30 PROCEDURE — 36415 COLL VENOUS BLD VENIPUNCTURE: CPT

## 2020-11-30 PROCEDURE — 20600001 HC STEP DOWN PRIVATE ROOM

## 2020-11-30 PROCEDURE — 63600175 PHARM REV CODE 636 W HCPCS: Performed by: SURGERY

## 2020-11-30 PROCEDURE — 99232 SBSQ HOSP IP/OBS MODERATE 35: CPT | Mod: ,,, | Performed by: HOSPITALIST

## 2020-11-30 PROCEDURE — 25000003 PHARM REV CODE 250: Performed by: STUDENT IN AN ORGANIZED HEALTH CARE EDUCATION/TRAINING PROGRAM

## 2020-11-30 PROCEDURE — 99232 PR SUBSEQUENT HOSPITAL CARE,LEVL II: ICD-10-PCS | Mod: ,,, | Performed by: INTERNAL MEDICINE

## 2020-11-30 PROCEDURE — 85025 COMPLETE CBC W/AUTO DIFF WBC: CPT

## 2020-11-30 PROCEDURE — 25000003 PHARM REV CODE 250: Performed by: HOSPITALIST

## 2020-11-30 PROCEDURE — 83735 ASSAY OF MAGNESIUM: CPT

## 2020-11-30 PROCEDURE — 80053 COMPREHEN METABOLIC PANEL: CPT

## 2020-11-30 PROCEDURE — 84100 ASSAY OF PHOSPHORUS: CPT

## 2020-11-30 RX ORDER — INSULIN ASPART 100 [IU]/ML
10 INJECTION, SOLUTION INTRAVENOUS; SUBCUTANEOUS
Status: DISCONTINUED | OUTPATIENT
Start: 2020-11-30 | End: 2020-12-01

## 2020-11-30 RX ORDER — SODIUM CHLORIDE 450 MG/100ML
INJECTION, SOLUTION INTRAVENOUS CONTINUOUS
Status: ACTIVE | OUTPATIENT
Start: 2020-11-30 | End: 2020-12-02

## 2020-11-30 RX ORDER — POTASSIUM CHLORIDE 20 MEQ/1
20 TABLET, EXTENDED RELEASE ORAL ONCE
Status: COMPLETED | OUTPATIENT
Start: 2020-11-30 | End: 2020-11-30

## 2020-11-30 RX ADMIN — OXYCODONE HYDROCHLORIDE 5 MG: 5 TABLET ORAL at 06:11

## 2020-11-30 RX ADMIN — INSULIN ASPART 15 UNITS: 100 INJECTION, SOLUTION INTRAVENOUS; SUBCUTANEOUS at 08:11

## 2020-11-30 RX ADMIN — METRONIDAZOLE 500 MG: 500 TABLET ORAL at 06:11

## 2020-11-30 RX ADMIN — HYDROMORPHONE HYDROCHLORIDE 0.5 MG: 1 INJECTION, SOLUTION INTRAMUSCULAR; INTRAVENOUS; SUBCUTANEOUS at 09:11

## 2020-11-30 RX ADMIN — SODIUM CHLORIDE: 0.45 INJECTION, SOLUTION INTRAVENOUS at 01:11

## 2020-11-30 RX ADMIN — HYDROMORPHONE HYDROCHLORIDE 0.5 MG: 1 INJECTION, SOLUTION INTRAMUSCULAR; INTRAVENOUS; SUBCUTANEOUS at 01:11

## 2020-11-30 RX ADMIN — OXYCODONE HYDROCHLORIDE 5 MG: 5 TABLET ORAL at 08:11

## 2020-11-30 RX ADMIN — SODIUM CHLORIDE: 0.45 INJECTION, SOLUTION INTRAVENOUS at 12:11

## 2020-11-30 RX ADMIN — OXYCODONE HYDROCHLORIDE 5 MG: 5 TABLET ORAL at 12:11

## 2020-11-30 RX ADMIN — HYDROMORPHONE HYDROCHLORIDE 0.5 MG: 1 INJECTION, SOLUTION INTRAMUSCULAR; INTRAVENOUS; SUBCUTANEOUS at 03:11

## 2020-11-30 RX ADMIN — INSULIN ASPART 15 UNITS: 100 INJECTION, SOLUTION INTRAVENOUS; SUBCUTANEOUS at 12:11

## 2020-11-30 RX ADMIN — INSULIN DETEMIR 15 UNITS: 100 INJECTION, SOLUTION SUBCUTANEOUS at 09:11

## 2020-11-30 RX ADMIN — MELATONIN TAB 3 MG 6 MG: 3 TAB at 09:11

## 2020-11-30 RX ADMIN — METRONIDAZOLE 500 MG: 500 TABLET ORAL at 09:11

## 2020-11-30 RX ADMIN — HYDROMORPHONE HYDROCHLORIDE 0.5 MG: 1 INJECTION, SOLUTION INTRAMUSCULAR; INTRAVENOUS; SUBCUTANEOUS at 08:11

## 2020-11-30 RX ADMIN — CIPROFLOXACIN HYDROCHLORIDE 500 MG: 500 TABLET, FILM COATED ORAL at 08:11

## 2020-11-30 RX ADMIN — SODIUM CHLORIDE: 0.45 INJECTION, SOLUTION INTRAVENOUS at 10:11

## 2020-11-30 RX ADMIN — METRONIDAZOLE 500 MG: 500 TABLET ORAL at 01:11

## 2020-11-30 RX ADMIN — POTASSIUM CHLORIDE 20 MEQ: 1500 TABLET, EXTENDED RELEASE ORAL at 12:11

## 2020-11-30 RX ADMIN — INSULIN DETEMIR 15 UNITS: 100 INJECTION, SOLUTION SUBCUTANEOUS at 08:11

## 2020-11-30 NOTE — PLAN OF CARE
Pt Aax4, breathing even and unlabored on RA, call light in reach, bed in lowest position, ambulates independently with no issues. Strict I and O maintained. Administered medications per orders, pt tolerated well. Plan of care reviewed with pt, pt stated understanding. Administered pain medication per pt report of pain. VSS, frequent rounding completed. No other significant events throughout shift. Will continue to monitor.

## 2020-11-30 NOTE — SUBJECTIVE & OBJECTIVE
"Interval HPI:   bg trend: 120-255    Overnight events:yady  Eatin%  Nausea: No  Hypoglycemia and intervention: No  Fever: No  TPN and/or TF: No  If yes, type of TF/TPN and rate: na    BP (!) 152/86 (BP Location: Left arm, Patient Position: Lying)   Pulse 86   Temp 98.4 °F (36.9 °C) (Oral)   Resp 16   Ht 5' 8" (1.727 m)   Wt 76.5 kg (168 lb 10.4 oz)   SpO2 99%   BMI 25.64 kg/m²     Labs Reviewed and Include    Recent Labs   Lab 20  0451   *   CALCIUM 8.3*   ALBUMIN 2.4*   PROT 6.2      K 3.5   CO2 31*      BUN 7   CREATININE 1.5*   ALKPHOS 111   ALT 39   AST 20   BILITOT 0.3     Lab Results   Component Value Date    WBC 8.90 2020    HGB 10.8 (L) 2020    HCT 32.2 (L) 2020    MCV 89 2020     2020     No results for input(s): TSH, FREET4 in the last 168 hours.  Lab Results   Component Value Date    HGBA1C 13.8 (H) 2020       Nutritional status:   Body mass index is 25.64 kg/m².  Lab Results   Component Value Date    ALBUMIN 2.4 (L) 2020    ALBUMIN 2.7 (L) 2020    ALBUMIN 2.7 (L) 2020     Lab Results   Component Value Date    PREALBUMIN 34 2015    PREALBUMIN 7 (L) 2014    PREALBUMIN 8 (L) 11/10/2014       Estimated Creatinine Clearance: 67.1 mL/min (A) (based on SCr of 1.5 mg/dL (H)).    Accu-Checks  Recent Labs     20  1127 20  1643 20  2058 20  0751 20  1143 20  1629 20  0735 20  1155 20  1554 20  2137   POCTGLUCOSE 252* 252* 171* 113* 255* 158* 120* 136* 179* 219*       Current Medications and/or Treatments Impacting Glycemic Control  Immunotherapy:    Immunosuppressants     None        Steroids:   Hormones (From admission, onward)    Start     Stop Route Frequency Ordered    20 1406  melatonin tablet 6 mg      -- Oral Nightly PRN 20 1407        Pressors:    Autonomic Drugs (From admission, onward)    None        Hyperglycemia/Diabetes " Medications:   Antihyperglycemics (From admission, onward)    Start     Stop Route Frequency Ordered    11/28/20 1645  insulin aspart U-100 pen 15 Units      -- SubQ 3 times daily with meals 11/28/20 1230    11/28/20 1104  insulin aspart U-100 pen 0-5 Units      -- SubQ Before meals & nightly PRN 11/28/20 1004    11/28/20 0900  insulin detemir U-100 pen 15 Units      -- SubQ 2 times daily 11/28/20 0741

## 2020-11-30 NOTE — PROGRESS NOTES
[facility]  Colorectal Surgery  Progress Note    Patient Name: Amando Calix  MRN: [id number]  Admission Date: 11/21/2020  Hospital Length of Stay: 9 days  Attending Physician: Eusebio Berumen MD    Subjective:     Interval History:   No acute events overnight  Pain well controlled  Afebrile  Tolerating diet  Ambulating and voiding without issue  Reports appropriate stool output from stoma, though documentation lacking    Post-Op Info:  Procedure(s) (LRB):  Exam under anesthesia and seton placement (N/A)   3 Days Post-Op      Medications:  Continuous Infusions:   sodium chloride 0.45% 100 mL/hr at 11/30/20 0131     Scheduled Meds:   ciprofloxacin HCl  500 mg Oral Q12H    insulin aspart U-100  15 Units Subcutaneous TIDWM    insulin detemir U-100  15 Units Subcutaneous BID    metroNIDAZOLE  500 mg Oral Q8H     PRN Meds:   dextrose 50%    dextrose 50%    glucagon (human recombinant)    glucose    glucose    HYDROmorphone    insulin aspart U-100    melatonin    ondansetron    oxyCODONE    promethazine (PHENERGAN) IVPB    sodium chloride 0.9%    sodium chloride 0.9%        Objective:     Vital Signs (Most Recent):  Temp: 98.4 °F (36.9 °C) (11/30/20 0416)  Pulse: 86 (11/30/20 0416)  Resp: 16 (11/30/20 0632)  BP: (!) 152/86 (11/30/20 0416)  SpO2: 99 % (11/30/20 0416) Vital Signs (24h Range):  Temp:  [96.7 °F (35.9 °C)-98.4 °F (36.9 °C)] 98.4 °F (36.9 °C)  Pulse:  [] 86  Resp:  [16-19] 16  SpO2:  [94 %-99 %] 99 %  BP: (136-152)/(75-86) 152/86     Intake/Output - Last 3 Shifts       11/28 0700 - 11/29 0659 11/29 0700 - 11/30 0659    P.O. 1160 944    I.V. (mL/kg) 2038.8 (26.7) 2341.7 (30.6)    Total Intake(mL/kg) 3198.8 (41.8) 3285.7 (42.9)    Urine (mL/kg/hr) 3150 (1.7) 1775 (1)    Stool  0    Total Output 3150 1775    Net +48.8 +1510.7          Urine Occurrence  2 x    Stool Occurrence  1 x          Physical Exam  Constitutional:       General: He is not in acute  distress.     Appearance: Normal appearance. He is normal weight. He is not ill-appearing.   HENT:      Head: Normocephalic and atraumatic.      Nose: Nose normal.      Mouth/Throat:      Mouth: Mucous membranes are moist.      Pharynx: Oropharynx is clear.   Eyes:      Extraocular Movements: Extraocular movements intact.   Cardiovascular:      Rate and Rhythm: Normal rate and regular rhythm.      Heart sounds: No murmur.   Pulmonary:      Effort: Pulmonary effort is normal. No respiratory distress.      Breath sounds: No stridor. No wheezing or rhonchi.   Abdominal:      General: Abdomen is flat. There is no distension.      Palpations: Abdomen is soft.      Tenderness: There is no abdominal tenderness. There is no guarding or rebound.   Genitourinary:     Comments: Previously placed setons noted in perianal region and perineum. New seton noted to traverse from scrotum to mid perineum. Decreased tenderness and erythema.  Skin:     General: Skin is warm and dry.      Capillary Refill: Capillary refill takes less than 2 seconds.   Neurological:      General: No focal deficit present.      Mental Status: He is oriented to person, place, and time.   Psychiatric:         Mood and Affect: Mood normal.         Behavior: Behavior normal.     Assessment/Plan:     Cellulitis of scrotum  Continue seton drainage of fistula tract and scrotal abscess  Activity as tolerated  Diabetic diet  Continue current cares per primary  Discharge planning per primary  Patient should follow up in clinic with Dr. Suarez in two weeks upon discharge  Should be sent home with two weeks of antibiotics - cipro/flagyl - on discharge          Yogi Thomas MD  Colorectal Surgery  [facility]

## 2020-11-30 NOTE — PLAN OF CARE
Problem: Diabetic Ketoacidosis  Goal: Fluid and Electrolyte Balance with Absence of Ketosis  Outcome: Ongoing, Progressing  Intervention: Monitor and Manage Ketoacidosis  Flowsheets (Taken 11/30/2020 0149)  Glycemic Management:   blood glucose monitoring   supplemental insulin given   Pt. Aaox4.vss.Bg monitored. Ileostomy bag cared for by pt. Scant serosanguinous drainage from scrotal abscess. Prn pain medication given. Pt tolerated well. Strict I and O's. Safety measures maintained. Poc reviewed with pt. CANDE.

## 2020-11-30 NOTE — ASSESSMENT & PLAN NOTE
Brief Hx: 35 y.o. male with a diagnosis of steroid induced DM during childhood that resolved after discontinuation of steroids, Crohn's disease s/p colectomy and ileostomy that was admitted to [facility] for scrotal cellulitis and KEITH.    Fatigue for about 2 weeks.  His symptoms started around November 4th after he received infusion of Entyvio and also ate a very rare steak.    Associated increased urinary frequency as well as polydipsia and an 18 lb weight loss over the past few weeks.  During ED course patient was found to be in DKA w/ glucose 600s, BHB 4.6, PH 7.29, HCO3 13 and AG 27.    Pt was started on IV and Insulin infusion and consulted with endocrinology for assistance in management of DKA in the setting of newly Dx DM    A1c 13% new diagnosis of diabetes  On admission with DKA now resolved    FRANCHESCA-65 negative and detectable C-peptide. Likely type 2 diabetes.    Consulted for: DKA  DM type: Presumed undiagnosed, new onset T2DM   A1C: 13.8*  Hypoglycemia: none  Steroids: none  Diet/Tfs: 100%  Glucose Goals: 140-180 mg/dL    Glucose Trend x 24hr:     Home Regimen:        PLAN:     -  Detemir 15 units BID   -  Aspart 15 units TIDWM   -  Low dose correction insulin   -  Accucheck ACHS    If discharged:  Suggest above regimen

## 2020-11-30 NOTE — PLAN OF CARE
1/2 NS was discontinued this morning. Then restarted this afternoon. 1/2 NS gtt started at 100ml/hr. Patient still receiving IV hydromorphone q6hrs PRN. Last dose was given at 1525. Next dose is available at 2125.  Patient is also receiving PO oxycodone q6hrs PRN. Last dose was given at 1252. Next dose is available is 1852. Patient's novolog got decreased. He is now receiving 10 units TID with meals.

## 2020-11-30 NOTE — ASSESSMENT & PLAN NOTE
KEITH since 11/27, can cause insulin stacking.   Avoid metformin at discharge, may consider outpatient if renal function normalizes  Improving

## 2020-11-30 NOTE — PROGRESS NOTES
Progress Note  Hospital Medicine    Admit Date: 11/21/2020  Length of Stay:  LOS: 9 days     SUBJECTIVE:         Follow-up For:  Type 2 diabetes mellitus with hyperglycemia    HPI/Interval history (See H&P for complete P,F,SHx) :     Mr. Calix is a 35 year old gentleman with PMH of Crohn's colitis (on Entyvio) s/p colectomy with end ileostomy with significant anorectal fistulizing disease s/p placement of multiple setons, h/o c diff infection, anemia and h/o steroid induced diabetes presents for fatigue for about 2 weeks.  His symptoms started around November 4th after he received infusion of Entyvio and also ate a very rare steak.  He reports associated increased urinary frequency as well as polydipsia and an 18 lb weight loss over the past few weeks.  States that he has a good appetite has been eating well, he denies any abdominal pain bloody stool from his ostomy, diarrhea, nausea/vomiting, fevers/chills, hematuria, dysuria, flank pain, chest pain, shortness of breath, cough or sore throat.       Interval History:   11/25 Patient lying in bed, no acute distress. Reports tolerating diet and fatigue has been improving. Continues to note scrotal swelling has unchanged. Increased scrotal pain after I&D.   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 Urology updated , findings likely from bedside debridement. no surgical intervention.  EUA with CRS tomorrow to assess for possible fistula tract between previous fistulas and scrotal abscess. started on clindamycin IV  11/27 EUA today -identification of fistula tract from previously known fistula to base of scrotum - seton placed.  Discontinued clindamycin  11/28 s/p EUA, seton placement to perineum/scrotum .  .transitioned to Cipro/Flagylx 14 days,    cultures NGTD,  RPR + , FTA abs pending  11/29 sodium 147 . Cr 1.6 . started on 1/2 NS @100ml   11/30 sodium 143 . Cr 1.5 continue 1/2 NS @100ml       Review of Systems:   Pain scale:  "Constitutional: Positive for fatigue. Negative for chills and fever.   HENT: Negative for congestion.    Eyes: Negative for visual disturbance.   Respiratory: Negative for cough and shortness of breath.    Cardiovascular: Negative for chest pain and leg swelling.   Gastrointestinal: Negative for abdominal distention, abdominal pain, nausea and vomiting.   Genitourinary: Positive for scrotal swelling. Negative for dysuria.   Neurological: Negative for dizziness, weakness and numbness.   Psychiatric/Behavioral: Negative for confusion.     OBJECTIVE:     Vital Signs Range (Last 24H):  Temp:  [97.8 °F (36.6 °C)-98.4 °F (36.9 °C)]   Pulse:  []   Resp:  [13-19]   BP: (137-152)/(75-86)   SpO2:  [96 %-99 %]     Physical Exam:  Constitutional: Appears well-developed and well-nourished.   Head: Normocephalic and atraumatic.   Neck: Normal range of motion. Neck supple.   Cardiovascular: Normal heart rate.  Regular heart rhythm.  Pulmonary/Chest: Effort normal.   Abdominal: + tenderness.  ileostomy  Musculoskeletal: Normal range of motion. No edema.    -Scrotal swellingand erythema improved  Neurological: Alert and oriented to person, place, and time.   Skin: Skin is warm and dry.   Psychiatric: Normal mood and affect. Behavior is normal.         Estimated body mass index is 25.64 kg/m² as calculated from the following:    Height as of this encounter: 5' 8" (1.727 m).    Weight as of this encounter: 76.5 kg (168 lb 10.4 oz).    I & O (Last 24H):    Intake/Output Summary (Last 24 hours) at 11/30/2020 1105  Last data filed at 11/30/2020 0500  Gross per 24 hour   Intake 891.67 ml   Output 1775 ml   Net -883.33 ml       Body mass index is 25.64 kg/m².    Estimated Creatinine Clearance: 67.1 mL/min (A) (based on SCr of 1.5 mg/dL (H)).        Laboratory/Diagnostic Data:    Imaging Results          CT Abdomen Pelvis With Contrast (Final result)  Result time 11/21/20 12:07:12    Final result by Nabil Cooper Jr., MD (11/21/20 " 12:07:12)             Impression:      Right perianal fistulous tract extending towards the scrotum with interval placement of a seton, noting the fistulous tract is similar to prior CT 02/09/2020.  There is a slightly more cephalad fistulous track in relation to the seton again noted.  No abnormal fluid collection to suggest an abscess.    Marked scrotal skin thickening.  Correlate for cellulitis.    Stable postoperative changes of a total colectomy and right lower quadrant end ileostomy in this patient with reported history of Crohn's disease.    Electronically signed by resident: Rosio Freeman  Date:    11/21/2020  Time:    11:30    Electronically signed by: Nabil Cooper MD  Date:    11/21/2020  Time:    12:07           Narrative:    EXAMINATION:  CT ABDOMEN PELVIS WITH CONTRAST    CLINICAL HISTORY:  Abdominal pain, fever;Low abd pain and testicular pain/ swelling.  Patient with Crohn's disease, anal fistula with seat on and scrotal stent.  Concern for scrotal cellulitis/abscess/fistula;    TECHNIQUE:  Low dose axial images were obtained from the lung bases through the proximal thighs following the intravenous administration of 75 cc of Omnipaque 350.  Sagittal and coronal reformats were provided.    COMPARISON:  CT pelvis 02/09/2020    FINDINGS:  Heart: Normal in size.  No pericardial effusion.    Lung bases: Symmetrically expanded.  No consolidation, pleural effusion, mass, or pneumothorax.    Liver: Normal in size and attenuation without focal hepatic abnormality.    Gallbladder: Normal in appearance without evidence for cholecystitis.    Bile Ducts: No intra or extrahepatic biliary ductal dilation.    Pancreas: No pancreatic mass lesion or peripancreatic inflammatory change.    Spleen: Unremarkable.    Adrenals: Unremarkable.    Kidneys/ Ureters: Normal in size and location.  Kidneys enhance normally.  No focal renal abnormality or nephrolithiasis.  No hydroureteronephrosis.    Bladder: Smooth contours  without bladder wall thickening.    Reproductive organs: Unremarkable.    GI Tract/Mesentery: The stomach is unremarkable.  Postoperative changes of a total colectomy and right lower quadrant end ileostomy.  Fatty deposition within the wall with a few loops of small bowel in the pelvis, favored to reflect chronic inflammation.    Peritoneal Space: No intraperitoneal free fluid or free air. No pathologically enlarged lymph nodes.    Retroperitoneum: No significant adenopathy.    Abdominal wall/extraperitoneal soft tissues: Soft tissue thickening again seen extending from the right perianal region along the inner right medial gluteal fold into the perineum and right scrotum with interval placement of a Seton in this patient with known perianal fistula, similar in appearance to prior CT 02/09/2020.  Increased soft tissue and skin induration within the scrotum, new from prior CT 02/09/2020.  No peripherally enhancing fluid collections to suggest an abscess.    Vasculature: Abdominal aorta is normal in caliber, contour, and course without significant calcific atherosclerosis.    Bones: Unremarkable without acute fracture or bone destructive process.                             X-Ray Chest PA And Lateral (Final result)  Result time 11/21/20 10:29:32    Final result by Randy Amador DO (11/21/20 10:29:32)             Impression:      No acute abnormality.      Electronically signed by: Randy Amador  Date:    11/21/2020  Time:    10:29           Narrative:    EXAMINATION:  XR CHEST PA AND LATERAL    CLINICAL HISTORY:  Other fatigue.    TECHNIQUE:  PA and lateral views of the chest were performed.    COMPARISON:  Multiple prior radiographs of the chest, most recent from 04/01/2016.    FINDINGS:  The lungs are well expanded and clear. No focal opacities are seen. The pleural spaces are clear.  The cardiac silhouette is unremarkable.  The visualized osseous structures are unremarkable.                                 Reviewed and noted in plan where applicable- Please see chart for full lab data.    Recent Labs   Lab 11/28/20  0450 11/29/20  0608 11/30/20  0451   WBC 10.61 9.13 8.90   HGB 12.9* 11.6* 10.8*   HCT 39.4* 34.8* 32.2*    216 220       Recent Labs   Lab 11/28/20  0450 11/28/20  1020 11/29/20  0608 11/30/20  0451   * 146* 147* 143   K 4.3 4.0 3.9 3.5    103 107 104   CO2 28 32* 29 31*   BUN 15 13 10 7   CREATININE 1.9* 1.9* 1.6* 1.5*    198* 119* 163*   CALCIUM 9.4 9.2 9.1 8.3*   MG 1.9  --  1.9 1.7   PHOS 4.6* 4.3 4.3 4.1       Recent Labs   Lab 11/28/20 0450 11/28/20  1020 11/29/20  0608 11/30/20  0451   ALKPHOS 108  --  129 111   ALT 47*  --  50* 39   AST 47*  --  29 20   ALBUMIN 2.8* 2.7* 2.7* 2.4*   PROT 6.7  --  6.8 6.2   BILITOT 0.4  --  0.3 0.3        Microbiology labs for the last week  Microbiology Results (last 7 days)     Procedure Component Value Units Date/Time    Fungus culture [[id number]] Collected: 11/25/20 1540    Order Status: Completed Specimen: Abscess from Groin Updated: 11/30/20 0945     Fungus (Mycology) Culture Culture in progress    Aerobic culture [[id number]] Collected: 11/25/20 1540    Order Status: Completed Specimen: Abscess from Groin Updated: 11/28/20 1011     Aerobic Bacterial Culture No growth    Culture, Anaerobe [[id number]] Collected: 11/25/20 1540    Order Status: Completed Specimen: Abscess from Groin Updated: 11/27/20 1041     Anaerobic Culture Culture in progress    Blood culture #1 **CANNOT BE ORDERED STAT** [[id number]] Collected: 11/21/20 0934    Order Status: Completed Specimen: Blood from Peripheral, Antecubital, Left Updated: 11/26/20 1012     Blood Culture, Routine No growth after 5 days.    Blood culture #2 **CANNOT BE ORDERED STAT** [[id number]] Collected: 11/21/20 0946    Order Status: Completed Specimen: Blood from Peripheral, Antecubital, Left Updated: 11/26/20 1012     Blood Culture, Routine No growth after 5 days.    Gram stain  [[id number]] Collected: 11/25/20 1540    Order Status: Completed Specimen: Abscess from Groin Updated: 11/25/20 1917     Gram Stain Result Rare WBC's      Rare Gram positive cocci      Rare Gram negative rods           Medications:  Medication list was reviewed and changes noted under Assessment/Plan.        Current Facility-Administered Medications:     0.45% NaCl infusion, , Intravenous, Continuous, Eusebio Berumen MD    ciprofloxacin HCl tablet 500 mg, 500 mg, Oral, Q12H, Yogi Thomas MD, 500 mg at 11/30/20 0811    dextrose 50% injection 12.5 g, 12.5 g, Intravenous, PRN, Yogi Thomas MD    dextrose 50% injection 25 g, 25 g, Intravenous, PRN, Yogi Thomas MD    glucagon (human recombinant) injection 1 mg, 1 mg, Intramuscular, PRN, Yogi Thomas MD    glucose chewable tablet 16 g, 16 g, Oral, PRN, Yogi Thomas MD    glucose chewable tablet 24 g, 24 g, Oral, PRN, Yogi Thomas MD    HYDROmorphone injection 0.5 mg, 0.5 mg, Intravenous, Q6H PRN, Yogi Thomas MD, 0.5 mg at 11/30/20 0811    insulin aspart U-100 pen 0-5 Units, 0-5 Units, Subcutaneous, QID (AC + HS) PRN, Pastor Cochran MD, 1 Units at 11/29/20 2140    insulin aspart U-100 pen 15 Units, 15 Units, Subcutaneous, TIDWM, Pastor Cochran MD, 15 Units at 11/30/20 0812    insulin detemir U-100 pen 15 Units, 15 Units, Subcutaneous, BID, Pastor Cochran MD, 15 Units at 11/30/20 0811    melatonin tablet 6 mg, 6 mg, Oral, Nightly PRN, Yogi Thomas MD, 6 mg at 11/29/20 2149    metroNIDAZOLE tablet 500 mg, 500 mg, Oral, Q8H, Yogi Thomas MD, 500 mg at 11/30/20 0632    ondansetron injection 4 mg, 4 mg, Intravenous, Q6H PRN, Yogi Thomas MD    oxyCODONE immediate release tablet 5 mg, 5 mg, Oral, Q6H PRN, Yogi Thomas MD, 5 mg at 11/30/20 0632    promethazine (PHENERGAN) 12.5 mg in dextrose 5 % 50 mL IVPB, 12.5 mg, Intravenous, Q6H PRN, Yogi Thomas MD    sodium chloride 0.9% flush 10  mL, 10 mL, Intravenous, PRN, Yogi Thomas MD    sodium chloride 0.9% flush 10 mL, 10 mL, Intravenous, PRN, Yogi Thomas MD    Estimated Creatinine Clearance: 67.1 mL/min (A) (based on SCr of 1.5 mg/dL (H)).    ASSESSMENT/PLAN:     Active Problems:     DKA- resolved   H/o steroid induced DM  - DKA likely triggered by scrotal cellulitis   - HA1c: 13.8  - A --> 13  - betahydroxybutyrate: 4.6  - BG on admit, 615 --> 425  - VBG on admit, pH: 7.29/38  - s/p albuterol, insulin/dextrose in the ED  - DKA pathway initiated   - continue insulin gtt   - CLD (no sugar)  - continue IVF  - clear liquid diet (no sugar)  - endocrinology consulted. apprec recs   -- s/p transitional insulin gtt  -- Start Levemir 22 U BID  -- Start Novolog 15 U AC  -- Low dose correction insulin   -- FS qAC/HS/AM   -- diabetic diet    Patient's FSGs are not controlled on current hypoglycemics.   Last A1c reviewed-   Lab Results   Component Value Date    HGBA1C 13.8 (H) 2020    HGBA1C 13.3 (H) 2020    HGBA1C 6.0 2011     Most recent fingerstick glucose reviewed-   Recent Labs   Lab 20  1155 20  1554 20  2137   POCTGLUCOSE 136* 179* 219*   FRANCHESCA-ab negative and detectable C-peptide. Likely type 2 diabetes.       Crohn colitis  S/p colectomy with end ileostomy  Anorectal fistulizing disease s/p placement of multiple setons  - follows with general surgery (Dr. Blackburn). Last visit on 10/5/2020  - follows with CRS (Dr. Suarez). Last visit 8/15/2020  - Given extent of anorectal disease with fistulae and stenosis, ileostomy will be permanent. Appears not to be a candidate for an attempt at restoring intestinal continuity given the extent of his anal disease, and I suspect he will ultimately require a completion proctectomy Discussed completion proctectomy [facility] patient - he is not ready to proceed at this point.  - ESR: 39, CRP: 35  - continue home Entyvio   GI consulted - no medication changes  recommended at this time       KEITH  baseline cr 0.8   Patient with  acute kidney injury.Serum BUN/Cr uptrending.  Strict I/O monitor .   Recent Labs   Lab 11/28/20  1020 11/29/20  0608 11/30/20  0451   BUN 13 10 7   CREATININE 1.9* 1.6* 1.5*   started on RL hydration  11/29 started on 1/2 NS @100ml   11/30. Cr 1.5 continue 1/2 NS @100ml       Hypernatremia - Patient with sodium   Recent Labs   Lab 11/28/20  1020 11/29/20  0608 11/30/20  0451   * 147* 143   . sodium trending up  . 11/29 . started on 1/2 NS @100ml        Scrotal cellulitis   - elevated ESR and CRP  - CT A/P with contrast  (11/21) showed 'Right perianal fistulous tract extending towards the scrotum with interval placement of a seton, noting the fistulous tract is similar to prior CT 02/09/2020. No abnormal fluid collection to suggest an abscess. Marked scrotal skin thickening. Correlate for cellulitis'  - followup bcx   - ID consulted. apprec recs  -- escalated from clindamycin to vancomycin and zosyn   - Urology consulted. apprec recs   -- s/p bedside I&D and packing on 11/25. followup I&D cultures  -- scrotal support, ice packs  -- can't give NSAIDs due to h/o ulcers  - CRS consulted. 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 Urology updated , findings likely from bedside debridement. no surgical intervention.  EUA with CRS tomorrow to assess for possible fistula tract between previous fistulas and scrotal abscess. started on clindamycin IV  11/27 EUA today -identification of fistula tract from previously known fistula to base of scrotum - seton placed.  Discontinued clindamycin.  Monitor for vancomycin toxicity  11/28 s/p EUA, seton placement to perineum/scrotum .transitioned to Cipro/Flagylx 14 days, cultures NGTD,        Anemia   Patient's with Normocyticanemia.. Hemoglobin stable. Etiology likely due to chronic disease .  Current CBC reviewed-    Recent Labs   Lab 11/28/20  0450 11/29/20  0608  11/30/20  0451   HGB 12.9* 11.6* 10.8*     Monitor serial CBC and transfuse if H/H drops below 7/21.       Component Value Date/Time    MCV 89 11/30/2020 0451    RDW 13.1 11/30/2020 0451    IRON 21 (L) 09/28/2010 1531    FERRITIN 23 03/11/2015 1725    FOLATE 8.3 12/29/2012 0614    GMMBFKFA58 884 12/29/2012 0614    OCCULTBLOOD Positive (A) 09/29/2014 2245        Protein calorie malnutrition  - Boost TID when DKA resolves        RPR + , FTA abs pending. ID to follow up . - if positive, will need to be treated for latent syphilis    Disposition- Home    Discharge Planning   MIGUEL: 12/1/2020     Code Status: Full Code   Is the patient medically ready for discharge?: No    Reason for patient still in hospital (select all that apply): Treatment  Discharge Plan A: Home with family   Discharge Delays: None known at this time     DVT prophylaxis addressed with:  SCDs.          Subsequent Hospital Care   Level 2 80112 Total visit time was 25 minutes or greater with greater than 50% of time spent in counseling and coordination of care.       We discussed in detail the plan of care, the patient's response to treatment, the discharge plan,.  Total time includes time spent reviewing the medical record, examining the patient, writing notes and communicating with other professionals.    Eusebio Berumen MD  Attending Staff Physician  Hospital Medicine  pager- [phone]  [facility] - [phone]

## 2020-11-30 NOTE — PROGRESS NOTES
"  [facility]  Adult Nutrition  Consult Note    SUMMARY     Recommendations    1. Continue current Diabetic diet.   2. RD to monitor & follow-up.    Goals: Pt's intake meals >75% by RD follow up.  Nutrition Goal Status: goal met  Communication of RD Recs: (POC)    Reason for Assessment    Reason For Assessment: RD follow-up  Diagnosis: diabetes diagnosis/complications(DKA)  Relevant Medical History: Crohn's disease s/p colectomy with end ileostomy, fistulizing disease s/p placement of multiple setons, steroid-induced DM  Interdisciplinary Rounds: did not attend    General Information Comments: Pt continues w/ good appetite, tolerating diet w/ adequate PO intake. NFPE complete 11/22 - pt w/ no physical signs of malnutrition. Diabetic diet education complete 11/22; reinforced today. Pt verbalized understanding.   Nutrition Discharge Planning: Adequate PO intake    Nutrition/Diet History    Spiritual, Cultural Beliefs, Adventism Practices, Values that Affect Care: no  Factors Affecting Nutritional Intake: None identified at this time    Anthropometrics    Temp: 97.9 °F (36.6 °C)  Height Method: Stated  Height: 5' 8" (172.7 cm)  Height (inches): 68 in  Weight Method: Standard Scale  Weight: 76.5 kg (168 lb 10.4 oz)  Weight (lb): 168.65 lb  Ideal Body Weight (IBW), Male: 154 lb  % Ideal Body Weight, Male (lb): 109.51 %  BMI (Calculated): 25.6  BMI Grade: 25 - 29.9 - overweight    Lab/Procedures/Meds    Pertinent Labs Reviewed: reviewed  Pertinent Labs Comments: A1C 13.8  Pertinent Medications Reviewed: reviewed  Pertinent Medications Comments: -    Estimated/Assessed Needs    Weight Used For Calorie Calculations: 76.5 kg (168 lb 10.4 oz)     Energy Calorie Requirements (kcal): 2016 kcal  Energy Need Method: [place]-St Jeor(PAL 1.2)     Protein Requirements: 77-92 g/d (1-1.2 g/kg)  Weight Used For Protein Calculations: 76.5 kg (168 lb 10.4 oz)     RDA Method (mL): 2016     CHO Requirement: " 252g    Nutrition Prescription Ordered    Current Diet Order: 2000 kcal ADA    Evaluation of Received Nutrient/Fluid Intake    Comments: LBM: 11/27    Tolerance: tolerating    Nutrition Risk    Level of Risk/Frequency of Follow-up: (1x/week)     Assessment and Plan    Nutrition Problem  Excessive CHO intake     Related to (etiology):   Uncontrolled DM     Signs and Symptoms (as evidenced by):   A1c 13.8%, DKA     Interventions(treatment strategy):  Collaboration of care with providers.     Nutrition Diagnosis Status:   Continues      Monitor and Evaluation    Food and Nutrient Intake: energy intake, food and beverage intake  Food and Nutrient Adminstration: diet order  Knowledge/Beliefs/Attitudes: food and nutrition knowledge/skill  Anthropometric Measurements: weight, weight change, body mass index  Biochemical Data, Medical Tests and Procedures: electrolyte and renal panel, gastrointestinal profile, glucose/endocrine profile, inflammatory profile, lipid profile  Nutrition-Focused Physical Findings: overall appearance, extremities, muscles and bones, head and eyes, skin     Malnutrition Assessment    Weight Loss (Malnutrition): (15.2% x 2 months)  Energy Intake (Malnutrition): less than 75% for greater than 7 days   Orbital Region (Subcutaneous Fat Loss): well nourished  Upper Arm Region (Subcutaneous Fat Loss): well nourished  Thoracic and Lumbar Region: well nourished   Pentecostal Region (Muscle Loss): well nourished  Clavicle Bone Region (Muscle Loss): well nourished  Clavicle and Acromion Bone Region (Muscle Loss): well nourished  Scapular Bone Region (Muscle Loss): well nourished  Dorsal Hand (Muscle Loss): well nourished  Patellar Region (Muscle Loss): mild depletion  Anterior Thigh Region (Muscle Loss): well nourished  Posterior Calf Region (Muscle Loss): well nourished     Nutrition Follow-Up    RD Follow-up?: Yes

## 2020-11-30 NOTE — SUBJECTIVE & OBJECTIVE
Subjective:     Interval History:   No acute events overnight  Pain well controlled  Afebrile  Tolerating diet  Ambulating and voiding without issue  Reports appropriate stool output from stoma, though documentation lacking    Post-Op Info:  Procedure(s) (LRB):  Exam under anesthesia and seton placement (N/A)   3 Days Post-Op      Medications:  Continuous Infusions:   sodium chloride 0.45% 100 mL/hr at 11/30/20 0131     Scheduled Meds:   ciprofloxacin HCl  500 mg Oral Q12H    insulin aspart U-100  15 Units Subcutaneous TIDWM    insulin detemir U-100  15 Units Subcutaneous BID    metroNIDAZOLE  500 mg Oral Q8H     PRN Meds:   dextrose 50%    dextrose 50%    glucagon (human recombinant)    glucose    glucose    HYDROmorphone    insulin aspart U-100    melatonin    ondansetron    oxyCODONE    promethazine (PHENERGAN) IVPB    sodium chloride 0.9%    sodium chloride 0.9%        Objective:     Vital Signs (Most Recent):  Temp: 98.4 °F (36.9 °C) (11/30/20 0416)  Pulse: 86 (11/30/20 0416)  Resp: 16 (11/30/20 0632)  BP: (!) 152/86 (11/30/20 0416)  SpO2: 99 % (11/30/20 0416) Vital Signs (24h Range):  Temp:  [96.7 °F (35.9 °C)-98.4 °F (36.9 °C)] 98.4 °F (36.9 °C)  Pulse:  [] 86  Resp:  [16-19] 16  SpO2:  [94 %-99 %] 99 %  BP: (136-152)/(75-86) 152/86     Intake/Output - Last 3 Shifts       11/28 0700 - 11/29 0659 11/29 0700 - 11/30 0659    P.O. 1160 944    I.V. (mL/kg) 2038.8 (26.7) 2341.7 (30.6)    Total Intake(mL/kg) 3198.8 (41.8) 3285.7 (42.9)    Urine (mL/kg/hr) 3150 (1.7) 1775 (1)    Stool  0    Total Output 3150 1775    Net +48.8 +1510.7          Urine Occurrence  2 x    Stool Occurrence  1 x          Physical Exam  Constitutional:       General: He is not in acute distress.     Appearance: Normal appearance. He is normal weight. He is not ill-appearing.   HENT:      Head: Normocephalic and atraumatic.      Nose: Nose normal.      Mouth/Throat:      Mouth: Mucous membranes are moist.       Pharynx: Oropharynx is clear.   Eyes:      Extraocular Movements: Extraocular movements intact.   Cardiovascular:      Rate and Rhythm: Normal rate and regular rhythm.      Heart sounds: No murmur.   Pulmonary:      Effort: Pulmonary effort is normal. No respiratory distress.      Breath sounds: No stridor. No wheezing or rhonchi.   Abdominal:      General: Abdomen is flat. There is no distension.      Palpations: Abdomen is soft.      Tenderness: There is no abdominal tenderness. There is no guarding or rebound.   Genitourinary:     Comments: Previously placed setons noted in perianal region and perineum. New seton noted to traverse from scrotum to mid perineum. Decreased tenderness and erythema.  Skin:     General: Skin is warm and dry.      Capillary Refill: Capillary refill takes less than 2 seconds.   Neurological:      General: No focal deficit present.      Mental Status: He is oriented to person, place, and time.   Psychiatric:         Mood and Affect: Mood normal.         Behavior: Behavior normal.

## 2020-11-30 NOTE — PROGRESS NOTES
"[facility]  Endocrinology  Progress Note    Admit Date: 2020     Reason for Consult: Management of DKA, Hyperglycemia     Surgical Procedure and Date: N/A     Diabetes diagnosis year: Newly Dx.  Per patient he had steroid induced DM during childhood and resolved after DC of steroids.     Home Diabetes Medications:  NONE     How often checking glucose at home? N/A    BG readings on regimen: N/a   Hypoglycemia on the regimen?  No  Missed doses on regimen?  No    Diabetes Complications include:     N/A     Complicating diabetes co morbidities:   N/A       HPI:   Patient is a 35 y.o. male with a diagnosis of Steroid induced DM during childhood that resolved after discontinuation of steroids, Chron's disease s/p colectomy and ileostomy that was admitted to [facility] for scrotal cellulitis and KEITH.  During ED course patient was found to be in DKA w/ glucose 600s, BHB 4.6, PH 7.29, HCO3 13 and AG 27.  Pt was started on IV and Insulin infusion and consulted with endocrinology for assistance in management of DKA in the setting of newly Dx. DM.         Interval HPI:   bg trend: 120-255    Overnight events:yady  Eatin%  Nausea: No  Hypoglycemia and intervention: No  Fever: No  TPN and/or TF: No  If yes, type of TF/TPN and rate: na    BP (!) 152/86 (BP Location: Left arm, Patient Position: Lying)   Pulse 86   Temp 98.4 °F (36.9 °C) (Oral)   Resp 16   Ht 5' 8" (1.727 m)   Wt 76.5 kg (168 lb 10.4 oz)   SpO2 99%   BMI 25.64 kg/m²     Labs Reviewed and Include    Recent Labs   Lab 20  0451   *   CALCIUM 8.3*   ALBUMIN 2.4*   PROT 6.2      K 3.5   CO2 31*      BUN 7   CREATININE 1.5*   ALKPHOS 111   ALT 39   AST 20   BILITOT 0.3     Lab Results   Component Value Date    WBC 8.90 2020    HGB 10.8 (L) 2020    HCT 32.2 (L) 2020    MCV 89 2020     2020     No results for input(s): TSH, FREET4 in the last 168 hours.  Lab Results   Component " Value Date    HGBA1C 13.8 (H) 11/21/2020       Nutritional status:   Body mass index is 25.64 kg/m².  Lab Results   Component Value Date    ALBUMIN 2.4 (L) 11/30/2020    ALBUMIN 2.7 (L) 11/29/2020    ALBUMIN 2.7 (L) 11/28/2020     Lab Results   Component Value Date    PREALBUMIN 34 03/31/2015    PREALBUMIN 7 (L) 11/11/2014    PREALBUMIN 8 (L) 11/10/2014       Estimated Creatinine Clearance: 67.1 mL/min (A) (based on SCr of 1.5 mg/dL (H)).    Accu-Checks  Recent Labs     11/27/20  1127 11/27/20  1643 11/27/20  2058 11/28/20  0751 11/28/20  1143 11/28/20  1629 11/29/20  0735 11/29/20  1155 11/29/20  1554 11/29/20  2137   POCTGLUCOSE 252* 252* 171* 113* 255* 158* 120* 136* 179* 219*       Current Medications and/or Treatments Impacting Glycemic Control  Immunotherapy:    Immunosuppressants     None        Steroids:   Hormones (From admission, onward)    Start     Stop Route Frequency Ordered    11/21/20 1406  melatonin tablet 6 mg      -- Oral Nightly PRN 11/21/20 1407        Pressors:    Autonomic Drugs (From admission, onward)    None        Hyperglycemia/Diabetes Medications:   Antihyperglycemics (From admission, onward)    Start     Stop Route Frequency Ordered    11/28/20 1645  insulin aspart U-100 pen 15 Units      -- SubQ 3 times daily with meals 11/28/20 1230    11/28/20 1104  insulin aspart U-100 pen 0-5 Units      -- SubQ Before meals & nightly PRN 11/28/20 1004    11/28/20 0900  insulin detemir U-100 pen 15 Units      -- SubQ 2 times daily 11/28/20 0741          ASSESSMENT and PLAN    * Type 2 diabetes mellitus with hyperglycemia  Brief Hx: 35 y.o. male with a diagnosis of steroid induced DM during childhood that resolved after discontinuation of steroids, Crohn's disease s/p colectomy and ileostomy that was admitted to [place] for scrotal cellulitis and KEITH.    Fatigue for about 2 weeks.  His symptoms started around November 4th after he received infusion of Entyvio and also ate a very rare steak.    Associated  increased urinary frequency as well as polydipsia and an 18 lb weight loss over the past few weeks.  During ED course patient was found to be in DKA w/ glucose 600s, BHB 4.6, PH 7.29, HCO3 13 and AG 27.    Pt was started on IV and Insulin infusion and consulted with endocrinology for assistance in management of DKA in the setting of newly Dx DM    A1c 13% new diagnosis of diabetes  On admission with DKA now resolved    FRANCHESCA-65 negative and detectable C-peptide. Likely type 2 diabetes.    Consulted for: DKA  DM type: Presumed undiagnosed, new onset T2DM   A1C: 13.8*  Hypoglycemia: none  Steroids: none  Diet/Tfs: 100%  Glucose Goals: 140-180 mg/dL    Glucose Trend x 24hr:     Home Regimen:    none    PLAN:     -  Detemir 15 units BID   -  Aspart 15 units TIDWM   -  Low dose correction insulin   -  Accucheck ACHS    If discharged:  Suggest above regimen          Cellulitis of scrotum  On antibiotics per primary        KEITH (acute kidney injury)  KEITH since 11/27, can cause insulin stacking.   Avoid metformin at discharge, may consider outpatient if renal function normalizes  Improving    Diabetic ketoacidosis without coma associated with diabetes mellitus due to underlying condition  Resolved. See diabetes.    Crohn's disease of both small and large intestine without complication  Per primary team   Not on steroids currently        Donavan Prabhakar MD  Endocrinology  [facility]

## 2020-12-01 ENCOUNTER — PATIENT MESSAGE (OUTPATIENT)
Dept: GASTROENTEROLOGY | Facility: CLINIC | Age: 35
End: 2020-12-01

## 2020-12-01 ENCOUNTER — TELEPHONE (OUTPATIENT)
Dept: GASTROENTEROLOGY | Facility: CLINIC | Age: 35
End: 2020-12-01

## 2020-12-01 ENCOUNTER — TELEPHONE (OUTPATIENT)
Dept: SURGERY | Facility: CLINIC | Age: 35
End: 2020-12-01

## 2020-12-01 PROBLEM — N17.9 AKI (ACUTE KIDNEY INJURY): Status: RESOLVED | Noted: 2020-11-21 | Resolved: 2020-12-01

## 2020-12-01 PROBLEM — E08.10 DIABETIC KETOACIDOSIS WITHOUT COMA ASSOCIATED WITH DIABETES MELLITUS DUE TO UNDERLYING CONDITION: Status: RESOLVED | Noted: 2020-11-21 | Resolved: 2020-12-01

## 2020-12-01 LAB
ALBUMIN SERPL BCP-MCNC: 2.5 G/DL (ref 3.5–5.2)
ALBUMIN SERPL BCP-MCNC: 2.6 G/DL (ref 3.5–5.2)
ALP SERPL-CCNC: 116 U/L (ref 55–135)
ALT SERPL W/O P-5'-P-CCNC: 41 U/L (ref 10–44)
ANION GAP SERPL CALC-SCNC: 11 MMOL/L (ref 8–16)
ANION GAP SERPL CALC-SCNC: 8 MMOL/L (ref 8–16)
AST SERPL-CCNC: 24 U/L (ref 10–40)
BASOPHILS # BLD AUTO: 0.02 K/UL (ref 0–0.2)
BASOPHILS NFR BLD: 0.2 % (ref 0–1.9)
BILIRUB SERPL-MCNC: 0.4 MG/DL (ref 0.1–1)
BUN SERPL-MCNC: 5 MG/DL (ref 6–20)
BUN SERPL-MCNC: 8 MG/DL (ref 6–20)
CALCIUM SERPL-MCNC: 8.6 MG/DL (ref 8.7–10.5)
CALCIUM SERPL-MCNC: 8.9 MG/DL (ref 8.7–10.5)
CHLORIDE SERPL-SCNC: 103 MMOL/L (ref 95–110)
CHLORIDE SERPL-SCNC: 105 MMOL/L (ref 95–110)
CO2 SERPL-SCNC: 28 MMOL/L (ref 23–29)
CO2 SERPL-SCNC: 29 MMOL/L (ref 23–29)
CREAT SERPL-MCNC: 1.3 MG/DL (ref 0.5–1.4)
CREAT SERPL-MCNC: 1.8 MG/DL (ref 0.5–1.4)
DIFFERENTIAL METHOD: ABNORMAL
EOSINOPHIL # BLD AUTO: 0.1 K/UL (ref 0–0.5)
EOSINOPHIL NFR BLD: 0.8 % (ref 0–8)
EOSINOPHIL URNS QL WRIGHT STN: NORMAL
ERYTHROCYTE [DISTWIDTH] IN BLOOD BY AUTOMATED COUNT: 13.2 % (ref 11.5–14.5)
EST. GFR  (AFRICAN AMERICAN): 55.5 ML/MIN/1.73 M^2
EST. GFR  (AFRICAN AMERICAN): >60 ML/MIN/1.73 M^2
EST. GFR  (NON AFRICAN AMERICAN): 48 ML/MIN/1.73 M^2
EST. GFR  (NON AFRICAN AMERICAN): >60 ML/MIN/1.73 M^2
GLUCOSE SERPL-MCNC: 207 MG/DL (ref 70–110)
GLUCOSE SERPL-MCNC: 84 MG/DL (ref 70–110)
HCT VFR BLD AUTO: 33.9 % (ref 40–54)
HGB BLD-MCNC: 11.2 G/DL (ref 14–18)
IMM GRANULOCYTES # BLD AUTO: 0.02 K/UL (ref 0–0.04)
IMM GRANULOCYTES NFR BLD AUTO: 0.2 % (ref 0–0.5)
LYMPHOCYTES # BLD AUTO: 1.4 K/UL (ref 1–4.8)
LYMPHOCYTES NFR BLD: 15.5 % (ref 18–48)
MAGNESIUM SERPL-MCNC: 1.5 MG/DL (ref 1.6–2.6)
MCH RBC QN AUTO: 29.6 PG (ref 27–31)
MCHC RBC AUTO-ENTMCNC: 33 G/DL (ref 32–36)
MCV RBC AUTO: 89 FL (ref 82–98)
MONOCYTES # BLD AUTO: 1.6 K/UL (ref 0.3–1)
MONOCYTES NFR BLD: 17.9 % (ref 4–15)
NEUTROPHILS # BLD AUTO: 6 K/UL (ref 1.8–7.7)
NEUTROPHILS NFR BLD: 65.4 % (ref 38–73)
NRBC BLD-RTO: 0 /100 WBC
PHOSPHATE SERPL-MCNC: 3.3 MG/DL (ref 2.7–4.5)
PHOSPHATE SERPL-MCNC: 3.6 MG/DL (ref 2.7–4.5)
PLATELET # BLD AUTO: 231 K/UL (ref 150–350)
PMV BLD AUTO: 13.2 FL (ref 9.2–12.9)
POCT GLUCOSE: 115 MG/DL (ref 70–110)
POCT GLUCOSE: 162 MG/DL (ref 70–110)
POCT GLUCOSE: 218 MG/DL (ref 70–110)
POCT GLUCOSE: 270 MG/DL (ref 70–110)
POTASSIUM SERPL-SCNC: 3.6 MMOL/L (ref 3.5–5.1)
POTASSIUM SERPL-SCNC: 4.6 MMOL/L (ref 3.5–5.1)
PROT SERPL-MCNC: 6.5 G/DL (ref 6–8.4)
RBC # BLD AUTO: 3.79 M/UL (ref 4.6–6.2)
SODIUM SERPL-SCNC: 140 MMOL/L (ref 136–145)
SODIUM SERPL-SCNC: 144 MMOL/L (ref 136–145)
WBC # BLD AUTO: 9.11 K/UL (ref 3.9–12.7)

## 2020-12-01 PROCEDURE — 99232 PR SUBSEQUENT HOSPITAL CARE,LEVL II: ICD-10-PCS | Mod: ,,, | Performed by: HOSPITALIST

## 2020-12-01 PROCEDURE — 80069 RENAL FUNCTION PANEL: CPT

## 2020-12-01 PROCEDURE — 25000003 PHARM REV CODE 250: Performed by: SURGERY

## 2020-12-01 PROCEDURE — 84100 ASSAY OF PHOSPHORUS: CPT

## 2020-12-01 PROCEDURE — 87205 SMEAR GRAM STAIN: CPT

## 2020-12-01 PROCEDURE — 20600001 HC STEP DOWN PRIVATE ROOM

## 2020-12-01 PROCEDURE — 85025 COMPLETE CBC W/AUTO DIFF WBC: CPT

## 2020-12-01 PROCEDURE — 99232 SBSQ HOSP IP/OBS MODERATE 35: CPT | Mod: ,,, | Performed by: INTERNAL MEDICINE

## 2020-12-01 PROCEDURE — 99232 PR SUBSEQUENT HOSPITAL CARE,LEVL II: ICD-10-PCS | Mod: ,,, | Performed by: INTERNAL MEDICINE

## 2020-12-01 PROCEDURE — 82570 ASSAY OF URINE CREATININE: CPT

## 2020-12-01 PROCEDURE — 99232 SBSQ HOSP IP/OBS MODERATE 35: CPT | Mod: ,,, | Performed by: HOSPITALIST

## 2020-12-01 PROCEDURE — 83735 ASSAY OF MAGNESIUM: CPT

## 2020-12-01 PROCEDURE — 63600175 PHARM REV CODE 636 W HCPCS: Performed by: SURGERY

## 2020-12-01 PROCEDURE — 84300 ASSAY OF URINE SODIUM: CPT

## 2020-12-01 PROCEDURE — 63600175 PHARM REV CODE 636 W HCPCS: Performed by: STUDENT IN AN ORGANIZED HEALTH CARE EDUCATION/TRAINING PROGRAM

## 2020-12-01 PROCEDURE — 84540 ASSAY OF URINE/UREA-N: CPT

## 2020-12-01 PROCEDURE — 63600175 PHARM REV CODE 636 W HCPCS: Performed by: HOSPITALIST

## 2020-12-01 PROCEDURE — 36415 COLL VENOUS BLD VENIPUNCTURE: CPT

## 2020-12-01 PROCEDURE — 80053 COMPREHEN METABOLIC PANEL: CPT

## 2020-12-01 RX ORDER — MAGNESIUM SULFATE HEPTAHYDRATE 40 MG/ML
2 INJECTION, SOLUTION INTRAVENOUS ONCE
Status: COMPLETED | OUTPATIENT
Start: 2020-12-01 | End: 2020-12-01

## 2020-12-01 RX ORDER — INSULIN ASPART 100 [IU]/ML
12 INJECTION, SOLUTION INTRAVENOUS; SUBCUTANEOUS
Status: DISCONTINUED | OUTPATIENT
Start: 2020-12-01 | End: 2020-12-02

## 2020-12-01 RX ORDER — OXYCODONE HYDROCHLORIDE 5 MG/1
5 TABLET ORAL EVERY 6 HOURS PRN
Qty: 28 TABLET | Refills: 0 | Status: SHIPPED | OUTPATIENT
Start: 2020-12-01 | End: 2020-12-04

## 2020-12-01 RX ORDER — LANCING DEVICE
1 EACH MISCELLANEOUS 2 TIMES DAILY WITH MEALS
Qty: 1 EACH | Refills: 0 | Status: ON HOLD | OUTPATIENT
Start: 2020-12-01 | End: 2021-07-28 | Stop reason: SDUPTHER

## 2020-12-01 RX ORDER — AMOXICILLIN 250 MG
2 CAPSULE ORAL DAILY
Qty: 30 TABLET | Refills: 0 | Status: ON HOLD | OUTPATIENT
Start: 2020-12-01 | End: 2021-07-28

## 2020-12-01 RX ORDER — INSULIN ASPART 100 [IU]/ML
10 INJECTION, SOLUTION INTRAVENOUS; SUBCUTANEOUS 3 TIMES DAILY
Qty: 15 ML | Refills: 1 | Status: ON HOLD | OUTPATIENT
Start: 2020-12-01 | End: 2021-07-30 | Stop reason: HOSPADM

## 2020-12-01 RX ORDER — PEN NEEDLE, DIABETIC 30 GX3/16"
1 NEEDLE, DISPOSABLE MISCELLANEOUS 2 TIMES DAILY WITH MEALS
Qty: 100 EACH | Refills: 11 | Status: ON HOLD | OUTPATIENT
Start: 2020-12-01 | End: 2021-07-28 | Stop reason: SDUPTHER

## 2020-12-01 RX ORDER — DEXTROSE 4 G
TABLET,CHEWABLE ORAL
Qty: 1 EACH | Refills: 0 | Status: ON HOLD | OUTPATIENT
Start: 2020-12-01 | End: 2021-07-28 | Stop reason: SDUPTHER

## 2020-12-01 RX ORDER — CIPROFLOXACIN 500 MG/1
500 TABLET ORAL EVERY 12 HOURS
Qty: 20 TABLET | Refills: 0 | Status: SHIPPED | OUTPATIENT
Start: 2020-12-01 | End: 2020-12-14

## 2020-12-01 RX ORDER — ACETAMINOPHEN 325 MG/1
650 TABLET ORAL EVERY 6 HOURS PRN
Qty: 60 TABLET | Refills: 0 | Status: ON HOLD | OUTPATIENT
Start: 2020-12-01 | End: 2021-07-28

## 2020-12-01 RX ORDER — BLOOD-GLUCOSE CONTROL, NORMAL
1 EACH MISCELLANEOUS 2 TIMES DAILY WITH MEALS
Qty: 100 EACH | Refills: 11 | Status: ON HOLD | OUTPATIENT
Start: 2020-12-01 | End: 2021-07-28 | Stop reason: SDUPTHER

## 2020-12-01 RX ORDER — METRONIDAZOLE 500 MG/1
500 TABLET ORAL EVERY 8 HOURS
Qty: 30 TABLET | Refills: 0 | Status: SHIPPED | OUTPATIENT
Start: 2020-12-01 | End: 2020-12-14

## 2020-12-01 RX ADMIN — METRONIDAZOLE 500 MG: 500 TABLET ORAL at 08:12

## 2020-12-01 RX ADMIN — CIPROFLOXACIN HYDROCHLORIDE 500 MG: 500 TABLET, FILM COATED ORAL at 09:12

## 2020-12-01 RX ADMIN — OXYCODONE HYDROCHLORIDE 5 MG: 5 TABLET ORAL at 12:12

## 2020-12-01 RX ADMIN — METRONIDAZOLE 500 MG: 500 TABLET ORAL at 06:12

## 2020-12-01 RX ADMIN — HYDROMORPHONE HYDROCHLORIDE 0.5 MG: 1 INJECTION, SOLUTION INTRAMUSCULAR; INTRAVENOUS; SUBCUTANEOUS at 03:12

## 2020-12-01 RX ADMIN — MELATONIN TAB 3 MG 6 MG: 3 TAB at 09:12

## 2020-12-01 RX ADMIN — INSULIN ASPART 12 UNITS: 100 INJECTION, SOLUTION INTRAVENOUS; SUBCUTANEOUS at 05:12

## 2020-12-01 RX ADMIN — OXYCODONE HYDROCHLORIDE 5 MG: 5 TABLET ORAL at 08:12

## 2020-12-01 RX ADMIN — METRONIDAZOLE 500 MG: 500 TABLET ORAL at 02:12

## 2020-12-01 RX ADMIN — INSULIN DETEMIR 15 UNITS: 100 INJECTION, SOLUTION SUBCUTANEOUS at 08:12

## 2020-12-01 RX ADMIN — INSULIN ASPART 1 UNITS: 100 INJECTION, SOLUTION INTRAVENOUS; SUBCUTANEOUS at 08:12

## 2020-12-01 RX ADMIN — HYDROMORPHONE HYDROCHLORIDE 0.5 MG: 1 INJECTION, SOLUTION INTRAMUSCULAR; INTRAVENOUS; SUBCUTANEOUS at 04:12

## 2020-12-01 RX ADMIN — HYDROMORPHONE HYDROCHLORIDE 0.5 MG: 1 INJECTION, SOLUTION INTRAMUSCULAR; INTRAVENOUS; SUBCUTANEOUS at 09:12

## 2020-12-01 RX ADMIN — INSULIN ASPART 10 UNITS: 100 INJECTION, SOLUTION INTRAVENOUS; SUBCUTANEOUS at 07:12

## 2020-12-01 RX ADMIN — CIPROFLOXACIN HYDROCHLORIDE 500 MG: 500 TABLET, FILM COATED ORAL at 08:12

## 2020-12-01 RX ADMIN — INSULIN DETEMIR 15 UNITS: 100 INJECTION, SOLUTION SUBCUTANEOUS at 09:12

## 2020-12-01 RX ADMIN — MAGNESIUM SULFATE IN WATER 2 G: 40 INJECTION, SOLUTION INTRAVENOUS at 11:12

## 2020-12-01 RX ADMIN — INSULIN ASPART 10 UNITS: 100 INJECTION, SOLUTION INTRAVENOUS; SUBCUTANEOUS at 11:12

## 2020-12-01 NOTE — PLAN OF CARE
Pt. Aaox4. Vss. 0.45 NS infusing at 100 ml/hr. Scrotum remains swollen and painful to pt. Prn pain medication administered. Bg monitored. Schedule insulin administered. Strict I and O's. POC reviewed with pt. Safety precautions maintained. Wctm.

## 2020-12-01 NOTE — ASSESSMENT & PLAN NOTE
Brief Hx: 35 y.o. male with a diagnosis of steroid induced DM during childhood that resolved after discontinuation of steroids, Crohn's disease s/p colectomy and ileostomy that was admitted to [facility] for scrotal cellulitis and KEITH.    Fatigue for about 2 weeks.  His symptoms started around November 4th after he received infusion of Entyvio and also ate a very rare steak.    Associated increased urinary frequency as well as polydipsia and an 18 lb weight loss over the past few weeks.  During ED course patient was found to be in DKA w/ glucose 600s, BHB 4.6, PH 7.29, HCO3 13 and AG 27.    Pt was started on IV and Insulin infusion and consulted with endocrinology for assistance in management of DKA in the setting of newly Dx DM    A1c 13% new diagnosis of diabetes  On admission with DKA now resolved    FRANCHESCA-65 negative and detectable C-peptide. Likely type 2 diabetes.    Consulted for: DKA  DM type: Presumed undiagnosed, new onset T2DM   A1C: 13.8*  Hypoglycemia: none  Steroids: none  Diet/Tfs: 100%  Glucose Goals: 140-180 mg/dL    Glucose Trend x 24hr:     Home Regimen:    none    PLAN:     -  Detemir 15 units BID   -  Decreased Aspart 10 units TIDWM   -  Low dose correction insulin   -  Accucheck ACHS    If discharged:  Suggest above regimen

## 2020-12-01 NOTE — PROGRESS NOTES
"[facility]  Endocrinology  Progress Note    Admit Date: 2020     Reason for Consult: Management of DKA, Hyperglycemia     Surgical Procedure and Date: N/A     Diabetes diagnosis year: Newly Dx.  Per patient he had steroid induced DM during childhood and resolved after DC of steroids.     Home Diabetes Medications:  NONE     How often checking glucose at home? N/A    BG readings on regimen: N/a   Hypoglycemia on the regimen?  No  Missed doses on regimen?  No    Diabetes Complications include:     N/A     Complicating diabetes co morbidities:   N/A       HPI:   Patient is a 35 y.o. male with a diagnosis of Steroid induced DM during childhood that resolved after discontinuation of steroids, Chron's disease s/p colectomy and ileostomy that was admitted to [facility] for scrotal cellulitis and KEITH.  During ED course patient was found to be in DKA w/ glucose 600s, BHB 4.6, PH 7.29, HCO3 13 and AG 27.  Pt was started on IV and Insulin infusion and consulted with endocrinology for assistance in management of DKA in the setting of newly Dx. DM.         Interval HPI  BG trend   Overnight events:yady  Eatin%  Nausea: No  Hypoglycemia and intervention: No  Fever: No  TPN and/or TF: No  If yes, type of TF/TPN and rate: na    /75   Pulse 73   Temp 98.5 °F (36.9 °C) (Oral)   Resp 18   Ht 5' 8" (1.727 m)   Wt 76.5 kg (168 lb 10.4 oz)   SpO2 97%   BMI 25.64 kg/m²     Labs Reviewed and Include    No results for input(s): GLU, CALCIUM, ALBUMIN, PROT, NA, K, CO2, CL, BUN, CREATININE, ALKPHOS, ALT, AST, BILITOT in the last 24 hours.  Lab Results   Component Value Date    WBC 8.90 2020    HGB 10.8 (L) 2020    HCT 32.2 (L) 2020    MCV 89 2020     2020     No results for input(s): TSH, FREET4 in the last 168 hours.  Lab Results   Component Value Date    HGBA1C 13.8 (H) 2020       Nutritional status:   Body mass index is 25.64 kg/m².  Lab Results "   Component Value Date    ALBUMIN 2.4 (L) 11/30/2020    ALBUMIN 2.7 (L) 11/29/2020    ALBUMIN 2.7 (L) 11/28/2020     Lab Results   Component Value Date    PREALBUMIN 34 03/31/2015    PREALBUMIN 7 (L) 11/11/2014    PREALBUMIN 8 (L) 11/10/2014       Estimated Creatinine Clearance: 67.1 mL/min (A) (based on SCr of 1.5 mg/dL (H)).    Accu-Checks  Recent Labs     11/28/20  1143 11/28/20  1629 11/29/20  0735 11/29/20  1155 11/29/20  1554 11/29/20  2137 11/30/20  1213 11/30/20  1606 11/30/20  2150   POCTGLUCOSE 255* 158* 120* 136* 179* 219* 106 81 179*       Current Medications and/or Treatments Impacting Glycemic Control  Immunotherapy:    Immunosuppressants     None        Steroids:   Hormones (From admission, onward)    Start     Stop Route Frequency Ordered    11/21/20 1406  melatonin tablet 6 mg      -- Oral Nightly PRN 11/21/20 1407        Pressors:    Autonomic Drugs (From admission, onward)    None        Hyperglycemia/Diabetes Medications:   Antihyperglycemics (From admission, onward)    Start     Stop Route Frequency Ordered    11/30/20 1645  insulin aspart U-100 pen 10 Units      -- SubQ 3 times daily with meals 11/30/20 1635    11/28/20 1104  insulin aspart U-100 pen 0-5 Units      -- SubQ Before meals & nightly PRN 11/28/20 1004    11/28/20 0900  insulin detemir U-100 pen 15 Units      -- SubQ 2 times daily 11/28/20 0741          ASSESSMENT and PLAN    * Type 2 diabetes mellitus with hyperglycemia  Brief Hx: 35 y.o. male with a diagnosis of steroid induced DM during childhood that resolved after discontinuation of steroids, Crohn's disease s/p colectomy and ileostomy that was admitted to [facility] for scrotal cellulitis and KEITH.    Fatigue for about 2 weeks.  His symptoms started around November 4th after he received infusion of Entyvio and also ate a very rare steak.    Associated increased urinary frequency as well as polydipsia and an 18 lb weight loss over the past few weeks.  During ED course patient was found  to be in DKA w/ glucose 600s, BHB 4.6, PH 7.29, HCO3 13 and AG 27.    Pt was started on IV and Insulin infusion and consulted with endocrinology for assistance in management of DKA in the setting of newly Dx DM    A1c 13% new diagnosis of diabetes  On admission with DKA now resolved    FRANCHESCA-65 negative and detectable C-peptide. Likely type 2 diabetes.    Consulted for: DKA  DM type: Presumed undiagnosed, new onset T2DM   A1C: 13.8*  Hypoglycemia: none  Steroids: none  Diet/Tfs: 100%  Glucose Goals: 140-180 mg/dL    Glucose Trend x 24hr:     Home Regimen:    none    PLAN:     -  Detemir 15 units BID   -  Decreased Aspart 10 units TIDWM   -  Low dose correction insulin   -  Accucheck ACHS    If discharged:  Suggest above regimen          Cellulitis of scrotum  On antibiotics per primary        KEITH (acute kidney injury)   can cause insulin stacking.   Avoid metformin at discharge, may consider outpatient if renal function normalizes  Improving    Diabetic ketoacidosis without coma associated with diabetes mellitus due to underlying condition  Resolved. See diabetes.    Crohn's disease of both small and large intestine without complication  Per primary team   Not on steroids currently        Donavan Prabhakar MD  Endocrinology  [facility]

## 2020-12-01 NOTE — SUBJECTIVE & OBJECTIVE
"Interval HPI  BG trend   Overnight events:yady  Eatin%  Nausea: No  Hypoglycemia and intervention: No  Fever: No  TPN and/or TF: No  If yes, type of TF/TPN and rate: na    /75   Pulse 73   Temp 98.5 °F (36.9 °C) (Oral)   Resp 18   Ht 5' 8" (1.727 m)   Wt 76.5 kg (168 lb 10.4 oz)   SpO2 97%   BMI 25.64 kg/m²     Labs Reviewed and Include    No results for input(s): GLU, CALCIUM, ALBUMIN, PROT, NA, K, CO2, CL, BUN, CREATININE, ALKPHOS, ALT, AST, BILITOT in the last 24 hours.  Lab Results   Component Value Date    WBC 8.90 2020    HGB 10.8 (L) 2020    HCT 32.2 (L) 2020    MCV 89 2020     2020     No results for input(s): TSH, FREET4 in the last 168 hours.  Lab Results   Component Value Date    HGBA1C 13.8 (H) 2020       Nutritional status:   Body mass index is 25.64 kg/m².  Lab Results   Component Value Date    ALBUMIN 2.4 (L) 2020    ALBUMIN 2.7 (L) 2020    ALBUMIN 2.7 (L) 2020     Lab Results   Component Value Date    PREALBUMIN 34 2015    PREALBUMIN 7 (L) 2014    PREALBUMIN 8 (L) 11/10/2014       Estimated Creatinine Clearance: 67.1 mL/min (A) (based on SCr of 1.5 mg/dL (H)).    Accu-Checks  Recent Labs     20  1143 20  1629 20  0735 20  1155 20  1554 20  2137 20  1213 20  1606 20  2150   POCTGLUCOSE 255* 158* 120* 136* 179* 219* 106 81 179*       Current Medications and/or Treatments Impacting Glycemic Control  Immunotherapy:    Immunosuppressants     None        Steroids:   Hormones (From admission, onward)    Start     Stop Route Frequency Ordered    20 1406  melatonin tablet 6 mg      -- Oral Nightly PRN 20 1407        Pressors:    Autonomic Drugs (From admission, onward)    None        Hyperglycemia/Diabetes Medications:   Antihyperglycemics (From admission, onward)    Start     Stop Route Frequency Ordered    20 5518  insulin aspart U-100 pen " 10 Units      -- SubQ 3 times daily with meals 11/30/20 1635    11/28/20 1104  insulin aspart U-100 pen 0-5 Units      -- SubQ Before meals & nightly PRN 11/28/20 1004    11/28/20 0900  insulin detemir U-100 pen 15 Units      -- SubQ 2 times daily 11/28/20 0741

## 2020-12-01 NOTE — ANESTHESIA POSTPROCEDURE EVALUATION
Anesthesia Post Evaluation    Patient: Amando Calix    Procedure(s) Performed: Procedure(s) (LRB):  Exam under anesthesia and seton placement (N/A)    Final Anesthesia Type: general    Patient location during evaluation: PACU  Patient participation: Yes- Able to Participate  Level of consciousness: awake and alert and oriented  Pain management: adequate  Airway patency: patent    PONV status at discharge: No PONV  Anesthetic complications: no      Cardiovascular status: blood pressure returned to baseline and hemodynamically stable  Respiratory status: unassisted  Hydration status: euvolemic  Follow-up not needed.          Vitals Value Taken Time   /80 12/01/20 1102   Temp 36.7 °C (98.1 °F) 12/01/20 1102   Pulse 80 12/01/20 1102   Resp 18 12/01/20 1102   SpO2 97 % 12/01/20 1102         Event Time   Out of Recovery 11/27/2020 10:45:00         Pain/Anu Score: Pain Rating Prior to Med Admin: 8 (12/1/2020  9:50 AM)  Pain Rating Post Med Admin: 4 (12/1/2020  4:23 AM)

## 2020-12-01 NOTE — PROGRESS NOTES
[facility]  Colorectal Surgery  Progress Note    Patient Name: Amando Calix  MRN: [id number]  Admission Date: 11/21/2020  Hospital Length of Stay: 10 days  Attending Physician: Eusebio Berumen MD    Subjective:     Interval History:   No acute events overnight. Pain controlled. Remains on IVF at 100 2/2 KEITH, which is improving based on labs    Post-Op Info:  Procedure(s) (LRB):  Exam under anesthesia and seton placement (N/A)   4 Days Post-Op      Medications:  Continuous Infusions:   sodium chloride 0.45% 100 mL/hr at 11/30/20 2214     Scheduled Meds:   ciprofloxacin HCl  500 mg Oral Q12H    insulin aspart U-100  10 Units Subcutaneous TIDWM    insulin detemir U-100  15 Units Subcutaneous BID    metroNIDAZOLE  500 mg Oral Q8H     PRN Meds:   dextrose 50%    dextrose 50%    glucagon (human recombinant)    glucose    glucose    HYDROmorphone    insulin aspart U-100    melatonin    ondansetron    oxyCODONE    promethazine (PHENERGAN) IVPB    sodium chloride 0.9%    sodium chloride 0.9%        Objective:     Vital Signs (Most Recent):  Temp: 98.5 °F (36.9 °C) (12/01/20 0421)  Pulse: 90 (12/01/20 0421)  Resp: 18 (12/01/20 0421)  BP: 133/75 (12/01/20 0421)  SpO2: 97 % (12/01/20 0421) Vital Signs (24h Range):  Temp:  [96.3 °F (35.7 °C)-98.5 °F (36.9 °C)] 98.5 °F (36.9 °C)  Pulse:  [63-99] 90  Resp:  [13-18] 18  SpO2:  [96 %-98 %] 97 %  BP: (133-141)/(70-85) 133/75     Intake/Output - Last 3 Shifts       11/29 0700 - 11/30 0659 11/30 0700 - 12/01 0659    P.O. 944     I.V. (mL/kg) 2341.7 (30.6)     Total Intake(mL/kg) 3285.7 (42.9)     Urine (mL/kg/hr) 1775 (1) 1450 (0.8)    Stool 0     Total Output 1775 1450    Net +1510.7 -1450          Urine Occurrence 2 x     Stool Occurrence 1 x           Physical Exam    Constitutional:       General: He is not in acute distress.     Appearance: Normal appearance. He is normal weight. He is not ill-appearing.   HENT:      Head:  Normocephalic and atraumatic.      Nose: Nose normal.      Mouth/Throat:      Mouth: Mucous membranes are moist.      Pharynx: Oropharynx is clear.   Eyes:      Extraocular Movements: Extraocular movements intact.   Cardiovascular:      Rate and Rhythm: Normal rate and regular rhythm.      Heart sounds: No murmur.   Pulmonary:      Effort: Pulmonary effort is normal. No respiratory distress.      Breath sounds: No stridor. No wheezing or rhonchi.   Abdominal:      General: Abdomen is flat. There is no distension.      Palpations: Abdomen is soft.      Tenderness: There is no abdominal tenderness. There is no guarding or rebound.   Genitourinary:     Comments: Previously placed setons noted in perianal region and perineum. New seton noted to traverse from scrotum to mid perineum. Decreased tenderness and erythema. Some seropurulent drainage  Skin:     General: Skin is warm and dry.      Capillary Refill: Capillary refill takes less than 2 seconds.   Neurological:      General: No focal deficit present.      Mental Status: He is oriented to person, place, and time.   Psychiatric:         Mood and Affect: Mood normal.         Behavior: Behavior normal.     Assessment/Plan:     Cellulitis of scrotum  Continue seton drainage of fistula tract and scrotal abscess  Activity as tolerated  Diabetic diet  Continue current cares per primary  Discharge planning per primary, possibly today pending AM labs  Patient should follow up in clinic with Dr. Suarez two weeks post-discharge  Should go home with two weeks of cipro/flagyl on discharge          Oswald Childers MD  Colorectal Surgery  [facility]

## 2020-12-01 NOTE — CONSULTS
[facility] Hospital Medicine  Telemedicine Consult Note    Patient Name: Amando Calix  MRN: [id number]  Admission Date: 11/21/2020  Hospital Length of Stay: 9 days  Attending Physician: Eusebio Berumen MD   Primary Care Provider: Celestino Wright MD       [facility] Medicine consulted for Amando Calix to be followed through telemedicine modalities.  The patient is currently not accepted for virtual visits due to [facility] service capacity limitations and patient likely to benefit from continued serial physical examinations.  Please re-consult once the patient is appropriate for virtual visits.          Katalina Alexander MD  Department of Hospital Medicine   [facility]

## 2020-12-01 NOTE — PROGRESS NOTES
Progress Note  Hospital Medicine    Admit Date: 11/21/2020  Length of Stay:  LOS: 10 days     SUBJECTIVE:         Follow-up For:  Type 2 diabetes mellitus with hyperglycemia    HPI/Interval history (See H&P for complete P,F,SHx) :     Mr. Calix is a 35 year old gentleman with PMH of Crohn's colitis (on Entyvio) s/p colectomy with end ileostomy with significant anorectal fistulizing disease s/p placement of multiple setons, h/o c diff infection, anemia and h/o steroid induced diabetes presents for fatigue for about 2 weeks.  His symptoms started around November 4th after he received infusion of Entyvio and also ate a very rare steak.  He reports associated increased urinary frequency as well as polydipsia and an 18 lb weight loss over the past few weeks.  States that he has a good appetite has been eating well, he denies any abdominal pain bloody stool from his ostomy, diarrhea, nausea/vomiting, fevers/chills, hematuria, dysuria, flank pain, chest pain, shortness of breath, cough or sore throat.       Interval History:   11/25 Patient lying in bed, no acute distress. Reports tolerating diet and fatigue has been improving. Continues to note scrotal swelling has unchanged. Increased scrotal pain after I&D.   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 Urology updated , findings likely from bedside debridement. no surgical intervention.  EUA with CRS tomorrow to assess for possible fistula tract between previous fistulas and scrotal abscess. started on clindamycin IV  11/27 EUA today -identification of fistula tract from previously known fistula to base of scrotum - seton placed.  Discontinued clindamycin  11/28 s/p EUA, seton placement to perineum/scrotum .  .transitioned to Cipro/Flagylx 14 days,    cultures NGTD,  RPR + , FTA abs pending  11/29 sodium 147 . Cr 1.6 . started on 1/2 NS @100ml   11/30 sodium 143 . Cr 1.5 continue 1/2 NS @100ml   12/1  sodium 144 . Cr 1.3 continue 1/2 NS @100ml .  "repeat renal panel in PM. does not have insurance. case management working regarding discharge medications      Review of Systems:   Pain scale: Constitutional: Positive for fatigue. Negative for chills and fever.   HENT: Negative for congestion.    Eyes: Negative for visual disturbance.   Respiratory: Negative for cough and shortness of breath.    Cardiovascular: Negative for chest pain and leg swelling.   Gastrointestinal: Negative for abdominal distention, abdominal pain, nausea and vomiting.   Genitourinary: Positive for scrotal swelling. Negative for dysuria.   Neurological: Negative for dizziness, weakness and numbness.   Psychiatric/Behavioral: Negative for confusion.     OBJECTIVE:     Vital Signs Range (Last 24H):  Temp:  [96.3 °F (35.7 °C)-98.5 °F (36.9 °C)]   Pulse:  [63-99]   Resp:  [14-18]   BP: (133-141)/(70-85)   SpO2:  [96 %-98 %]     Physical Exam:  Constitutional: Appears well-developed and well-nourished.   Head: Normocephalic and atraumatic.   Neck: Normal range of motion. Neck supple.   Cardiovascular: Normal heart rate.  Regular heart rhythm.  Pulmonary/Chest: Effort normal.   Abdominal: + tenderness.  ileostomy  Musculoskeletal: Normal range of motion. No edema.    -Scrotal swellingand erythema improved  Neurological: Alert and oriented to person, place, and time.   Skin: Skin is warm and dry.   Psychiatric: Normal mood and affect. Behavior is normal.         Estimated body mass index is 25.64 kg/m² as calculated from the following:    Height as of this encounter: 5' 8" (1.727 m).    Weight as of this encounter: 76.5 kg (168 lb 10.4 oz).    I & O (Last 24H):    Intake/Output Summary (Last 24 hours) at 12/1/2020 0986  Last data filed at 12/1/2020 0500  Gross per 24 hour   Intake no documentation   Output 1450 ml   Net -1450 ml       Body mass index is 25.64 kg/m².    Estimated Creatinine Clearance: 77.5 mL/min (based on SCr of 1.3 mg/dL).        Laboratory/Diagnostic Data:    Imaging Results  "         CT Abdomen Pelvis With Contrast (Final result)  Result time 11/21/20 12:07:12    Final result by Nabil Cooper Jr., MD (11/21/20 12:07:12)             Impression:      Right perianal fistulous tract extending towards the scrotum with interval placement of a seton, noting the fistulous tract is similar to prior CT 02/09/2020.  There is a slightly more cephalad fistulous track in relation to the seton again noted.  No abnormal fluid collection to suggest an abscess.    Marked scrotal skin thickening.  Correlate for cellulitis.    Stable postoperative changes of a total colectomy and right lower quadrant end ileostomy in this patient with reported history of Crohn's disease.    Electronically signed by resident: Rosio Freeman  Date:    11/21/2020  Time:    11:30    Electronically signed by: Nabil Cooper MD  Date:    11/21/2020  Time:    12:07           Narrative:    EXAMINATION:  CT ABDOMEN PELVIS WITH CONTRAST    CLINICAL HISTORY:  Abdominal pain, fever;Low abd pain and testicular pain/ swelling.  Patient with Crohn's disease, anal fistula with seat on and scrotal stent.  Concern for scrotal cellulitis/abscess/fistula;    TECHNIQUE:  Low dose axial images were obtained from the lung bases through the proximal thighs following the intravenous administration of 75 cc of Omnipaque 350.  Sagittal and coronal reformats were provided.    COMPARISON:  CT pelvis 02/09/2020    FINDINGS:  Heart: Normal in size.  No pericardial effusion.    Lung bases: Symmetrically expanded.  No consolidation, pleural effusion, mass, or pneumothorax.    Liver: Normal in size and attenuation without focal hepatic abnormality.    Gallbladder: Normal in appearance without evidence for cholecystitis.    Bile Ducts: No intra or extrahepatic biliary ductal dilation.    Pancreas: No pancreatic mass lesion or peripancreatic inflammatory change.    Spleen: Unremarkable.    Adrenals: Unremarkable.    Kidneys/ Ureters: Normal in size and  location.  Kidneys enhance normally.  No focal renal abnormality or nephrolithiasis.  No hydroureteronephrosis.    Bladder: Smooth contours without bladder wall thickening.    Reproductive organs: Unremarkable.    GI Tract/Mesentery: The stomach is unremarkable.  Postoperative changes of a total colectomy and right lower quadrant end ileostomy.  Fatty deposition within the wall with a few loops of small bowel in the pelvis, favored to reflect chronic inflammation.    Peritoneal Space: No intraperitoneal free fluid or free air. No pathologically enlarged lymph nodes.    Retroperitoneum: No significant adenopathy.    Abdominal wall/extraperitoneal soft tissues: Soft tissue thickening again seen extending from the right perianal region along the inner right medial gluteal fold into the perineum and right scrotum with interval placement of a Seton in this patient with known perianal fistula, similar in appearance to prior CT 02/09/2020.  Increased soft tissue and skin induration within the scrotum, new from prior CT 02/09/2020.  No peripherally enhancing fluid collections to suggest an abscess.    Vasculature: Abdominal aorta is normal in caliber, contour, and course without significant calcific atherosclerosis.    Bones: Unremarkable without acute fracture or bone destructive process.                             X-Ray Chest PA And Lateral (Final result)  Result time 11/21/20 10:29:32    Final result by Randy Amador DO (11/21/20 10:29:32)             Impression:      No acute abnormality.      Electronically signed by: Randy Amador  Date:    11/21/2020  Time:    10:29           Narrative:    EXAMINATION:  XR CHEST PA AND LATERAL    CLINICAL HISTORY:  Other fatigue.    TECHNIQUE:  PA and lateral views of the chest were performed.    COMPARISON:  Multiple prior radiographs of the chest, most recent from 04/01/2016.    FINDINGS:  The lungs are well expanded and clear. No focal opacities are seen. The pleural spaces  are clear.  The cardiac silhouette is unremarkable.  The visualized osseous structures are unremarkable.                                Reviewed and noted in plan where applicable- Please see chart for full lab data.    Recent Labs   Lab 11/29/20  0608 11/30/20  0451 12/01/20  0504   WBC 9.13 8.90 9.11   HGB 11.6* 10.8* 11.2*   HCT 34.8* 32.2* 33.9*    220 231       Recent Labs   Lab 11/29/20  0608 11/30/20  0451 12/01/20  0504   * 143 144   K 3.9 3.5 3.6    104 105   CO2 29 31* 28   BUN 10 7 5*   CREATININE 1.6* 1.5* 1.3   * 163* 84   CALCIUM 9.1 8.3* 8.6*   MG 1.9 1.7 1.5*   PHOS 4.3 4.1 3.6       Recent Labs   Lab 11/29/20  0608 11/30/20  0451 12/01/20  0504   ALKPHOS 129 111 116   ALT 50* 39 41   AST 29 20 24   ALBUMIN 2.7* 2.4* 2.6*   PROT 6.8 6.2 6.5   BILITOT 0.3 0.3 0.4        Microbiology labs for the last week  Microbiology Results (last 7 days)     Procedure Component Value Units Date/Time    Culture, Anaerobe [[id number]]  (Abnormal) Collected: 11/25/20 1540    Order Status: Completed Specimen: Abscess from Groin Updated: 11/30/20 1355     Anaerobic Culture PORPHYROMONAS SOMERAE  Few        PREVOTELLA BERGENSIS  Few      Fungus culture [[id number]] Collected: 11/25/20 1540    Order Status: Completed Specimen: Abscess from Groin Updated: 11/30/20 0945     Fungus (Mycology) Culture Culture in progress    Aerobic culture [[id number]] Collected: 11/25/20 1540    Order Status: Completed Specimen: Abscess from Groin Updated: 11/28/20 1011     Aerobic Bacterial Culture No growth    Blood culture #1 **CANNOT BE ORDERED STAT** [[id number]] Collected: 11/21/20 0934    Order Status: Completed Specimen: Blood from Peripheral, Antecubital, Left Updated: 11/26/20 1012     Blood Culture, Routine No growth after 5 days.    Blood culture #2 **CANNOT BE ORDERED STAT** [[id number]] Collected: 11/21/20 0946    Order Status: Completed Specimen: Blood from Peripheral, Antecubital, Left Updated: 11/26/20  1012     Blood Culture, Routine No growth after 5 days.    Gram stain [[id number]] Collected: 11/25/20 1540    Order Status: Completed Specimen: Abscess from Groin Updated: 11/25/20 1917     Gram Stain Result Rare WBC's      Rare Gram positive cocci      Rare Gram negative rods           Medications:  Medication list was reviewed and changes noted under Assessment/Plan.        Current Facility-Administered Medications:     0.45% NaCl infusion, , Intravenous, Continuous, Eusebio Berumen MD, Last Rate: 100 mL/hr at 11/30/20 2214    ciprofloxacin HCl tablet 500 mg, 500 mg, Oral, Q12H, Yogi Thomas MD, 500 mg at 12/01/20 0900    dextrose 50% injection 12.5 g, 12.5 g, Intravenous, PRN, Yogi Thomas MD    dextrose 50% injection 25 g, 25 g, Intravenous, PRN, Yogi Thomas MD    glucagon (human recombinant) injection 1 mg, 1 mg, Intramuscular, PRN, Yogi Thomas MD    glucose chewable tablet 16 g, 16 g, Oral, PRN, Yogi Thomas MD    glucose chewable tablet 24 g, 24 g, Oral, PRN, Yogi Thomas MD    HYDROmorphone injection 0.5 mg, 0.5 mg, Intravenous, Q6H PRN, Yogi Thomas MD, 0.5 mg at 12/01/20 0950    insulin aspart U-100 pen 0-5 Units, 0-5 Units, Subcutaneous, QID (AC + HS) PRN, Pastor Cochran MD, 1 Units at 11/29/20 2140    insulin aspart U-100 pen 10 Units, 10 Units, Subcutaneous, TIDWM, Donavan Prabhakar MD, 10 Units at 12/01/20 0715    insulin detemir U-100 pen 15 Units, 15 Units, Subcutaneous, BID, Pastor Cochran MD, 15 Units at 12/01/20 0900    magnesium sulfate 2g in water 50mL IVPB (premix), 2 g, Intravenous, Once, Eusebio Berumen MD    melatonin tablet 6 mg, 6 mg, Oral, Nightly PRN, Yogi Thomas MD, 6 mg at 11/30/20 2153    metroNIDAZOLE tablet 500 mg, 500 mg, Oral, Q8H, Yogi Thomas MD, 500 mg at 12/01/20 0604    ondansetron injection 4 mg, 4 mg, Intravenous, Q6H PRN, Yogi Thomas MD    oxyCODONE immediate release tablet 5 mg, 5 mg,  Oral, Q6H PRN, Yogi Thomas MD, 5 mg at 20    promethazine (PHENERGAN) 12.5 mg in dextrose 5 % 50 mL IVPB, 12.5 mg, Intravenous, Q6H PRN, Yogi Thomas MD    sodium chloride 0.9% flush 10 mL, 10 mL, Intravenous, PRN, Yogi Thomas MD    sodium chloride 0.9% flush 10 mL, 10 mL, Intravenous, PRN, Yogi Thomas MD    Estimated Creatinine Clearance: 77.5 mL/min (based on SCr of 1.3 mg/dL).    ASSESSMENT/PLAN:     Active Problems:     DKA- resolved   H/o steroid induced DM  - DKA likely triggered by scrotal cellulitis   - HA1c: 13.8  - A --> 13  - betahydroxybutyrate: 4.6  - BG on admit, 615 --> 425  - VBG on admit, pH: 7.29/38  - s/p albuterol, insulin/dextrose in the ED  - DKA pathway initiated   - continue insulin gtt   - CLD (no sugar)  - continue IVF  - clear liquid diet (no sugar)  - endocrinology consulted. apprec recs   -- s/p transitional insulin gtt  -- Start Levemir 22 U BID  -- Start Novolog 15 U AC  -- Low dose correction insulin   -- FS qAC/HS/AM   -- diabetic diet    Patient's FSGs are not controlled on current hypoglycemics.   Last A1c reviewed-   Lab Results   Component Value Date    HGBA1C 13.8 (H) 2020    HGBA1C 13.3 (H) 2020    HGBA1C 6.0 2011     Most recent fingerstick glucose reviewed-   Recent Labs   Lab 20  1213 20  1606 20  2150 20  0834   POCTGLUCOSE 106 81 179* 115*   FRANCHESCA-ab negative and detectable C-peptide. Likely type 2 diabetes.       Crohn colitis  S/p colectomy with end ileostomy  Anorectal fistulizing disease s/p placement of multiple setons  - follows with general surgery (Dr. Blackburn). Last visit on 10/5/2020  - follows with CRS (Dr. Suarez). Last visit 8/15/2020  - Given extent of anorectal disease with fistulae and stenosis, ileostomy will be permanent. Appears not to be a candidate for an attempt at restoring intestinal continuity given the extent of his anal disease, and I suspect he will ultimately  require a completion proctectomy Discussed completion proctectomy [facility] patient - he is not ready to proceed at this point.  - ESR: 39, CRP: 35  - continue home Entyvio  11/26 GI consulted - no medication changes recommended at this time     Hypomagnesemia - Patient with Magnesium   Recent Labs   Lab 11/29/20  0608 11/30/20  0451 12/01/20  0504   MG 1.9 1.7 1.5*    . replaced. monitor    KEITH  baseline cr 0.8   Patient with  acute kidney injury.Serum BUN/Cr uptrending.  Strict I/O monitor .   Recent Labs   Lab 11/29/20  0608 11/30/20  0451 12/01/20  0504   BUN 10 7 5*   CREATININE 1.6* 1.5* 1.3   started on RL hydration  11/29 started on 1/2 NS @100ml   11/30. Cr 1.5 continue 1/2 NS @100ml   12/1continue 1/2 NS @100ml . repeat renal panel in PM      Hypernatremia - Patient with sodium   Recent Labs   Lab 11/29/20  0608 11/30/20  0451 12/01/20  0504   * 143 144   . sodium trending up  . 11/29 . started on 1/2 NS @100ml        Scrotal cellulitis   - elevated ESR and CRP  - CT A/P with contrast  (11/21) showed 'Right perianal fistulous tract extending towards the scrotum with interval placement of a seton, noting the fistulous tract is similar to prior CT 02/09/2020. No abnormal fluid collection to suggest an abscess. Marked scrotal skin thickening. Correlate for cellulitis'  - followup bcx   - ID consulted. apprec recs  -- escalated from clindamycin to vancomycin and zosyn   - Urology consulted. apprec recs   -- s/p bedside I&D and packing on 11/25. followup I&D cultures  -- scrotal support, ice packs  -- can't give NSAIDs due to h/o ulcers  - CRS consulted. 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 Urology updated , findings likely from bedside debridement. no surgical intervention.  EUA with CRS tomorrow to assess for possible fistula tract between previous fistulas and scrotal abscess. started on clindamycin IV  11/27 EUA today -identification of fistula tract from  previously known fistula to base of scrotum - seton placed.  Discontinued clindamycin.  Monitor for vancomycin toxicity  11/28 s/p EUA, seton placement to perineum/scrotum .transitioned to Cipro/Flagylx 14 days, cultures NGTD,        Anemia   Patient's with Normocyticanemia.. Hemoglobin stable. Etiology likely due to chronic disease .  Current CBC reviewed-    Recent Labs   Lab 11/29/20  0608 11/30/20  0451 12/01/20  0504   HGB 11.6* 10.8* 11.2*     Monitor serial CBC and transfuse if H/H drops below 7/21.       Component Value Date/Time    MCV 89 12/01/2020 0504    RDW 13.2 12/01/2020 0504    IRON 21 (L) 09/28/2010 1531    FERRITIN 23 03/11/2015 1725    FOLATE 8.3 12/29/2012 0614    CISMDPLM64 884 12/29/2012 0614    OCCULTBLOOD Positive (A) 09/29/2014 2245        Protein calorie malnutrition  - Boost TID when DKA resolves        RPR + , FTA abs negative. RPR likely false +    Disposition- Home    Discharge Planning   MIGUEL: 12/1/2020     Code Status: Full Code   Is the patient medically ready for discharge?: No    Reason for patient still in hospital (select all that apply): Treatment  Discharge Plan A: Home with family   Discharge Delays: None known at this time     DVT prophylaxis addressed with:  SCDs.          Subsequent Hospital Care   Level 2 48014 Total visit time was 25 minutes or greater with greater than 50% of time spent in counseling and coordination of care.       We discussed in detail the plan of care, the patient's response to treatment, the discharge plan,.  Total time includes time spent reviewing the medical record, examining the patient, writing notes and communicating with other professionals.    Eusebio Berumen MD  Attending Staff Physician  [facility] Medicine  pager- [phone] [place] - [phone]

## 2020-12-01 NOTE — ASSESSMENT & PLAN NOTE
Continue seton drainage of fistula tract and scrotal abscess  Activity as tolerated  Diabetic diet  Continue current cares per primary  Discharge planning per primary, possibly today pending AM labs  Patient should follow up in clinic with Dr. Suarez two weeks post-discharge  Should go home with two weeks of cipro/flagyl on discharge

## 2020-12-01 NOTE — PLAN OF CARE
GIOVANNI informed pt does not have insurance. GIOVANNI sent email to [facility] to request pt be screened for Medicaid.    Domi Hunter, EVENS [phone]

## 2020-12-01 NOTE — ASSESSMENT & PLAN NOTE
can cause insulin stacking.   Avoid metformin at discharge, may consider outpatient if renal function normalizes  Improving

## 2020-12-01 NOTE — SUBJECTIVE & OBJECTIVE
Subjective:     Interval History:   No acute events overnight. Pain controlled. Remains on IVF at 100 2/2 KEITH, which is improving based on labs    Post-Op Info:  Procedure(s) (LRB):  Exam under anesthesia and seton placement (N/A)   4 Days Post-Op      Medications:  Continuous Infusions:   sodium chloride 0.45% 100 mL/hr at 11/30/20 2214     Scheduled Meds:   ciprofloxacin HCl  500 mg Oral Q12H    insulin aspart U-100  10 Units Subcutaneous TIDWM    insulin detemir U-100  15 Units Subcutaneous BID    metroNIDAZOLE  500 mg Oral Q8H     PRN Meds:   dextrose 50%    dextrose 50%    glucagon (human recombinant)    glucose    glucose    HYDROmorphone    insulin aspart U-100    melatonin    ondansetron    oxyCODONE    promethazine (PHENERGAN) IVPB    sodium chloride 0.9%    sodium chloride 0.9%        Objective:     Vital Signs (Most Recent):  Temp: 98.5 °F (36.9 °C) (12/01/20 0421)  Pulse: 90 (12/01/20 0421)  Resp: 18 (12/01/20 0421)  BP: 133/75 (12/01/20 0421)  SpO2: 97 % (12/01/20 0421) Vital Signs (24h Range):  Temp:  [96.3 °F (35.7 °C)-98.5 °F (36.9 °C)] 98.5 °F (36.9 °C)  Pulse:  [63-99] 90  Resp:  [13-18] 18  SpO2:  [96 %-98 %] 97 %  BP: (133-141)/(70-85) 133/75     Intake/Output - Last 3 Shifts       11/29 0700 - 11/30 0659 11/30 0700 - 12/01 0659    P.O. 944     I.V. (mL/kg) 2341.7 (30.6)     Total Intake(mL/kg) 3285.7 (42.9)     Urine (mL/kg/hr) 1775 (1) 1450 (0.8)    Stool 0     Total Output 1775 1450    Net +1510.7 -1450          Urine Occurrence 2 x     Stool Occurrence 1 x           Physical Exam    Constitutional:       General: He is not in acute distress.     Appearance: Normal appearance. He is normal weight. He is not ill-appearing.   HENT:      Head: Normocephalic and atraumatic.      Nose: Nose normal.      Mouth/Throat:      Mouth: Mucous membranes are moist.      Pharynx: Oropharynx is clear.   Eyes:      Extraocular Movements: Extraocular movements intact.   Cardiovascular:       Rate and Rhythm: Normal rate and regular rhythm.      Heart sounds: No murmur.   Pulmonary:      Effort: Pulmonary effort is normal. No respiratory distress.      Breath sounds: No stridor. No wheezing or rhonchi.   Abdominal:      General: Abdomen is flat. There is no distension.      Palpations: Abdomen is soft.      Tenderness: There is no abdominal tenderness. There is no guarding or rebound.   Genitourinary:     Comments: Previously placed setons noted in perianal region and perineum. New seton noted to traverse from scrotum to mid perineum. Decreased tenderness and erythema. Some seropurulent drainage  Skin:     General: Skin is warm and dry.      Capillary Refill: Capillary refill takes less than 2 seconds.   Neurological:      General: No focal deficit present.      Mental Status: He is oriented to person, place, and time.   Psychiatric:         Mood and Affect: Mood normal.         Behavior: Behavior normal.

## 2020-12-01 NOTE — DISCHARGE INSTRUCTIONS
continue to apply 4x4s and sanitary pads to be changed daily to collect any drainage around the indwelling setons. should shower or soak after every bowel movement to keep the area as clean as possible.

## 2020-12-01 NOTE — TELEPHONE ENCOUNTER
Patient has been scheduled for lab work on 12/29 for 8:00.  It is the day before his next infusion.  Confirmation mailed.   Alisia

## 2020-12-01 NOTE — CARE UPDATE
BG goal 140-180    Remains in MTSU, NAEON. BG below goal yesterday. Trending up today with decrease in insulin doses.     Plan:  Continue Levemir 15 units BID.   Decrease to novolog 10 units with meals.   BG monitoring ac/hs and low dose correction scale.     ADDENDUM: increase to novolog 12 units with meals.     Discharge planning:tbf     Endocrine to continue to follow    ** Please call Endocrine for any BG related issues **

## 2020-12-01 NOTE — TELEPHONE ENCOUNTER
No answer, patient is currently admitted. I received a message to schedule patient for an office visit post acute admission. Will attempt to contact patient via the patient portal.

## 2020-12-02 LAB
ALBUMIN SERPL BCP-MCNC: 2.6 G/DL (ref 3.5–5.2)
ALP SERPL-CCNC: 105 U/L (ref 55–135)
ALT SERPL W/O P-5'-P-CCNC: 37 U/L (ref 10–44)
ANION GAP SERPL CALC-SCNC: 12 MMOL/L (ref 8–16)
AST SERPL-CCNC: 19 U/L (ref 10–40)
BASOPHILS # BLD AUTO: 0.04 K/UL (ref 0–0.2)
BASOPHILS NFR BLD: 0.5 % (ref 0–1.9)
BILIRUB SERPL-MCNC: 0.3 MG/DL (ref 0.1–1)
BILIRUB UR QL STRIP: NEGATIVE
BUN SERPL-MCNC: 8 MG/DL (ref 6–20)
CALCIUM SERPL-MCNC: 8.8 MG/DL (ref 8.7–10.5)
CHLORIDE SERPL-SCNC: 105 MMOL/L (ref 95–110)
CLARITY UR REFRACT.AUTO: CLEAR
CO2 SERPL-SCNC: 27 MMOL/L (ref 23–29)
COLOR UR AUTO: YELLOW
CREAT SERPL-MCNC: 1.5 MG/DL (ref 0.5–1.4)
CREAT UR-MCNC: 39 MG/DL (ref 23–375)
DIFFERENTIAL METHOD: ABNORMAL
EOSINOPHIL # BLD AUTO: 0.1 K/UL (ref 0–0.5)
EOSINOPHIL NFR BLD: 1.2 % (ref 0–8)
ERYTHROCYTE [DISTWIDTH] IN BLOOD BY AUTOMATED COUNT: 13.2 % (ref 11.5–14.5)
EST. GFR  (AFRICAN AMERICAN): >60 ML/MIN/1.73 M^2
EST. GFR  (NON AFRICAN AMERICAN): 59.9 ML/MIN/1.73 M^2
GLUCOSE SERPL-MCNC: 108 MG/DL (ref 70–110)
GLUCOSE UR QL STRIP: NEGATIVE
HCT VFR BLD AUTO: 33.7 % (ref 40–54)
HGB BLD-MCNC: 11.3 G/DL (ref 14–18)
HGB UR QL STRIP: NEGATIVE
IMM GRANULOCYTES # BLD AUTO: 0.02 K/UL (ref 0–0.04)
IMM GRANULOCYTES NFR BLD AUTO: 0.2 % (ref 0–0.5)
KETONES UR QL STRIP: NEGATIVE
LEUKOCYTE ESTERASE UR QL STRIP: ABNORMAL
LYMPHOCYTES # BLD AUTO: 1.8 K/UL (ref 1–4.8)
LYMPHOCYTES NFR BLD: 22.5 % (ref 18–48)
MAGNESIUM SERPL-MCNC: 2.1 MG/DL (ref 1.6–2.6)
MCH RBC QN AUTO: 30.4 PG (ref 27–31)
MCHC RBC AUTO-ENTMCNC: 33.5 G/DL (ref 32–36)
MCV RBC AUTO: 91 FL (ref 82–98)
MICROSCOPIC COMMENT: NORMAL
MONOCYTES # BLD AUTO: 1.5 K/UL (ref 0.3–1)
MONOCYTES NFR BLD: 19.1 % (ref 4–15)
NEUTROPHILS # BLD AUTO: 4.5 K/UL (ref 1.8–7.7)
NEUTROPHILS NFR BLD: 56.5 % (ref 38–73)
NITRITE UR QL STRIP: NEGATIVE
NRBC BLD-RTO: 0 /100 WBC
PH UR STRIP: 7 [PH] (ref 5–8)
PHOSPHATE SERPL-MCNC: 3.6 MG/DL (ref 2.7–4.5)
PLATELET # BLD AUTO: 244 K/UL (ref 150–350)
PMV BLD AUTO: 13.4 FL (ref 9.2–12.9)
POCT GLUCOSE: 131 MG/DL (ref 70–110)
POCT GLUCOSE: 174 MG/DL (ref 70–110)
POCT GLUCOSE: 89 MG/DL (ref 70–110)
POTASSIUM SERPL-SCNC: 3.8 MMOL/L (ref 3.5–5.1)
PROT SERPL-MCNC: 6.4 G/DL (ref 6–8.4)
PROT UR QL STRIP: NEGATIVE
RBC # BLD AUTO: 3.72 M/UL (ref 4.6–6.2)
RBC #/AREA URNS AUTO: 1 /HPF (ref 0–4)
SODIUM SERPL-SCNC: 144 MMOL/L (ref 136–145)
SODIUM UR-SCNC: 67 MMOL/L (ref 20–250)
SP GR UR STRIP: 1 (ref 1–1.03)
URN SPEC COLLECT METH UR: ABNORMAL
UUN UR-MCNC: 113 MG/DL (ref 140–1050)
WBC # BLD AUTO: 8.03 K/UL (ref 3.9–12.7)
WBC #/AREA URNS AUTO: 1 /HPF (ref 0–5)

## 2020-12-02 PROCEDURE — 99232 PR SUBSEQUENT HOSPITAL CARE,LEVL II: ICD-10-PCS | Mod: ,,, | Performed by: HOSPITALIST

## 2020-12-02 PROCEDURE — 25000003 PHARM REV CODE 250: Performed by: HOSPITALIST

## 2020-12-02 PROCEDURE — 84100 ASSAY OF PHOSPHORUS: CPT

## 2020-12-02 PROCEDURE — 20600001 HC STEP DOWN PRIVATE ROOM

## 2020-12-02 PROCEDURE — 25000003 PHARM REV CODE 250: Performed by: SURGERY

## 2020-12-02 PROCEDURE — 80053 COMPREHEN METABOLIC PANEL: CPT

## 2020-12-02 PROCEDURE — 99223 PR INITIAL HOSPITAL CARE,LEVL III: ICD-10-PCS | Mod: ,,, | Performed by: HOSPITALIST

## 2020-12-02 PROCEDURE — 85025 COMPLETE CBC W/AUTO DIFF WBC: CPT

## 2020-12-02 PROCEDURE — 63600175 PHARM REV CODE 636 W HCPCS: Performed by: SURGERY

## 2020-12-02 PROCEDURE — 83735 ASSAY OF MAGNESIUM: CPT

## 2020-12-02 PROCEDURE — 99232 SBSQ HOSP IP/OBS MODERATE 35: CPT | Mod: ,,, | Performed by: NURSE PRACTITIONER

## 2020-12-02 PROCEDURE — 99223 1ST HOSP IP/OBS HIGH 75: CPT | Mod: ,,, | Performed by: HOSPITALIST

## 2020-12-02 PROCEDURE — 36415 COLL VENOUS BLD VENIPUNCTURE: CPT

## 2020-12-02 PROCEDURE — 81001 URINALYSIS AUTO W/SCOPE: CPT

## 2020-12-02 PROCEDURE — 99232 SBSQ HOSP IP/OBS MODERATE 35: CPT | Mod: ,,, | Performed by: HOSPITALIST

## 2020-12-02 PROCEDURE — 99232 PR SUBSEQUENT HOSPITAL CARE,LEVL II: ICD-10-PCS | Mod: ,,, | Performed by: NURSE PRACTITIONER

## 2020-12-02 PROCEDURE — 63600175 PHARM REV CODE 636 W HCPCS: Performed by: NURSE PRACTITIONER

## 2020-12-02 RX ORDER — GLUCAGON 1 MG
1 KIT INJECTION
Status: DISCONTINUED | OUTPATIENT
Start: 2020-12-02 | End: 2020-12-04 | Stop reason: HOSPADM

## 2020-12-02 RX ORDER — SODIUM CHLORIDE 450 MG/100ML
INJECTION, SOLUTION INTRAVENOUS CONTINUOUS
Status: ACTIVE | OUTPATIENT
Start: 2020-12-02 | End: 2020-12-03

## 2020-12-02 RX ORDER — IBUPROFEN 200 MG
24 TABLET ORAL
Status: DISCONTINUED | OUTPATIENT
Start: 2020-12-02 | End: 2020-12-04 | Stop reason: HOSPADM

## 2020-12-02 RX ORDER — INSULIN ASPART 100 [IU]/ML
6-12 INJECTION, SOLUTION INTRAVENOUS; SUBCUTANEOUS
Status: DISCONTINUED | OUTPATIENT
Start: 2020-12-02 | End: 2020-12-03

## 2020-12-02 RX ORDER — IBUPROFEN 200 MG
16 TABLET ORAL
Status: DISCONTINUED | OUTPATIENT
Start: 2020-12-02 | End: 2020-12-04 | Stop reason: HOSPADM

## 2020-12-02 RX ADMIN — SODIUM CHLORIDE 125 ML/HR: 0.45 INJECTION, SOLUTION INTRAVENOUS at 10:12

## 2020-12-02 RX ADMIN — CIPROFLOXACIN HYDROCHLORIDE 500 MG: 500 TABLET, FILM COATED ORAL at 09:12

## 2020-12-02 RX ADMIN — METRONIDAZOLE 500 MG: 500 TABLET ORAL at 05:12

## 2020-12-02 RX ADMIN — INSULIN ASPART 6 UNITS: 100 INJECTION, SOLUTION INTRAVENOUS; SUBCUTANEOUS at 05:12

## 2020-12-02 RX ADMIN — METRONIDAZOLE 500 MG: 500 TABLET ORAL at 02:12

## 2020-12-02 RX ADMIN — SODIUM CHLORIDE 125 ML/HR: 0.45 INJECTION, SOLUTION INTRAVENOUS at 03:12

## 2020-12-02 RX ADMIN — CIPROFLOXACIN HYDROCHLORIDE 500 MG: 500 TABLET, FILM COATED ORAL at 08:12

## 2020-12-02 RX ADMIN — INSULIN ASPART 12 UNITS: 100 INJECTION, SOLUTION INTRAVENOUS; SUBCUTANEOUS at 08:12

## 2020-12-02 RX ADMIN — INSULIN DETEMIR 15 UNITS: 100 INJECTION, SOLUTION SUBCUTANEOUS at 09:12

## 2020-12-02 RX ADMIN — METRONIDAZOLE 500 MG: 500 TABLET ORAL at 09:12

## 2020-12-02 RX ADMIN — OXYCODONE HYDROCHLORIDE 5 MG: 5 TABLET ORAL at 09:12

## 2020-12-02 RX ADMIN — OXYCODONE HYDROCHLORIDE 5 MG: 5 TABLET ORAL at 02:12

## 2020-12-02 RX ADMIN — MELATONIN TAB 3 MG 6 MG: 3 TAB at 09:12

## 2020-12-02 RX ADMIN — HYDROMORPHONE HYDROCHLORIDE 0.5 MG: 1 INJECTION, SOLUTION INTRAMUSCULAR; INTRAVENOUS; SUBCUTANEOUS at 09:12

## 2020-12-02 RX ADMIN — OXYCODONE HYDROCHLORIDE 5 MG: 5 TABLET ORAL at 05:12

## 2020-12-02 RX ADMIN — HYDROMORPHONE HYDROCHLORIDE 0.5 MG: 1 INJECTION, SOLUTION INTRAMUSCULAR; INTRAVENOUS; SUBCUTANEOUS at 03:12

## 2020-12-02 RX ADMIN — INSULIN DETEMIR 15 UNITS: 100 INJECTION, SOLUTION SUBCUTANEOUS at 08:12

## 2020-12-02 RX ADMIN — INSULIN ASPART 6 UNITS: 100 INJECTION, SOLUTION INTRAVENOUS; SUBCUTANEOUS at 12:12

## 2020-12-02 NOTE — ASSESSMENT & PLAN NOTE
sCr increased to 1.6 on 11/29 (BL 0.8). Initially downtrended w IV fluids, but increased again, to sCr 1.8 on 12/1. IVF were held at that time as pt lost IV access. However, pt reports good PO intake. 1950 UOP in 24hr prior to consult. Matais 67, uUN 113. Retroperitoneal u/s wnl.    sCr 1.5 on morning of consult. DDx includes AIN (recent initiation of new abx) vs pre-renal.     -- rec continue 0.45% saline @ 125cc/hr for 24hr  -- f/u urine microscopy  -- f/u CK, urinalysis  -- renal diet  -- avoid nephrotoxic agents  -- renally dose medications

## 2020-12-02 NOTE — CONSULTS
[facility]  Nephrology  Consult Note    Patient Name: Amando Calix  MRN: [id number]  Admission Date: 11/21/2020  Hospital Length of Stay: 11 days  Attending Provider: Eusebio Berumen MD   Primary Care Physician: Celestino Wright MD  Principal Problem:Type 2 diabetes mellitus with hyperglycemia    Inpatient consult to Nephrology  Consult performed by: Masoud Feliz MD  Consult ordered by: Eusebio Berumen MD        Subjective:     HPI: Mr. Calix is a 34yo M with PMH of Crohn's colitis (on Entyvio) s/p colectomy with end ileostomy with significant anorectal fistulizing disease s/p placement of multiple setons, h/o c diff infection, anemia and h/o steroid induced diabetes; presents for fatigue for about 2 weeks.  His symptoms started around November 4th after he received infusion of Entyvio and also ate a very rare steak.  He reports associated increased urinary frequency as well as polydipsia and an 18 lb weight loss over the past few weeks.  States that he has a good appetite has been eating well, he denies any abdominal pain bloody stool from his ostomy, diarrhea, nausea/vomiting, fevers/chills, hematuria, dysuria, flank pain, chest pain, shortness of breath, cough or sore throat. Noted scrotal swelling and pain at this time.     Further w/u revealed pt to be in DKA; was placed on insulin gtt until 11/24, then transitioned to basal-bolus regimen. As well, scrotal cellulitis noted to be 2/2 extension of known fistula. I+D, EUA by CRS, and eventually seton placement performed. Of further note, sCr increased to 1.6 on 11/29 (BL 0.8). Initially downtrended w IV fluids, but increased again, to sCr 1.8 on 12/1. IVF were held at that time as pt lost IV access. Nephrology consulted for eval/mgmt of KEITH. Upon date of consult, pt is now resting comfortably. Notes some residual discomfort but without other acute complaint. Reports appropriate PO intake over this period.     Past Medical  History:   Diagnosis Date    Anemia     Chronic diarrhea     Clostridium difficile infection     Crohn's disease     Diabetic ketoacidosis without coma associated with diabetes mellitus due to underlying condition 11/21/2020    Protein calorie malnutrition     Steroid-induced diabetes        Past Surgical History:   Procedure Laterality Date    ABSCESS DRAINAGE      COLONOSCOPY      COLOSTOMY      EXAMINATION UNDER ANESTHESIA N/A 11/27/2020    Procedure: Exam under anesthesia and seton placement;  Surgeon: Robe Suarez MD;  Location: [facility];  Service: Colon and Rectal;  Laterality: N/A;    FLEXIBLE SIGMOIDOSCOPY N/A 7/10/2020    Procedure: SIGMOIDOSCOPY, FLEXIBLE;  Surgeon: Robe Suarez MD;  Location: [facility] OR [facility] FLR;  Service: Colon and Rectal;  Laterality: N/A;    HERNIA REPAIR      ILEOSTOMY      INSERTION OF SETON STITCH N/A 7/10/2020    Procedure: PLACEMENT-SETON DRAIN;  Surgeon: Robe Suarez MD;  [facility];  Service: Colon and Rectal;  Laterality: N/A;    SIGMOIDECTOMY         Review of patient's allergies indicates:   Allergen Reactions    Ibuprofen Other (See Comments)     Can't take d/t stomach ulcers     Current Facility-Administered Medications   Medication Frequency    0.45% NaCl infusion Continuous    ciprofloxacin HCl tablet 500 mg Q12H    dextrose 50% injection 12.5 g PRN    dextrose 50% injection 25 g PRN    glucagon (human recombinant) injection 1 mg PRN    glucose chewable tablet 16 g PRN    glucose chewable tablet 24 g PRN    HYDROmorphone injection 0.5 mg Q6H PRN    insulin aspart U-100 pen 0-5 Units QID (AC + HS) PRN    insulin aspart U-100 pen 12 Units TIDWM    insulin detemir U-100 pen 15 Units BID    melatonin tablet 6 mg Nightly PRN    metroNIDAZOLE tablet 500 mg Q8H    ondansetron injection 4 mg Q6H PRN    oxyCODONE immediate release tablet 5 mg Q6H PRN    promethazine (PHENERGAN) 12.5 mg in dextrose 5 % 50 mL IVPB Q6H PRN     sodium chloride 0.9% flush 10 mL PRN    sodium chloride 0.9% flush 10 mL PRN     Family History     Problem Relation (Age of Onset)    Diabetes Father, Mother    Hyperlipidemia Mother        Tobacco Use    Smoking status: Current Every Day Smoker     Packs/day: 0.50     Years: 3.00     Pack years: 1.50     Types: Cigarettes     Last attempt to quit: 2014     Years since quittin.0    Smokeless tobacco: Never Used   Substance and Sexual Activity    Alcohol use: Yes     Comment: socially-weekly    Drug use: No    Sexual activity: Yes     Partners: Female     Review of Systems   Constitutional: Negative for chills and fever.   HENT: Negative for congestion and sore throat.    Eyes: Negative for photophobia and visual disturbance.   Respiratory: Negative for cough and shortness of breath.    Cardiovascular: Negative for chest pain and palpitations.   Gastrointestinal: Negative for abdominal pain, diarrhea, nausea and vomiting.   Genitourinary: Positive for scrotal swelling and testicular pain. Negative for dysuria and frequency.   Musculoskeletal: Negative for arthralgias and myalgias.   Skin: Negative for pallor and rash.   Neurological: Negative for dizziness and headaches.   Psychiatric/Behavioral: Negative for agitation and suicidal ideas.     Objective:     Vital Signs (Most Recent):  Temp: 98 °F (36.7 °C) (20 0747)  Pulse: 87 (20 0816)  Resp: 19 (20 0929)  BP: (!) 140/84 (20 0747)  SpO2: 98 % (20 0747)  O2 Device (Oxygen Therapy): room air (20 0420) Vital Signs (24h Range):  Temp:  [98 °F (36.7 °C)-98.8 °F (37.1 °C)] 98 °F (36.7 °C)  Pulse:  [] 87  Resp:  [16-19] 19  SpO2:  [94 %-99 %] 98 %  BP: (120-157)/(64-97) 140/84     Weight: 76.5 kg (168 lb 10.4 oz) (20 1400)  Body mass index is 25.64 kg/m².  Body surface area is 1.92 meters squared.    I/O last 3 completed shifts:  In: 1600 [P.O.:1600]  Out: 2500 [Urine:2500]    Physical Exam  Vitals signs and  nursing note reviewed.   Constitutional:       General: He is not in acute distress.  HENT:      Head: Normocephalic and atraumatic.      Right Ear: External ear normal.      Left Ear: External ear normal.      Nose: Nose normal. No rhinorrhea.      Mouth/Throat:      Mouth: Mucous membranes are moist.      Pharynx: Oropharynx is clear.   Eyes:      Extraocular Movements: Extraocular movements intact.      Pupils: Pupils are equal, round, and reactive to light.   Neck:      Musculoskeletal: Normal range of motion and neck supple.   Cardiovascular:      Rate and Rhythm: Normal rate and regular rhythm.      Pulses: Normal pulses.      Heart sounds: No murmur. No friction rub. No gallop.    Pulmonary:      Effort: Pulmonary effort is normal. No respiratory distress.      Breath sounds: No wheezing.   Abdominal:      General: Bowel sounds are normal. There is no distension.      Palpations: Abdomen is soft.      Tenderness: There is no abdominal tenderness.   Musculoskeletal: Normal range of motion.         General: No deformity.   Skin:     General: Skin is warm and dry.      Capillary Refill: Capillary refill takes less than 2 seconds.   Neurological:      General: No focal deficit present.      Mental Status: He is alert and oriented to person, place, and time.      Cranial Nerves: No cranial nerve deficit.   Psychiatric:         Mood and Affect: Mood normal.         Behavior: Behavior normal.         Significant Labs:  All labs within the past 24 hours have been reviewed.    Significant Imaging:  All imaging studies within the past 24 hours have been reviewed.    Assessment/Plan:     KEITH (acute kidney injury)  sCr increased to 1.6 on 11/29 (BL 0.8). Initially downtrended w IV fluids, but increased again, to sCr 1.8 on 12/1. IVF were held at that time as pt lost IV access. However, pt reports good PO intake. 1950 UOP in 24hr prior to consult. Matias 67, uUN 113. Retroperitoneal u/s wnl.    sCr 1.5 on morning of consult.  DDx includes AIN (recent initiation of new abx) vs pre-renal.     -- rec continue 0.45% saline @ 125cc/hr for 24hr  -- f/u urine microscopy  -- f/u CK, urinalysis  -- renal diet  -- avoid nephrotoxic agents  -- renally dose medications        Thank you for your consult. I will follow-up with patient. Please contact us if you have any additional questions.    Masoud Feliz MD PGY1  Nephrology  [facility]

## 2020-12-02 NOTE — ASSESSMENT & PLAN NOTE
Continue seton drainage of fistula tract and scrotal abscess  Activity as tolerated  Diabetic diet  F/U nephrology recs  Continue current cares per primary  Discharge planning per primary, possibly today pending AM labs  Patient should follow up in clinic with Dr. Suarez two weeks post-discharge  Should go home with two weeks of cipro/flagyl on discharge

## 2020-12-02 NOTE — PLAN OF CARE
Pt without c/o this shift.  Unable to obtain IV access after 2 attempts, pt declined further attempts at IV access. Medical team already aware of loss of PIV on 12/1 day shift.  Plan to pursue a midline consult if pt needs to stay for IV hydration and further renal workup.

## 2020-12-02 NOTE — SUBJECTIVE & OBJECTIVE
Subjective:     Interval History:   No acute events overnight. Pain controlled. Remains on IVF at 100 2/2 KEITH, rise in creatinine yesterday prevented discharge.    Post-Op Info:  Procedure(s) (LRB):  Exam under anesthesia and seton placement (N/A)   5 Days Post-Op      Medications:  Continuous Infusions:    Scheduled Meds:   ciprofloxacin HCl  500 mg Oral Q12H    insulin aspart U-100  12 Units Subcutaneous TIDWM    insulin detemir U-100  15 Units Subcutaneous BID    metroNIDAZOLE  500 mg Oral Q8H     PRN Meds:   dextrose 50%    dextrose 50%    glucagon (human recombinant)    glucose    glucose    HYDROmorphone    insulin aspart U-100    melatonin    ondansetron    oxyCODONE    promethazine (PHENERGAN) IVPB    sodium chloride 0.9%    sodium chloride 0.9%        Objective:     Vital Signs (Most Recent):  Temp: 98 °F (36.7 °C) (12/02/20 0747)  Pulse: 87 (12/02/20 0816)  Resp: 19 (12/02/20 0929)  BP: (!) 140/84 (12/02/20 0747)  SpO2: 98 % (12/02/20 0747) Vital Signs (24h Range):  Temp:  [98 °F (36.7 °C)-98.8 °F (37.1 °C)] 98 °F (36.7 °C)  Pulse:  [] 87  Resp:  [16-19] 19  SpO2:  [94 %-99 %] 98 %  BP: (120-157)/(64-97) 140/84     Intake/Output - Last 3 Shifts       11/30 0700 - 12/01 0659 12/01 0700 - 12/02 0659 12/02 0700 - 12/03 0659    P.O.  1600 222    I.V. (mL/kg)       Total Intake(mL/kg)  1600 (20.9) 222 (2.9)    Urine (mL/kg/hr) 1450 (0.8) 1950 (1.1) 425 (1.5)    Stool   0    Total Output 1450 1950 425    Net -1450 -350 -203           Stool Occurrence   1 x          Physical Exam    Constitutional:       General: He is not in acute distress.     Appearance: Normal appearance. He is normal weight. He is not ill-appearing.   HENT:      Head: Normocephalic and atraumatic.      Nose: Nose normal.      Mouth/Throat:      Mouth: Mucous membranes are moist.      Pharynx: Oropharynx is clear.   Eyes:      Extraocular Movements: Extraocular movements intact.   Cardiovascular:      Rate and Rhythm:  Normal rate and regular rhythm.      Heart sounds: No murmur.   Pulmonary:      Effort: Pulmonary effort is normal. No respiratory distress.      Breath sounds: No stridor. No wheezing or rhonchi.   Abdominal:      General: Abdomen is flat. There is no distension.      Palpations: Abdomen is soft.      Tenderness: There is no abdominal tenderness. There is no guarding or rebound.   Genitourinary:     Comments: Previously placed setons noted in perianal region and perineum. New seton noted to traverse from scrotum to mid perineum. Decreased tenderness and erythema. Some seropurulent drainage  Skin:     General: Skin is warm and dry.      Capillary Refill: Capillary refill takes less than 2 seconds.   Neurological:      General: No focal deficit present.      Mental Status: He is oriented to person, place, and time.   Psychiatric:         Mood and Affect: Mood normal.         Behavior: Behavior normal.

## 2020-12-02 NOTE — PROGRESS NOTES
"[facility]  Endocrinology  Progress Note    Admit Date: 2020     Reason for Consult: Management of DKA, Hyperglycemia     Surgical Procedure and Date: N/A     Diabetes diagnosis year: Newly Dx.  Per patient he had steroid induced DM during childhood and resolved after DC of steroids.     Home Diabetes Medications:  NONE     How often checking glucose at home? N/A    BG readings on regimen: N/a   Hypoglycemia on the regimen?  No  Missed doses on regimen?  No    Diabetes Complications include:     N/A     Complicating diabetes co morbidities:   N/A       HPI:   Patient is a 35 y.o. male with a diagnosis of Steroid induced DM during childhood that resolved after discontinuation of steroids, Chron's disease s/p colectomy and ileostomy that was admitted to [facility] for scrotal cellulitis and KEITH.  During ED course patient was found to be in DKA w/ glucose 600s, BHB 4.6, PH 7.29, HCO3 13 and AG 27.  Pt was started on IV and Insulin infusion and consulted with endocrinology for assistance in management of DKA in the setting of newly Dx. DM.         Interval HPI:   Overnight events: Remains in [facility]. BG variable with scheduled insulin and prn correction scale (). Creatinine 1.5.   Eatin% - 100%   Nausea: No  Hypoglycemia and intervention: No  Fever: No  TPN and/or TF: No    /66 (BP Location: Right arm, Patient Position: Lying)   Pulse 78   Temp 98.1 °F (36.7 °C) (Oral)   Resp 17   Ht 5' 8" (1.727 m)   Wt 76.5 kg (168 lb 10.4 oz)   SpO2 98%   BMI 25.64 kg/m²     Labs Reviewed and Include    Recent Labs   Lab 20  0336      CALCIUM 8.8   ALBUMIN 2.6*   PROT 6.4      K 3.8   CO2 27      BUN 8   CREATININE 1.5*   ALKPHOS 105   ALT 37   AST 19   BILITOT 0.3     Lab Results   Component Value Date    WBC 8.03 2020    HGB 11.3 (L) 2020    HCT 33.7 (L) 2020    MCV 91 2020     2020     No results for input(s): TSH, FREET4 in the " last 168 hours.  Lab Results   Component Value Date    HGBA1C 13.8 (H) 11/21/2020       Nutritional status:   Body mass index is 25.64 kg/m².  Lab Results   Component Value Date    ALBUMIN 2.6 (L) 12/02/2020    ALBUMIN 2.5 (L) 12/01/2020    ALBUMIN 2.6 (L) 12/01/2020     Lab Results   Component Value Date    PREALBUMIN 34 03/31/2015    PREALBUMIN 7 (L) 11/11/2014    PREALBUMIN 8 (L) 11/10/2014       Estimated Creatinine Clearance: 67.1 mL/min (A) (based on SCr of 1.5 mg/dL (H)).    Accu-Checks  Recent Labs     11/29/20  2137 11/30/20  1213 11/30/20  1606 11/30/20  2150 12/01/20  0834 12/01/20  1105 12/01/20  1647 12/01/20  2048 12/02/20  0746 12/02/20  1206   POCTGLUCOSE 219* 106 81 179* 115* 218* 162* 270* 131* 89       Current Medications and/or Treatments Impacting Glycemic Control  Immunotherapy:    Immunosuppressants     None        Steroids:   Hormones (From admission, onward)    Start     Stop Route Frequency Ordered    11/21/20 1406  melatonin tablet 6 mg      -- Oral Nightly PRN 11/21/20 1407        Pressors:    Autonomic Drugs (From admission, onward)    None        Hyperglycemia/Diabetes Medications:   Antihyperglycemics (From admission, onward)    Start     Stop Route Frequency Ordered    12/02/20 1245  insulin aspart U-100 pen 6-12 Units      -- SubQ 3 times daily with meals 12/02/20 1231    11/28/20 1104  insulin aspart U-100 pen 0-5 Units      -- SubQ Before meals & nightly PRN 11/28/20 1004    11/28/20 0900  insulin detemir U-100 pen 15 Units      -- SubQ 2 times daily 11/28/20 0741          ASSESSMENT and PLAN    * Type 2 diabetes mellitus with hyperglycemia  Brief Hx: 35 y.o. male with a diagnosis of steroid induced DM during childhood that resolved after discontinuation of steroids, Crohn's disease s/p colectomy and ileostomy that [place] [facility] [place] and KEITH.    Fatigue for about 2 weeks.  His symptoms started around November 4th after he received infusion of Entyvio and  also ate a very rare steak.    Associated increased urinary frequency as well as polydipsia and an 18 lb weight loss over the past few weeks.  During ED course patient was found to be in DKA w/ glucose 600s, BHB 4.6, PH 7.29, HCO3 13 and AG 27.    Pt was started on IV and Insulin infusion and consulted with endocrinology for assistance in management of DKA in the setting of newly Dx DM    A1c 13% new diagnosis of diabetes  On admission with DKA now resolved    FRANCHESCA-65 negative and detectable C-peptide. Likely type 2 diabetes.    Consulted for: DKA  DM type: Presumed undiagnosed, new onset T2DM   A1C: 13.8*    Glucose Goals: 140-180 mg/dL      Home Regimen:    none    PLAN:     -  Detemir 15 units BID   -  Change to  Aspart 6-12 units TIDWM   -  Low dose correction insulin   -  Accucheck ACHS    If discharged:  Suggest above regimen          KEITH (acute kidney injury)   can cause insulin stacking.   Avoid metformin at discharge, may consider outpatient if renal function normalizes  Improving    Cellulitis of scrotum  On antibiotics per primary        Crohn's disease of both small and large intestine without complication  Per primary team   Not on steroids currently        Cristy Delgadillo NP  Endocrinology  [facility]

## 2020-12-02 NOTE — PHYSICIAN QUERY
PT Name: Amando Calix  MR #: [id number]     Diagnosis Clarification      CDS/: Eric Ferris RN               Contact information:   [email or web address]        This form is a permanent document in the medical record.     Query Date: December 2, 2020    Dear Provider,  By submitting this query, we are merely seeking further clarification of documentation.  Please utilize your independent clinical judgment when addressing the question(s) below.     The medical record contains the following:    Supporting Clinical Information Location in Medical Record     Scrotal cellulitis:   - CT A/P shoed Right perianal fistulous tract towards scrotum with seton, similar to prior CT 2/9/2020. No abnormal fluid collection to suggest abscess. Marked thickening. Correlate for cellulitis. ----- IV Clindamycin    Impression:    ... 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    Interval History:  11/27 EUA today -identification of fistula tract from previously known fistula to base of scrotum - seton placed.  Discontinued clindamycin  11/28 s/p EUA, seton placement to perineum/scrotum .  .transitioned to Cipro/Flagylx 14 days,    cultures NGTD,  RPR + , FTA abs pending       11/21 , Hospital medicine            11/26 US Scrotum and testicles        12/1 PN, Hospital medicine                 Please clarify if the    Macario's gangrene     diagnosis has been:    [  ] Ruled In   [  ] Ruled In, Now Resolved   [ x ] Ruled Out   [  ] Other/Clarification of findings (please specify)_______________    [   ] Clinically undetermined       Please document in your progress notes daily for the duration of treatment, until resolved, and include in your discharge summary.

## 2020-12-02 NOTE — PROGRESS NOTES
Progress Note  Hospital Medicine    Admit Date: 11/21/2020  Length of Stay:  LOS: 11 days     SUBJECTIVE:         Follow-up For:  Type 2 diabetes mellitus with hyperglycemia    HPI/Interval history (See H&P for complete P,F,SHx) :     Mr. Calix is a 35 year old gentleman with PMH of Crohn's colitis (on Entyvio) s/p colectomy with end ileostomy with significant anorectal fistulizing disease s/p placement of multiple setons, h/o c diff infection, anemia and h/o steroid induced diabetes presents for fatigue for about 2 weeks.  His symptoms started around November 4th after he received infusion of Entyvio and also ate a very rare steak.  He reports associated increased urinary frequency as well as polydipsia and an 18 lb weight loss over the past few weeks.  States that he has a good appetite has been eating well, he denies any abdominal pain bloody stool from his ostomy, diarrhea, nausea/vomiting, fevers/chills, hematuria, dysuria, flank pain, chest pain, shortness of breath, cough or sore throat.       Interval History:   11/25 Patient lying in bed, no acute distress. Reports tolerating diet and fatigue has been improving. Continues to note scrotal swelling has unchanged. Increased scrotal pain after I&D.   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 Urology updated , findings likely from bedside debridement. no surgical intervention.  EUA with CRS tomorrow to assess for possible fistula tract between previous fistulas and scrotal abscess. started on clindamycin IV  11/27 EUA today -identification of fistula tract from previously known fistula to base of scrotum - seton placed.  Discontinued clindamycin  11/28 s/p EUA, seton placement to perineum/scrotum .  .transitioned to Cipro/Flagylx 14 days,    cultures NGTD,  RPR + , FTA abs pending  11/29 sodium 147 . Cr 1.6 . started on 1/2 NS @100ml   11/30 sodium 143 . Cr 1.5 continue 1/2 NS @100ml   12/1  sodium 144 . Cr 1.3 continue 1/2 NS @100ml .  "repeat renal panel in PM. does not have insurance. case management working regarding discharge medications  12/2 loss of IV access overnight. nephrology consulted.  AIN vs KEITH ? wrights stain negative . renal sonogram -No evidence of hydronephrosis or other significant sonographic abnormality. PICC team consulted. sarted on 1/NS at 125ml/h       Review of Systems:   Pain scale: Constitutional: Positive for fatigue. Negative for chills and fever.   HENT: Negative for congestion.    Eyes: Negative for visual disturbance.   Respiratory: Negative for cough and shortness of breath.    Cardiovascular: Negative for chest pain and leg swelling.   Gastrointestinal: Negative for abdominal distention, abdominal pain, nausea and vomiting.   Genitourinary: Positive for scrotal swelling. Negative for dysuria.   Neurological: Negative for dizziness, weakness and numbness.   Psychiatric/Behavioral: Negative for confusion.     OBJECTIVE:     Vital Signs Range (Last 24H):  Temp:  [97.5 °F (36.4 °C)-98.8 °F (37.1 °C)]   Pulse:  []   Resp:  [16-19]   BP: (120-157)/(64-97)   SpO2:  [94 %-99 %]     Physical Exam:  Constitutional: Appears well-developed and well-nourished.   Head: Normocephalic and atraumatic.   Neck: Normal range of motion. Neck supple.   Cardiovascular: Normal heart rate.  Regular heart rhythm.  Pulmonary/Chest: Effort normal.   Abdominal: + tenderness.  ileostomy  Musculoskeletal: Normal range of motion. No edema.    -Scrotal swellingand erythema improved  Neurological: Alert and oriented to person, place, and time.   Skin: Skin is warm and dry.   Psychiatric: Normal mood and affect. Behavior is normal.         Estimated body mass index is 25.64 kg/m² as calculated from the following:    Height as of this encounter: 5' 8" (1.727 m).    Weight as of this encounter: 76.5 kg (168 lb 10.4 oz).    I & O (Last 24H):    Intake/Output Summary (Last 24 hours) at 12/2/2020 1609  Last data filed at 12/2/2020 1600  Gross " per 24 hour   Intake 2524 ml   Output 2600 ml   Net -76 ml       Body mass index is 25.64 kg/m².    Estimated Creatinine Clearance: 67.1 mL/min (A) (based on SCr of 1.5 mg/dL (H)).        Laboratory/Diagnostic Data:    Imaging Results          CT Abdomen Pelvis With Contrast (Final result)  Result time 11/21/20 12:07:12    Final result by Nabil Cooper Jr., MD (11/21/20 12:07:12)             Impression:      Right perianal fistulous tract extending towards the scrotum with interval placement of a seton, noting the fistulous tract is similar to prior CT 02/09/2020.  There is a slightly more cephalad fistulous track in relation to the seton again noted.  No abnormal fluid collection to suggest an abscess.    Marked scrotal skin thickening.  Correlate for cellulitis.    Stable postoperative changes of a total colectomy and right lower quadrant end ileostomy in this patient with reported history of Crohn's disease.    Electronically signed by resident: Rosio Freeman  Date:    11/21/2020  Time:    11:30    Electronically signed by: Nabil Cooper MD  Date:    11/21/2020  Time:    12:07           Narrative:    EXAMINATION:  CT ABDOMEN PELVIS WITH CONTRAST    CLINICAL HISTORY:  Abdominal pain, fever;Low abd pain and testicular pain/ swelling.  Patient with Crohn's disease, anal fistula with seat on and scrotal stent.  Concern for scrotal cellulitis/abscess/fistula;    TECHNIQUE:  Low dose axial images were obtained from the lung bases through the proximal thighs following the intravenous administration of 75 cc of Omnipaque 350.  Sagittal and coronal reformats were provided.    COMPARISON:  CT pelvis 02/09/2020    FINDINGS:  Heart: Normal in size.  No pericardial effusion.    Lung bases: Symmetrically expanded.  No consolidation, pleural effusion, mass, or pneumothorax.    Liver: Normal in size and attenuation without focal hepatic abnormality.    Gallbladder: Normal in appearance without evidence for  cholecystitis.    Bile Ducts: No intra or extrahepatic biliary ductal dilation.    Pancreas: No pancreatic mass lesion or peripancreatic inflammatory change.    Spleen: Unremarkable.    Adrenals: Unremarkable.    Kidneys/ Ureters: Normal in size and location.  Kidneys enhance normally.  No focal renal abnormality or nephrolithiasis.  No hydroureteronephrosis.    Bladder: Smooth contours without bladder wall thickening.    Reproductive organs: Unremarkable.    GI Tract/Mesentery: The stomach is unremarkable.  Postoperative changes of a total colectomy and right lower quadrant end ileostomy.  Fatty deposition within the wall with a few loops of small bowel in the pelvis, favored to reflect chronic inflammation.    Peritoneal Space: No intraperitoneal free fluid or free air. No pathologically enlarged lymph nodes.    Retroperitoneum: No significant adenopathy.    Abdominal wall/extraperitoneal soft tissues: Soft tissue thickening again seen extending from the right perianal region along the inner right medial gluteal fold into the perineum and right scrotum with interval placement of a Seton in this patient with known perianal fistula, similar in appearance to prior CT 02/09/2020.  Increased soft tissue and skin induration within the scrotum, new from prior CT 02/09/2020.  No peripherally enhancing fluid collections to suggest an abscess.    Vasculature: Abdominal aorta is normal in caliber, contour, and course without significant calcific atherosclerosis.    Bones: Unremarkable without acute fracture or bone destructive process.                             X-Ray Chest PA And Lateral (Final result)  Result time 11/21/20 10:29:32    Final result by Randy Amador DO (11/21/20 10:29:32)             Impression:      No acute abnormality.      Electronically signed by: Randy Amador  Date:    11/21/2020  Time:    10:29           Narrative:    EXAMINATION:  XR CHEST PA AND LATERAL    CLINICAL HISTORY:  Other  fatigue.    TECHNIQUE:  PA and lateral views of the chest were performed.    COMPARISON:  Multiple prior radiographs of the chest, most recent from 04/01/2016.    FINDINGS:  The lungs are well expanded and clear. No focal opacities are seen. The pleural spaces are clear.  The cardiac silhouette is unremarkable.  The visualized osseous structures are unremarkable.                                Reviewed and noted in plan where applicable- Please see chart for full lab data.    Recent Labs   Lab 11/30/20 0451 12/01/20  0504 12/02/20  0336   WBC 8.90 9.11 8.03   HGB 10.8* 11.2* 11.3*   HCT 32.2* 33.9* 33.7*    231 244       Recent Labs   Lab 11/30/20 0451 12/01/20  0504 12/01/20  1519 12/02/20  0336    144 140 144   K 3.5 3.6 4.6 3.8    105 103 105   CO2 31* 28 29 27   BUN 7 5* 8 8   CREATININE 1.5* 1.3 1.8* 1.5*   * 84 207* 108   CALCIUM 8.3* 8.6* 8.9 8.8   MG 1.7 1.5*  --  2.1   PHOS 4.1 3.6 3.3 3.6       Recent Labs   Lab 11/30/20 0451 12/01/20  0504 12/01/20 1519 12/02/20  0336   ALKPHOS 111 116  --  105   ALT 39 41  --  37   AST 20 24  --  19   ALBUMIN 2.4* 2.6* 2.5* 2.6*   PROT 6.2 6.5  --  6.4   BILITOT 0.3 0.4  --  0.3        Microbiology labs for the last week  Microbiology Results (last 7 days)     Procedure Component Value Units Date/Time    Culture, Anaerobe [[id number]]  (Abnormal) Collected: 11/25/20 1540    Order Status: Completed Specimen: Abscess from Groin Updated: 11/30/20 1355     Anaerobic Culture PORPHYROMONAS SOMERAE  Few        PREVOTELLA BERGENSIS  Few      Fungus culture [[id number]] Collected: 11/25/20 1540    Order Status: Completed Specimen: Abscess from Groin Updated: 11/30/20 0945     Fungus (Mycology) Culture Culture in progress    Aerobic culture [[id number]] Collected: 11/25/20 1540    Order Status: Completed Specimen: Abscess from Groin Updated: 11/28/20 1011     Aerobic Bacterial Culture No growth    Blood culture #1 **CANNOT BE ORDERED STAT** [[id number]]  Collected: 11/21/20 0934    Order Status: Completed Specimen: Blood from Peripheral, Antecubital, Left Updated: 11/26/20 1012     Blood Culture, Routine No growth after 5 days.    Blood culture #2 **CANNOT BE ORDERED STAT** [[id number]] Collected: 11/21/20 0946    Order Status: Completed Specimen: Blood from Peripheral, Antecubital, Left Updated: 11/26/20 1012     Blood Culture, Routine No growth after 5 days.    Gram stain [[id number]] Collected: 11/25/20 1540    Order Status: Completed Specimen: Abscess from Groin Updated: 11/25/20 1917     Gram Stain Result Rare WBC's      Rare Gram positive cocci      Rare Gram negative rods           Medications:  Medication list was reviewed and changes noted under Assessment/Plan.        Current Facility-Administered Medications:     0.45% NaCl infusion, , Intravenous, Continuous, Eusebio Berumen MD, Last Rate: 125 mL/hr at 12/02/20 1553, 125 mL/hr at 12/02/20 1553    ciprofloxacin HCl tablet 500 mg, 500 mg, Oral, Q12H, Yogi Thomas MD, 500 mg at 12/02/20 0824    dextrose 50% injection 12.5 g, 12.5 g, Intravenous, PRN, Cristy Delgadillo NP    dextrose 50% injection 25 g, 25 g, Intravenous, PRN, Cristy Delgadillo NP    glucagon (human recombinant) injection 1 mg, 1 mg, Intramuscular, PRN, rCisty Delgadillo NP    glucose chewable tablet 16 g, 16 g, Oral, PRN, Cristy Delgadillo NP    glucose chewable tablet 24 g, 24 g, Oral, PRN, Cristy Delgadillo NP    HYDROmorphone injection 0.5 mg, 0.5 mg, Intravenous, Q6H PRN, Yogi Thomas MD, 0.5 mg at 12/02/20 1553    insulin aspart U-100 pen 0-5 Units, 0-5 Units, Subcutaneous, QID (AC + HS) PRN, Pastor Cochran MD, 1 Units at 12/01/20 2049    insulin aspart U-100 pen 6-12 Units, 6-12 Units, Subcutaneous, TIDWM, Cristy Delgadillo NP, 6 Units at 12/02/20 1253    insulin detemir U-100 pen 15 Units, 15 Units, Subcutaneous, BID, Pastor Cochran MD, 15 Units at 12/02/20 0825    melatonin tablet 6 mg, 6 mg, Oral, Nightly  PRN, Yogi Thomas MD, 6 mg at 20 2115    metroNIDAZOLE tablet 500 mg, 500 mg, Oral, Q8H, Yogi Thomas MD, 500 mg at 20 1441    ondansetron injection 4 mg, 4 mg, Intravenous, Q6H PRN, Yogi Thomas MD    oxyCODONE immediate release tablet 5 mg, 5 mg, Oral, Q6H PRN, Yogi Thomas MD, 5 mg at 20 0929    promethazine (PHENERGAN) 12.5 mg in dextrose 5 % 50 mL IVPB, 12.5 mg, Intravenous, Q6H PRN, Yogi Thomas MD    sodium chloride 0.9% flush 10 mL, 10 mL, Intravenous, PRN, Yogi Thomas MD    sodium chloride 0.9% flush 10 mL, 10 mL, Intravenous, PRN, Yogi Thomas MD    Estimated Creatinine Clearance: 67.1 mL/min (A) (based on SCr of 1.5 mg/dL (H)).    ASSESSMENT/PLAN:     Active Problems:     DKA- resolved   H/o steroid induced DM  - DKA likely triggered by scrotal cellulitis   - HA1c: 13.8  - A --> 13  - betahydroxybutyrate: 4.6  - BG on admit, 615 --> 425  - VBG on admit, pH: 7.29/38  - s/p albuterol, insulin/dextrose in the ED  - DKA pathway initiated   - continue insulin gtt   - CLD (no sugar)  - continue IVF  - clear liquid diet (no sugar)  - endocrinology consulted. apprec recs   -- s/p transitional insulin gtt  -- Start Levemir 22 U BID  -- Start Novolog 15 U AC  -- Low dose correction insulin   -- FS qAC/HS/AM   -- diabetic diet    Patient's FSGs are not controlled on current hypoglycemics.   Last A1c reviewed-   Lab Results   Component Value Date    HGBA1C 13.8 (H) 2020    HGBA1C 13.3 (H) 2020    HGBA1C 6.0 2011     Most recent fingerstick glucose reviewed-   Recent Labs   Lab 20  1647 20  2048 20  0746 20  1206   POCTGLUCOSE 162* 270* 131* 89   FRANCHESCA-ab negative and detectable C-peptide. Likely type 2 diabetes.       Crohn colitis  S/p colectomy with end ileostomy  Anorectal fistulizing disease s/p placement of multiple setons  - follows with general surgery (Dr. Blackburn). Last visit on 10/5/2020  - follows with  CRS (Dr. Suarez). Last visit 8/15/2020  - Given extent of anorectal disease with fistulae and stenosis, ileostomy will be permanent. Appears not to be a candidate for an attempt at restoring intestinal continuity given the extent of his anal disease, and I suspect he will ultimately require a completion proctectomy Discussed completion proctectomy [facility] patient - he is not ready to proceed at this point.  - ESR: 39, CRP: 35  - continue home Entyvio  11/26 GI consulted - no medication changes recommended at this time     Hypomagnesemia - Patient with Magnesium   Recent Labs   Lab 11/30/20  0451 12/01/20  0504 12/02/20  0336   MG 1.7 1.5* 2.1    . replaced. monitor    KEITH  baseline cr 0.8   Patient with  acute kidney injury.Serum BUN/Cr uptrending.  Strict I/O monitor .   Recent Labs   Lab 12/01/20  0504 12/01/20  1519 12/02/20  0336   BUN 5* 8 8   CREATININE 1.3 1.8* 1.5*   started on RL hydration  11/29 started on 1/2 NS @100ml   11/30. Cr 1.5 continue 1/2 NS @100ml   12/1continue 1/2 NS @100ml . repeat renal panel in PM  12/2 loss of IV access overnight. nephrology consulted. wrights stain negative . renal sonogram -No evidence of hydronephrosis or other significant sonographic abnormality.  AIN vs KEITH ? PICC team consulted. sarted on 1/NS at 125ml/h               Hypernatremia - Patient with sodium   Recent Labs   Lab 12/01/20  0504 12/01/20  1519 12/02/20  0336    140 144   . sodium trending up  . 11/29 . started on 1/2 NS @100ml        Scrotal cellulitis   - elevated ESR and CRP  - CT A/P with contrast  (11/21) showed 'Right perianal fistulous tract extending towards the scrotum with interval placement of a seton, noting the fistulous tract is similar to prior CT 02/09/2020. No abnormal fluid collection to suggest an abscess. Marked scrotal skin thickening. Correlate for cellulitis'  - followup bcx   - ID consulted. apprec recs  -- escalated from clindamycin to vancomycin and zosyn   - Urology consulted.  apprec recs   -- s/p bedside I&D and packing on 11/25. followup I&D cultures  -- scrotal support, ice packs  -- can't give NSAIDs due to h/o ulcers  - CRS consulted. 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 Urology updated , findings likely from bedside debridement. no surgical intervention.  EUA with CRS tomorrow to assess for possible fistula tract between previous fistulas and scrotal abscess. started on clindamycin IV  11/27 EUA today -identification of fistula tract from previously known fistula to base of scrotum - seton placed.  Discontinued clindamycin.  Monitor for vancomycin toxicity  11/28 s/p EUA, seton placement to perineum/scrotum .transitioned to Cipro/Flagylx 14 days, cultures NGTD,        Anemia   Patient's with Normocyticanemia.. Hemoglobin stable. Etiology likely due to chronic disease .  Current CBC reviewed-    Recent Labs   Lab 11/30/20  0451 12/01/20  0504 12/02/20  0336   HGB 10.8* 11.2* 11.3*     Monitor serial CBC and transfuse if H/H drops below 7/21.       Component Value Date/Time    MCV 91 12/02/2020 0336    RDW 13.2 12/02/2020 0336    IRON 21 (L) 09/28/2010 1531    FERRITIN 23 03/11/2015 1725    FOLATE 8.3 12/29/2012 0614    [id number] 884 12/29/2012 0614    OCCULTBLOOD Positive (A) 09/29/2014 2245        Protein calorie malnutrition  - Boost TID when DKA resolves        RPR + , FTA abs negative. RPR likely false +    Disposition- Home    Discharge Planning   MIGUEL: 12/1/2020     Code Status: Full Code   Is the patient medically ready for discharge?: No    Reason for patient still in hospital (select all that apply): Treatment  Discharge Plan A: Home with family   Discharge Delays: None known at this time     DVT prophylaxis addressed with:  SCDs.          Subsequent Hospital Care   Level 2 67992 Total visit time was 25 minutes or greater with greater than 50% of time spent in counseling and coordination of care.       We discussed in  detail the plan of care, the patient's response to treatment, the discharge plan,.  Total time includes time spent reviewing the medical record, examining the patient, writing notes and communicating with other professionals.    Eusebio Berumen MD  Attending Staff Physician  [facility]  pager- [phone] [id number] - [phone]

## 2020-12-02 NOTE — HPI
Mr. Calix is a 36yo M with PMH of Crohn's colitis (on Entyvio) s/p colectomy with end ileostomy with significant anorectal fistulizing disease s/p placement of multiple setons, h/o c diff infection, anemia and h/o steroid induced diabetes; presents for fatigue for about 2 weeks.  His symptoms started around November 4th after he received infusion of Entyvio and also ate a very rare steak.  He reports associated increased urinary frequency as well as polydipsia and an 18 lb weight loss over the past few weeks.  States that he has a good appetite has been eating well, he denies any abdominal pain bloody stool from his ostomy, diarrhea, nausea/vomiting, fevers/chills, hematuria, dysuria, flank pain, chest pain, shortness of breath, cough or sore throat. Noted scrotal swelling and pain at this time.     Further w/u revealed pt to be in DKA; was placed on insulin gtt until 11/24, then transitioned to basal-bolus regimen. As well, scrotal cellulitis noted to be 2/2 extension of known fistula. I+D, EUA by CRS, and eventually seton placement performed. Of further note, sCr increased to 1.6 on 11/29 (BL 0.8). Initially downtrended w IV fluids, but increased again, to sCr 1.8 on 12/1. IVF were held at that time as pt lost IV access. Nephrology consulted for eval/mgmt of KEITH. Upon date of consult, pt is now resting comfortably. Notes some residual discomfort but without other acute complaint. Reports appropriate PO intake over this period.

## 2020-12-02 NOTE — SUBJECTIVE & OBJECTIVE
Past Medical History:   Diagnosis Date    Anemia     Chronic diarrhea     Clostridium difficile infection     Crohn's disease     Diabetic ketoacidosis without coma associated with diabetes mellitus due to underlying condition 11/21/2020    Protein calorie malnutrition     Steroid-induced diabetes        Past Surgical History:   Procedure Laterality Date    ABSCESS DRAINAGE      COLONOSCOPY      COLOSTOMY      EXAMINATION UNDER ANESTHESIA N/A 11/27/2020    Procedure: Exam under anesthesia and seton placement;  Surgeon: Robe Suarez MD;  Location: [facility] OR [facility] FLR;  Service: Colon and Rectal;  Laterality: N/A;    FLEXIBLE SIGMOIDOSCOPY N/A 7/10/2020    Procedure: SIGMOIDOSCOPY, FLEXIBLE;  Surgeon: Robe Suarez MD;  Location: [facility] OR [facility] FLR;  Service: Colon and Rectal;  Laterality: N/A;    HERNIA REPAIR      ILEOSTOMY      INSERTION OF SETON STITCH N/A 7/10/2020    Procedure: PLACEMENT-SETON DRAIN;  Surgeon: Robe Suarez MD;  [facility];  Service: Colon and Rectal;  Laterality: N/A;    SIGMOIDECTOMY         Review of patient's allergies indicates:   Allergen Reactions    Ibuprofen Other (See Comments)     Can't take d/t stomach ulcers     Current Facility-Administered Medications   Medication Frequency    0.45% NaCl infusion Continuous    ciprofloxacin HCl tablet 500 mg Q12H    dextrose 50% injection 12.5 g PRN    dextrose 50% injection 25 g PRN    glucagon (human recombinant) injection 1 mg PRN    glucose chewable tablet 16 g PRN    glucose chewable tablet 24 g PRN    HYDROmorphone injection 0.5 mg Q6H PRN    insulin aspart U-100 pen 0-5 Units QID (AC + HS) PRN    insulin aspart U-100 pen 12 Units TIDWM    insulin detemir U-100 pen 15 Units BID    melatonin tablet 6 mg Nightly PRN    metroNIDAZOLE tablet 500 mg Q8H    ondansetron injection 4 mg Q6H PRN    oxyCODONE immediate release tablet 5 mg Q6H PRN    promethazine (PHENERGAN) 12.5 mg in dextrose 5 % 50 mL IVPB  Q6H PRN    sodium chloride 0.9% flush 10 mL PRN    sodium chloride 0.9% flush 10 mL PRN     Family History     Problem Relation (Age of Onset)    Diabetes Father, Mother    Hyperlipidemia Mother        Tobacco Use    Smoking status: Current Every Day Smoker     Packs/day: 0.50     Years: 3.00     Pack years: 1.50     Types: Cigarettes     Last attempt to quit: 2014     Years since quittin.0    Smokeless tobacco: Never Used   Substance and Sexual Activity    Alcohol use: Yes     Comment: socially-weekly    Drug use: No    Sexual activity: Yes     Partners: Female     Review of Systems   Constitutional: Negative for chills and fever.   HENT: Negative for congestion and sore throat.    Eyes: Negative for photophobia and visual disturbance.   Respiratory: Negative for cough and shortness of breath.    Cardiovascular: Negative for chest pain and palpitations.   Gastrointestinal: Negative for abdominal pain, diarrhea, nausea and vomiting.   Genitourinary: Positive for scrotal swelling and testicular pain. Negative for dysuria and frequency.   Musculoskeletal: Negative for arthralgias and myalgias.   Skin: Negative for pallor and rash.   Neurological: Negative for dizziness and headaches.   Psychiatric/Behavioral: Negative for agitation and suicidal ideas.     Objective:     Vital Signs (Most Recent):  Temp: 98 °F (36.7 °C) (20 0747)  Pulse: 87 (20 0816)  Resp: 19 (20 0929)  BP: (!) 140/84 (20 0747)  SpO2: 98 % (20 0747)  O2 Device (Oxygen Therapy): room air (20 0420) Vital Signs (24h Range):  Temp:  [98 °F (36.7 °C)-98.8 °F (37.1 °C)] 98 °F (36.7 °C)  Pulse:  [] 87  Resp:  [16-19] 19  SpO2:  [94 %-99 %] 98 %  BP: (120-157)/(64-97) 140/84     Weight: 76.5 kg (168 lb 10.4 oz) (20 1400)  Body mass index is 25.64 kg/m².  Body surface area is 1.92 meters squared.    I/O last 3 completed shifts:  In: 1600 [P.O.:1600]  Out: 2500 [Urine:2500]    Physical Exam  Vitals  signs and nursing note reviewed.   Constitutional:       General: He is not in acute distress.  HENT:      Head: Normocephalic and atraumatic.      Right Ear: External ear normal.      Left Ear: External ear normal.      Nose: Nose normal. No rhinorrhea.      Mouth/Throat:      Mouth: Mucous membranes are moist.      Pharynx: Oropharynx is clear.   Eyes:      Extraocular Movements: Extraocular movements intact.      Pupils: Pupils are equal, round, and reactive to light.   Neck:      Musculoskeletal: Normal range of motion and neck supple.   Cardiovascular:      Rate and Rhythm: Normal rate and regular rhythm.      Pulses: Normal pulses.      Heart sounds: No murmur. No friction rub. No gallop.    Pulmonary:      Effort: Pulmonary effort is normal. No respiratory distress.      Breath sounds: No wheezing.   Abdominal:      General: Bowel sounds are normal. There is no distension.      Palpations: Abdomen is soft.      Tenderness: There is no abdominal tenderness.   Musculoskeletal: Normal range of motion.         General: No deformity.   Skin:     General: Skin is warm and dry.      Capillary Refill: Capillary refill takes less than 2 seconds.   Neurological:      General: No focal deficit present.      Mental Status: He is alert and oriented to person, place, and time.      Cranial Nerves: No cranial nerve deficit.   Psychiatric:         Mood and Affect: Mood normal.         Behavior: Behavior normal.         Significant Labs:  All labs within the past 24 hours have been reviewed.    Significant Imaging:  All imaging studies within the past 24 hours have been reviewed.

## 2020-12-02 NOTE — ASSESSMENT & PLAN NOTE
Brief Hx: 35 y.o. male with a diagnosis of steroid induced DM during childhood that resolved after discontinuation of steroids, Crohn's disease s/p colectomy and ileostomy that was admitted to [facility] for scrotal cellulitis and KEITH.    Fatigue for about 2 weeks.  His symptoms started around November 4th after he received infusion of Entyvio and also ate a very rare steak.    Associated increased urinary frequency as well as polydipsia and an 18 lb weight loss over the past few weeks.  During ED course patient was found to be in DKA w/ glucose 600s, BHB 4.6, PH 7.29, HCO3 13 and AG 27.    Pt was started on IV and Insulin infusion and consulted with endocrinology for assistance in management of DKA in the setting of newly Dx DM    A1c 13% new diagnosis of diabetes  On admission with DKA now resolved    FRANCHESCA-65 negative and detectable C-peptide. Likely type 2 diabetes.    Consulted for: DKA  DM type: Presumed undiagnosed, new onset T2DM   A1C: 13.8*    Glucose Goals: 140-180 mg/dL      Home Regimen:    none    PLAN:     -  Detemir 15 units BID   -  Change to  Aspart 6-12 units TIDWM   -  Low dose correction insulin   -  Accucheck ACHS    If discharged:  Suggest above regimen

## 2020-12-02 NOTE — PROGRESS NOTES
[facility]  Colorectal Surgery  Progress Note    Patient Name: Amando Calix  MRN: [id number]  Admission Date: 11/21/2020  Hospital Length of Stay: 11 days  Attending Physician: Eusebio Berumen MD    Subjective:     Interval History:   No acute events overnight. Pain controlled. Remains on IVF at 100 2/2 KEITH, rise in creatinine yesterday prevented discharge.    Post-Op Info:  Procedure(s) (LRB):  Exam under anesthesia and seton placement (N/A)   5 Days Post-Op      Medications:  Continuous Infusions:    Scheduled Meds:   ciprofloxacin HCl  500 mg Oral Q12H    insulin aspart U-100  12 Units Subcutaneous TIDWM    insulin detemir U-100  15 Units Subcutaneous BID    metroNIDAZOLE  500 mg Oral Q8H     PRN Meds:   dextrose 50%    dextrose 50%    glucagon (human recombinant)    glucose    glucose    HYDROmorphone    insulin aspart U-100    melatonin    ondansetron    oxyCODONE    promethazine (PHENERGAN) IVPB    sodium chloride 0.9%    sodium chloride 0.9%        Objective:     Vital Signs (Most Recent):  Temp: 98 °F (36.7 °C) (12/02/20 0747)  Pulse: 87 (12/02/20 0816)  Resp: 19 (12/02/20 0929)  BP: (!) 140/84 (12/02/20 0747)  SpO2: 98 % (12/02/20 0747) Vital Signs (24h Range):  Temp:  [98 °F (36.7 °C)-98.8 °F (37.1 °C)] 98 °F (36.7 °C)  Pulse:  [] 87  Resp:  [16-19] 19  SpO2:  [94 %-99 %] 98 %  BP: (120-157)/(64-97) 140/84     Intake/Output - Last 3 Shifts       11/30 0700 - 12/01 0659 12/01 0700 - 12/02 0659 12/02 0700 - 12/03 0659    P.O.  1600 222    I.V. (mL/kg)       Total Intake(mL/kg)  1600 (20.9) 222 (2.9)    Urine (mL/kg/hr) 1450 (0.8) 1950 (1.1) 425 (1.5)    Stool   0    Total Output 1450 1950 425    Net -1450 -350 -203           Stool Occurrence   1 x          Physical Exam    Constitutional:       General: He is not in acute distress.     Appearance: Normal appearance. He is normal weight. He is not ill-appearing.   HENT:      Head: Normocephalic and atraumatic.       Nose: Nose normal.      Mouth/Throat:      Mouth: Mucous membranes are moist.      Pharynx: Oropharynx is clear.   Eyes:      Extraocular Movements: Extraocular movements intact.   Cardiovascular:      Rate and Rhythm: Normal rate and regular rhythm.      Heart sounds: No murmur.   Pulmonary:      Effort: Pulmonary effort is normal. No respiratory distress.      Breath sounds: No stridor. No wheezing or rhonchi.   Abdominal:      General: Abdomen is flat. There is no distension.      Palpations: Abdomen is soft.      Tenderness: There is no abdominal tenderness. There is no guarding or rebound.   Genitourinary:     Comments: Previously placed setons noted in perianal region and perineum. New seton noted to traverse from scrotum to mid perineum. Decreased tenderness and erythema. Some seropurulent drainage  Skin:     General: Skin is warm and dry.      Capillary Refill: Capillary refill takes less than 2 seconds.   Neurological:      General: No focal deficit present.      Mental Status: He is oriented to person, place, and time.   Psychiatric:         Mood and Affect: Mood normal.         Behavior: Behavior normal.     Assessment/Plan:     Cellulitis of scrotum  Continue seton drainage of fistula tract and scrotal abscess  Activity as tolerated  Diabetic diet  F/U nephrology recs  Continue current cares per primary  Discharge planning per primary, possibly today pending AM labs  Patient should follow up in clinic with Dr. Suarez two weeks post-discharge  Should go home with two weeks of cipro/flagyl on discharge          Oswald Childers MD  Colorectal Surgery  [facility]

## 2020-12-02 NOTE — SUBJECTIVE & OBJECTIVE
"Interval HPI:   Overnight events: Remains in MTSU. BG variable with scheduled insulin and prn correction scale (). Creatinine 1.5.   Eatin% - 100%   Nausea: No  Hypoglycemia and intervention: No  Fever: No  TPN and/or TF: No    /66 (BP Location: Right arm, Patient Position: Lying)   Pulse 78   Temp 98.1 °F (36.7 °C) (Oral)   Resp 17   Ht 5' 8" (1.727 m)   Wt 76.5 kg (168 lb 10.4 oz)   SpO2 98%   BMI 25.64 kg/m²     Labs Reviewed and Include    Recent Labs   Lab 20  0336      CALCIUM 8.8   ALBUMIN 2.6*   PROT 6.4      K 3.8   CO2 27      BUN 8   CREATININE 1.5*   ALKPHOS 105   ALT 37   AST 19   BILITOT 0.3     Lab Results   Component Value Date    WBC 8.03 2020    HGB 11.3 (L) 2020    HCT 33.7 (L) 2020    MCV 91 2020     2020     No results for input(s): TSH, FREET4 in the last 168 hours.  Lab Results   Component Value Date    HGBA1C 13.8 (H) 2020       Nutritional status:   Body mass index is 25.64 kg/m².  Lab Results   Component Value Date    ALBUMIN 2.6 (L) 2020    ALBUMIN 2.5 (L) 2020    ALBUMIN 2.6 (L) 2020     Lab Results   Component Value Date    PREALBUMIN 34 2015    PREALBUMIN 7 (L) 2014    PREALBUMIN 8 (L) 11/10/2014       Estimated Creatinine Clearance: 67.1 mL/min (A) (based on SCr of 1.5 mg/dL (H)).    Accu-Checks  Recent Labs     20  2137 20  1213 20  1606 20  2150 20  0834 20  1105 20  1647 20  2048 20  0746 20  1206   POCTGLUCOSE 219* 106 81 179* 115* 218* 162* 270* 131* 89       Current Medications and/or Treatments Impacting Glycemic Control  Immunotherapy:    Immunosuppressants     None        Steroids:   Hormones (From admission, onward)    Start     Stop Route Frequency Ordered    20 1406  melatonin tablet 6 mg      -- Oral Nightly PRN 20 1407        Pressors:    Autonomic Drugs (From admission, onward) "    None        Hyperglycemia/Diabetes Medications:   Antihyperglycemics (From admission, onward)    Start     Stop Route Frequency Ordered    12/02/20 1245  insulin aspart U-100 pen 6-12 Units      -- SubQ 3 times daily with meals 12/02/20 1231    11/28/20 1104  insulin aspart U-100 pen 0-5 Units      -- SubQ Before meals & nightly PRN 11/28/20 1004    11/28/20 0900  insulin detemir U-100 pen 15 Units      -- SubQ 2 times daily 11/28/20 0741

## 2020-12-03 LAB
ALBUMIN SERPL BCP-MCNC: 2.7 G/DL (ref 3.5–5.2)
ALP SERPL-CCNC: 92 U/L (ref 55–135)
ALT SERPL W/O P-5'-P-CCNC: 30 U/L (ref 10–44)
ANION GAP SERPL CALC-SCNC: 9 MMOL/L (ref 8–16)
AST SERPL-CCNC: 16 U/L (ref 10–40)
BASOPHILS # BLD AUTO: 0.02 K/UL (ref 0–0.2)
BASOPHILS NFR BLD: 0.2 % (ref 0–1.9)
BILIRUB SERPL-MCNC: 0.3 MG/DL (ref 0.1–1)
BUN SERPL-MCNC: 9 MG/DL (ref 6–20)
CALCIUM SERPL-MCNC: 8.6 MG/DL (ref 8.7–10.5)
CHLORIDE SERPL-SCNC: 102 MMOL/L (ref 95–110)
CK SERPL-CCNC: 51 U/L (ref 20–200)
CO2 SERPL-SCNC: 32 MMOL/L (ref 23–29)
CREAT SERPL-MCNC: 1.5 MG/DL (ref 0.5–1.4)
CREAT UR-MCNC: 39 MG/DL (ref 23–375)
DIFFERENTIAL METHOD: ABNORMAL
EOSINOPHIL # BLD AUTO: 0.1 K/UL (ref 0–0.5)
EOSINOPHIL NFR BLD: 0.9 % (ref 0–8)
ERYTHROCYTE [DISTWIDTH] IN BLOOD BY AUTOMATED COUNT: 13.2 % (ref 11.5–14.5)
EST. GFR  (AFRICAN AMERICAN): >60 ML/MIN/1.73 M^2
EST. GFR  (NON AFRICAN AMERICAN): 59.9 ML/MIN/1.73 M^2
GLUCOSE SERPL-MCNC: 89 MG/DL (ref 70–110)
HCT VFR BLD AUTO: 33.7 % (ref 40–54)
HGB BLD-MCNC: 11.2 G/DL (ref 14–18)
IMM GRANULOCYTES # BLD AUTO: 0.02 K/UL (ref 0–0.04)
IMM GRANULOCYTES NFR BLD AUTO: 0.2 % (ref 0–0.5)
LYMPHOCYTES # BLD AUTO: 2.1 K/UL (ref 1–4.8)
LYMPHOCYTES NFR BLD: 24.3 % (ref 18–48)
MAGNESIUM SERPL-MCNC: 1.9 MG/DL (ref 1.6–2.6)
MCH RBC QN AUTO: 29.6 PG (ref 27–31)
MCHC RBC AUTO-ENTMCNC: 33.2 G/DL (ref 32–36)
MCV RBC AUTO: 89 FL (ref 82–98)
MONOCYTES # BLD AUTO: 1.4 K/UL (ref 0.3–1)
MONOCYTES NFR BLD: 15.7 % (ref 4–15)
NEUTROPHILS # BLD AUTO: 5.1 K/UL (ref 1.8–7.7)
NEUTROPHILS NFR BLD: 58.7 % (ref 38–73)
NRBC BLD-RTO: 0 /100 WBC
PHOSPHATE SERPL-MCNC: 4 MG/DL (ref 2.7–4.5)
PLATELET # BLD AUTO: 251 K/UL (ref 150–350)
PMV BLD AUTO: 12.4 FL (ref 9.2–12.9)
POCT GLUCOSE: 107 MG/DL (ref 70–110)
POCT GLUCOSE: 109 MG/DL (ref 70–110)
POCT GLUCOSE: 182 MG/DL (ref 70–110)
POCT GLUCOSE: 209 MG/DL (ref 70–110)
POCT GLUCOSE: 274 MG/DL (ref 70–110)
POTASSIUM SERPL-SCNC: 3.5 MMOL/L (ref 3.5–5.1)
PROT SERPL-MCNC: 6.5 G/DL (ref 6–8.4)
PROT UR-MCNC: <7 MG/DL (ref 0–15)
PROT/CREAT UR: NORMAL MG/G{CREAT} (ref 0–0.2)
RBC # BLD AUTO: 3.79 M/UL (ref 4.6–6.2)
SODIUM SERPL-SCNC: 143 MMOL/L (ref 136–145)
WBC # BLD AUTO: 8.73 K/UL (ref 3.9–12.7)

## 2020-12-03 PROCEDURE — 80053 COMPREHEN METABOLIC PANEL: CPT

## 2020-12-03 PROCEDURE — 99232 PR SUBSEQUENT HOSPITAL CARE,LEVL II: ICD-10-PCS | Mod: ,,, | Performed by: NURSE PRACTITIONER

## 2020-12-03 PROCEDURE — 82550 ASSAY OF CK (CPK): CPT

## 2020-12-03 PROCEDURE — 82570 ASSAY OF URINE CREATININE: CPT

## 2020-12-03 PROCEDURE — 25000003 PHARM REV CODE 250: Performed by: SURGERY

## 2020-12-03 PROCEDURE — 63600175 PHARM REV CODE 636 W HCPCS: Performed by: SURGERY

## 2020-12-03 PROCEDURE — 36415 COLL VENOUS BLD VENIPUNCTURE: CPT

## 2020-12-03 PROCEDURE — 84100 ASSAY OF PHOSPHORUS: CPT

## 2020-12-03 PROCEDURE — 99223 PR INITIAL HOSPITAL CARE,LEVL III: ICD-10-PCS | Mod: ,,, | Performed by: HOSPITALIST

## 2020-12-03 PROCEDURE — 20600001 HC STEP DOWN PRIVATE ROOM

## 2020-12-03 PROCEDURE — 25000003 PHARM REV CODE 250: Performed by: HOSPITALIST

## 2020-12-03 PROCEDURE — 83735 ASSAY OF MAGNESIUM: CPT

## 2020-12-03 PROCEDURE — 99223 1ST HOSP IP/OBS HIGH 75: CPT | Mod: ,,, | Performed by: HOSPITALIST

## 2020-12-03 PROCEDURE — 85025 COMPLETE CBC W/AUTO DIFF WBC: CPT

## 2020-12-03 PROCEDURE — 99232 SBSQ HOSP IP/OBS MODERATE 35: CPT | Mod: ,,, | Performed by: NURSE PRACTITIONER

## 2020-12-03 RX ORDER — INSULIN ASPART 100 [IU]/ML
4-10 INJECTION, SOLUTION INTRAVENOUS; SUBCUTANEOUS
Status: DISCONTINUED | OUTPATIENT
Start: 2020-12-03 | End: 2020-12-04 | Stop reason: HOSPADM

## 2020-12-03 RX ADMIN — OXYCODONE HYDROCHLORIDE 5 MG: 5 TABLET ORAL at 02:12

## 2020-12-03 RX ADMIN — HYDROMORPHONE HYDROCHLORIDE 0.5 MG: 1 INJECTION, SOLUTION INTRAMUSCULAR; INTRAVENOUS; SUBCUTANEOUS at 12:12

## 2020-12-03 RX ADMIN — SODIUM CHLORIDE 125 ML/HR: 0.45 INJECTION, SOLUTION INTRAVENOUS at 05:12

## 2020-12-03 RX ADMIN — MELATONIN TAB 3 MG 6 MG: 3 TAB at 09:12

## 2020-12-03 RX ADMIN — CIPROFLOXACIN HYDROCHLORIDE 500 MG: 500 TABLET, FILM COATED ORAL at 08:12

## 2020-12-03 RX ADMIN — CIPROFLOXACIN HYDROCHLORIDE 500 MG: 500 TABLET, FILM COATED ORAL at 09:12

## 2020-12-03 RX ADMIN — INSULIN ASPART 1 UNITS: 100 INJECTION, SOLUTION INTRAVENOUS; SUBCUTANEOUS at 09:12

## 2020-12-03 RX ADMIN — METRONIDAZOLE 500 MG: 500 TABLET ORAL at 05:12

## 2020-12-03 RX ADMIN — METRONIDAZOLE 500 MG: 500 TABLET ORAL at 09:12

## 2020-12-03 RX ADMIN — INSULIN ASPART 10 UNITS: 100 INJECTION, SOLUTION INTRAVENOUS; SUBCUTANEOUS at 05:12

## 2020-12-03 RX ADMIN — INSULIN ASPART 4 UNITS: 100 INJECTION, SOLUTION INTRAVENOUS; SUBCUTANEOUS at 09:12

## 2020-12-03 RX ADMIN — SODIUM CHLORIDE 125 ML/HR: 0.45 INJECTION, SOLUTION INTRAVENOUS at 01:12

## 2020-12-03 RX ADMIN — OXYCODONE HYDROCHLORIDE 5 MG: 5 TABLET ORAL at 05:12

## 2020-12-03 RX ADMIN — HYDROMORPHONE HYDROCHLORIDE 0.5 MG: 1 INJECTION, SOLUTION INTRAMUSCULAR; INTRAVENOUS; SUBCUTANEOUS at 05:12

## 2020-12-03 RX ADMIN — INSULIN ASPART 4 UNITS: 100 INJECTION, SOLUTION INTRAVENOUS; SUBCUTANEOUS at 01:12

## 2020-12-03 RX ADMIN — OXYCODONE HYDROCHLORIDE 5 MG: 5 TABLET ORAL at 08:12

## 2020-12-03 RX ADMIN — METRONIDAZOLE 500 MG: 500 TABLET ORAL at 02:12

## 2020-12-03 RX ADMIN — HYDROMORPHONE HYDROCHLORIDE 0.5 MG: 1 INJECTION, SOLUTION INTRAMUSCULAR; INTRAVENOUS; SUBCUTANEOUS at 08:12

## 2020-12-03 NOTE — PLAN OF CARE
POC reviewed with pt. Lacie. VSS. BG checked before meals. Insulin given per MAR. Pt medicated for pain with IV and PO pain medication. Pt performed teach back on how to use glucometer to check blood sugar and insulin administration. Safety checks performed. Call light and bedside table placed within reach. [facility].         Problem: Adult Inpatient Plan of Care  Goal: Plan of Care Review  Outcome: Ongoing, Progressing     Problem: Adult Inpatient Plan of Care  Goal: Optimal Comfort and Wellbeing  Outcome: Ongoing, Progressing

## 2020-12-03 NOTE — CONSULTS
"  Nutrition consult received stating "Diabetes".  Pt educated on Diabetic diet 11/22 & 11/30; please see education tab for details.    Thanks!  NIESHA Cornell, LDN  "

## 2020-12-03 NOTE — ASSESSMENT & PLAN NOTE
sCr increased to 1.6 on 11/29 (BL 0.8). Initially downtrended w IV fluids, but increased again, to sCr 1.8 on 12/1. IVF were held at that time as pt lost IV access. However, pt reports good PO intake. 1950 UOP in 24hr prior to consult. Matias 67, uUN 113. Retroperitoneal u/s wnl.    sCr 1.5 on morning of consult. DDx includes AIN (recent initiation of new abx) vs pre-renal.   CK 51 wnl. U/A notable for trace leuks.     -- more likely AIN given no improvement on IVF   May require different abx   Cipro known cause of AIN, started on 11/28 w KEITH on 11/29  -- continue 0.45% saline @ 125cc/hr for 24hr  -- f/u urine microscopy, uPCr  -- renal diet  -- avoid nephrotoxic agents  -- renally dose medications

## 2020-12-03 NOTE — SUBJECTIVE & OBJECTIVE
Interval History: Cr stable at 1.5 despite IVF. 2650cc UOP over past 24hr.     Review of patient's allergies indicates:   Allergen Reactions    Ibuprofen Other (See Comments)     Can't take d/t stomach ulcers     Current Facility-Administered Medications   Medication Frequency    0.45% NaCl infusion Continuous    ciprofloxacin HCl tablet 500 mg Q12H    dextrose 50% injection 12.5 g PRN    dextrose 50% injection 25 g PRN    glucagon (human recombinant) injection 1 mg PRN    glucose chewable tablet 16 g PRN    glucose chewable tablet 24 g PRN    HYDROmorphone injection 0.5 mg Q6H PRN    insulin aspart U-100 pen 0-5 Units QID (AC + HS) PRN    insulin aspart U-100 pen 4-10 Units TIDWM    [START ON 12/4/2020] insulin detemir U-100 pen 26 Units Daily    melatonin tablet 6 mg Nightly PRN    metroNIDAZOLE tablet 500 mg Q8H    ondansetron injection 4 mg Q6H PRN    oxyCODONE immediate release tablet 5 mg Q6H PRN    promethazine (PHENERGAN) 12.5 mg in dextrose 5 % 50 mL IVPB Q6H PRN    sodium chloride 0.9% flush 10 mL PRN    sodium chloride 0.9% flush 10 mL PRN       Objective:     Vital Signs (Most Recent):  Temp: 98 °F (36.7 °C) (12/03/20 1143)  Pulse: 74 (12/03/20 1143)  Resp: 17 (12/03/20 1219)  BP: (!) 164/93 (12/03/20 1143)  SpO2: 99 % (12/03/20 1143)  O2 Device (Oxygen Therapy): room air (12/02/20 1603) Vital Signs (24h Range):  Temp:  [97.5 °F (36.4 °C)-98.6 °F (37 °C)] 98 °F (36.7 °C)  Pulse:  [68-89] 74  Resp:  [12-19] 17  SpO2:  [96 %-99 %] 99 %  BP: (139-164)/(83-97) 164/93     Weight: 76.5 kg (168 lb 10.4 oz) (11/30/20 1400)  Body mass index is 25.64 kg/m².  Body surface area is 1.92 meters squared.    I/O last 3 completed shifts:  In: 4894.8 [P.O.:2926; I.V.:1968.8]  Out: 3400 [Urine:3400]    Physical Exam  Vitals signs and nursing note reviewed.   Constitutional:       General: He is not in acute distress.  HENT:      Head: Normocephalic and atraumatic.      Right Ear: External ear normal.       Left Ear: External ear normal.      Nose: Nose normal. No rhinorrhea.      Mouth/Throat:      Mouth: Mucous membranes are moist.      Pharynx: Oropharynx is clear.   Eyes:      Extraocular Movements: Extraocular movements intact.      Pupils: Pupils are equal, round, and reactive to light.   Neck:      Musculoskeletal: Normal range of motion and neck supple.   Cardiovascular:      Rate and Rhythm: Normal rate and regular rhythm.      Pulses: Normal pulses.      Heart sounds: No murmur. No friction rub. No gallop.    Pulmonary:      Effort: Pulmonary effort is normal. No respiratory distress.      Breath sounds: No wheezing.   Abdominal:      General: Bowel sounds are normal. There is no distension.      Palpations: Abdomen is soft.      Tenderness: There is no abdominal tenderness.   Musculoskeletal: Normal range of motion.         General: No deformity.   Skin:     General: Skin is warm and dry.      Capillary Refill: Capillary refill takes less than 2 seconds.   Neurological:      General: No focal deficit present.      Mental Status: He is alert and oriented to person, place, and time.      Cranial Nerves: No cranial nerve deficit.   Psychiatric:         Mood and Affect: Mood normal.         Behavior: Behavior normal.         Significant Labs:  All labs within the past 24 hours have been reviewed.     Significant Imaging:  All imaging studies within the past 24 hours have been reviewed.

## 2020-12-03 NOTE — SUBJECTIVE & OBJECTIVE
"Interval HPI:   Overnight events: Remains in MTSU. NAEON. BG at or below goal with scheduled insulin. PO intake decreased some yesterday- does not always care for the food. Reports PO intake will be better at home.   Eatin%  Nausea: No  Hypoglycemia and intervention: No  Fever: No  TPN and/or TF: No      BP (!) 164/93 (BP Location: Right arm, Patient Position: Lying)   Pulse 74   Temp 98 °F (36.7 °C) (Oral)   Resp 17   Ht 5' 8" (1.727 m)   Wt 76.5 kg (168 lb 10.4 oz)   SpO2 99%   BMI 25.64 kg/m²     Labs Reviewed and Include    Recent Labs   Lab 20  0304   GLU 89   CALCIUM 8.6*   ALBUMIN 2.7*   PROT 6.5      K 3.5   CO2 32*      BUN 9   CREATININE 1.5*   ALKPHOS 92   ALT 30   AST 16   BILITOT 0.3     Lab Results   Component Value Date    WBC 8.73 2020    HGB 11.2 (L) 2020    HCT 33.7 (L) 2020    MCV 89 2020     2020     No results for input(s): TSH, FREET4 in the last 168 hours.  Lab Results   Component Value Date    HGBA1C 13.8 (H) 2020       Nutritional status:   Body mass index is 25.64 kg/m².  Lab Results   Component Value Date    ALBUMIN 2.7 (L) 2020    ALBUMIN 2.6 (L) 2020    ALBUMIN 2.5 (L) 2020     Lab Results   Component Value Date    PREALBUMIN 34 2015    PREALBUMIN 7 (L) 2014    PREALBUMIN 8 (L) 11/10/2014       Estimated Creatinine Clearance: 67.1 mL/min (A) (based on SCr of 1.5 mg/dL (H)).    Accu-Checks  Recent Labs     20  2150 20  0834 20  1105 20  1647 20  2048 20  0746 20  1206 20  1634 20  2146 20  0852   POCTGLUCOSE 179* 115* 218* 162* 270* 131* 89 174* 107 109       Current Medications and/or Treatments Impacting Glycemic Control  Immunotherapy:    Immunosuppressants     None        Steroids:   Hormones (From admission, onward)    Start     Stop Route Frequency Ordered    20 1406  melatonin tablet 6 mg      -- Oral Nightly PRN " 11/21/20 1407        Pressors:    Autonomic Drugs (From admission, onward)    None        Hyperglycemia/Diabetes Medications:   Antihyperglycemics (From admission, onward)    Start     Stop Route Frequency Ordered    12/03/20 0900  insulin detemir U-100 pen 12 Units      -- SubQ 2 times daily 12/03/20 0812    12/03/20 0815  insulin aspart U-100 pen 4-10 Units      -- SubQ 3 times daily with meals 12/03/20 0812    11/28/20 1104  insulin aspart U-100 pen 0-5 Units      -- SubQ Before meals & nightly PRN 11/28/20 1004

## 2020-12-03 NOTE — ASSESSMENT & PLAN NOTE
Brief Hx: 35 y.o. male with a diagnosis of steroid induced DM during childhood that resolved after discontinuation of steroids, Crohn's disease s/p colectomy and ileostomy that was admitted to [facility] for scrotal cellulitis and KEITH.    Fatigue for about 2 weeks.  His symptoms started around November 4th after he received infusion of Entyvio and also ate a very rare steak.    Associated increased urinary frequency as well as polydipsia and an 18 lb weight loss over the past few weeks.  During ED course patient was found to be in DKA w/ glucose 600s, BHB 4.6, PH 7.29, HCO3 13 and AG 27.    Pt was started on IV and Insulin infusion and consulted with endocrinology for assistance in management of DKA in the setting of newly Dx DM    A1c 13% new diagnosis of diabetes  On admission with DKA now resolved    FRANCHESCA-65 negative and detectable C-peptide. Likely type 2 diabetes.    Consulted for: DKA  DM type: Presumed undiagnosed, new onset T2DM   A1C: 13.8*    Glucose Goals: 140-180 mg/dL      Home Regimen:    none    PLAN:     - change to Levemir 24 units daily.    -  Change to  Aspart 4-10 units TIDWM; PO intake decreased - does not always care for food    -  Low dose correction insulin   -  Accucheck ACHS    Discharge: Levemir 26 units daily. novolog 10 units with meals. BG monitoring ac/hs and low dose correction scale. BG logs in 1 week. Follow up outpatient.

## 2020-12-03 NOTE — PROGRESS NOTES
Progress Note  Hospital Medicine    Admit Date: 11/21/2020  Length of Stay:  LOS: 12 days     SUBJECTIVE:         Follow-up For:  Type 2 diabetes mellitus with hyperglycemia    HPI/Interval history (See H&P for complete P,F,SHx) :     Mr. Calix is a 35 year old gentleman with PMH of Crohn's colitis (on Entyvio) s/p colectomy with end ileostomy with significant anorectal fistulizing disease s/p placement of multiple setons, h/o c diff infection, anemia and h/o steroid induced diabetes presents for fatigue for about 2 weeks.  His symptoms started around November 4th after he received infusion of Entyvio and also ate a very rare steak.  He reports associated increased urinary frequency as well as polydipsia and an 18 lb weight loss over the past few weeks.  States that he has a good appetite has been eating well, he denies any abdominal pain bloody stool from his ostomy, diarrhea, nausea/vomiting, fevers/chills, hematuria, dysuria, flank pain, chest pain, shortness of breath, cough or sore throat.       Interval History:   11/25 Patient lying in bed, no acute distress. Reports tolerating diet and fatigue has been improving. Continues to note scrotal swelling has unchanged. Increased scrotal pain after I&D.   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 Urology updated , findings likely from bedside debridement. no surgical intervention.  EUA with CRS tomorrow to assess for possible fistula tract between previous fistulas and scrotal abscess. started on clindamycin IV  11/27 EUA today -identification of fistula tract from previously known fistula to base of scrotum - seton placed.  Discontinued clindamycin  11/28 s/p EUA, seton placement to perineum/scrotum .  .transitioned to Cipro/Flagylx 14 days,    cultures NGTD,  RPR + , FTA abs pending  11/29 sodium 147 . Cr 1.6 . started on 1/2 NS @100ml   11/30 sodium 143 . Cr 1.5 continue 1/2 NS @100ml   12/1  sodium 144 . Cr 1.3 continue 1/2 NS @100ml .  "repeat renal panel in PM. does not have insurance. case management working regarding discharge medications  12/2 loss of IV access overnight. nephrology consulted.  AIN vs KEITH ? wrights stain negative . renal sonogram -No evidence of hydronephrosis or other significant sonographic abnormality. PICC team consulted. sarted on 1/NS at 125ml/h       Review of Systems:   Pain scale: Constitutional: Positive for fatigue. Negative for chills and fever.   HENT: Negative for congestion.    Eyes: Negative for visual disturbance.   Respiratory: Negative for cough and shortness of breath.    Cardiovascular: Negative for chest pain and leg swelling.   Gastrointestinal: Negative for abdominal distention, abdominal pain, nausea and vomiting.   Genitourinary: Positive for scrotal swelling. Negative for dysuria.   Neurological: Negative for dizziness, weakness and numbness.   Psychiatric/Behavioral: Negative for confusion.     OBJECTIVE:     Vital Signs Range (Last 24H):  Temp:  [97.5 °F (36.4 °C)-98.6 °F (37 °C)]   Pulse:  [68-89]   Resp:  [12-19]   BP: (139-164)/(83-97)   SpO2:  [96 %-99 %]     Physical Exam:  Constitutional: Appears well-developed and well-nourished.   Head: Normocephalic and atraumatic.   Neck: Normal range of motion. Neck supple.   Cardiovascular: Normal heart rate.  Regular heart rhythm.  Pulmonary/Chest: Effort normal.   Abdominal: + tenderness.  ileostomy  Musculoskeletal: Normal range of motion. No edema.    -Scrotal swellingand erythema improved  Neurological: Alert and oriented to person, place, and time.   Skin: Skin is warm and dry.   Psychiatric: Normal mood and affect. Behavior is normal.         Estimated body mass index is 25.64 kg/m² as calculated from the following:    Height as of this encounter: 5' 8" (1.727 m).    Weight as of this encounter: 76.5 kg (168 lb 10.4 oz).    I & O (Last 24H):    Intake/Output Summary (Last 24 hours) at 12/3/2020 1447  Last data filed at 12/3/2020 1200  Gross per " 24 hour   Intake 3150.75 ml   Output 2650 ml   Net 500.75 ml       Body mass index is 25.64 kg/m².    Estimated Creatinine Clearance: 67.1 mL/min (A) (based on SCr of 1.5 mg/dL (H)).        Laboratory/Diagnostic Data:    Imaging Results          CT Abdomen Pelvis With Contrast (Final result)  Result time 11/21/20 12:07:12    Final result by Nabil Cooper Jr., MD (11/21/20 12:07:12)                 Impression:      Right perianal fistulous tract extending towards the scrotum with interval placement of a seton, noting the fistulous tract is similar to prior CT 02/09/2020.  There is a slightly more cephalad fistulous track in relation to the seton again noted.  No abnormal fluid collection to suggest an abscess.    Marked scrotal skin thickening.  Correlate for cellulitis.    Stable postoperative changes of a total colectomy and right lower quadrant end ileostomy in this patient with reported history of Crohn's disease.    Electronically signed by resident: Rosio Freeman  Date:    11/21/2020  Time:    11:30    Electronically signed by: Nabil Cooper MD  Date:    11/21/2020  Time:    12:07             Narrative:    EXAMINATION:  CT ABDOMEN PELVIS WITH CONTRAST    CLINICAL HISTORY:  Abdominal pain, fever;Low abd pain and testicular pain/ swelling.  Patient with Crohn's disease, anal fistula with seat on and scrotal stent.  Concern for scrotal cellulitis/abscess/fistula;    TECHNIQUE:  Low dose axial images were obtained from the lung bases through the proximal thighs following the intravenous administration of 75 cc of Omnipaque 350.  Sagittal and coronal reformats were provided.    COMPARISON:  CT pelvis 02/09/2020    FINDINGS:  Heart: Normal in size.  No pericardial effusion.    Lung bases: Symmetrically expanded.  No consolidation, pleural effusion, mass, or pneumothorax.    Liver: Normal in size and attenuation without focal hepatic abnormality.    Gallbladder: Normal in appearance without evidence for  cholecystitis.    Bile Ducts: No intra or extrahepatic biliary ductal dilation.    Pancreas: No pancreatic mass lesion or peripancreatic inflammatory change.    Spleen: Unremarkable.    Adrenals: Unremarkable.    Kidneys/ Ureters: Normal in size and location.  Kidneys enhance normally.  No focal renal abnormality or nephrolithiasis.  No hydroureteronephrosis.    Bladder: Smooth contours without bladder wall thickening.    Reproductive organs: Unremarkable.    GI Tract/Mesentery: The stomach is unremarkable.  Postoperative changes of a total colectomy and right lower quadrant end ileostomy.  Fatty deposition within the wall with a few loops of small bowel in the pelvis, favored to reflect chronic inflammation.    Peritoneal Space: No intraperitoneal free fluid or free air. No pathologically enlarged lymph nodes.    Retroperitoneum: No significant adenopathy.    Abdominal wall/extraperitoneal soft tissues: Soft tissue thickening again seen extending from the right perianal region along the inner right medial gluteal fold into the perineum and right scrotum with interval placement of a Seton in this patient with known perianal fistula, similar in appearance to prior CT 02/09/2020.  Increased soft tissue and skin induration within the scrotum, new from prior CT 02/09/2020.  No peripherally enhancing fluid collections to suggest an abscess.    Vasculature: Abdominal aorta is normal in caliber, contour, and course without significant calcific atherosclerosis.    Bones: Unremarkable without acute fracture or bone destructive process.                               X-Ray Chest PA And Lateral (Final result)  Result time 11/21/20 10:29:32    Final result by Randy Amador DO (11/21/20 10:29:32)                 Impression:      No acute abnormality.      Electronically signed by: Randy Amador  Date:    11/21/2020  Time:    10:29             Narrative:    EXAMINATION:  XR CHEST PA AND LATERAL    CLINICAL HISTORY:  Other  fatigue.    TECHNIQUE:  PA and lateral views of the chest were performed.    COMPARISON:  Multiple prior radiographs of the chest, most recent from 04/01/2016.    FINDINGS:  The lungs are well expanded and clear. No focal opacities are seen. The pleural spaces are clear.  The cardiac silhouette is unremarkable.  The visualized osseous structures are unremarkable.                                  Reviewed and noted in plan where applicable- Please see chart for full lab data.    Recent Labs   Lab 12/01/20  0504 12/02/20 0336 12/03/20  0304   WBC 9.11 8.03 8.73   HGB 11.2* 11.3* 11.2*   HCT 33.9* 33.7* 33.7*    244 251       Recent Labs   Lab 12/01/20  0504 12/01/20  1519 12/02/20  0336 12/03/20  0304    140 144 143   K 3.6 4.6 3.8 3.5    103 105 102   CO2 28 29 27 32*   BUN 5* 8 8 9   CREATININE 1.3 1.8* 1.5* 1.5*   GLU 84 207* 108 89   CALCIUM 8.6* 8.9 8.8 8.6*   MG 1.5*  --  2.1 1.9   PHOS 3.6 3.3 3.6 4.0       Recent Labs   Lab 12/01/20  0504 12/01/20  1519 12/02/20  0336 12/03/20  0304   ALKPHOS 116  --  105 92   ALT 41  --  37 30   AST 24  --  19 16   ALBUMIN 2.6* 2.5* 2.6* 2.7*   PROT 6.5  --  6.4 6.5   BILITOT 0.4  --  0.3 0.3        Microbiology labs for the last week  Microbiology Results (last 7 days)     Procedure Component Value Units Date/Time    Culture, Anaerobe [[id number]]  (Abnormal) Collected: 11/25/20 1540    Order Status: Completed Specimen: Abscess from Groin Updated: 11/30/20 1355     Anaerobic Culture PORPHYROMONAS SOMERAE  Few        PREVOTELLA BERGENSIS  Few      Fungus culture [[id number]] Collected: 11/25/20 1540    Order Status: Completed Specimen: Abscess from Groin Updated: 11/30/20 0945     Fungus (Mycology) Culture Culture in progress    Aerobic culture [[id number]] Collected: 11/25/20 1540    Order Status: Completed Specimen: Abscess from Groin Updated: 11/28/20 1011     Aerobic Bacterial Culture No growth           Medications:  Medication list was reviewed and  changes noted under Assessment/Plan.        Current Facility-Administered Medications:     0.45% NaCl infusion, , Intravenous, Continuous, Eusebio Berumen MD, Last Rate: 125 mL/hr at 12/03/20 1344, 125 mL/hr at 12/03/20 1344    ciprofloxacin HCl tablet 500 mg, 500 mg, Oral, Q12H, Margy Askew MD, 500 mg at 12/03/20 0855    dextrose 50% injection 12.5 g, 12.5 g, Intravenous, PRN, Cristy Delgadillo NP    dextrose 50% injection 25 g, 25 g, Intravenous, PRN, Cristy Delgadillo NP    glucagon (human recombinant) injection 1 mg, 1 mg, Intramuscular, PRN, Cristy Delgadillo NP    glucose chewable tablet 16 g, 16 g, Oral, PRN, Cristy Delgadillo NP    glucose chewable tablet 24 g, 24 g, Oral, PRN, Cristy Delgadillo NP    HYDROmorphone injection 0.5 mg, 0.5 mg, Intravenous, Q6H PRN, Yogi Thomas MD, 0.5 mg at 12/03/20 1219    insulin aspart U-100 pen 0-5 Units, 0-5 Units, Subcutaneous, QID (AC + HS) PRN, Pastor Cochran MD, 1 Units at 12/01/20 2049    insulin aspart U-100 pen 4-10 Units, 4-10 Units, Subcutaneous, TIDWM, Cristy Delgadillo NP, 4 Units at 12/03/20 1310    [START ON 12/4/2020] insulin detemir U-100 pen 26 Units, 26 Units, Subcutaneous, Daily, Margy Askew MD    melatonin tablet 6 mg, 6 mg, Oral, Nightly PRN, Yogi Thomas MD, 6 mg at 12/02/20 2149    metroNIDAZOLE tablet 500 mg, 500 mg, Oral, Q8H, Margy Askew MD, 500 mg at 12/03/20 0537    ondansetron injection 4 mg, 4 mg, Intravenous, Q6H PRN, Yogi Thomas MD    oxyCODONE immediate release tablet 5 mg, 5 mg, Oral, Q6H PRN, Yogi Thomas MD, 5 mg at 12/03/20 0855    promethazine (PHENERGAN) 12.5 mg in dextrose 5 % 50 mL IVPB, 12.5 mg, Intravenous, Q6H PRN, Yogi Thomas MD    sodium chloride 0.9% flush 10 mL, 10 mL, Intravenous, PRN, Yogi Thomas MD    sodium chloride 0.9% flush 10 mL, 10 mL, Intravenous, PRN, Yogi Thomas MD    Estimated Creatinine Clearance: 67.1 mL/min (A) (based on SCr of 1.5 mg/dL  (H)).    ASSESSMENT/PLAN:     Active Problems:     DKA- resolved   H/o steroid induced DM  - DKA likely triggered by scrotal cellulitis   - HA1c: 13.8  - A --> 13  - betahydroxybutyrate: 4.6  - BG on admit, 615 --> 425  - VBG on admit, pH: 7.29/38  - s/p albuterol, insulin/dextrose in the ED  - DKA pathway initiated     - CLD (no sugar)  - continue IVF  - clear liquid diet (no sugar)  - endocrinology consulted. apprec recs   -- s/p transitional insulin gtt  -- Start Levemir 26 U daily  -- Start Novolog 10 TID  -- Low dose correction insulin   -- FS qAC/HS/AM   -- diabetic diet    Patient's FSGs are not controlled on current hypoglycemics.   Last A1c reviewed-   Lab Results   Component Value Date    HGBA1C 13.8 (H) 2020    HGBA1C 13.3 (H) 2020    HGBA1C 6.0 2011     Most recent fingerstick glucose reviewed-   Recent Labs   Lab 20  1634 20  2146 20  0852   POCTGLUCOSE 174* 107 109   FRANCHESCA-ab negative and detectable C-peptide. Likely type 2 diabetes.       Crohn colitis  S/p colectomy with end ileostomy  Anorectal fistulizing disease s/p placement of multiple setons  - follows with general surgery (Dr. Blackbunr). Last visit on 10/5/2020  - follows with CRS (Dr. Suarez). Last visit 8/15/2020  - Given extent of anorectal disease with fistulae and stenosis, ileostomy will be permanent. Appears not to be a candidate for an attempt at restoring intestinal continuity given the extent of his anal disease, and I suspect he will ultimately require a completion proctectomy Discussed completion proctectomy [facility] patient - he is not ready to proceed at this point.  - ESR: 39, CRP: 35  - continue home Entyvio   GI consulted - no medication changes recommended at this time     Hypomagnesemia - Patient with Magnesium   Recent Labs   Lab 20  0504 20  0336 20  0304   MG 1.5* 2.1 1.9    . replaced. monitor    KEITH  baseline cr 0.8   Patient with  acute kidney injury.Serum  BUN/Cr stable mild downtrend  Strict I/O monitor .   Recent Labs   Lab 12/01/20  1519 12/02/20  0336 12/03/20  0304   BUN 8 8 9   CREATININE 1.8* 1.5* 1.5*   started on RL hydration  11/29 started on 1/2 NS @100ml   11/30. Cr 1.5 continue 1/2 NS @100ml   12/1continue 1/2 NS @100ml . repeat renal panel in PM  12/2 loss of IV access overnight. nephrology consulted. wrights stain negative . renal sonogram -No evidence of hydronephrosis or other significant sonographic abnormality.  AIN vs KEITH ? PICC team consulted. sarted on 1/NS at 125ml/h   12/3: stable              Hypernatremia - Patient with sodium   Recent Labs   Lab 12/01/20  1519 12/02/20  0336 12/03/20  0304    144 143   . sodium trending up  . 11/29 . started on 1/2 NS @100ml        Scrotal cellulitis   - elevated ESR and CRP  - CT A/P with contrast  (11/21) showed 'Right perianal fistulous tract extending towards the scrotum with interval placement of a seton, noting the fistulous tract is similar to prior CT 02/09/2020. No abnormal fluid collection to suggest an abscess. Marked scrotal skin thickening. Correlate for cellulitis'  - followup bcx   - ID consulted. apprec recs  -- escalated from clindamycin to vancomycin and zosyn   - Urology consulted. apprec recs   -- s/p bedside I&D and packing on 11/25. followup I&D cultures  -- scrotal support, ice packs  -- can't give NSAIDs due to h/o ulcers  - CRS consulted. 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 Urology updated , findings likely from bedside debridement. no surgical intervention.  EUA with CRS tomorrow to assess for possible fistula tract between previous fistulas and scrotal abscess. started on clindamycin IV  11/27 EUA today -identification of fistula tract from previously known fistula to base of scrotum - seton placed.  Discontinued clindamycin.  Monitor for vancomycin toxicity  11/28 s/p EUA, seton placement to perineum/scrotum .transitioned  to Cipro/Flagylx 14 days, cultures NGTD,        Anemia   Patient's with Normocyticanemia.. Hemoglobin stable. Etiology likely due to chronic disease .  Current CBC reviewed-    Recent Labs   Lab 12/01/20  0504 12/02/20  0336 12/03/20  0304   HGB 11.2* 11.3* 11.2*     Monitor serial CBC and transfuse if H/H drops below 7/21.       Component Value Date/Time    MCV 89 12/03/2020 0304    RDW 13.2 12/03/2020 0304    IRON 21 (L) 09/28/2010 1531    FERRITIN 23 03/11/2015 1725    FOLATE 8.3 12/29/2012 0614    HTEVFATP42 884 12/29/2012 0614    OCCULTBLOOD Positive (A) 09/29/2014 2245        Protein calorie malnutrition  - Boost TID when DKA resolves        RPR + , FTA abs negative. RPR likely false +    Disposition- Home    Discharge Planning   MIGUEL: 12/7/2020     Code Status: Full Code   Is the patient medically ready for discharge?: No    Reason for patient still in hospital (select all that apply): Treatment  Discharge Plan A: Home with family   Discharge Delays: None known at this time     DVT prophylaxis addressed with:  SCDs.          Subsequent Hospital Care   Level 2 68116 Total visit time was 25 minutes or greater with greater than 50% of time spent in counseling and coordination of care.       We discussed in detail the plan of care, the patient's response to treatment, the discharge plan,.  Total time includes time spent reviewing the medical record, examining the patient, writing notes and communicating with other professional    Margy Askew MD  Attending Staff Physician  [facility]

## 2020-12-03 NOTE — PLAN OF CARE
Patient independent. Changed ostomy appliance today. PRN pain medication administered with mild to moderate relief. IV fluids restarted 1/2 NS @ 125cc/hr. Patient received education on CBG checking and insulin administration. VS stable. Plan of care reviewed with patient and questions answered.

## 2020-12-03 NOTE — PROGRESS NOTES
[facility]  Nephrology  Progress Note    Patient Name: Amando Calix  MRN: [id number]  Admission Date: 11/21/2020  Hospital Length of Stay: 12 days  Attending Provider: Margy Askew MD   Primary Care Physician: Celestino Wright MD  Principal Problem:Type 2 diabetes mellitus with hyperglycemia    Subjective:     HPI: Mr. Calix is a 34yo M with PMH of Crohn's colitis (on Entyvio) s/p colectomy with end ileostomy with significant anorectal fistulizing disease s/p placement of multiple setons, h/o c diff infection, anemia and h/o steroid induced diabetes; presents for fatigue for about 2 weeks.  His symptoms started around November 4th after he received infusion of Entyvio and also ate a very rare steak.  He reports associated increased urinary frequency as well as polydipsia and an 18 lb weight loss over the past few weeks.  States that he has a good appetite has been eating well, he denies any abdominal pain bloody stool from his ostomy, diarrhea, nausea/vomiting, fevers/chills, hematuria, dysuria, flank pain, chest pain, shortness of breath, cough or sore throat. Noted scrotal swelling and pain at this time.     Further w/u revealed pt to be in DKA; was placed on insulin gtt until 11/24, then transitioned to basal-bolus regimen. As well, scrotal cellulitis noted to be 2/2 extension of known fistula. I+D, EUA by CRS, and eventually seton placement performed. Of further note, sCr increased to 1.6 on 11/29 (BL 0.8). Initially downtrended w IV fluids, but increased again, to sCr 1.8 on 12/1. IVF were held at that time as pt lost IV access. Nephrology consulted for eval/mgmt of KEITH. Upon date of consult, pt is now resting comfortably. Notes some residual discomfort but without other acute complaint. Reports appropriate PO intake over this period.     Interval History: Cr stable at 1.5 despite IVF. 2650cc UOP over past 24hr.     Review of patient's allergies indicates:   Allergen Reactions     Ibuprofen Other (See Comments)     Can't take d/t stomach ulcers     Current Facility-Administered Medications   Medication Frequency    0.45% NaCl infusion Continuous    ciprofloxacin HCl tablet 500 mg Q12H    dextrose 50% injection 12.5 g PRN    dextrose 50% injection 25 g PRN    glucagon (human recombinant) injection 1 mg PRN    glucose chewable tablet 16 g PRN    glucose chewable tablet 24 g PRN    HYDROmorphone injection 0.5 mg Q6H PRN    insulin aspart U-100 pen 0-5 Units QID (AC + HS) PRN    insulin aspart U-100 pen 4-10 Units TIDWM    [START ON 12/4/2020] insulin detemir U-100 pen 26 Units Daily    melatonin tablet 6 mg Nightly PRN    metroNIDAZOLE tablet 500 mg Q8H    ondansetron injection 4 mg Q6H PRN    oxyCODONE immediate release tablet 5 mg Q6H PRN    promethazine (PHENERGAN) 12.5 mg in dextrose 5 % 50 mL IVPB Q6H PRN    sodium chloride 0.9% flush 10 mL PRN    sodium chloride 0.9% flush 10 mL PRN       Objective:     Vital Signs (Most Recent):  Temp: 98 °F (36.7 °C) (12/03/20 1143)  Pulse: 74 (12/03/20 1143)  Resp: 17 (12/03/20 1219)  BP: (!) 164/93 (12/03/20 1143)  SpO2: 99 % (12/03/20 1143)  O2 Device (Oxygen Therapy): room air (12/02/20 1603) Vital Signs (24h Range):  Temp:  [97.5 °F (36.4 °C)-98.6 °F (37 °C)] 98 °F (36.7 °C)  Pulse:  [68-89] 74  Resp:  [12-19] 17  SpO2:  [96 %-99 %] 99 %  BP: (139-164)/(83-97) 164/93     Weight: 76.5 kg (168 lb 10.4 oz) (11/30/20 1400)  Body mass index is 25.64 kg/m².  Body surface area is 1.92 meters squared.    I/O last 3 completed shifts:  In: 4894.8 [P.O.:2926; I.V.:1968.8]  Out: 3400 [Urine:3400]    Physical Exam  Vitals signs and nursing note reviewed.   Constitutional:       General: He is not in acute distress.  HENT:      Head: Normocephalic and atraumatic.      Right Ear: External ear normal.      Left Ear: External ear normal.      Nose: Nose normal. No rhinorrhea.      Mouth/Throat:      Mouth: Mucous membranes are moist.       Pharynx: Oropharynx is clear.   Eyes:      Extraocular Movements: Extraocular movements intact.      Pupils: Pupils are equal, round, and reactive to light.   Neck:      Musculoskeletal: Normal range of motion and neck supple.   Cardiovascular:      Rate and Rhythm: Normal rate and regular rhythm.      Pulses: Normal pulses.      Heart sounds: No murmur. No friction rub. No gallop.    Pulmonary:      Effort: Pulmonary effort is normal. No respiratory distress.      Breath sounds: No wheezing.   Abdominal:      General: Bowel sounds are normal. There is no distension.      Palpations: Abdomen is soft.      Tenderness: There is no abdominal tenderness.   Musculoskeletal: Normal range of motion.         General: No deformity.   Skin:     General: Skin is warm and dry.      Capillary Refill: Capillary refill takes less than 2 seconds.   Neurological:      General: No focal deficit present.      Mental Status: He is alert and oriented to person, place, and time.      Cranial Nerves: No cranial nerve deficit.   Psychiatric:         Mood and Affect: Mood normal.         Behavior: Behavior normal.         Significant Labs:  All labs within the past 24 hours have been reviewed.     Significant Imaging:  All imaging studies within the past 24 hours have been reviewed.     Assessment/Plan:     KEITH (acute kidney injury)  sCr increased to 1.6 on 11/29 (BL 0.8). Initially downtrended w IV fluids, but increased again, to sCr 1.8 on 12/1. IVF were held at that time as pt lost IV access. However, pt reports good PO intake. 1950 UOP in 24hr prior to consult. Matias 67, uUN 113. Retroperitoneal u/s wnl.    sCr 1.5 on morning of consult. DDx includes AIN (recent initiation of new abx) vs pre-renal.   CK 51 wnl. U/A notable for trace leuks.     -- more likely AIN given no improvement on IVF   May require different abx   Cipro known cause of AIN, started on 11/28 w KEITH on 11/29  -- continue 0.45% saline @ 125cc/hr for 24hr  -- f/u urine  microscopy, uPCr  -- renal diet  -- avoid nephrotoxic agents  -- renally dose medications        Thank you for your consult. I will follow-up with patient. Please contact us if you have any additional questions.    Masoud Feliz MD PGY1  Nephrology  [facility]

## 2020-12-03 NOTE — PROGRESS NOTES
"[facility]  Endocrinology  Progress Note    Admit Date: 2020     Reason for Consult: Management of DKA, Hyperglycemia     Surgical Procedure and Date: N/A     Diabetes diagnosis year: Newly Dx.  Per patient he had steroid induced DM during childhood and resolved after DC of steroids.     Home Diabetes Medications:  NONE     How often checking glucose at home? N/A    BG readings on regimen: N/a   Hypoglycemia on the regimen?  No  Missed doses on regimen?  No    Diabetes Complications include:     N/A     Complicating diabetes co morbidities:   N/A       HPI:   Patient is a 35 y.o. male with a diagnosis of Steroid induced DM during childhood that resolved after discontinuation of steroids, Chron's disease s/p colectomy and ileostomy that was admitted to [facility] for scrotal cellulitis and KEITH.  During ED course patient was found to be in DKA w/ glucose 600s, BHB 4.6, PH 7.29, HCO3 13 and AG 27.  Pt was started on IV and Insulin infusion and consulted with endocrinology for assistance in management of DKA in the setting of newly Dx. DM.         Interval HPI:   Overnight events: Remains in [facility]. NAEON. BG at or below goal with scheduled insulin. PO intake decreased some yesterday- does not always care for the food. Reports PO intake will be better at home.   Eatin%  Nausea: No  Hypoglycemia and intervention: No  Fever: No  TPN and/or TF: No      BP (!) 164/93 (BP Location: Right arm, Patient Position: Lying)   Pulse 74   Temp 98 °F (36.7 °C) (Oral)   Resp 17   Ht 5' 8" (1.727 m)   Wt 76.5 kg (168 lb 10.4 oz)   SpO2 99%   BMI 25.64 kg/m²     Labs Reviewed and Include    Recent Labs   Lab 20  0304   GLU 89   CALCIUM 8.6*   ALBUMIN 2.7*   PROT 6.5      K 3.5   CO2 32*      BUN 9   CREATININE 1.5*   ALKPHOS 92   ALT 30   AST 16   BILITOT 0.3     Lab Results   Component Value Date    WBC 8.73 2020    HGB 11.2 (L) 2020    HCT 33.7 (L) 2020    MCV 89 " 12/03/2020     12/03/2020     No results for input(s): TSH, FREET4 in the last 168 hours.  Lab Results   Component Value Date    HGBA1C 13.8 (H) 11/21/2020       Nutritional status:   Body mass index is 25.64 kg/m².  Lab Results   Component Value Date    ALBUMIN 2.7 (L) 12/03/2020    ALBUMIN 2.6 (L) 12/02/2020    ALBUMIN 2.5 (L) 12/01/2020     Lab Results   Component Value Date    PREALBUMIN 34 03/31/2015    PREALBUMIN 7 (L) 11/11/2014    PREALBUMIN 8 (L) 11/10/2014       Estimated Creatinine Clearance: 67.1 mL/min (A) (based on SCr of 1.5 mg/dL (H)).    Accu-Checks  Recent Labs     11/30/20  2150 12/01/20  0834 12/01/20  1105 12/01/20  1647 12/01/20  2048 12/02/20  0746 12/02/20  1206 12/02/20  1634 12/02/20  2146 12/03/20  0852   POCTGLUCOSE 179* 115* 218* 162* 270* 131* 89 174* 107 109       Current Medications and/or Treatments Impacting Glycemic Control  Immunotherapy:    Immunosuppressants     None        Steroids:   Hormones (From admission, onward)    Start     Stop Route Frequency Ordered    11/21/20 1406  melatonin tablet 6 mg      -- Oral Nightly PRN 11/21/20 1407        Pressors:    Autonomic Drugs (From admission, onward)    None        Hyperglycemia/Diabetes Medications:   Antihyperglycemics (From admission, onward)    Start     Stop Route Frequency Ordered    12/03/20 0900  insulin detemir U-100 pen 12 Units      -- SubQ 2 times daily 12/03/20 0812    12/03/20 0815  insulin aspart U-100 pen 4-10 Units      -- SubQ 3 times daily with meals 12/03/20 0812    11/28/20 1104  insulin aspart U-100 pen 0-5 Units      -- SubQ Before meals & nightly PRN 11/28/20 1004          ASSESSMENT and PLAN    * Type 2 diabetes mellitus with hyperglycemia  Brief Hx: 35 y.o. male with a diagnosis of steroid induced DM during childhood that resolved after discontinuation of steroids, Crohn's disease s/p colectomy and ileostomy that [place] [facility] [place] and KEITH.    Fatigue for about 2 weeks.  His  symptoms started around November 4th after he received infusion of Entyvio and also ate a very rare steak.    Associated increased urinary frequency as well as polydipsia and an 18 lb weight loss over the past few weeks.  During ED course patient was found to be in DKA w/ glucose 600s, BHB 4.6, PH 7.29, HCO3 13 and AG 27.    Pt was started on IV and Insulin infusion and consulted with endocrinology for assistance in management of DKA in the setting of newly Dx DM    A1c 13% new diagnosis of diabetes  On admission with DKA now resolved    FRANCHESCA-65 negative and detectable C-peptide. Likely type 2 diabetes.    Consulted for: DKA  DM type: Presumed undiagnosed, new onset T2DM   A1C: 13.8*    Glucose Goals: 140-180 mg/dL      Home Regimen:    none    PLAN:     - change to Levemir 24 units daily.    -  Change to  Aspart 4-10 units TIDWM; PO intake decreased - does not always care for food    -  Low dose correction insulin   -  Accucheck ACHS    Discharge: Levemir 26 units daily. novolog 10 units with meals. BG monitoring ac/hs and low dose correction scale. BG logs in 1 week. Follow up outpatient.           KEITH (acute kidney injury)   can cause insulin stacking.   Avoid metformin at discharge, may consider outpatient if renal function normalizes  Improving    Cellulitis of scrotum  On antibiotics per primary        Crohn's disease of both small and large intestine without complication  Per primary team   Not on steroids currently        Cristy Delgadillo NP  Endocrinology  [facility]

## 2020-12-03 NOTE — PLAN OF CARE
Pt remains on 1/2 NS infusion. BG of 107 at HS, no sliding scale insulin required. Pt with no new complaints or concerns.

## 2020-12-04 ENCOUNTER — PATIENT MESSAGE (OUTPATIENT)
Dept: ENDOCRINOLOGY | Facility: HOSPITAL | Age: 35
End: 2020-12-04

## 2020-12-04 ENCOUNTER — TELEPHONE (OUTPATIENT)
Dept: SURGERY | Facility: CLINIC | Age: 35
End: 2020-12-04

## 2020-12-04 VITALS
SYSTOLIC BLOOD PRESSURE: 161 MMHG | TEMPERATURE: 99 F | HEART RATE: 65 BPM | BODY MASS INDEX: 25.56 KG/M2 | RESPIRATION RATE: 17 BRPM | OXYGEN SATURATION: 95 % | HEIGHT: 68 IN | WEIGHT: 168.63 LBS | DIASTOLIC BLOOD PRESSURE: 95 MMHG

## 2020-12-04 LAB
ALBUMIN SERPL BCP-MCNC: 2.7 G/DL (ref 3.5–5.2)
ALP SERPL-CCNC: 85 U/L (ref 55–135)
ALT SERPL W/O P-5'-P-CCNC: 25 U/L (ref 10–44)
ANION GAP SERPL CALC-SCNC: 8 MMOL/L (ref 8–16)
AST SERPL-CCNC: 13 U/L (ref 10–40)
BASOPHILS # BLD AUTO: 0.03 K/UL (ref 0–0.2)
BASOPHILS NFR BLD: 0.4 % (ref 0–1.9)
BILIRUB SERPL-MCNC: 0.4 MG/DL (ref 0.1–1)
BUN SERPL-MCNC: 9 MG/DL (ref 6–20)
CALCIUM SERPL-MCNC: 9.3 MG/DL (ref 8.7–10.5)
CHLORIDE SERPL-SCNC: 103 MMOL/L (ref 95–110)
CO2 SERPL-SCNC: 30 MMOL/L (ref 23–29)
CREAT SERPL-MCNC: 1.4 MG/DL (ref 0.5–1.4)
DIFFERENTIAL METHOD: ABNORMAL
EOSINOPHIL # BLD AUTO: 0.1 K/UL (ref 0–0.5)
EOSINOPHIL NFR BLD: 0.9 % (ref 0–8)
ERYTHROCYTE [DISTWIDTH] IN BLOOD BY AUTOMATED COUNT: 12.9 % (ref 11.5–14.5)
EST. GFR  (AFRICAN AMERICAN): >60 ML/MIN/1.73 M^2
EST. GFR  (NON AFRICAN AMERICAN): >60 ML/MIN/1.73 M^2
GLUCOSE SERPL-MCNC: 212 MG/DL (ref 70–110)
HCT VFR BLD AUTO: 35.1 % (ref 40–54)
HGB BLD-MCNC: 11.5 G/DL (ref 14–18)
IMM GRANULOCYTES # BLD AUTO: 0.02 K/UL (ref 0–0.04)
IMM GRANULOCYTES NFR BLD AUTO: 0.2 % (ref 0–0.5)
LYMPHOCYTES # BLD AUTO: 1.7 K/UL (ref 1–4.8)
LYMPHOCYTES NFR BLD: 20 % (ref 18–48)
MAGNESIUM SERPL-MCNC: 1.9 MG/DL (ref 1.6–2.6)
MCH RBC QN AUTO: 29.3 PG (ref 27–31)
MCHC RBC AUTO-ENTMCNC: 32.8 G/DL (ref 32–36)
MCV RBC AUTO: 89 FL (ref 82–98)
MONOCYTES # BLD AUTO: 1 K/UL (ref 0.3–1)
MONOCYTES NFR BLD: 11.4 % (ref 4–15)
NEUTROPHILS # BLD AUTO: 5.7 K/UL (ref 1.8–7.7)
NEUTROPHILS NFR BLD: 67.1 % (ref 38–73)
NRBC BLD-RTO: 0 /100 WBC
PHOSPHATE SERPL-MCNC: 3.7 MG/DL (ref 2.7–4.5)
PLATELET # BLD AUTO: 249 K/UL (ref 150–350)
PMV BLD AUTO: 13 FL (ref 9.2–12.9)
POCT GLUCOSE: 175 MG/DL (ref 70–110)
POTASSIUM SERPL-SCNC: 4.2 MMOL/L (ref 3.5–5.1)
PROT SERPL-MCNC: 6.8 G/DL (ref 6–8.4)
RBC # BLD AUTO: 3.93 M/UL (ref 4.6–6.2)
SODIUM SERPL-SCNC: 141 MMOL/L (ref 136–145)
WBC # BLD AUTO: 8.51 K/UL (ref 3.9–12.7)

## 2020-12-04 PROCEDURE — 99232 PR SUBSEQUENT HOSPITAL CARE,LEVL II: ICD-10-PCS | Mod: ,,, | Performed by: HOSPITALIST

## 2020-12-04 PROCEDURE — 99232 SBSQ HOSP IP/OBS MODERATE 35: CPT | Mod: ,,, | Performed by: HOSPITALIST

## 2020-12-04 PROCEDURE — 25000003 PHARM REV CODE 250: Performed by: SURGERY

## 2020-12-04 PROCEDURE — 63600175 PHARM REV CODE 636 W HCPCS: Performed by: SURGERY

## 2020-12-04 PROCEDURE — 83735 ASSAY OF MAGNESIUM: CPT

## 2020-12-04 PROCEDURE — 80053 COMPREHEN METABOLIC PANEL: CPT

## 2020-12-04 PROCEDURE — 85025 COMPLETE CBC W/AUTO DIFF WBC: CPT

## 2020-12-04 PROCEDURE — 84100 ASSAY OF PHOSPHORUS: CPT

## 2020-12-04 PROCEDURE — 36415 COLL VENOUS BLD VENIPUNCTURE: CPT

## 2020-12-04 PROCEDURE — 25000003 PHARM REV CODE 250: Performed by: HOSPITALIST

## 2020-12-04 RX ORDER — OXYCODONE HYDROCHLORIDE 5 MG/1
5 TABLET ORAL EVERY 6 HOURS PRN
Qty: 28 TABLET | Refills: 0 | Status: ON HOLD | OUTPATIENT
Start: 2020-12-04 | End: 2021-07-28

## 2020-12-04 RX ADMIN — OXYCODONE HYDROCHLORIDE 5 MG: 5 TABLET ORAL at 08:12

## 2020-12-04 RX ADMIN — METRONIDAZOLE 500 MG: 500 TABLET ORAL at 06:12

## 2020-12-04 RX ADMIN — INSULIN ASPART 4 UNITS: 100 INJECTION, SOLUTION INTRAVENOUS; SUBCUTANEOUS at 08:12

## 2020-12-04 RX ADMIN — HYDROMORPHONE HYDROCHLORIDE 0.5 MG: 1 INJECTION, SOLUTION INTRAMUSCULAR; INTRAVENOUS; SUBCUTANEOUS at 03:12

## 2020-12-04 RX ADMIN — CIPROFLOXACIN HYDROCHLORIDE 500 MG: 500 TABLET, FILM COATED ORAL at 08:12

## 2020-12-04 NOTE — PLAN OF CARE
Future Appointments   Date Time Provider Department Center   12/14/2020 11:30 AM Krista Brown NP [facility] [place] [facility]   12/29/2020  8:00 AM LAB, APPOINTMENT [place] [facility]   12/30/2020  8:30 AM INFUSION, CHAIR 4 [facility] INF [facility]           12/04/20 1721   Final Note   Assessment Type Final Discharge Note   Anticipated Discharge Disposition Home   What phone number can be called within the next 1-3 days to see how you are doing after discharge? [phone]   Hospital Follow Up  Appt(s) scheduled? Yes  (Ambulatory referral sent to CRS and Diabetes Education.)   Right Care Referral Info   Post Acute Recommendation No Care   Post-Acute Status   Post-Acute Authorization Other   Other Status No Post-Acute Service Needs   Discharge Delays None known at this time

## 2020-12-04 NOTE — NURSING
Pt being discharged to home/selfcare. AVS printed. Pt education done. Pt verbalized understanding. Medications delivered to bedside. PIV removed. Tele removed. Pt is independent and drove himself here and feels well enough to drive himself home.

## 2020-12-04 NOTE — ASSESSMENT & PLAN NOTE
sCr increased to 1.6 on 11/29 (BL 0.8). Initially downtrended w IV fluids, but increased again, to sCr 1.8 on 12/1. IVF were held at that time as pt lost IV access. However, pt reports good PO intake. 1950 UOP in 24hr prior to consult. Matias 67, uUN 113. Retroperitoneal u/s wnl.    sCr 1.5 on morning of consult. DDx includes AIN (recent initiation of new abx) vs pre-renal.   CK 51 wnl. U/A notable for trace leuks.   Urine microscopy unremarkable, uPCr undetectable    -- rec change cipro to different antibioitic   Cipro known cause of AIN, started on 11/28 w KEITH on 11/29  -- renal diet  -- avoid nephrotoxic agents  -- renally dose medications

## 2020-12-04 NOTE — SUBJECTIVE & OBJECTIVE
Interval History: Cr 1.4 from 1.5, on IVF. 2800cc UOP over past 24hr.     Review of patient's allergies indicates:   Allergen Reactions    Ibuprofen Other (See Comments)     Can't take d/t stomach ulcers     Current Facility-Administered Medications   Medication Frequency    ciprofloxacin HCl tablet 500 mg Q12H    dextrose 50% injection 12.5 g PRN    dextrose 50% injection 25 g PRN    glucagon (human recombinant) injection 1 mg PRN    glucose chewable tablet 16 g PRN    glucose chewable tablet 24 g PRN    HYDROmorphone injection 0.5 mg Q6H PRN    insulin aspart U-100 pen 0-5 Units QID (AC + HS) PRN    insulin aspart U-100 pen 4-10 Units TIDWM    insulin detemir U-100 pen 24 Units Daily    melatonin tablet 6 mg Nightly PRN    metroNIDAZOLE tablet 500 mg Q8H    ondansetron injection 4 mg Q6H PRN    oxyCODONE immediate release tablet 5 mg Q6H PRN    promethazine (PHENERGAN) 12.5 mg in dextrose 5 % 50 mL IVPB Q6H PRN    sodium chloride 0.9% flush 10 mL PRN    sodium chloride 0.9% flush 10 mL PRN       Objective:     Vital Signs (Most Recent):  Temp: 98.5 °F (36.9 °C) (12/04/20 0753)  Pulse: 65 (12/04/20 1102)  Resp: 17 (12/04/20 0805)  BP: (!) 161/95 (12/04/20 0753)  SpO2: 95 % (12/04/20 0753)  O2 Device (Oxygen Therapy): room air (12/04/20 0314) Vital Signs (24h Range):  Temp:  [97.5 °F (36.4 °C)-98.5 °F (36.9 °C)] 98.5 °F (36.9 °C)  Pulse:  [61-88] 65  Resp:  [12-19] 17  SpO2:  [95 %-98 %] 95 %  BP: (128-161)/(72-95) 161/95     Weight: 76.5 kg (168 lb 10.4 oz) (11/30/20 1400)  Body mass index is 25.64 kg/m².  Body surface area is 1.92 meters squared.    I/O last 3 completed shifts:  In: 2198.8 [P.O.:480; I.V.:1718.8]  Out: 3850 [Urine:3850]    Physical Exam  Vitals signs and nursing note reviewed.   Constitutional:       General: He is not in acute distress.  HENT:      Head: Normocephalic and atraumatic.      Right Ear: External ear normal.      Left Ear: External ear normal.      Nose: Nose normal.  No rhinorrhea.      Mouth/Throat:      Mouth: Mucous membranes are moist.      Pharynx: Oropharynx is clear.   Eyes:      Extraocular Movements: Extraocular movements intact.      Pupils: Pupils are equal, round, and reactive to light.   Neck:      Musculoskeletal: Normal range of motion and neck supple.   Cardiovascular:      Rate and Rhythm: Normal rate and regular rhythm.      Pulses: Normal pulses.      Heart sounds: No murmur. No friction rub. No gallop.    Pulmonary:      Effort: Pulmonary effort is normal. No respiratory distress.      Breath sounds: No wheezing.   Abdominal:      General: Bowel sounds are normal. There is no distension.      Palpations: Abdomen is soft.      Tenderness: There is no abdominal tenderness.   Musculoskeletal: Normal range of motion.         General: No deformity.   Skin:     General: Skin is warm and dry.      Capillary Refill: Capillary refill takes less than 2 seconds.   Neurological:      General: No focal deficit present.      Mental Status: He is alert and oriented to person, place, and time.      Cranial Nerves: No cranial nerve deficit.   Psychiatric:         Mood and Affect: Mood normal.         Behavior: Behavior normal.         Significant Labs:  All labs within the past 24 hours have been reviewed.     Significant Imaging:  All imaging studies within the past 24 hours have been reviewed.

## 2020-12-04 NOTE — TELEPHONE ENCOUNTER
No answer, called to set up post acute follow up appointment per message received.  Patient is still currently admitted. Previous portal message not read.

## 2020-12-04 NOTE — PROGRESS NOTES
[facility]  Nephrology  Progress Note    Patient Name: Amando Calix  MRN: [id number]  Admission Date: 11/21/2020  Hospital Length of Stay: 13 days  Attending Provider: Margy Askew MD   Primary Care Physician: Celestino Wright MD  Principal Problem:Type 2 diabetes mellitus with hyperglycemia    Subjective:     HPI: Mr. Calix is a 34yo M with PMH of Crohn's colitis (on Entyvio) s/p colectomy with end ileostomy with significant anorectal fistulizing disease s/p placement of multiple setons, h/o c diff infection, anemia and h/o steroid induced diabetes; presents for fatigue for about 2 weeks.  His symptoms started around November 4th after he received infusion of Entyvio and also ate a very rare steak.  He reports associated increased urinary frequency as well as polydipsia and an 18 lb weight loss over the past few weeks.  States that he has a good appetite has been eating well, he denies any abdominal pain bloody stool from his ostomy, diarrhea, nausea/vomiting, fevers/chills, hematuria, dysuria, flank pain, chest pain, shortness of breath, cough or sore throat. Noted scrotal swelling and pain at this time.     Further w/u revealed pt to be in DKA; was placed on insulin gtt until 11/24, then transitioned to basal-bolus regimen. As well, scrotal cellulitis noted to be 2/2 extension of known fistula. I+D, EUA by CRS, and eventually seton placement performed. Of further note, sCr increased to 1.6 on 11/29 (BL 0.8). Initially downtrended w IV fluids, but increased again, to sCr 1.8 on 12/1. IVF were held at that time as pt lost IV access. Nephrology consulted for eval/mgmt of KEITH. Upon date of consult, pt is now resting comfortably. Notes some residual discomfort but without other acute complaint. Reports appropriate PO intake over this period.     Interval History: Cr 1.4 from 1.5, on IVF. 2800cc UOP over past 24hr.     Review of patient's allergies indicates:   Allergen Reactions     Ibuprofen Other (See Comments)     Can't take d/t stomach ulcers     Current Facility-Administered Medications   Medication Frequency    ciprofloxacin HCl tablet 500 mg Q12H    dextrose 50% injection 12.5 g PRN    dextrose 50% injection 25 g PRN    glucagon (human recombinant) injection 1 mg PRN    glucose chewable tablet 16 g PRN    glucose chewable tablet 24 g PRN    HYDROmorphone injection 0.5 mg Q6H PRN    insulin aspart U-100 pen 0-5 Units QID (AC + HS) PRN    insulin aspart U-100 pen 4-10 Units TIDWM    insulin detemir U-100 pen 24 Units Daily    melatonin tablet 6 mg Nightly PRN    metroNIDAZOLE tablet 500 mg Q8H    ondansetron injection 4 mg Q6H PRN    oxyCODONE immediate release tablet 5 mg Q6H PRN    promethazine (PHENERGAN) 12.5 mg in dextrose 5 % 50 mL IVPB Q6H PRN    sodium chloride 0.9% flush 10 mL PRN    sodium chloride 0.9% flush 10 mL PRN       Objective:     Vital Signs (Most Recent):  Temp: 98.5 °F (36.9 °C) (12/04/20 0753)  Pulse: 65 (12/04/20 1102)  Resp: 17 (12/04/20 0805)  BP: (!) 161/95 (12/04/20 0753)  SpO2: 95 % (12/04/20 0753)  O2 Device (Oxygen Therapy): room air (12/04/20 0314) Vital Signs (24h Range):  Temp:  [97.5 °F (36.4 °C)-98.5 °F (36.9 °C)] 98.5 °F (36.9 °C)  Pulse:  [61-88] 65  Resp:  [12-19] 17  SpO2:  [95 %-98 %] 95 %  BP: (128-161)/(72-95) 161/95     Weight: 76.5 kg (168 lb 10.4 oz) (11/30/20 1400)  Body mass index is 25.64 kg/m².  Body surface area is 1.92 meters squared.    I/O last 3 completed shifts:  In: 2198.8 [P.O.:480; I.V.:1718.8]  Out: 3850 [Urine:3850]    Physical Exam  Vitals signs and nursing note reviewed.   Constitutional:       General: He is not in acute distress.  HENT:      Head: Normocephalic and atraumatic.      Right Ear: External ear normal.      Left Ear: External ear normal.      Nose: Nose normal. No rhinorrhea.      Mouth/Throat:      Mouth: Mucous membranes are moist.      Pharynx: Oropharynx is clear.   Eyes:      Extraocular  Movements: Extraocular movements intact.      Pupils: Pupils are equal, round, and reactive to light.   Neck:      Musculoskeletal: Normal range of motion and neck supple.   Cardiovascular:      Rate and Rhythm: Normal rate and regular rhythm.      Pulses: Normal pulses.      Heart sounds: No murmur. No friction rub. No gallop.    Pulmonary:      Effort: Pulmonary effort is normal. No respiratory distress.      Breath sounds: No wheezing.   Abdominal:      General: Bowel sounds are normal. There is no distension.      Palpations: Abdomen is soft.      Tenderness: There is no abdominal tenderness.   Musculoskeletal: Normal range of motion.         General: No deformity.   Skin:     General: Skin is warm and dry.      Capillary Refill: Capillary refill takes less than 2 seconds.   Neurological:      General: No focal deficit present.      Mental Status: He is alert and oriented to person, place, and time.      Cranial Nerves: No cranial nerve deficit.   Psychiatric:         Mood and Affect: Mood normal.         Behavior: Behavior normal.         Significant Labs:  All labs within the past 24 hours have been reviewed.     Significant Imaging:  All imaging studies within the past 24 hours have been reviewed.     Assessment/Plan:     KEITH (acute kidney injury)  sCr increased to 1.6 on 11/29 (BL 0.8). Initially downtrended w IV fluids, but increased again, to sCr 1.8 on 12/1. IVF were held at that time as pt lost IV access. However, pt reports good PO intake. 1950 UOP in 24hr prior to consult. Matias 67, uUN 113. Retroperitoneal u/s wnl.    sCr 1.5 on morning of consult. DDx includes AIN (recent initiation of new abx) vs pre-renal.   CK 51 wnl. U/A notable for trace leuks.   Urine microscopy unremarkable, uPCr undetectable    -- rec change cipro to different antibioitic   Cipro known cause of AIN, started on 11/28 w KEITH on 11/29  -- renal diet  -- avoid nephrotoxic agents  -- renally dose medications        Thank you for your  consult. I will sign off. Please contact us if you have any additional questions.    Masoud Feliz MD PGY1  Nephrology  [facility]

## 2020-12-04 NOTE — PLAN OF CARE
Full assessment as documented.  Pt without new c/o this shift. Prn pain medication with good effect. BG of 209 at HS, SSI per order.       Plan to continue current level of assessment and plan of care.  Will notify primary team of any changes in pt status.

## 2020-12-07 ENCOUNTER — PATIENT OUTREACH (OUTPATIENT)
Dept: ADMINISTRATIVE | Facility: CLINIC | Age: 35
End: 2020-12-07

## 2020-12-07 NOTE — PROGRESS NOTES
C3 nurse attempted to contact patient. No answer. The following message was left for the patient to return the call:  Good afternoon, I am a nurse calling on behalf of [facility] from the [facility].  This is a Transitional Care Call for Amando Calix. When you have a moment please contact us at [phone] or [phone] Monday through Friday, between the hours of 8 am to 4 pm. We look forward to speaking with you. On behalf of [facility] have a nice day.    The patient does not have a scheduled HOSFU appointment within 7-14 days post hospital discharge date 12/4/20. Non [facility]

## 2020-12-11 NOTE — PATIENT INSTRUCTIONS
New-Onset Hyperglycemia (Diabetes Suspected)  Your blood sugar is high today. This is called hyperglycemia. It means there is too much glucose (sugar) in your blood. This can have several possible causes. These include taking certain medicines, being under stress, or having an infection. The most common cause is diabetes. Diabetes develops when your body doesn't make enough insulin, which is a hormone that helps control blood sugar. Tests can be done to help find the cause of hyperglycemia. To bring your blood sugar down, you may be given insulin.     Hyperglycemia occurs when glucose cant enter cells, so it builds up in the bloodstream instead.   When your body is working normally, the food you eat is digested and turned into sugar. This sugar goes into your bloodstream. Insulin helps sugar move from the bloodstream into the bodys cells. There it is used as fuel. When you have diabetes, the sugar cant enter the cells. It stays in the blood, causing high blood sugar. If not controlled, diabetes can cause long-term health problems. Even if you dont have symptoms, it can harm your blood vessels, heart, and other organs. Over time, uncontrolled hyperglycemia can make a heart attack or stroke more likely.  Follow-up care  Follow up with your healthcare provider as advised. To determine the underlying cause of the hyperglycemia, you likely need more tests. You will be given more information about these tests. If needed, your healthcare provider will refer you to a team that specializes in diabetes care. The diabetes care team can help you learn about and make the changes needed to manage your hyperglycemia. Talk with your healthcare provider about starting an exercise program and meeting with a dietitian. These actions can help control high blood sugar.  When to seek medical advice  Call your healthcare provider right away if any of these occur:  · Excessive thirst or hunger  · Frequent need to urinate  · Losing  weight without trying  · Feeling tired  · Nausea or vomiting  · Itchy, dry skin  · Dizziness  · Confusion  · Abdominal pain  · Fruity-smelling breath  · Deep or rapid breathing    © 2174-7235 [employer]. [address] [place] [address], [place] [address]. All rights reserved. This information is not intended as a substitute for professional medical care. Always follow your healthcare professional's instructions.

## 2020-12-11 NOTE — PROGRESS NOTES
C3 nurse attempted to contact patient. The following occurred:   C3 nurse attempted to contact Amando Calix for a TCC post hospital discharge follow up call. The patient is unable to conduct the call @ this time. The patient requested a callback.    The patient does not have a scheduled HOSFU appointment within 7-14 days post hospital discharge date 12/6/20.

## 2020-12-14 NOTE — PROGRESS NOTES
Subjective:      Patient ID: Amando Calix is a 35 y.o. male.    Chief Complaint:  Follow-up    History of Present Illness  Amando Calix with diagnosis of steroid induced DM during childhood that resolved after discontinuation of steroids, Chron's disease s/p colectomy and ileostomy presents today for follow up of type 2 diabetes. This is his first visit with me.     We first met patient during Dec 2020 admission for DKA with newly diagnosed diabetes and scrotal cellulitis and KEITH. Per patient he had steroid induced DM during childhood and resolved after DC of steroids. Discharged on Levemir 26 units daily and novolog 10 units with meals     Not currently on steroids    With regards to the diabetes:    Diagnosed with Type 2 DM newly Dec 2020   Family History of Type 2 DM: father, mother, and MGF  Known complications:  DKA/HHS: on diagnosis  RN: needs exam  PN: denies  Nephropathy: denies  Gastroparesis: denies  CAD: denies    Current regimen:  Levemir 26 units daily  novolog 10 units with meals     He is giving novolog right before he eats. He is changing needle every time. He is rotating injection sites.     Missed doses: some missed doses of novolog when he is working;     Other medications tried:  none    4 # times a day testing  Log reviewed: no log for review  Oral recall states BGs in AM at 160's  States throughout the day: 200-300     Hypoglycemic event-denies; states lowest was 86  Yes, did not need assistance   Knows how to correct with 15 grams of carbs- juice, coke, or a peppermint.     Dietary recall: He feels that he is not following diabetic diet. States that he knows what it is but does not eat that way.  Eats 2 meals a day. Skips lunch  Breakfast: grits, eggs, and ham; oatmeal and protein  Dinner: protein and carb. Does not care for vegetables.  He is not eating out.   Snacks: candy midday  Drinks: water, juices    Exercise - No formal exercise.        Education - last visit: none but would  like to go    Has a Medic alert tag-does not want  Glucagon/Baqsimi-does not have    Diabetes Management Status  Statin: Not taking  ACE/ARB: Not taking    Lab Results   Component Value Date    HGBA1C 13.8 (H) 11/21/2020    HGBA1C 13.3 (H) 11/21/2020    HGBA1C 6.0 06/23/2011     Screening or Prevention Patient's value Goal Complete/Controlled?   HgA1C Testing and Control   Lab Results   Component Value Date    HGBA1C 13.8 (H) 11/21/2020      Annually/Less than 8% No   Lipid profile Most Recent Lipid Panel Health Maintenance Topic Completion: Not Found Annually No   LDL control No results found for: LDLCALC Annually/Less than 100 mg/dl  No   Nephropathy screening No results found for: LABMICR  Lab Results   Component Value Date    PROTEINUA Negative 12/02/2020    Annually Yes   Blood pressure BP Readings from Last 1 Encounters:   12/15/20 134/80    Less than 140/90 No   Dilated retinal exam Most Recent Eye Exam Date: Not Found Annually No   Foot exam   : 12/15/2020 Annually No     Review of Systems   Constitutional: Negative for fatigue and unexpected weight change.   Eyes: Negative for visual disturbance.   Respiratory: Negative for shortness of breath.    Cardiovascular: Negative for chest pain.   Gastrointestinal: Negative for abdominal pain.   Endocrine: Negative for polydipsia, polyphagia and polyuria.   Musculoskeletal: Negative for arthralgias.   Skin: Negative for wound.   Neurological: Negative for headaches.   Hematological: Does not bruise/bleed easily.   Psychiatric/Behavioral: Negative for sleep disturbance.     Objective:   Physical Exam  Vitals signs reviewed.   Constitutional:       Appearance: He is well-developed.   Neck:      Thyroid: No thyromegaly.   Cardiovascular:      Rate and Rhythm: Normal rate.   Pulmonary:      Effort: Pulmonary effort is normal.   Abdominal:      Palpations: Abdomen is soft.     injection sites are without edema or erythema. No lipo hypertropthy or atrophy  Diabetes Foot  "Exam:   No sores or lesions to bilateral feet  Long and thickened toenails with calluses on bilateral medial 1st toes  Shoes appropriate  sensation intact to vibration and monofilament      Visit Vitals  /80   Ht 5' 8" (1.727 m)   Wt 80.7 kg (178 lb 0.3 oz)   BMI 27.07 kg/m²        Lab Review:   Lab Results   Component Value Date    HGBA1C 13.8 (H) 11/21/2020    HGBA1C 13.3 (H) 11/21/2020    HGBA1C 6.0 06/23/2011      No results found for: CHOL, HDL, LDLCALC, TRIG, CHOLHDL  Lab Results   Component Value Date     12/04/2020    K 4.2 12/04/2020     12/04/2020    CO2 30 (H) 12/04/2020     (H) 12/04/2020    BUN 9 12/04/2020    CREATININE 1.4 12/04/2020    CALCIUM 9.3 12/04/2020    PROT 6.8 12/04/2020    ALBUMIN 2.7 (L) 12/04/2020    BILITOT 0.4 12/04/2020    ALKPHOS 85 12/04/2020    AST 13 12/04/2020    ALT 25 12/04/2020    ANIONGAP 8 12/04/2020    ESTGFRAFRICA >60.0 12/04/2020    EGFRNONAA >60.0 12/04/2020    TSH 0.635 12/29/2012     No results found for: [id number]  Assessment and Plan     1. Type 2 diabetes mellitus with hyperglycemia, unspecified whether long term insulin use  Ambulatory referral/consult to Optometry    Ambulatory referral/consult to Diabetes Education    metFORMIN (GLUCOPHAGE-XR) 500 MG ER 24hr tablet    Comprehensive Metabolic Panel    Hemoglobin A1C    Lipid Panel    Ambulatory referral/consult to Podiatry   2. Crohn's disease of both small and large intestine without complication         Type 2 diabetes mellitus with hyperglycemia  -- Reviewed goals of therapy are to get the best control we can without hypoglycemia  -- Refer to diabetes education  Medication changes:    Start Metformin 500 mg daily and increase every 2 weeks until you get to 4 pills daily   Increase Levemir (long acting) to 30 units daily.    Increase Novolog 12 units before meals and start sliding scale    With using correction scale:  MDD of:     150-200: +2 units  201-250: +4 units  251-300: +6 " units  301-350: +8 units  >350: +10 units    If you are skipping a meal, please check blood sugar and give novolog sliding scale only as above.   Message me for BG <70 and send log in 2 weeks  Message me if you are EVER unable to afford medications and we will come up with another plan.  -- Reviewed patient's current insulin regimen. Clarified proper insulin dose and timing in relation to meals, etc. Insulin injection sites and proper rotation instructed.    -- Advised frequent self blood glucose monitoring.  Patient encouraged to document glucose results and bring them to every clinic visit    -- Hypoglycemia precautions discussed. Instructed on precautions before driving.    -- Call for Bg repeatedly < 90 or > 180.   -- Consult optometry and podiatry  -- Close adherence to lifestyle changes recommended.   -- Periodic follow ups for eye evaluations, foot care and dental care suggested.  -- Encouraged exercise per ADA guidelines of 150 minutes weekly. Encouraged DM diet and low carb snacks and will give written information. Encouraged to  proteins with carbs, encouraged ¼ cup of carbs per meal and 30-45 grams of carbs per meal, eat 3 meals daily and glucerna or protein bar if skipping a meal.      Crohn's disease of both small and large intestine without complication  Cautious with diabetes medications that cause GI symptoms       Follow up in about 3 months (around 3/15/2021).

## 2020-12-15 ENCOUNTER — OFFICE VISIT (OUTPATIENT)
Dept: ENDOCRINOLOGY | Facility: CLINIC | Age: 35
End: 2020-12-15
Payer: OTHER GOVERNMENT

## 2020-12-15 VITALS
BODY MASS INDEX: 26.98 KG/M2 | WEIGHT: 178 LBS | SYSTOLIC BLOOD PRESSURE: 134 MMHG | DIASTOLIC BLOOD PRESSURE: 80 MMHG | HEIGHT: 68 IN

## 2020-12-15 DIAGNOSIS — E11.65 TYPE 2 DIABETES MELLITUS WITH HYPERGLYCEMIA, UNSPECIFIED WHETHER LONG TERM INSULIN USE: Primary | ICD-10-CM

## 2020-12-15 DIAGNOSIS — K50.80 CROHN'S DISEASE OF BOTH SMALL AND LARGE INTESTINE WITHOUT COMPLICATION: ICD-10-CM

## 2020-12-15 PROCEDURE — 99999 PR PBB SHADOW E&M-EST. PATIENT-LVL V: ICD-10-PCS | Mod: PBBFAC,,, | Performed by: NURSE PRACTITIONER

## 2020-12-15 PROCEDURE — 99214 OFFICE O/P EST MOD 30 MIN: CPT | Mod: S$PBB,,, | Performed by: NURSE PRACTITIONER

## 2020-12-15 PROCEDURE — 99214 PR OFFICE/OUTPT VISIT, EST, LEVL IV, 30-39 MIN: ICD-10-PCS | Mod: S$PBB,,, | Performed by: NURSE PRACTITIONER

## 2020-12-15 PROCEDURE — 99215 OFFICE O/P EST HI 40 MIN: CPT | Mod: PBBFAC | Performed by: NURSE PRACTITIONER

## 2020-12-15 PROCEDURE — 99999 PR PBB SHADOW E&M-EST. PATIENT-LVL V: CPT | Mod: PBBFAC,,, | Performed by: NURSE PRACTITIONER

## 2020-12-15 RX ORDER — METFORMIN HYDROCHLORIDE 500 MG/1
1000 TABLET, EXTENDED RELEASE ORAL 2 TIMES DAILY WITH MEALS
Qty: 360 TABLET | Refills: 3 | Status: SHIPPED | OUTPATIENT
Start: 2020-12-15 | End: 2021-03-16

## 2020-12-15 NOTE — PATIENT INSTRUCTIONS
Diabetes   For now, because you are waking up with blood sugars above goal, increase Levemir (long acting) to 30 units daily. For now, increase Novolog 12 units before meals and start sliding scale    With using correction scale:  MDD of:     150-200: +2 units  201-250: +4 units  251-300: +6 units  301-350: +8 units  >350: +10 units    If you are skipping a meal, please check blood sugar and give novolog sliding scale only as above.     Message me if you are EVER unable to afford medications and we will come up with another plan.     Metformin Start  Please start taking metformin 500 milligrams once every evening with dinner     If you tolerate this dose for 2 weeks, please start taking metformin 500 milligrams twice daily, so you will be taking 500mg in the morning and 500 milligrams in the evening     If you tolerate this dose for 2 weeks, increase your evening dose to 1000 milligrams (so you will be taking 500mg in the morning and 1000 milligrams in the evening)     If you tolerate this dose for 2 week, increase your morning dose to 1000 milligrams (so you will be taking 1000mg in the morning and 1000 milligrams in the evening)    *Please take with food    *If you are having side effects, most commonly diarrhea and stomach upset, wait to increase the dose until your symptoms have resolved - ie, if it takes you longer than 1 week to get to the next highest dose, this is fine.      METFORMIN (GLUCOPHAGE)    This medication is often used for people with Type 2 diabetes.  It is commonly used to treat polycystic ovary syndrome but this is not an FDA-approved indication.     Some of the common side effects of Metformin are nausea, vomiting and diarrhea.  This medicine should be taken with meals.  These side effects usually go away after several weeks on the medication.  These side effects can be minimized by increasing the dose gradually over a period of weeks, as prescribed by your doctor.      If you become  dehydrated, if you are hospitalized, or if you need to have an IV contrast test, you should discontinue this medication.        Goal A1c is less than 7.0%  Your A1c was 13.8%          Goal blood sugar is  fasting   Goal blood sugar 1 hour after meal is less than 180  Goal blood sugar 2 hour after meal is less than 140    Exercise: 2.5 hours weekly     Diet:   Protein bar or shake for breakfast  Glucerna or boost glucose control or smoothie bishop-gladiator  Try three meals daily to avoid candy  Avoid juice    Consult diabetes education   Diabetic drinks we recommend:   Water -BEST  Crystal light sugar free packets  Gatorade zero   Powerade zero  Tea without sweetener    Diabetic drinks we do not recommend:  Coke or any sugary regular soft drink  Coffee with sugar  Milk  Juice  Beer  Liquor    Sweeteners we recommend:  Anne Escobedo    Note: Caffeine can increase your blood sugar     Snacks can be an important part of a balanced, healthy meal plan. They allow you to eat more frequently, feeling full and satisfied throughout the day. Also, they allow you to spread carbohydrates evenly, which may stabilize blood sugars.  Plus, snacks are enjoyable!     The amount of carbohydrate needed at snacks varies. Generally, about 15-30 grams of carbohydrate per snack is recommended.  Below you will find some tasty treats.       0-5 gm carb   Crystal Light   Vitamin Water Zero   Herbal tea, unsweetened   2 tsp peanut butter on celery   1./2 cup sugar-free jell-o   1 sugar-free popsicle   ¼ cup blueberries   8oz Blue Hilary unsweetened almond milk   5 baby carrots & celery sticks, cucumbers, bell peppers dipped in ¼ cup salsa, 2Tbsp light ranch dressing or 2Tbsp plain Greek yogurt   10 Goldfish crackers   ½ oz low-fat cheese or string cheese   1 closed handful of nuts, unsalted   1 Tbsp of sunflower seeds, unsalted   1 cup Smart Pop popcorn   1 whole grain brown rice cake        15 gm carb   1 small  piece of fruit or ½ banana or 1/2 cup lite canned fruit   3 nakia cracker squares   3 cups Smart Pop popcorn, top spray butter, Franklin lite salt or cinnamon and Truvia   5 Vanilla Wafers   ½ cup low fat, no added sugar ice cream or frozen yogurt (Blue bell, Blue Bunny, Weight Watchers, Skinny Cow)   ½ turkey, ham, or chicken sandwich   ½ c fruit with ½ c Cottage cheese   4-6 unsalted wheat crackers with 1 oz low fat cheese or 1 tbsp peanut butter    30-45 goldfish crackers (depending on flavor)    7-8 Muslim mini brown rice cakes (caramel, apple cinnamon, chocolate)    12 Muslim mini brown rice cakes (cheddar, bbq, ranch)    1/3 cup hummus dip with raw veg   1/2 whole wheat geronimo, 1Tbsp hummus   Mini Pizza (1/2 whole wheat English muffin, low-fat  cheese, tomato sauce)   100 calorie snack pack (Oreo, Chips Ahoy, Ritz Mix, Baked Cheetos)   4-6 oz. light or Greek Style yogurt (Chobani, Yoplait, Okios, [place])   ½ cup sugar-free pudding     6 in. wheat tortilla or geronimo oven toasted chips (topped with spray butter flavoring, cinnamon, Truvia OR spray butter, garlic powder, chili powder)    18 BBQ Popchips (available at [employer], [employer], Fresh Market)

## 2020-12-15 NOTE — ASSESSMENT & PLAN NOTE
-- Reviewed goals of therapy are to get the best control we can without hypoglycemia  -- Refer to diabetes education  Medication changes:    Start Metformin 500 mg daily and increase every 2 weeks until you get to 4 pills daily   Increase Levemir (long acting) to 30 units daily.    Increase Novolog 12 units before meals and start sliding scale    With using correction scale:  MDD of:     150-200: +2 units  201-250: +4 units  251-300: +6 units  301-350: +8 units  >350: +10 units    If you are skipping a meal, please check blood sugar and give novolog sliding scale only as above.   Message me for BG <70 and send log in 2 weeks  Message me if you are EVER unable to afford medications and we will come up with another plan.  -- Reviewed patient's current insulin regimen. Clarified proper insulin dose and timing in relation to meals, etc. Insulin injection sites and proper rotation instructed.    -- Advised frequent self blood glucose monitoring.  Patient encouraged to document glucose results and bring them to every clinic visit    -- Hypoglycemia precautions discussed. Instructed on precautions before driving.    -- Call for Bg repeatedly < 90 or > 180.   -- Consult optometry and podiatry  -- Close adherence to lifestyle changes recommended.   -- Periodic follow ups for eye evaluations, foot care and dental care suggested.  -- Encouraged exercise per ADA guidelines of 150 minutes weekly. Encouraged DM diet and low carb snacks and will give written information. Encouraged to  proteins with carbs, encouraged ¼ cup of carbs per meal and 30-45 grams of carbs per meal, eat 3 meals daily and glucerna or protein bar if skipping a meal.

## 2020-12-15 NOTE — DISCHARGE SUMMARY
DISCHARGE SUMMARY  Hospital Medicine    Team: [facility] A    Patient Name: Amando Calix  YOB: 1985    Admit Date: 11/21/2020    Discharge Date: 12/04/2020    Discharge Attending Physician: Margy Askew   Admitting Resident    Diagnoses:  Active Hospital Problems    Diagnosis  POA    *Type 2 diabetes mellitus with hyperglycemia [E11.65]  Yes    Cellulitis of groin [L03.314]  Yes    KIETH (acute kidney injury) [N17.9]  Unknown    Cellulitis of scrotum [N49.2]  Yes    Crohn's disease of colon with fistula [K50.113]  Yes    Moderate protein-calorie malnutrition [E44.0]  Yes     Chronic    Anemia [D64.9]  Yes    Crohn's disease of both small and large intestine without complication [K50.80]  Yes      Resolved Hospital Problems    Diagnosis Date Resolved POA    Diabetic ketoacidosis without coma associated with diabetes mellitus due to underlying condition [E08.10] 12/01/2020 Yes    Tachycardia [R00.0] 12/01/2020 Yes       Discharged Condition: admit problems have stabilized       HOSPITAL COURSE:      Initial Presentation:    Mr. Calix is a 35 year old gentleman with PMH of Crohn's colitis (on Entyvio) s/p colectomy with end ileostomy with significant anorectal fistulizing disease s/p placement of multiple setons, h/o c diff infection, anemia and h/o steroid induced diabetes presents for fatigue for about 2 weeks.  His symptoms started around November 4th after he received infusion of Entyvio and also ate a very rare steak.  He reports associated increased urinary frequency as well as polydipsia and an 18 lb weight loss over the past few weeks.  States that he has a good appetite has been eating well, he denies any abdominal pain bloody stool from his ostomy, diarrhea, nausea/vomiting, fevers/chills, hematuria, dysuria, flank pain, chest pain, shortness of breath, cough or sore throat.    Course of Principle Problem for Admission:    DKA new onset in setting of scrotal cellulitils  - DKA  pathway completed  - transitioned to levemir 26 U daily and Novolog 10 TID  - education provided  - endocrinology referal to clinic    Crohn colitis  S/p colectomy with end ileostomy  Anorectal fistulizing disease s/p placement of multiple setons  - follows with general surgery (Dr. Blackburn). Last visit on 10/5/2020  - follows with CRS (Dr. Suarez). Last visit 8/15/2020  - Given extent of anorectal disease with fistulae and stenosis, ileostomy will be permanent. Appears not to be a candidate for an attempt at restoring intestinal continuity given the extent of his anal disease, and I suspect he will ultimately require a completion proctectomy. Discussed completion proctectomy with patient - he is not ready to proceed at this point.  - continue home Entyvio  11/26 GI consulted - no medication changes recommended at this time    Scrotal cellulitis   - elevated ESR and CRP  - CT A/P with contrast  (11/21) showed 'Right perianal fistulous tract extending towards the scrotum with interval placement of a seton, noting the fistulous tract is similar to prior CT 02/09/2020. No abnormal fluid collection to suggest an abscess. Marked scrotal skin thickening. Correlate for cellulitis'  - followup bcx   - ID consulted. apprec recs  -- Originally treated with vancomycin and zosyn   - Urology consulted. apprec recs   -- s/p bedside I&D and packing on 11/25. followup I&D cultures  -- scrotal support, ice packs  -- can't give NSAIDs due to h/o ulcers  - CRS consulted. 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 Urology updated , findings likely from bedside debridement. no surgical intervention.  EUA with CRS tomorrow to assess for possible fistula tract between previous fistulas and scrotal abscess. started on clindamycin IV  11/27 EUA today -identification of fistula tract from previously known fistula to base of scrotum - seton placed.  Discontinued clindamycin.  Monitor for  vancomycin toxicity  11/28 s/p EUA, seton placement to perineum/scrotum .transitioned to Cipro/Flagylx 14 days, cultures NGTD,      Other Medical Problems Addressed in the Hospital:    KEITH  - prerenal, resolving with increased oral intake.     CONSULTS: CRS, ID, GI, endocrinology       Last CBC/BMP/HgbA1c (if applicable):  Recent Results (from the past 336 hour(s))   CBC Auto Differential    Collection Time: 12/04/20  5:56 AM   Result Value Ref Range    WBC 8.51 3.90 - 12.70 K/uL    Hemoglobin 11.5 (L) 14.0 - 18.0 g/dL    Hematocrit 35.1 (L) 40.0 - 54.0 %    Platelets 249 150 - 350 K/uL   CBC Auto Differential    Collection Time: 12/03/20  3:04 AM   Result Value Ref Range    WBC 8.73 3.90 - 12.70 K/uL    Hemoglobin 11.2 (L) 14.0 - 18.0 g/dL    Hematocrit 33.7 (L) 40.0 - 54.0 %    Platelets 251 150 - 350 K/uL   CBC Auto Differential    Collection Time: 12/02/20  3:36 AM   Result Value Ref Range    WBC 8.03 3.90 - 12.70 K/uL    Hemoglobin 11.3 (L) 14.0 - 18.0 g/dL    Hematocrit 33.7 (L) 40.0 - 54.0 %    Platelets 244 150 - 350 K/uL     No results found for this or any previous visit (from the past 336 hour(s)).  Lab Results   Component Value Date    HGBA1C 13.8 (H) 11/21/2020         Disposition:  Home         Future Scheduled Appointments:  Future Appointments   Date Time Provider Department Center   12/15/2020 10:30 AM Krista Brown NP [facility] ENDODIA [facility]   12/29/2020  8:00 AM LAB, APPOINTMENT [facility] LAB VNP [facility]   12/30/2020  8:30 AM INFUSION, CHAIR 4 [facility] INF [facility]       Follow-up Plans from This Hospitalization:  Followup with Endocrinology, CRS and GI     Discharge Medication List:      Amando Calix   Home Medication Instructions LINSEY:[id number]    Printed on:12/15/20 8234   Medication Information                      acetaminophen (TYLENOL) 325 MG tablet  Take 2 tablets (650 mg total) by mouth every 6 (six) hours as needed for Pain.             blood sugar diagnostic  "Strp  Use to test blood glucose 2 (two) times daily with meals.             blood-glucose meter Misc  Use as instructed             insulin aspart U-100 (NOVOLOG) 100 unit/mL (3 mL) InPn pen  Inject 10 Units into the skin 3 (three) times daily.             insulin detemir U-100 (LEVEMIR FLEXTOUCH) 100 unit/mL (3 mL) SubQ InPn pen  Inject 26 Units into the skin once daily.             lancets 30 gauge Misc  use to test blood glucose 2 (two) times daily with meals.             lancing device Misc  1 Device by Misc.(Non-Drug; Combo Route) route 2 (two) times daily with meals.             oxyCODONE (ROXICODONE) 5 MG immediate release tablet  Take 1 tablet (5 mg total) by mouth every 6 (six) hours as needed.             pen needle, diabetic 31 gauge x 5/16" Ndle  1 each by Misc.(Non-Drug; Combo Route) route 2 (two) times daily with meals.             senna-docusate 8.6-50 mg (PERICOLACE) 8.6-50 mg per tablet  Take 2 tablets by mouth once daily.             VEDOLIZUMAB (ENTYVIO IV)  Inject into the vein.                 Patient Instructions:  Discharge Procedure Orders   Ambulatory referral/consult to Diabetes Education   Standing Status: Future   Referral Priority: Routine Referral Type: Consultation   Referral Reason: Specialty Services Required   Requested Specialty: Diabetes   Number of Visits Requested: 1 Expiration Date: 11/30/21     Ambulatory referral/consult to Colorectal Surgery   Standing Status: Future   Referral Priority: Routine Referral Type: Consultation   Referral Reason: Specialty Services Required   Requested Specialty: Colon and Rectal Surgery   Number of Visits Requested: 1     Ambulatory referral/consult to Endocrinology   Standing Status: Future   Referral Priority: Routine Referral Type: Consultation   Requested Specialty: Endocrinology   Number of Visits Requested: 1       Signing Physician:  Margy Askew MD    "

## 2020-12-28 LAB — FUNGUS SPEC CULT: NORMAL

## 2020-12-29 ENCOUNTER — LAB VISIT (OUTPATIENT)
Dept: LAB | Facility: HOSPITAL | Age: 35
End: 2020-12-29
Attending: STUDENT IN AN ORGANIZED HEALTH CARE EDUCATION/TRAINING PROGRAM
Payer: OTHER GOVERNMENT

## 2020-12-29 DIAGNOSIS — K50.113 CROHN'S DISEASE OF COLON WITH FISTULA: ICD-10-CM

## 2020-12-29 PROCEDURE — 80280 DRUG ASSAY VEDOLIZUMAB: CPT

## 2020-12-29 PROCEDURE — 82397 CHEMILUMINESCENT ASSAY: CPT

## 2020-12-29 PROCEDURE — 36415 COLL VENOUS BLD VENIPUNCTURE: CPT

## 2020-12-30 ENCOUNTER — INFUSION (OUTPATIENT)
Dept: INFECTIOUS DISEASES | Facility: HOSPITAL | Age: 35
End: 2020-12-30
Attending: INTERNAL MEDICINE

## 2020-12-30 ENCOUNTER — PATIENT MESSAGE (OUTPATIENT)
Dept: ENDOCRINOLOGY | Facility: CLINIC | Age: 35
End: 2020-12-30

## 2020-12-30 VITALS
WEIGHT: 182.44 LBS | RESPIRATION RATE: 18 BRPM | SYSTOLIC BLOOD PRESSURE: 140 MMHG | DIASTOLIC BLOOD PRESSURE: 87 MMHG | HEIGHT: 68 IN | BODY MASS INDEX: 27.65 KG/M2 | OXYGEN SATURATION: 98 % | TEMPERATURE: 99 F | HEART RATE: 93 BPM

## 2020-12-30 DIAGNOSIS — K50.918 CROHN'S DISEASE WITH OTHER COMPLICATION, UNSPECIFIED GASTROINTESTINAL TRACT LOCATION: Primary | ICD-10-CM

## 2020-12-30 PROCEDURE — 63600175 PHARM REV CODE 636 W HCPCS: Mod: JG | Performed by: INTERNAL MEDICINE

## 2020-12-30 PROCEDURE — 96365 THER/PROPH/DIAG IV INF INIT: CPT

## 2020-12-30 PROCEDURE — 25000003 PHARM REV CODE 250: Performed by: INTERNAL MEDICINE

## 2020-12-30 RX ORDER — SODIUM CHLORIDE 0.9 % (FLUSH) 0.9 %
10 SYRINGE (ML) INJECTION
Status: DISCONTINUED | OUTPATIENT
Start: 2020-12-30 | End: 2020-12-30 | Stop reason: HOSPADM

## 2020-12-30 RX ORDER — HEPARIN 100 UNIT/ML
500 SYRINGE INTRAVENOUS
OUTPATIENT
Start: 2021-02-24

## 2020-12-30 RX ORDER — EPINEPHRINE 1 MG/ML
0.3 INJECTION, SOLUTION, CONCENTRATE INTRAVENOUS
OUTPATIENT
Start: 2021-02-24

## 2020-12-30 RX ORDER — IPRATROPIUM BROMIDE AND ALBUTEROL SULFATE 2.5; .5 MG/3ML; MG/3ML
3 SOLUTION RESPIRATORY (INHALATION)
Status: DISPENSED | OUTPATIENT
Start: 2020-12-30 | End: 2020-12-30

## 2020-12-30 RX ORDER — EPINEPHRINE 0.3 MG/.3ML
0.3 INJECTION SUBCUTANEOUS
Status: DISPENSED | OUTPATIENT
Start: 2020-12-30 | End: 2020-12-30

## 2020-12-30 RX ORDER — HEPARIN 100 UNIT/ML
500 SYRINGE INTRAVENOUS
Status: DISPENSED | OUTPATIENT
Start: 2020-12-30 | End: 2020-12-30

## 2020-12-30 RX ORDER — SODIUM CHLORIDE 0.9 % (FLUSH) 0.9 %
10 SYRINGE (ML) INJECTION
OUTPATIENT
Start: 2021-02-24

## 2020-12-30 RX ORDER — DIPHENHYDRAMINE HYDROCHLORIDE 50 MG/ML
25 INJECTION INTRAMUSCULAR; INTRAVENOUS
OUTPATIENT
Start: 2021-02-24

## 2020-12-30 RX ORDER — DIPHENHYDRAMINE HYDROCHLORIDE 50 MG/ML
25 INJECTION INTRAMUSCULAR; INTRAVENOUS
Status: DISPENSED | OUTPATIENT
Start: 2020-12-30 | End: 2020-12-30

## 2020-12-30 RX ORDER — IPRATROPIUM BROMIDE AND ALBUTEROL SULFATE 2.5; .5 MG/3ML; MG/3ML
3 SOLUTION RESPIRATORY (INHALATION)
OUTPATIENT
Start: 2021-02-24

## 2020-12-30 RX ORDER — ACETAMINOPHEN 325 MG/1
650 TABLET ORAL
OUTPATIENT
Start: 2021-02-24

## 2020-12-30 RX ORDER — ACETAMINOPHEN 325 MG/1
650 TABLET ORAL
Status: DISPENSED | OUTPATIENT
Start: 2020-12-30 | End: 2020-12-30

## 2020-12-30 RX ORDER — METHYLPREDNISOLONE SOD SUCC 125 MG
40 VIAL (EA) INJECTION
OUTPATIENT
Start: 2021-02-24

## 2020-12-30 RX ADMIN — VEDOLIZUMAB 300 MG: 300 INJECTION, POWDER, LYOPHILIZED, FOR SOLUTION INTRAVENOUS at 08:12

## 2020-12-30 NOTE — PROGRESS NOTES
"  Infusion medication:  Entyvio  Today's weight:  82.7 kg  Wt Readings from Last 1 Encounters:   07/21/17 109.8 kg (242 lb)         Checklist prior to infusion:  Is the Biologic RX (therapy plan) up to date within the past one year? Yes  Are the most recent labs within the last 3 - 6 months ? Yes  Has the patient had an appointment with the MD/ANIKA that is prescribing the biologic infusion within the last 3 - 6 months? Yes  Documentation of safety questions prior to infusion:   RN TO NOTIFY MD IF "YES" answered to any of below questions prior to infusion:    Symptoms in past 2 weeks? (fever, night sweats, URI or Flu-like symptoms, cough, painful urination, warm/red/painful skin, skin ulcers/wounds, tooth infection/abscess): No  Current symptoms of an active infection? No  Temperature greater than 100.4 in the last 48 hours? No  Recent infections that required antibiotic use in the last 10-14 days?Yes  Completed Cipro and Flagyl approximately two weeks ago.  Dr. Freeman stated it's ok for patient to receive Entyvio today.   If patient has had surgery, any problems with the surgical wound (drainage, redness, tenderness)? Yes If yes then has surgeon cleared patient prior to getting this infusion? Yes  Pregnant? N/A  If yes, is prescribing provider aware of this pregnancy?  N/A  NEW or WORSENING abdominal pain, diarrhea, nausea/vomiting? No  Any SOB, ankle/feet swelling, or sudden weight gain? No    Patient tolerated infusion well today, vital signs remained normal throughout infusion and patient left with no apparent distress:  Yes  6  Received next infusion date prior to discharge: Yes    Additional note to provider:  No    "

## 2021-01-14 ENCOUNTER — OFFICE VISIT (OUTPATIENT)
Dept: PODIATRY | Facility: CLINIC | Age: 36
End: 2021-01-14
Payer: OTHER GOVERNMENT

## 2021-01-14 VITALS
WEIGHT: 189.38 LBS | HEART RATE: 94 BPM | SYSTOLIC BLOOD PRESSURE: 139 MMHG | BODY MASS INDEX: 28.79 KG/M2 | DIASTOLIC BLOOD PRESSURE: 92 MMHG

## 2021-01-14 DIAGNOSIS — B35.1 ONYCHOMYCOSIS DUE TO DERMATOPHYTE: ICD-10-CM

## 2021-01-14 DIAGNOSIS — L84 CORN OR CALLUS: Primary | ICD-10-CM

## 2021-01-14 DIAGNOSIS — E11.65 TYPE 2 DIABETES MELLITUS WITH HYPERGLYCEMIA, UNSPECIFIED WHETHER LONG TERM INSULIN USE: ICD-10-CM

## 2021-01-14 PROCEDURE — 11056 PR TRIM BENIGN HYPERKERATOTIC SKIN LESION,2-4: ICD-10-PCS | Mod: S$PBB,,, | Performed by: PODIATRIST

## 2021-01-14 PROCEDURE — 99999 PR PBB SHADOW E&M-EST. PATIENT-LVL III: ICD-10-PCS | Mod: PBBFAC,,, | Performed by: PODIATRIST

## 2021-01-14 PROCEDURE — 99999 PR PBB SHADOW E&M-EST. PATIENT-LVL III: CPT | Mod: PBBFAC,,, | Performed by: PODIATRIST

## 2021-01-14 PROCEDURE — 11056 PARNG/CUTG B9 HYPRKR LES 2-4: CPT | Mod: S$PBB,,, | Performed by: PODIATRIST

## 2021-01-14 PROCEDURE — 11721 DEBRIDE NAIL 6 OR MORE: CPT | Mod: 59,PBBFAC | Performed by: PODIATRIST

## 2021-01-14 PROCEDURE — 11056 PARNG/CUTG B9 HYPRKR LES 2-4: CPT | Mod: PBBFAC | Performed by: PODIATRIST

## 2021-01-14 PROCEDURE — 11721 DEBRIDE NAIL 6 OR MORE: CPT | Mod: 59,S$PBB,, | Performed by: PODIATRIST

## 2021-01-14 PROCEDURE — 99203 OFFICE O/P NEW LOW 30 MIN: CPT | Mod: 25,S$PBB,, | Performed by: PODIATRIST

## 2021-01-14 PROCEDURE — 11721 PR DEBRIDEMENT OF NAILS, 6 OR MORE: ICD-10-PCS | Mod: 59,S$PBB,, | Performed by: PODIATRIST

## 2021-01-14 PROCEDURE — 99213 OFFICE O/P EST LOW 20 MIN: CPT | Mod: PBBFAC,25 | Performed by: PODIATRIST

## 2021-01-14 PROCEDURE — 99203 PR OFFICE/OUTPT VISIT, NEW, LEVL III, 30-44 MIN: ICD-10-PCS | Mod: 25,S$PBB,, | Performed by: PODIATRIST

## 2021-01-14 RX ORDER — TERBINAFINE HYDROCHLORIDE 250 MG/1
250 TABLET ORAL DAILY
Qty: 90 TABLET | Refills: 0 | Status: SHIPPED | OUTPATIENT
Start: 2021-01-14 | End: 2021-02-13

## 2021-01-14 RX ORDER — AMMONIUM LACTATE 12 G/100G
CREAM TOPICAL
Qty: 140 G | Refills: 11 | Status: ON HOLD | OUTPATIENT
Start: 2021-01-14 | End: 2021-07-28

## 2021-01-15 LAB — VEDOLIZUMAB AND ANTI-VEDOLIZUMAB AB: NORMAL

## 2021-01-19 ENCOUNTER — TELEPHONE (OUTPATIENT)
Dept: GASTROENTEROLOGY | Facility: CLINIC | Age: 36
End: 2021-01-19

## 2021-03-09 ENCOUNTER — PATIENT MESSAGE (OUTPATIENT)
Dept: ENDOCRINOLOGY | Facility: CLINIC | Age: 36
End: 2021-03-09

## 2021-03-09 ENCOUNTER — LAB VISIT (OUTPATIENT)
Dept: LAB | Facility: HOSPITAL | Age: 36
End: 2021-03-09

## 2021-03-09 DIAGNOSIS — E11.65 TYPE 2 DIABETES MELLITUS WITH HYPERGLYCEMIA, UNSPECIFIED WHETHER LONG TERM INSULIN USE: ICD-10-CM

## 2021-03-09 LAB
ALBUMIN SERPL BCP-MCNC: 4.3 G/DL (ref 3.5–5.2)
ALP SERPL-CCNC: 93 U/L (ref 55–135)
ALT SERPL W/O P-5'-P-CCNC: 48 U/L (ref 10–44)
ANION GAP SERPL CALC-SCNC: 12 MMOL/L (ref 8–16)
AST SERPL-CCNC: 33 U/L (ref 10–40)
BILIRUB SERPL-MCNC: 0.4 MG/DL (ref 0.1–1)
BUN SERPL-MCNC: 15 MG/DL (ref 6–20)
CALCIUM SERPL-MCNC: 9.6 MG/DL (ref 8.7–10.5)
CHLORIDE SERPL-SCNC: 102 MMOL/L (ref 95–110)
CHOLEST SERPL-MCNC: 205 MG/DL (ref 120–199)
CHOLEST/HDLC SERPL: 4.7 {RATIO} (ref 2–5)
CO2 SERPL-SCNC: 23 MMOL/L (ref 23–29)
CREAT SERPL-MCNC: 1.1 MG/DL (ref 0.5–1.4)
EST. GFR  (AFRICAN AMERICAN): >60 ML/MIN/1.73 M^2
EST. GFR  (NON AFRICAN AMERICAN): >60 ML/MIN/1.73 M^2
ESTIMATED AVG GLUCOSE: 140 MG/DL (ref 68–131)
GLUCOSE SERPL-MCNC: 154 MG/DL (ref 70–110)
HBA1C MFR BLD: 6.5 % (ref 4–5.6)
HDLC SERPL-MCNC: 44 MG/DL (ref 40–75)
HDLC SERPL: 21.5 % (ref 20–50)
LDLC SERPL CALC-MCNC: 102.6 MG/DL (ref 63–159)
NONHDLC SERPL-MCNC: 161 MG/DL
POTASSIUM SERPL-SCNC: 4.6 MMOL/L (ref 3.5–5.1)
PROT SERPL-MCNC: 9 G/DL (ref 6–8.4)
SODIUM SERPL-SCNC: 137 MMOL/L (ref 136–145)
TRIGL SERPL-MCNC: 292 MG/DL (ref 30–150)

## 2021-03-09 PROCEDURE — 80061 LIPID PANEL: CPT | Performed by: NURSE PRACTITIONER

## 2021-03-09 PROCEDURE — 83036 HEMOGLOBIN GLYCOSYLATED A1C: CPT | Performed by: NURSE PRACTITIONER

## 2021-03-09 PROCEDURE — 80053 COMPREHEN METABOLIC PANEL: CPT | Performed by: NURSE PRACTITIONER

## 2021-03-09 PROCEDURE — 36415 COLL VENOUS BLD VENIPUNCTURE: CPT | Performed by: NURSE PRACTITIONER

## 2021-03-15 ENCOUNTER — TELEPHONE (OUTPATIENT)
Dept: ENDOCRINOLOGY | Facility: CLINIC | Age: 36
End: 2021-03-15

## 2021-03-16 ENCOUNTER — OFFICE VISIT (OUTPATIENT)
Dept: ENDOCRINOLOGY | Facility: CLINIC | Age: 36
End: 2021-03-16
Payer: OTHER GOVERNMENT

## 2021-03-16 DIAGNOSIS — E10.65 TYPE 1 DIABETES MELLITUS WITH HYPERGLYCEMIA: ICD-10-CM

## 2021-03-16 DIAGNOSIS — E78.2 MIXED HYPERLIPIDEMIA: ICD-10-CM

## 2021-03-16 DIAGNOSIS — K50.918 CROHN'S DISEASE WITH OTHER COMPLICATION, UNSPECIFIED GASTROINTESTINAL TRACT LOCATION: ICD-10-CM

## 2021-03-16 DIAGNOSIS — E11.65 TYPE 2 DIABETES MELLITUS WITH HYPERGLYCEMIA, UNSPECIFIED WHETHER LONG TERM INSULIN USE: Chronic | ICD-10-CM

## 2021-03-16 PROCEDURE — 99214 PR OFFICE/OUTPT VISIT, EST, LEVL IV, 30-39 MIN: ICD-10-PCS | Mod: 95,,, | Performed by: NURSE PRACTITIONER

## 2021-03-16 PROCEDURE — 99214 OFFICE O/P EST MOD 30 MIN: CPT | Mod: 95,,, | Performed by: NURSE PRACTITIONER

## 2021-03-16 RX ORDER — METFORMIN HYDROCHLORIDE 500 MG/1
1000 TABLET, EXTENDED RELEASE ORAL 2 TIMES DAILY WITH MEALS
Qty: 360 TABLET | Refills: 3 | Status: ON HOLD | OUTPATIENT
Start: 2021-03-16 | End: 2021-07-28 | Stop reason: SDUPTHER

## 2021-03-16 RX ORDER — ATORVASTATIN CALCIUM 20 MG/1
20 TABLET, FILM COATED ORAL DAILY
Qty: 90 TABLET | Refills: 3 | Status: ON HOLD | OUTPATIENT
Start: 2021-03-16 | End: 2021-07-28

## 2021-03-22 ENCOUNTER — OFFICE VISIT (OUTPATIENT)
Dept: GASTROENTEROLOGY | Facility: CLINIC | Age: 36
End: 2021-03-22

## 2021-03-22 ENCOUNTER — LAB VISIT (OUTPATIENT)
Dept: LAB | Facility: HOSPITAL | Age: 36
End: 2021-03-22
Attending: INTERNAL MEDICINE

## 2021-03-22 VITALS
HEART RATE: 101 BPM | DIASTOLIC BLOOD PRESSURE: 81 MMHG | WEIGHT: 196.19 LBS | BODY MASS INDEX: 29.73 KG/M2 | HEIGHT: 68 IN | SYSTOLIC BLOOD PRESSURE: 131 MMHG

## 2021-03-22 DIAGNOSIS — K50.113 CROHN'S DISEASE OF COLON WITH FISTULA: ICD-10-CM

## 2021-03-22 DIAGNOSIS — K50.113 CROHN'S DISEASE OF COLON WITH FISTULA: Primary | ICD-10-CM

## 2021-03-22 LAB — CRP SERPL-MCNC: 191.4 MG/L (ref 0–8.2)

## 2021-03-22 PROCEDURE — 99214 OFFICE O/P EST MOD 30 MIN: CPT | Mod: S$PBB,,, | Performed by: INTERNAL MEDICINE

## 2021-03-22 PROCEDURE — 86140 C-REACTIVE PROTEIN: CPT | Performed by: INTERNAL MEDICINE

## 2021-03-22 PROCEDURE — 99999 PR PBB SHADOW E&M-EST. PATIENT-LVL IV: CPT | Mod: PBBFAC,,, | Performed by: INTERNAL MEDICINE

## 2021-03-22 PROCEDURE — 99214 OFFICE O/P EST MOD 30 MIN: CPT | Mod: PBBFAC | Performed by: INTERNAL MEDICINE

## 2021-03-22 PROCEDURE — 99999 PR PBB SHADOW E&M-EST. PATIENT-LVL IV: ICD-10-PCS | Mod: PBBFAC,,, | Performed by: INTERNAL MEDICINE

## 2021-03-22 PROCEDURE — 99214 PR OFFICE/OUTPT VISIT, EST, LEVL IV, 30-39 MIN: ICD-10-PCS | Mod: S$PBB,,, | Performed by: INTERNAL MEDICINE

## 2021-03-29 ENCOUNTER — OFFICE VISIT (OUTPATIENT)
Dept: SURGERY | Facility: CLINIC | Age: 36
End: 2021-03-29

## 2021-03-29 VITALS
HEIGHT: 68 IN | HEART RATE: 95 BPM | WEIGHT: 198.63 LBS | BODY MASS INDEX: 30.1 KG/M2 | DIASTOLIC BLOOD PRESSURE: 77 MMHG | SYSTOLIC BLOOD PRESSURE: 144 MMHG

## 2021-03-29 DIAGNOSIS — Z93.2 ILEOSTOMY PRESENT: ICD-10-CM

## 2021-03-29 DIAGNOSIS — K50.113 CROHN'S DISEASE OF COLON WITH FISTULA: ICD-10-CM

## 2021-03-29 DIAGNOSIS — K60.3 ANAL FISTULA: Primary | ICD-10-CM

## 2021-03-29 PROCEDURE — 99999 PR PBB SHADOW E&M-EST. PATIENT-LVL IV: CPT | Mod: PBBFAC,,, | Performed by: COLON & RECTAL SURGERY

## 2021-03-29 PROCEDURE — 99214 OFFICE O/P EST MOD 30 MIN: CPT | Mod: PBBFAC | Performed by: COLON & RECTAL SURGERY

## 2021-03-29 PROCEDURE — 99213 PR OFFICE/OUTPT VISIT, EST, LEVL III, 20-29 MIN: ICD-10-PCS | Mod: S$PBB,,, | Performed by: COLON & RECTAL SURGERY

## 2021-03-29 PROCEDURE — 99213 OFFICE O/P EST LOW 20 MIN: CPT | Mod: S$PBB,,, | Performed by: COLON & RECTAL SURGERY

## 2021-03-29 PROCEDURE — 99999 PR PBB SHADOW E&M-EST. PATIENT-LVL IV: ICD-10-PCS | Mod: PBBFAC,,, | Performed by: COLON & RECTAL SURGERY

## 2021-03-30 ENCOUNTER — PATIENT MESSAGE (OUTPATIENT)
Dept: GASTROENTEROLOGY | Facility: CLINIC | Age: 36
End: 2021-03-30

## 2021-06-28 ENCOUNTER — OFFICE VISIT (OUTPATIENT)
Dept: SURGERY | Facility: CLINIC | Age: 36
End: 2021-06-28

## 2021-06-28 ENCOUNTER — TELEPHONE (OUTPATIENT)
Dept: SURGERY | Facility: CLINIC | Age: 36
End: 2021-06-28

## 2021-06-28 ENCOUNTER — LAB VISIT (OUTPATIENT)
Dept: LAB | Facility: HOSPITAL | Age: 36
End: 2021-06-28
Attending: COLON & RECTAL SURGERY

## 2021-06-28 ENCOUNTER — PATIENT MESSAGE (OUTPATIENT)
Dept: SURGERY | Facility: CLINIC | Age: 36
End: 2021-06-28

## 2021-06-28 VITALS
DIASTOLIC BLOOD PRESSURE: 88 MMHG | HEART RATE: 95 BPM | WEIGHT: 196.5 LBS | HEIGHT: 68 IN | SYSTOLIC BLOOD PRESSURE: 147 MMHG | BODY MASS INDEX: 29.78 KG/M2

## 2021-06-28 DIAGNOSIS — Z01.818 PREOP TESTING: Primary | ICD-10-CM

## 2021-06-28 DIAGNOSIS — K60.3 ANAL FISTULA: Primary | ICD-10-CM

## 2021-06-28 DIAGNOSIS — K60.3 ANAL FISTULA: ICD-10-CM

## 2021-06-28 DIAGNOSIS — Z93.2 ILEOSTOMY PRESENT: ICD-10-CM

## 2021-06-28 DIAGNOSIS — K50.113 CROHN'S DISEASE OF COLON WITH FISTULA: ICD-10-CM

## 2021-06-28 LAB
ANION GAP SERPL CALC-SCNC: 13 MMOL/L (ref 8–16)
BASOPHILS # BLD AUTO: 0.03 K/UL (ref 0–0.2)
BASOPHILS NFR BLD: 0.4 % (ref 0–1.9)
BUN SERPL-MCNC: 11 MG/DL (ref 6–20)
CALCIUM SERPL-MCNC: 10.3 MG/DL (ref 8.7–10.5)
CHLORIDE SERPL-SCNC: 100 MMOL/L (ref 95–110)
CO2 SERPL-SCNC: 24 MMOL/L (ref 23–29)
CREAT SERPL-MCNC: 1.3 MG/DL (ref 0.5–1.4)
DIFFERENTIAL METHOD: NORMAL
EOSINOPHIL # BLD AUTO: 0.1 K/UL (ref 0–0.5)
EOSINOPHIL NFR BLD: 1 % (ref 0–8)
ERYTHROCYTE [DISTWIDTH] IN BLOOD BY AUTOMATED COUNT: 13 % (ref 11.5–14.5)
EST. GFR  (AFRICAN AMERICAN): >60 ML/MIN/1.73 M^2
EST. GFR  (NON AFRICAN AMERICAN): >60 ML/MIN/1.73 M^2
GLUCOSE SERPL-MCNC: 565 MG/DL (ref 70–110)
HCT VFR BLD AUTO: 43 % (ref 40–54)
HGB BLD-MCNC: 15.2 G/DL (ref 14–18)
IMM GRANULOCYTES # BLD AUTO: 0.01 K/UL (ref 0–0.04)
IMM GRANULOCYTES NFR BLD AUTO: 0.1 % (ref 0–0.5)
LYMPHOCYTES # BLD AUTO: 1.8 K/UL (ref 1–4.8)
LYMPHOCYTES NFR BLD: 21.6 % (ref 18–48)
MCH RBC QN AUTO: 30.5 PG (ref 27–31)
MCHC RBC AUTO-ENTMCNC: 35.3 G/DL (ref 32–36)
MCV RBC AUTO: 86 FL (ref 82–98)
MONOCYTES # BLD AUTO: 0.7 K/UL (ref 0.3–1)
MONOCYTES NFR BLD: 8.8 % (ref 4–15)
NEUTROPHILS # BLD AUTO: 5.6 K/UL (ref 1.8–7.7)
NEUTROPHILS NFR BLD: 68.1 % (ref 38–73)
NRBC BLD-RTO: 0 /100 WBC
PLATELET # BLD AUTO: 239 K/UL (ref 150–450)
PMV BLD AUTO: 12.6 FL (ref 9.2–12.9)
POTASSIUM SERPL-SCNC: 4.9 MMOL/L (ref 3.5–5.1)
RBC # BLD AUTO: 4.99 M/UL (ref 4.6–6.2)
SODIUM SERPL-SCNC: 137 MMOL/L (ref 136–145)
WBC # BLD AUTO: 8.21 K/UL (ref 3.9–12.7)

## 2021-06-28 PROCEDURE — 85025 COMPLETE CBC W/AUTO DIFF WBC: CPT | Performed by: COLON & RECTAL SURGERY

## 2021-06-28 PROCEDURE — 99213 PR OFFICE/OUTPT VISIT, EST, LEVL III, 20-29 MIN: ICD-10-PCS | Mod: S$PBB,,, | Performed by: COLON & RECTAL SURGERY

## 2021-06-28 PROCEDURE — 36415 COLL VENOUS BLD VENIPUNCTURE: CPT | Performed by: COLON & RECTAL SURGERY

## 2021-06-28 PROCEDURE — 80048 BASIC METABOLIC PNL TOTAL CA: CPT | Performed by: COLON & RECTAL SURGERY

## 2021-06-28 PROCEDURE — 99215 OFFICE O/P EST HI 40 MIN: CPT | Mod: PBBFAC | Performed by: COLON & RECTAL SURGERY

## 2021-06-28 PROCEDURE — 99999 PR PBB SHADOW E&M-EST. PATIENT-LVL V: CPT | Mod: PBBFAC,,, | Performed by: COLON & RECTAL SURGERY

## 2021-06-28 PROCEDURE — 99213 OFFICE O/P EST LOW 20 MIN: CPT | Mod: S$PBB,,, | Performed by: COLON & RECTAL SURGERY

## 2021-06-28 PROCEDURE — 99999 PR PBB SHADOW E&M-EST. PATIENT-LVL V: ICD-10-PCS | Mod: PBBFAC,,, | Performed by: COLON & RECTAL SURGERY

## 2021-06-28 RX ORDER — METRONIDAZOLE 500 MG/1
500 TABLET ORAL EVERY 8 HOURS
Qty: 30 TABLET | Refills: 0 | Status: SHIPPED | OUTPATIENT
Start: 2021-06-28 | End: 2021-07-08

## 2021-07-19 ENCOUNTER — LAB VISIT (OUTPATIENT)
Dept: SPORTS MEDICINE | Facility: CLINIC | Age: 36
End: 2021-07-19

## 2021-07-19 DIAGNOSIS — Z01.818 PREOP TESTING: ICD-10-CM

## 2021-07-19 PROCEDURE — U0005 INFEC AGEN DETEC AMPLI PROBE: HCPCS | Performed by: COLON & RECTAL SURGERY

## 2021-07-19 PROCEDURE — U0003 INFECTIOUS AGENT DETECTION BY NUCLEIC ACID (DNA OR RNA); SEVERE ACUTE RESPIRATORY SYNDROME CORONAVIRUS 2 (SARS-COV-2) (CORONAVIRUS DISEASE [COVID-19]), AMPLIFIED PROBE TECHNIQUE, MAKING USE OF HIGH THROUGHPUT TECHNOLOGIES AS DESCRIBED BY CMS-2020-01-R: HCPCS | Performed by: COLON & RECTAL SURGERY

## 2021-07-20 ENCOUNTER — TELEPHONE (OUTPATIENT)
Dept: SURGERY | Facility: CLINIC | Age: 36
End: 2021-07-20

## 2021-07-20 LAB — SARS-COV-2- CYCLE NUMBER: 31.66

## 2021-07-21 ENCOUNTER — HOSPITAL ENCOUNTER (OUTPATIENT)
Facility: HOSPITAL | Age: 36
Discharge: HOME | End: 2021-07-21
Attending: COLON & RECTAL SURGERY | Admitting: COLON & RECTAL SURGERY

## 2021-07-21 ENCOUNTER — TELEPHONE (OUTPATIENT)
Dept: SURGERY | Facility: CLINIC | Age: 36
End: 2021-07-21

## 2021-07-21 DIAGNOSIS — K60.3 ANAL FISTULA: ICD-10-CM

## 2021-07-21 DIAGNOSIS — U07.1 COVID-19 VIRUS DETECTED: ICD-10-CM

## 2021-07-21 LAB — SARS-COV-2 RNA RESP QL NAA+PROBE: DETECTED

## 2021-07-21 PROCEDURE — 82962 GLUCOSE BLOOD TEST: CPT | Performed by: COLON & RECTAL SURGERY

## 2021-07-21 PROCEDURE — 99499 UNLISTED E&M SERVICE: CPT | Mod: ,,, | Performed by: COLON & RECTAL SURGERY

## 2021-07-21 PROCEDURE — 99499 NO LOS: ICD-10-PCS | Mod: ,,, | Performed by: COLON & RECTAL SURGERY

## 2021-07-21 RX ORDER — MUPIROCIN 20 MG/G
OINTMENT TOPICAL
Status: DISCONTINUED | OUTPATIENT
Start: 2021-07-21 | End: 2021-07-21 | Stop reason: HOSPADM

## 2021-07-21 RX ORDER — SODIUM CHLORIDE 9 MG/ML
INJECTION, SOLUTION INTRAVENOUS CONTINUOUS
Status: DISCONTINUED | OUTPATIENT
Start: 2021-07-21 | End: 2021-07-21 | Stop reason: HOSPADM

## 2021-07-21 RX ORDER — LIDOCAINE HYDROCHLORIDE 10 MG/ML
1 INJECTION, SOLUTION EPIDURAL; INFILTRATION; INTRACAUDAL; PERINEURAL ONCE
Status: DISCONTINUED | OUTPATIENT
Start: 2021-07-21 | End: 2021-07-21 | Stop reason: HOSPADM

## 2021-07-27 ENCOUNTER — HOSPITAL ENCOUNTER (INPATIENT)
Facility: HOSPITAL | Age: 36
LOS: 3 days | Discharge: HOME | DRG: 637 | End: 2021-07-31
Attending: EMERGENCY MEDICINE | Admitting: INTERNAL MEDICINE

## 2021-07-27 DIAGNOSIS — E11.65 TYPE 2 DIABETES MELLITUS WITH HYPERGLYCEMIA, UNSPECIFIED WHETHER LONG TERM INSULIN USE: Chronic | ICD-10-CM

## 2021-07-27 DIAGNOSIS — R73.9 HYPERGLYCEMIA: ICD-10-CM

## 2021-07-27 DIAGNOSIS — E87.6 HYPOKALEMIA: ICD-10-CM

## 2021-07-27 DIAGNOSIS — U07.1 COVID-19: ICD-10-CM

## 2021-07-27 DIAGNOSIS — E10.10 DIABETIC KETOACIDOSIS WITHOUT COMA ASSOCIATED WITH TYPE 1 DIABETES MELLITUS: Primary | ICD-10-CM

## 2021-07-27 LAB
ALLENS TEST: ABNORMAL
B-OH-BUTYR BLD STRIP-SCNC: 6 MMOL/L (ref 0–0.5)
BACTERIA #/AREA URNS AUTO: NORMAL /HPF
BASOPHILS # BLD AUTO: 0.02 K/UL (ref 0–0.2)
BASOPHILS NFR BLD: 0.2 % (ref 0–1.9)
BILIRUB UR QL STRIP: NEGATIVE
CLARITY UR REFRACT.AUTO: CLEAR
COLOR UR AUTO: ABNORMAL
D DIMER PPP IA.FEU-MCNC: 0.79 MG/L FEU
DIFFERENTIAL METHOD: ABNORMAL
EOSINOPHIL # BLD AUTO: 0 K/UL (ref 0–0.5)
EOSINOPHIL NFR BLD: 0 % (ref 0–8)
ERYTHROCYTE [DISTWIDTH] IN BLOOD BY AUTOMATED COUNT: 13.4 % (ref 11.5–14.5)
GLUCOSE UR QL STRIP: ABNORMAL
HCO3 UR-SCNC: 16.9 MMOL/L (ref 24–28)
HCT VFR BLD AUTO: 51.2 % (ref 40–54)
HGB BLD-MCNC: 18.4 G/DL (ref 14–18)
HGB UR QL STRIP: ABNORMAL
HYALINE CASTS UR QL AUTO: 0 /LPF
IMM GRANULOCYTES # BLD AUTO: 0.01 K/UL (ref 0–0.04)
IMM GRANULOCYTES NFR BLD AUTO: 0.1 % (ref 0–0.5)
KETONES UR QL STRIP: ABNORMAL
LACTATE SERPL-SCNC: 2 MMOL/L (ref 0.5–2.2)
LDH SERPL L TO P-CCNC: 207 U/L (ref 110–260)
LEUKOCYTE ESTERASE UR QL STRIP: NEGATIVE
LYMPHOCYTES # BLD AUTO: 1.4 K/UL (ref 1–4.8)
LYMPHOCYTES NFR BLD: 17.8 % (ref 18–48)
MCH RBC QN AUTO: 30.2 PG (ref 27–31)
MCHC RBC AUTO-ENTMCNC: 35.9 G/DL (ref 32–36)
MCV RBC AUTO: 84 FL (ref 82–98)
MICROSCOPIC COMMENT: NORMAL
MONOCYTES # BLD AUTO: 0.7 K/UL (ref 0.3–1)
MONOCYTES NFR BLD: 9 % (ref 4–15)
NEUTROPHILS # BLD AUTO: 5.9 K/UL (ref 1.8–7.7)
NEUTROPHILS NFR BLD: 72.9 % (ref 38–73)
NITRITE UR QL STRIP: NEGATIVE
NRBC BLD-RTO: 0 /100 WBC
PCO2 BLDA: 36.4 MMHG (ref 35–45)
PH SMN: 7.28 [PH] (ref 7.35–7.45)
PH UR STRIP: 5 [PH] (ref 5–8)
PLATELET # BLD AUTO: 191 K/UL (ref 150–450)
PMV BLD AUTO: 13.5 FL (ref 9.2–12.9)
PO2 BLDA: 42 MMHG (ref 40–60)
POC BE: -10 MMOL/L
POC SATURATED O2: 71 % (ref 95–100)
POC TCO2: 18 MMOL/L (ref 24–29)
PROT UR QL STRIP: ABNORMAL
RBC # BLD AUTO: 6.09 M/UL (ref 4.6–6.2)
RBC #/AREA URNS AUTO: 1 /HPF (ref 0–4)
SAMPLE: ABNORMAL
SITE: ABNORMAL
SP GR UR STRIP: >=1.03 (ref 1–1.03)
URN SPEC COLLECT METH UR: ABNORMAL
WBC # BLD AUTO: 8.04 K/UL (ref 3.9–12.7)
WBC #/AREA URNS AUTO: 1 /HPF (ref 0–5)
YEAST UR QL AUTO: NORMAL

## 2021-07-27 PROCEDURE — 84484 ASSAY OF TROPONIN QUANT: CPT | Performed by: EMERGENCY MEDICINE

## 2021-07-27 PROCEDURE — 83605 ASSAY OF LACTIC ACID: CPT | Performed by: EMERGENCY MEDICINE

## 2021-07-27 PROCEDURE — 99285 EMERGENCY DEPT VISIT HI MDM: CPT | Mod: 25

## 2021-07-27 PROCEDURE — 83615 LACTATE (LD) (LDH) ENZYME: CPT | Performed by: EMERGENCY MEDICINE

## 2021-07-27 PROCEDURE — 80053 COMPREHEN METABOLIC PANEL: CPT | Performed by: EMERGENCY MEDICINE

## 2021-07-27 PROCEDURE — 96361 HYDRATE IV INFUSION ADD-ON: CPT

## 2021-07-27 PROCEDURE — 86140 C-REACTIVE PROTEIN: CPT | Performed by: EMERGENCY MEDICINE

## 2021-07-27 PROCEDURE — 82803 BLOOD GASES ANY COMBINATION: CPT

## 2021-07-27 PROCEDURE — 85025 COMPLETE CBC W/AUTO DIFF WBC: CPT | Performed by: EMERGENCY MEDICINE

## 2021-07-27 PROCEDURE — 82010 KETONE BODYS QUAN: CPT | Performed by: EMERGENCY MEDICINE

## 2021-07-27 PROCEDURE — 85379 FIBRIN DEGRADATION QUANT: CPT | Performed by: PHYSICIAN ASSISTANT

## 2021-07-27 PROCEDURE — 25000003 PHARM REV CODE 250: Performed by: PHYSICIAN ASSISTANT

## 2021-07-27 PROCEDURE — 99285 PR EMERGENCY DEPT VISIT,LEVEL V: ICD-10-PCS | Mod: CR,,, | Performed by: PHYSICIAN ASSISTANT

## 2021-07-27 PROCEDURE — 63600175 PHARM REV CODE 636 W HCPCS: Performed by: PHYSICIAN ASSISTANT

## 2021-07-27 PROCEDURE — 81001 URINALYSIS AUTO W/SCOPE: CPT | Performed by: EMERGENCY MEDICINE

## 2021-07-27 PROCEDURE — 96374 THER/PROPH/DIAG INJ IV PUSH: CPT

## 2021-07-27 PROCEDURE — 99285 EMERGENCY DEPT VISIT HI MDM: CPT | Mod: CR,,, | Performed by: PHYSICIAN ASSISTANT

## 2021-07-27 PROCEDURE — 80047 BASIC METABLC PNL IONIZED CA: CPT

## 2021-07-27 PROCEDURE — 82800 BLOOD PH: CPT

## 2021-07-27 PROCEDURE — 93005 ELECTROCARDIOGRAM TRACING: CPT

## 2021-07-27 PROCEDURE — 93010 ELECTROCARDIOGRAM REPORT: CPT | Mod: ,,, | Performed by: INTERNAL MEDICINE

## 2021-07-27 PROCEDURE — 82550 ASSAY OF CK (CPK): CPT | Performed by: EMERGENCY MEDICINE

## 2021-07-27 PROCEDURE — 99900035 HC TECH TIME PER 15 MIN (STAT)

## 2021-07-27 PROCEDURE — 82728 ASSAY OF FERRITIN: CPT | Performed by: EMERGENCY MEDICINE

## 2021-07-27 PROCEDURE — 93010 EKG 12-LEAD: ICD-10-PCS | Mod: ,,, | Performed by: INTERNAL MEDICINE

## 2021-07-27 RX ORDER — MORPHINE SULFATE 4 MG/ML
4 INJECTION, SOLUTION INTRAMUSCULAR; INTRAVENOUS
Status: COMPLETED | OUTPATIENT
Start: 2021-07-27 | End: 2021-07-27

## 2021-07-27 RX ADMIN — SODIUM CHLORIDE 1000 ML: 0.9 INJECTION, SOLUTION INTRAVENOUS at 11:07

## 2021-07-27 RX ADMIN — MORPHINE SULFATE 4 MG: 4 INJECTION INTRAVENOUS at 10:07

## 2021-07-28 PROBLEM — U07.1 COVID-19: Status: ACTIVE | Noted: 2021-07-28

## 2021-07-28 PROBLEM — F17.200 TOBACCO USE DISORDER: Status: ACTIVE | Noted: 2021-07-28

## 2021-07-28 PROBLEM — E78.1 HYPERTRIGLYCERIDEMIA: Status: ACTIVE | Noted: 2021-07-28

## 2021-07-28 PROBLEM — E55.9 VITAMIN D DEFICIENCY: Status: ACTIVE | Noted: 2021-07-28

## 2021-07-28 PROBLEM — E10.10 DIABETIC KETOACIDOSIS WITHOUT COMA ASSOCIATED WITH TYPE 1 DIABETES MELLITUS: Status: ACTIVE | Noted: 2021-07-28

## 2021-07-28 PROBLEM — G89.29 CHRONIC PAIN: Status: ACTIVE | Noted: 2021-07-28

## 2021-07-28 PROBLEM — Z79.899 DVT PROPHYLAXIS: Status: ACTIVE | Noted: 2021-07-28

## 2021-07-28 LAB
25(OH)D3+25(OH)D2 SERPL-MCNC: 17 NG/ML (ref 30–96)
ALBUMIN SERPL BCP-MCNC: 4 G/DL (ref 3.5–5.2)
ALBUMIN SERPL BCP-MCNC: 4.3 G/DL (ref 3.5–5.2)
ALP SERPL-CCNC: 108 U/L (ref 55–135)
ALP SERPL-CCNC: 96 U/L (ref 55–135)
ALT SERPL W/O P-5'-P-CCNC: 19 U/L (ref 10–44)
ALT SERPL W/O P-5'-P-CCNC: 21 U/L (ref 10–44)
ANION GAP SERPL CALC-SCNC: 12 MMOL/L (ref 8–16)
ANION GAP SERPL CALC-SCNC: 15 MMOL/L (ref 8–16)
ANION GAP SERPL CALC-SCNC: 21 MMOL/L (ref 8–16)
ANION GAP SERPL CALC-SCNC: 25 MMOL/L (ref 8–16)
ANION GAP SERPL CALC-SCNC: 7 MMOL/L (ref 8–16)
APTT BLDCRRT: 23.6 SEC (ref 21–32)
AST SERPL-CCNC: 9 U/L (ref 10–40)
AST SERPL-CCNC: 9 U/L (ref 10–40)
BACTERIA #/AREA URNS AUTO: NORMAL /HPF
BASOPHILS # BLD AUTO: 0.02 K/UL (ref 0–0.2)
BASOPHILS NFR BLD: 0.2 % (ref 0–1.9)
BILIRUB DIRECT SERPL-MCNC: 0.2 MG/DL (ref 0.1–0.3)
BILIRUB SERPL-MCNC: 0.6 MG/DL (ref 0.1–1)
BILIRUB SERPL-MCNC: 0.6 MG/DL (ref 0.1–1)
BUN SERPL-MCNC: 10 MG/DL (ref 6–20)
BUN SERPL-MCNC: 10 MG/DL (ref 6–20)
BUN SERPL-MCNC: 10 MG/DL (ref 6–30)
BUN SERPL-MCNC: 8 MG/DL (ref 6–20)
BUN SERPL-MCNC: 8 MG/DL (ref 6–20)
BUN SERPL-MCNC: 9 MG/DL (ref 6–20)
CALCIUM SERPL-MCNC: 10.2 MG/DL (ref 8.7–10.5)
CALCIUM SERPL-MCNC: 10.6 MG/DL (ref 8.7–10.5)
CALCIUM SERPL-MCNC: 8.2 MG/DL (ref 8.7–10.5)
CALCIUM SERPL-MCNC: 8.8 MG/DL (ref 8.7–10.5)
CALCIUM SERPL-MCNC: 9.6 MG/DL (ref 8.7–10.5)
CHLORIDE SERPL-SCNC: 102 MMOL/L (ref 95–110)
CHLORIDE SERPL-SCNC: 107 MMOL/L (ref 95–110)
CHLORIDE SERPL-SCNC: 107 MMOL/L (ref 95–110)
CHLORIDE SERPL-SCNC: 108 MMOL/L (ref 95–110)
CHLORIDE SERPL-SCNC: 110 MMOL/L (ref 95–110)
CHLORIDE SERPL-SCNC: 111 MMOL/L (ref 95–110)
CK SERPL-CCNC: 84 U/L (ref 20–200)
CO2 SERPL-SCNC: 14 MMOL/L (ref 23–29)
CO2 SERPL-SCNC: 18 MMOL/L (ref 23–29)
CO2 SERPL-SCNC: 18 MMOL/L (ref 23–29)
CO2 SERPL-SCNC: 22 MMOL/L (ref 23–29)
CO2 SERPL-SCNC: 22 MMOL/L (ref 23–29)
CREAT SERPL-MCNC: 0.9 MG/DL (ref 0.5–1.4)
CREAT SERPL-MCNC: 0.9 MG/DL (ref 0.5–1.4)
CREAT SERPL-MCNC: 1 MG/DL (ref 0.5–1.4)
CREAT SERPL-MCNC: 1.4 MG/DL (ref 0.5–1.4)
CREAT SERPL-MCNC: 1.6 MG/DL (ref 0.5–1.4)
CREAT SERPL-MCNC: 2 MG/DL (ref 0.5–1.4)
CREAT UR-MCNC: 137 MG/DL (ref 23–375)
CRP SERPL-MCNC: 5.8 MG/L (ref 0–8.2)
DIFFERENTIAL METHOD: ABNORMAL
EOSINOPHIL # BLD AUTO: 0 K/UL (ref 0–0.5)
EOSINOPHIL NFR BLD: 0 % (ref 0–8)
ERYTHROCYTE [DISTWIDTH] IN BLOOD BY AUTOMATED COUNT: 13.1 % (ref 11.5–14.5)
ERYTHROCYTE [SEDIMENTATION RATE] IN BLOOD BY WESTERGREN METHOD: 33 MM/HR (ref 0–23)
EST. GFR  (AFRICAN AMERICAN): 48.9 ML/MIN/1.73 M^2
EST. GFR  (AFRICAN AMERICAN): >60 ML/MIN/1.73 M^2
EST. GFR  (NON AFRICAN AMERICAN): 42.3 ML/MIN/1.73 M^2
EST. GFR  (NON AFRICAN AMERICAN): 55.4 ML/MIN/1.73 M^2
EST. GFR  (NON AFRICAN AMERICAN): >60 ML/MIN/1.73 M^2
ESTIMATED AVG GLUCOSE: 289 MG/DL (ref 68–131)
FERRITIN SERPL-MCNC: 836 NG/ML (ref 20–300)
GLUCOSE SERPL-MCNC: 226 MG/DL (ref 70–110)
GLUCOSE SERPL-MCNC: 294 MG/DL (ref 70–110)
GLUCOSE SERPL-MCNC: 325 MG/DL (ref 70–110)
GLUCOSE SERPL-MCNC: 367 MG/DL (ref 70–110)
GLUCOSE SERPL-MCNC: 488 MG/DL (ref 70–110)
GLUCOSE SERPL-MCNC: 780 MG/DL (ref 70–110)
HBA1C MFR BLD: 11.7 % (ref 4–5.6)
HCT VFR BLD AUTO: 49.3 % (ref 40–54)
HCT VFR BLD CALC: 50 %PCV (ref 36–54)
HGB BLD-MCNC: 17.6 G/DL (ref 14–18)
HYALINE CASTS UR QL AUTO: 1 /LPF
IMM GRANULOCYTES # BLD AUTO: 0.01 K/UL (ref 0–0.04)
IMM GRANULOCYTES NFR BLD AUTO: 0.1 % (ref 0–0.5)
INR PPP: 1 (ref 0.8–1.2)
LYMPHOCYTES # BLD AUTO: 2.1 K/UL (ref 1–4.8)
LYMPHOCYTES NFR BLD: 24.8 % (ref 18–48)
MAGNESIUM SERPL-MCNC: 1.6 MG/DL (ref 1.6–2.6)
MAGNESIUM SERPL-MCNC: 2 MG/DL (ref 1.6–2.6)
MCH RBC QN AUTO: 30.7 PG (ref 27–31)
MCHC RBC AUTO-ENTMCNC: 35.7 G/DL (ref 32–36)
MCV RBC AUTO: 86 FL (ref 82–98)
MICROSCOPIC COMMENT: NORMAL
MONOCYTES # BLD AUTO: 0.9 K/UL (ref 0.3–1)
MONOCYTES NFR BLD: 10.7 % (ref 4–15)
NEUTROPHILS # BLD AUTO: 5.4 K/UL (ref 1.8–7.7)
NEUTROPHILS NFR BLD: 64.2 % (ref 38–73)
NRBC BLD-RTO: 0 /100 WBC
OSMOLALITY SERPL: 335 MOSM/KG (ref 280–300)
PHOSPHATE SERPL-MCNC: 1.1 MG/DL (ref 2.7–4.5)
PHOSPHATE SERPL-MCNC: 2.7 MG/DL (ref 2.7–4.5)
PLATELET # BLD AUTO: 187 K/UL (ref 150–450)
PMV BLD AUTO: 13.4 FL (ref 9.2–12.9)
POC IONIZED CALCIUM: 1.29 MMOL/L (ref 1.06–1.42)
POC TCO2 (MEASURED): 19 MMOL/L (ref 23–29)
POCT GLUCOSE: 213 MG/DL (ref 70–110)
POCT GLUCOSE: 231 MG/DL (ref 70–110)
POCT GLUCOSE: 250 MG/DL (ref 70–110)
POCT GLUCOSE: 252 MG/DL (ref 70–110)
POCT GLUCOSE: 268 MG/DL (ref 70–110)
POCT GLUCOSE: 276 MG/DL (ref 70–110)
POCT GLUCOSE: 277 MG/DL (ref 70–110)
POCT GLUCOSE: 280 MG/DL (ref 70–110)
POCT GLUCOSE: 290 MG/DL (ref 70–110)
POCT GLUCOSE: 291 MG/DL (ref 70–110)
POCT GLUCOSE: 292 MG/DL (ref 70–110)
POCT GLUCOSE: 292 MG/DL (ref 70–110)
POCT GLUCOSE: 305 MG/DL (ref 70–110)
POCT GLUCOSE: 312 MG/DL (ref 70–110)
POCT GLUCOSE: 327 MG/DL (ref 70–110)
POCT GLUCOSE: 328 MG/DL (ref 70–110)
POCT GLUCOSE: 340 MG/DL (ref 70–110)
POCT GLUCOSE: 341 MG/DL (ref 70–110)
POCT GLUCOSE: 349 MG/DL (ref 70–110)
POCT GLUCOSE: 350 MG/DL (ref 70–110)
POCT GLUCOSE: 354 MG/DL (ref 70–110)
POCT GLUCOSE: 361 MG/DL (ref 70–110)
POCT GLUCOSE: 429 MG/DL (ref 70–110)
POTASSIUM BLD-SCNC: 4.5 MMOL/L (ref 3.5–5.1)
POTASSIUM SERPL-SCNC: 2.9 MMOL/L (ref 3.5–5.1)
POTASSIUM SERPL-SCNC: 3.9 MMOL/L (ref 3.5–5.1)
POTASSIUM SERPL-SCNC: 4.2 MMOL/L (ref 3.5–5.1)
POTASSIUM SERPL-SCNC: 4.6 MMOL/L (ref 3.5–5.1)
POTASSIUM SERPL-SCNC: 4.7 MMOL/L (ref 3.5–5.1)
PROCALCITONIN SERPL IA-MCNC: 0.04 NG/ML
PROT SERPL-MCNC: 8.7 G/DL (ref 6–8.4)
PROT SERPL-MCNC: 9.6 G/DL (ref 6–8.4)
PROTHROMBIN TIME: 10.7 SEC (ref 9–12.5)
RBC # BLD AUTO: 5.74 M/UL (ref 4.6–6.2)
SAMPLE: ABNORMAL
SODIUM BLD-SCNC: 146 MMOL/L (ref 136–145)
SODIUM SERPL-SCNC: 140 MMOL/L (ref 136–145)
SODIUM SERPL-SCNC: 140 MMOL/L (ref 136–145)
SODIUM SERPL-SCNC: 141 MMOL/L (ref 136–145)
SODIUM SERPL-SCNC: 144 MMOL/L (ref 136–145)
SODIUM SERPL-SCNC: 146 MMOL/L (ref 136–145)
SODIUM UR-SCNC: <20 MMOL/L (ref 20–250)
TROPONIN I SERPL DL<=0.01 NG/ML-MCNC: <0.006 NG/ML (ref 0–0.03)
WBC # BLD AUTO: 8.34 K/UL (ref 3.9–12.7)
WBC #/AREA URNS AUTO: 3 /HPF (ref 0–5)

## 2021-07-28 PROCEDURE — 84100 ASSAY OF PHOSPHORUS: CPT | Mod: 91 | Performed by: STUDENT IN AN ORGANIZED HEALTH CARE EDUCATION/TRAINING PROGRAM

## 2021-07-28 PROCEDURE — 85652 RBC SED RATE AUTOMATED: CPT | Performed by: STUDENT IN AN ORGANIZED HEALTH CARE EDUCATION/TRAINING PROGRAM

## 2021-07-28 PROCEDURE — 63600175 PHARM REV CODE 636 W HCPCS: Performed by: HOSPITALIST

## 2021-07-28 PROCEDURE — 36415 COLL VENOUS BLD VENIPUNCTURE: CPT | Performed by: STUDENT IN AN ORGANIZED HEALTH CARE EDUCATION/TRAINING PROGRAM

## 2021-07-28 PROCEDURE — 87040 BLOOD CULTURE FOR BACTERIA: CPT | Mod: 59 | Performed by: STUDENT IN AN ORGANIZED HEALTH CARE EDUCATION/TRAINING PROGRAM

## 2021-07-28 PROCEDURE — 20600001 HC STEP DOWN PRIVATE ROOM

## 2021-07-28 PROCEDURE — 85610 PROTHROMBIN TIME: CPT | Performed by: STUDENT IN AN ORGANIZED HEALTH CARE EDUCATION/TRAINING PROGRAM

## 2021-07-28 PROCEDURE — 80048 BASIC METABOLIC PNL TOTAL CA: CPT | Mod: 91 | Performed by: STUDENT IN AN ORGANIZED HEALTH CARE EDUCATION/TRAINING PROGRAM

## 2021-07-28 PROCEDURE — 25000003 PHARM REV CODE 250: Performed by: STUDENT IN AN ORGANIZED HEALTH CARE EDUCATION/TRAINING PROGRAM

## 2021-07-28 PROCEDURE — 94761 N-INVAS EAR/PLS OXIMETRY MLT: CPT

## 2021-07-28 PROCEDURE — 83735 ASSAY OF MAGNESIUM: CPT | Performed by: STUDENT IN AN ORGANIZED HEALTH CARE EDUCATION/TRAINING PROGRAM

## 2021-07-28 PROCEDURE — 99900035 HC TECH TIME PER 15 MIN (STAT)

## 2021-07-28 PROCEDURE — 83735 ASSAY OF MAGNESIUM: CPT | Mod: 91 | Performed by: STUDENT IN AN ORGANIZED HEALTH CARE EDUCATION/TRAINING PROGRAM

## 2021-07-28 PROCEDURE — 85730 THROMBOPLASTIN TIME PARTIAL: CPT | Performed by: STUDENT IN AN ORGANIZED HEALTH CARE EDUCATION/TRAINING PROGRAM

## 2021-07-28 PROCEDURE — 83930 ASSAY OF BLOOD OSMOLALITY: CPT | Performed by: STUDENT IN AN ORGANIZED HEALTH CARE EDUCATION/TRAINING PROGRAM

## 2021-07-28 PROCEDURE — 25000003 PHARM REV CODE 250: Performed by: INTERNAL MEDICINE

## 2021-07-28 PROCEDURE — 80076 HEPATIC FUNCTION PANEL: CPT | Performed by: STUDENT IN AN ORGANIZED HEALTH CARE EDUCATION/TRAINING PROGRAM

## 2021-07-28 PROCEDURE — 84145 PROCALCITONIN (PCT): CPT | Performed by: STUDENT IN AN ORGANIZED HEALTH CARE EDUCATION/TRAINING PROGRAM

## 2021-07-28 PROCEDURE — S5010 5% DEXTROSE AND 0.45% SALINE: HCPCS | Performed by: STUDENT IN AN ORGANIZED HEALTH CARE EDUCATION/TRAINING PROGRAM

## 2021-07-28 PROCEDURE — 84300 ASSAY OF URINE SODIUM: CPT | Performed by: STUDENT IN AN ORGANIZED HEALTH CARE EDUCATION/TRAINING PROGRAM

## 2021-07-28 PROCEDURE — 63600175 PHARM REV CODE 636 W HCPCS: Performed by: STUDENT IN AN ORGANIZED HEALTH CARE EDUCATION/TRAINING PROGRAM

## 2021-07-28 PROCEDURE — 25000003 PHARM REV CODE 250: Performed by: PHYSICIAN ASSISTANT

## 2021-07-28 PROCEDURE — 83935 ASSAY OF URINE OSMOLALITY: CPT | Performed by: STUDENT IN AN ORGANIZED HEALTH CARE EDUCATION/TRAINING PROGRAM

## 2021-07-28 PROCEDURE — 63700000 PHARM REV CODE 250 ALT 637 W/O HCPCS: Performed by: STUDENT IN AN ORGANIZED HEALTH CARE EDUCATION/TRAINING PROGRAM

## 2021-07-28 PROCEDURE — 94799 UNLISTED PULMONARY SVC/PX: CPT

## 2021-07-28 PROCEDURE — 27000646 HC AEROBIKA DEVICE

## 2021-07-28 PROCEDURE — 80048 BASIC METABOLIC PNL TOTAL CA: CPT | Performed by: STUDENT IN AN ORGANIZED HEALTH CARE EDUCATION/TRAINING PROGRAM

## 2021-07-28 PROCEDURE — 99223 PR INITIAL HOSPITAL CARE,LEVL III: ICD-10-PCS | Mod: ,,, | Performed by: HOSPITALIST

## 2021-07-28 PROCEDURE — 94664 DEMO&/EVAL PT USE INHALER: CPT

## 2021-07-28 PROCEDURE — 99223 1ST HOSP IP/OBS HIGH 75: CPT | Mod: ,,, | Performed by: HOSPITALIST

## 2021-07-28 PROCEDURE — 82306 VITAMIN D 25 HYDROXY: CPT | Performed by: STUDENT IN AN ORGANIZED HEALTH CARE EDUCATION/TRAINING PROGRAM

## 2021-07-28 PROCEDURE — 82570 ASSAY OF URINE CREATININE: CPT | Performed by: STUDENT IN AN ORGANIZED HEALTH CARE EDUCATION/TRAINING PROGRAM

## 2021-07-28 PROCEDURE — 85025 COMPLETE CBC W/AUTO DIFF WBC: CPT | Performed by: STUDENT IN AN ORGANIZED HEALTH CARE EDUCATION/TRAINING PROGRAM

## 2021-07-28 PROCEDURE — 81001 URINALYSIS AUTO W/SCOPE: CPT | Performed by: STUDENT IN AN ORGANIZED HEALTH CARE EDUCATION/TRAINING PROGRAM

## 2021-07-28 PROCEDURE — 83036 HEMOGLOBIN GLYCOSYLATED A1C: CPT | Performed by: STUDENT IN AN ORGANIZED HEALTH CARE EDUCATION/TRAINING PROGRAM

## 2021-07-28 PROCEDURE — 63600175 PHARM REV CODE 636 W HCPCS: Performed by: PHYSICIAN ASSISTANT

## 2021-07-28 PROCEDURE — 84100 ASSAY OF PHOSPHORUS: CPT | Performed by: STUDENT IN AN ORGANIZED HEALTH CARE EDUCATION/TRAINING PROGRAM

## 2021-07-28 RX ORDER — ENOXAPARIN SODIUM 100 MG/ML
40 INJECTION SUBCUTANEOUS EVERY 24 HOURS
Status: DISCONTINUED | OUTPATIENT
Start: 2021-07-28 | End: 2021-07-31 | Stop reason: HOSPADM

## 2021-07-28 RX ORDER — DEXTROSE 4 G
TABLET,CHEWABLE ORAL
Qty: 1 EACH | Refills: 0 | Status: SHIPPED | OUTPATIENT
Start: 2021-07-28 | End: 2021-07-31 | Stop reason: SDUPTHER

## 2021-07-28 RX ORDER — LIDOCAINE 50 MG/G
1 PATCH TOPICAL DAILY PRN
Status: DISCONTINUED | OUTPATIENT
Start: 2021-07-28 | End: 2021-07-28

## 2021-07-28 RX ORDER — SODIUM CHLORIDE 450 MG/100ML
INJECTION, SOLUTION INTRAVENOUS CONTINUOUS
Status: DISCONTINUED | OUTPATIENT
Start: 2021-07-28 | End: 2021-07-28

## 2021-07-28 RX ORDER — CEFTRIAXONE 1 G/1
1 INJECTION, POWDER, FOR SOLUTION INTRAMUSCULAR; INTRAVENOUS
Status: DISCONTINUED | OUTPATIENT
Start: 2021-07-28 | End: 2021-07-28

## 2021-07-28 RX ORDER — SODIUM CHLORIDE 0.9 % (FLUSH) 0.9 %
10 SYRINGE (ML) INJECTION
Status: DISCONTINUED | OUTPATIENT
Start: 2021-07-28 | End: 2021-07-31 | Stop reason: HOSPADM

## 2021-07-28 RX ORDER — IBUPROFEN 200 MG
1 TABLET ORAL DAILY
Status: DISCONTINUED | OUTPATIENT
Start: 2021-07-29 | End: 2021-07-31 | Stop reason: HOSPADM

## 2021-07-28 RX ORDER — LIDOCAINE 50 MG/G
3 PATCH TOPICAL DAILY PRN
Status: DISCONTINUED | OUTPATIENT
Start: 2021-07-28 | End: 2021-07-31 | Stop reason: HOSPADM

## 2021-07-28 RX ORDER — AZITHROMYCIN 250 MG/1
500 TABLET, FILM COATED ORAL ONCE
Status: COMPLETED | OUTPATIENT
Start: 2021-07-28 | End: 2021-07-28

## 2021-07-28 RX ORDER — ASCORBIC ACID 500 MG
500 TABLET ORAL 2 TIMES DAILY
Status: DISCONTINUED | OUTPATIENT
Start: 2021-07-28 | End: 2021-07-28

## 2021-07-28 RX ORDER — MUPIROCIN 20 MG/G
OINTMENT TOPICAL 2 TIMES DAILY
Status: DISCONTINUED | OUTPATIENT
Start: 2021-07-28 | End: 2021-07-31 | Stop reason: HOSPADM

## 2021-07-28 RX ORDER — DEXTROSE MONOHYDRATE 100 MG/ML
INJECTION, SOLUTION INTRAVENOUS
Status: DISCONTINUED | OUTPATIENT
Start: 2021-07-28 | End: 2021-07-29

## 2021-07-28 RX ORDER — METFORMIN HYDROCHLORIDE 500 MG/1
1000 TABLET, EXTENDED RELEASE ORAL 2 TIMES DAILY WITH MEALS
Qty: 360 TABLET | Refills: 3 | Status: SHIPPED | OUTPATIENT
Start: 2021-07-28 | End: 2024-01-18

## 2021-07-28 RX ORDER — OXYCODONE HYDROCHLORIDE 5 MG/1
5 TABLET ORAL ONCE
Status: COMPLETED | OUTPATIENT
Start: 2021-07-28 | End: 2021-07-28

## 2021-07-28 RX ORDER — BLOOD-GLUCOSE CONTROL, NORMAL
EACH MISCELLANEOUS
Qty: 100 EACH | Refills: 11 | Status: SHIPPED | OUTPATIENT
Start: 2021-07-28

## 2021-07-28 RX ORDER — ACETAMINOPHEN 325 MG/1
650 TABLET ORAL EVERY 6 HOURS PRN
Status: DISCONTINUED | OUTPATIENT
Start: 2021-07-28 | End: 2021-07-28

## 2021-07-28 RX ORDER — PEN NEEDLE, DIABETIC 30 GX3/16"
NEEDLE, DISPOSABLE MISCELLANEOUS
Qty: 100 EACH | Refills: 11 | Status: SHIPPED | OUTPATIENT
Start: 2021-07-28 | End: 2021-07-31 | Stop reason: HOSPADM

## 2021-07-28 RX ORDER — METHOCARBAMOL 500 MG/1
500 TABLET, FILM COATED ORAL 4 TIMES DAILY PRN
Status: DISCONTINUED | OUTPATIENT
Start: 2021-07-28 | End: 2021-07-31 | Stop reason: HOSPADM

## 2021-07-28 RX ORDER — HEPARIN SODIUM 5000 [USP'U]/ML
5000 INJECTION, SOLUTION INTRAVENOUS; SUBCUTANEOUS EVERY 8 HOURS
Status: DISCONTINUED | OUTPATIENT
Start: 2021-07-28 | End: 2021-07-28

## 2021-07-28 RX ORDER — DEXTROSE MONOHYDRATE AND SODIUM CHLORIDE 5; .45 G/100ML; G/100ML
INJECTION, SOLUTION INTRAVENOUS CONTINUOUS
Status: DISCONTINUED | OUTPATIENT
Start: 2021-07-28 | End: 2021-07-29

## 2021-07-28 RX ORDER — BENZONATATE 100 MG/1
100 CAPSULE ORAL 3 TIMES DAILY PRN
Status: DISCONTINUED | OUTPATIENT
Start: 2021-07-28 | End: 2021-07-31 | Stop reason: HOSPADM

## 2021-07-28 RX ORDER — ACETAMINOPHEN 500 MG
2000 TABLET ORAL 2 TIMES DAILY
Status: ON HOLD | COMMUNITY
End: 2021-07-28 | Stop reason: SDUPTHER

## 2021-07-28 RX ORDER — AZITHROMYCIN 250 MG/1
250 TABLET, FILM COATED ORAL DAILY
Status: DISCONTINUED | OUTPATIENT
Start: 2021-07-29 | End: 2021-07-28

## 2021-07-28 RX ORDER — ALBUTEROL SULFATE 90 UG/1
2 AEROSOL, METERED RESPIRATORY (INHALATION) EVERY 6 HOURS PRN
Status: DISCONTINUED | OUTPATIENT
Start: 2021-07-28 | End: 2021-07-31 | Stop reason: HOSPADM

## 2021-07-28 RX ORDER — ACETAMINOPHEN 500 MG
1000 TABLET ORAL EVERY 6 HOURS PRN
Refills: 0
Start: 2021-07-28

## 2021-07-28 RX ORDER — ACETAMINOPHEN 500 MG
1000 TABLET ORAL EVERY 8 HOURS PRN
Status: DISCONTINUED | OUTPATIENT
Start: 2021-07-28 | End: 2021-07-28

## 2021-07-28 RX ORDER — CHOLECALCIFEROL (VITAMIN D3) 25 MCG
1000 TABLET ORAL DAILY
Status: DISCONTINUED | OUTPATIENT
Start: 2021-07-29 | End: 2021-07-31 | Stop reason: HOSPADM

## 2021-07-28 RX ORDER — ACETAMINOPHEN 325 MG/1
650 TABLET ORAL EVERY 6 HOURS PRN
Status: DISCONTINUED | OUTPATIENT
Start: 2021-07-28 | End: 2021-07-31 | Stop reason: HOSPADM

## 2021-07-28 RX ORDER — LANCING DEVICE
EACH MISCELLANEOUS
Qty: 1 EACH | Refills: 0 | Status: SHIPPED | OUTPATIENT
Start: 2021-07-28 | End: 2021-07-31 | Stop reason: SDUPTHER

## 2021-07-28 RX ADMIN — ACETAMINOPHEN 1000 MG: 500 TABLET ORAL at 03:07

## 2021-07-28 RX ADMIN — THERA TABS 1 TABLET: TAB at 08:07

## 2021-07-28 RX ADMIN — CEFTRIAXONE 1 G: 1 INJECTION, POWDER, FOR SOLUTION INTRAMUSCULAR; INTRAVENOUS at 05:07

## 2021-07-28 RX ADMIN — MUPIROCIN: 20 OINTMENT TOPICAL at 09:07

## 2021-07-28 RX ADMIN — SODIUM CHLORIDE: 450 INJECTION, SOLUTION INTRAVENOUS at 03:07

## 2021-07-28 RX ADMIN — OXYCODONE HYDROCHLORIDE AND ACETAMINOPHEN 500 MG: 500 TABLET ORAL at 08:07

## 2021-07-28 RX ADMIN — ACETAMINOPHEN 650 MG: 325 TABLET ORAL at 03:07

## 2021-07-28 RX ADMIN — ACETAMINOPHEN 650 MG: 325 TABLET ORAL at 11:07

## 2021-07-28 RX ADMIN — ENOXAPARIN SODIUM 40 MG: 40 INJECTION SUBCUTANEOUS at 04:07

## 2021-07-28 RX ADMIN — MUPIROCIN: 20 OINTMENT TOPICAL at 08:07

## 2021-07-28 RX ADMIN — AZITHROMYCIN MONOHYDRATE 500 MG: 250 TABLET ORAL at 03:07

## 2021-07-28 RX ADMIN — METHOCARBAMOL 500 MG: 500 TABLET ORAL at 11:07

## 2021-07-28 RX ADMIN — SODIUM CHLORIDE: 450 INJECTION, SOLUTION INTRAVENOUS at 08:07

## 2021-07-28 RX ADMIN — DEXTROSE AND SODIUM CHLORIDE: 5; .45 INJECTION, SOLUTION INTRAVENOUS at 04:07

## 2021-07-28 RX ADMIN — LIDOCAINE 1 PATCH: 50 PATCH CUTANEOUS at 04:07

## 2021-07-28 RX ADMIN — SODIUM CHLORIDE 1 UNITS/HR: 9 INJECTION, SOLUTION INTRAVENOUS at 01:07

## 2021-07-28 RX ADMIN — SODIUM CHLORIDE: 450 INJECTION, SOLUTION INTRAVENOUS at 02:07

## 2021-07-28 RX ADMIN — OXYCODONE 5 MG: 5 TABLET ORAL at 09:07

## 2021-07-28 RX ADMIN — METHOCARBAMOL 500 MG: 500 TABLET ORAL at 04:07

## 2021-07-29 LAB
ALBUMIN SERPL BCP-MCNC: 2.7 G/DL (ref 3.5–5.2)
ALP SERPL-CCNC: 62 U/L (ref 55–135)
ALT SERPL W/O P-5'-P-CCNC: 12 U/L (ref 10–44)
ANION GAP SERPL CALC-SCNC: 10 MMOL/L (ref 8–16)
ANION GAP SERPL CALC-SCNC: 11 MMOL/L (ref 8–16)
ANION GAP SERPL CALC-SCNC: 11 MMOL/L (ref 8–16)
ANION GAP SERPL CALC-SCNC: 7 MMOL/L (ref 8–16)
ANION GAP SERPL CALC-SCNC: 8 MMOL/L (ref 8–16)
AST SERPL-CCNC: 9 U/L (ref 10–40)
BASOPHILS # BLD AUTO: 0.01 K/UL (ref 0–0.2)
BASOPHILS NFR BLD: 0.1 % (ref 0–1.9)
BILIRUB DIRECT SERPL-MCNC: 0.2 MG/DL (ref 0.1–0.3)
BILIRUB SERPL-MCNC: 0.6 MG/DL (ref 0.1–1)
BUN SERPL-MCNC: 6 MG/DL (ref 6–20)
BUN SERPL-MCNC: 7 MG/DL (ref 6–20)
BUN SERPL-MCNC: 7 MG/DL (ref 6–20)
BUN SERPL-MCNC: 8 MG/DL (ref 6–20)
BUN SERPL-MCNC: 9 MG/DL (ref 6–20)
CALCIUM SERPL-MCNC: 8.1 MG/DL (ref 8.7–10.5)
CALCIUM SERPL-MCNC: 8.1 MG/DL (ref 8.7–10.5)
CALCIUM SERPL-MCNC: 8.4 MG/DL (ref 8.7–10.5)
CALCIUM SERPL-MCNC: 8.4 MG/DL (ref 8.7–10.5)
CALCIUM SERPL-MCNC: 8.8 MG/DL (ref 8.7–10.5)
CHLORIDE SERPL-SCNC: 102 MMOL/L (ref 95–110)
CHLORIDE SERPL-SCNC: 107 MMOL/L (ref 95–110)
CHLORIDE SERPL-SCNC: 108 MMOL/L (ref 95–110)
CHLORIDE SERPL-SCNC: 108 MMOL/L (ref 95–110)
CHLORIDE SERPL-SCNC: 109 MMOL/L (ref 95–110)
CO2 SERPL-SCNC: 21 MMOL/L (ref 23–29)
CO2 SERPL-SCNC: 22 MMOL/L (ref 23–29)
CO2 SERPL-SCNC: 23 MMOL/L (ref 23–29)
CREAT SERPL-MCNC: 0.8 MG/DL (ref 0.5–1.4)
CREAT SERPL-MCNC: 0.9 MG/DL (ref 0.5–1.4)
DIFFERENTIAL METHOD: ABNORMAL
EOSINOPHIL # BLD AUTO: 0.1 K/UL (ref 0–0.5)
EOSINOPHIL NFR BLD: 0.7 % (ref 0–8)
ERYTHROCYTE [DISTWIDTH] IN BLOOD BY AUTOMATED COUNT: 12.7 % (ref 11.5–14.5)
EST. GFR  (AFRICAN AMERICAN): >60 ML/MIN/1.73 M^2
EST. GFR  (NON AFRICAN AMERICAN): >60 ML/MIN/1.73 M^2
GLUCOSE SERPL-MCNC: 218 MG/DL (ref 70–110)
GLUCOSE SERPL-MCNC: 218 MG/DL (ref 70–110)
GLUCOSE SERPL-MCNC: 245 MG/DL (ref 70–110)
GLUCOSE SERPL-MCNC: 254 MG/DL (ref 70–110)
GLUCOSE SERPL-MCNC: 380 MG/DL (ref 70–110)
HCT VFR BLD AUTO: 36 % (ref 40–54)
HGB BLD-MCNC: 13 G/DL (ref 14–18)
IMM GRANULOCYTES # BLD AUTO: 0.02 K/UL (ref 0–0.04)
IMM GRANULOCYTES NFR BLD AUTO: 0.3 % (ref 0–0.5)
LYMPHOCYTES # BLD AUTO: 1.8 K/UL (ref 1–4.8)
LYMPHOCYTES NFR BLD: 26.2 % (ref 18–48)
MAGNESIUM SERPL-MCNC: 1.4 MG/DL (ref 1.6–2.6)
MAGNESIUM SERPL-MCNC: 1.5 MG/DL (ref 1.6–2.6)
MAGNESIUM SERPL-MCNC: 1.6 MG/DL (ref 1.6–2.6)
MAGNESIUM SERPL-MCNC: 1.6 MG/DL (ref 1.6–2.6)
MCH RBC QN AUTO: 30.5 PG (ref 27–31)
MCHC RBC AUTO-ENTMCNC: 36.1 G/DL (ref 32–36)
MCV RBC AUTO: 85 FL (ref 82–98)
MONOCYTES # BLD AUTO: 0.4 K/UL (ref 0.3–1)
MONOCYTES NFR BLD: 6.3 % (ref 4–15)
NEUTROPHILS # BLD AUTO: 4.6 K/UL (ref 1.8–7.7)
NEUTROPHILS NFR BLD: 66.4 % (ref 38–73)
NRBC BLD-RTO: 0 /100 WBC
OSMOLALITY UR: 923 MOSM/KG (ref 50–1200)
PHOSPHATE SERPL-MCNC: 1 MG/DL (ref 2.7–4.5)
PHOSPHATE SERPL-MCNC: 1.6 MG/DL (ref 2.7–4.5)
PHOSPHATE SERPL-MCNC: 2.3 MG/DL (ref 2.7–4.5)
PHOSPHATE SERPL-MCNC: <1 MG/DL (ref 2.7–4.5)
PLATELET # BLD AUTO: 122 K/UL (ref 150–450)
PMV BLD AUTO: 12.9 FL (ref 9.2–12.9)
POCT GLUCOSE: 183 MG/DL (ref 70–110)
POCT GLUCOSE: 197 MG/DL (ref 70–110)
POCT GLUCOSE: 223 MG/DL (ref 70–110)
POCT GLUCOSE: 226 MG/DL (ref 70–110)
POCT GLUCOSE: 246 MG/DL (ref 70–110)
POCT GLUCOSE: 252 MG/DL (ref 70–110)
POCT GLUCOSE: 257 MG/DL (ref 70–110)
POCT GLUCOSE: 259 MG/DL (ref 70–110)
POCT GLUCOSE: 260 MG/DL (ref 70–110)
POCT GLUCOSE: 269 MG/DL (ref 70–110)
POCT GLUCOSE: 282 MG/DL (ref 70–110)
POCT GLUCOSE: 289 MG/DL (ref 70–110)
POCT GLUCOSE: 369 MG/DL (ref 70–110)
POTASSIUM SERPL-SCNC: 2.6 MMOL/L (ref 3.5–5.1)
POTASSIUM SERPL-SCNC: 2.7 MMOL/L (ref 3.5–5.1)
POTASSIUM SERPL-SCNC: 2.7 MMOL/L (ref 3.5–5.1)
POTASSIUM SERPL-SCNC: 2.9 MMOL/L (ref 3.5–5.1)
POTASSIUM SERPL-SCNC: 3.9 MMOL/L (ref 3.5–5.1)
PROT SERPL-MCNC: 5.6 G/DL (ref 6–8.4)
RBC # BLD AUTO: 4.26 M/UL (ref 4.6–6.2)
SODIUM SERPL-SCNC: 134 MMOL/L (ref 136–145)
SODIUM SERPL-SCNC: 137 MMOL/L (ref 136–145)
SODIUM SERPL-SCNC: 138 MMOL/L (ref 136–145)
SODIUM SERPL-SCNC: 140 MMOL/L (ref 136–145)
SODIUM SERPL-SCNC: 140 MMOL/L (ref 136–145)
WBC # BLD AUTO: 6.95 K/UL (ref 3.9–12.7)

## 2021-07-29 PROCEDURE — 36415 COLL VENOUS BLD VENIPUNCTURE: CPT | Performed by: STUDENT IN AN ORGANIZED HEALTH CARE EDUCATION/TRAINING PROGRAM

## 2021-07-29 PROCEDURE — 25000003 PHARM REV CODE 250

## 2021-07-29 PROCEDURE — 84100 ASSAY OF PHOSPHORUS: CPT | Mod: 91 | Performed by: INTERNAL MEDICINE

## 2021-07-29 PROCEDURE — 80048 BASIC METABOLIC PNL TOTAL CA: CPT | Mod: 91 | Performed by: STUDENT IN AN ORGANIZED HEALTH CARE EDUCATION/TRAINING PROGRAM

## 2021-07-29 PROCEDURE — 84100 ASSAY OF PHOSPHORUS: CPT | Mod: 91 | Performed by: STUDENT IN AN ORGANIZED HEALTH CARE EDUCATION/TRAINING PROGRAM

## 2021-07-29 PROCEDURE — 80048 BASIC METABOLIC PNL TOTAL CA: CPT | Mod: 91 | Performed by: INTERNAL MEDICINE

## 2021-07-29 PROCEDURE — 80076 HEPATIC FUNCTION PANEL: CPT | Performed by: STUDENT IN AN ORGANIZED HEALTH CARE EDUCATION/TRAINING PROGRAM

## 2021-07-29 PROCEDURE — 63600175 PHARM REV CODE 636 W HCPCS: Performed by: STUDENT IN AN ORGANIZED HEALTH CARE EDUCATION/TRAINING PROGRAM

## 2021-07-29 PROCEDURE — 63600175 PHARM REV CODE 636 W HCPCS: Performed by: HOSPITALIST

## 2021-07-29 PROCEDURE — 99233 SBSQ HOSP IP/OBS HIGH 50: CPT | Mod: ,,, | Performed by: INTERNAL MEDICINE

## 2021-07-29 PROCEDURE — 80048 BASIC METABOLIC PNL TOTAL CA: CPT | Performed by: STUDENT IN AN ORGANIZED HEALTH CARE EDUCATION/TRAINING PROGRAM

## 2021-07-29 PROCEDURE — 20600001 HC STEP DOWN PRIVATE ROOM

## 2021-07-29 PROCEDURE — 84100 ASSAY OF PHOSPHORUS: CPT | Performed by: STUDENT IN AN ORGANIZED HEALTH CARE EDUCATION/TRAINING PROGRAM

## 2021-07-29 PROCEDURE — 25000003 PHARM REV CODE 250: Performed by: STUDENT IN AN ORGANIZED HEALTH CARE EDUCATION/TRAINING PROGRAM

## 2021-07-29 PROCEDURE — C9399 UNCLASSIFIED DRUGS OR BIOLOG: HCPCS | Performed by: STUDENT IN AN ORGANIZED HEALTH CARE EDUCATION/TRAINING PROGRAM

## 2021-07-29 PROCEDURE — 85025 COMPLETE CBC W/AUTO DIFF WBC: CPT | Performed by: STUDENT IN AN ORGANIZED HEALTH CARE EDUCATION/TRAINING PROGRAM

## 2021-07-29 PROCEDURE — 99233 PR SUBSEQUENT HOSPITAL CARE,LEVL III: ICD-10-PCS | Mod: ,,, | Performed by: INTERNAL MEDICINE

## 2021-07-29 PROCEDURE — S4991 NICOTINE PATCH NONLEGEND: HCPCS | Performed by: STUDENT IN AN ORGANIZED HEALTH CARE EDUCATION/TRAINING PROGRAM

## 2021-07-29 PROCEDURE — 83735 ASSAY OF MAGNESIUM: CPT | Mod: 91 | Performed by: STUDENT IN AN ORGANIZED HEALTH CARE EDUCATION/TRAINING PROGRAM

## 2021-07-29 PROCEDURE — 63600175 PHARM REV CODE 636 W HCPCS

## 2021-07-29 PROCEDURE — 36415 COLL VENOUS BLD VENIPUNCTURE: CPT | Performed by: INTERNAL MEDICINE

## 2021-07-29 PROCEDURE — 83735 ASSAY OF MAGNESIUM: CPT | Performed by: STUDENT IN AN ORGANIZED HEALTH CARE EDUCATION/TRAINING PROGRAM

## 2021-07-29 PROCEDURE — 83615 LACTATE (LD) (LDH) ENZYME: CPT | Performed by: STUDENT IN AN ORGANIZED HEALTH CARE EDUCATION/TRAINING PROGRAM

## 2021-07-29 PROCEDURE — 83735 ASSAY OF MAGNESIUM: CPT | Mod: 91 | Performed by: INTERNAL MEDICINE

## 2021-07-29 RX ORDER — GLUCAGON 1 MG
1 KIT INJECTION
Status: DISCONTINUED | OUTPATIENT
Start: 2021-07-29 | End: 2021-07-31 | Stop reason: HOSPADM

## 2021-07-29 RX ORDER — IBUPROFEN 200 MG
24 TABLET ORAL
Status: DISCONTINUED | OUTPATIENT
Start: 2021-07-29 | End: 2021-07-31 | Stop reason: HOSPADM

## 2021-07-29 RX ORDER — IBUPROFEN 200 MG
16 TABLET ORAL
Status: DISCONTINUED | OUTPATIENT
Start: 2021-07-29 | End: 2021-07-29

## 2021-07-29 RX ORDER — SODIUM CHLORIDE AND POTASSIUM CHLORIDE 150; 450 MG/100ML; MG/100ML
INJECTION, SOLUTION INTRAVENOUS CONTINUOUS
Status: DISCONTINUED | OUTPATIENT
Start: 2021-07-29 | End: 2021-07-29

## 2021-07-29 RX ORDER — IBUPROFEN 200 MG
24 TABLET ORAL
Status: DISCONTINUED | OUTPATIENT
Start: 2021-07-29 | End: 2021-07-29

## 2021-07-29 RX ORDER — INSULIN ASPART 100 [IU]/ML
1-10 INJECTION, SOLUTION INTRAVENOUS; SUBCUTANEOUS
Status: DISCONTINUED | OUTPATIENT
Start: 2021-07-29 | End: 2021-07-29

## 2021-07-29 RX ORDER — POTASSIUM CHLORIDE 7.45 MG/ML
10 INJECTION INTRAVENOUS
Status: DISPENSED | OUTPATIENT
Start: 2021-07-29 | End: 2021-07-29

## 2021-07-29 RX ORDER — MAGNESIUM SULFATE HEPTAHYDRATE 40 MG/ML
2 INJECTION, SOLUTION INTRAVENOUS ONCE
Status: DISCONTINUED | OUTPATIENT
Start: 2021-07-29 | End: 2021-07-29

## 2021-07-29 RX ORDER — POTASSIUM CHLORIDE 7.45 MG/ML
10 INJECTION INTRAVENOUS
Status: DISCONTINUED | OUTPATIENT
Start: 2021-07-29 | End: 2021-07-29

## 2021-07-29 RX ORDER — GLUCAGON 1 MG
1 KIT INJECTION
Status: DISCONTINUED | OUTPATIENT
Start: 2021-07-29 | End: 2021-07-29

## 2021-07-29 RX ORDER — INSULIN ASPART 100 [IU]/ML
1-10 INJECTION, SOLUTION INTRAVENOUS; SUBCUTANEOUS
Status: DISCONTINUED | OUTPATIENT
Start: 2021-07-29 | End: 2021-07-31 | Stop reason: HOSPADM

## 2021-07-29 RX ORDER — MAGNESIUM SULFATE HEPTAHYDRATE 40 MG/ML
2 INJECTION, SOLUTION INTRAVENOUS
Status: COMPLETED | OUTPATIENT
Start: 2021-07-29 | End: 2021-07-30

## 2021-07-29 RX ORDER — POTASSIUM CHLORIDE 20 MEQ/1
20 TABLET, EXTENDED RELEASE ORAL
Status: COMPLETED | OUTPATIENT
Start: 2021-07-29 | End: 2021-07-29

## 2021-07-29 RX ORDER — INSULIN ASPART 100 [IU]/ML
10 INJECTION, SOLUTION INTRAVENOUS; SUBCUTANEOUS
Status: DISCONTINUED | OUTPATIENT
Start: 2021-07-29 | End: 2021-07-30

## 2021-07-29 RX ORDER — DEXTROSE MONOHYDRATE, SODIUM CHLORIDE, AND POTASSIUM CHLORIDE 50; 1.49; 4.5 G/1000ML; G/1000ML; G/1000ML
INJECTION, SOLUTION INTRAVENOUS CONTINUOUS
Status: DISCONTINUED | OUTPATIENT
Start: 2021-07-29 | End: 2021-07-29

## 2021-07-29 RX ORDER — IBUPROFEN 200 MG
16 TABLET ORAL
Status: DISCONTINUED | OUTPATIENT
Start: 2021-07-29 | End: 2021-07-31 | Stop reason: HOSPADM

## 2021-07-29 RX ADMIN — INSULIN ASPART 10 UNITS: 100 INJECTION, SOLUTION INTRAVENOUS; SUBCUTANEOUS at 04:07

## 2021-07-29 RX ADMIN — INSULIN DETEMIR 15 UNITS: 100 INJECTION, SOLUTION SUBCUTANEOUS at 09:07

## 2021-07-29 RX ADMIN — ENOXAPARIN SODIUM 40 MG: 40 INJECTION SUBCUTANEOUS at 05:07

## 2021-07-29 RX ADMIN — POTASSIUM BICARBONATE 20 MEQ: 391 TABLET, EFFERVESCENT ORAL at 09:07

## 2021-07-29 RX ADMIN — INSULIN ASPART 2 UNITS: 100 INJECTION, SOLUTION INTRAVENOUS; SUBCUTANEOUS at 11:07

## 2021-07-29 RX ADMIN — DEXTROSE, SODIUM CHLORIDE, AND POTASSIUM CHLORIDE: 5; .45; .15 INJECTION INTRAVENOUS at 03:07

## 2021-07-29 RX ADMIN — ACETAMINOPHEN 650 MG: 325 TABLET ORAL at 09:07

## 2021-07-29 RX ADMIN — Medication 1000 UNITS: at 09:07

## 2021-07-29 RX ADMIN — ACETAMINOPHEN 650 MG: 325 TABLET ORAL at 06:07

## 2021-07-29 RX ADMIN — POTASSIUM CHLORIDE AND SODIUM CHLORIDE: 450; 150 INJECTION, SOLUTION INTRAVENOUS at 10:07

## 2021-07-29 RX ADMIN — MAGNESIUM SULFATE 2 G: 2 INJECTION INTRAVENOUS at 11:07

## 2021-07-29 RX ADMIN — MAGNESIUM SULFATE 2 G: 2 INJECTION INTRAVENOUS at 09:07

## 2021-07-29 RX ADMIN — POTASSIUM BICARBONATE 20 MEQ: 391 TABLET, EFFERVESCENT ORAL at 10:07

## 2021-07-29 RX ADMIN — MUPIROCIN: 20 OINTMENT TOPICAL at 09:07

## 2021-07-29 RX ADMIN — POTASSIUM BICARBONATE 20 MEQ: 391 TABLET, EFFERVESCENT ORAL at 11:07

## 2021-07-29 RX ADMIN — METHOCARBAMOL 500 MG: 500 TABLET ORAL at 06:07

## 2021-07-29 RX ADMIN — INSULIN ASPART 5 UNITS: 100 INJECTION, SOLUTION INTRAVENOUS; SUBCUTANEOUS at 09:07

## 2021-07-29 RX ADMIN — POTASSIUM CHLORIDE 20 MEQ: 1500 TABLET, EXTENDED RELEASE ORAL at 06:07

## 2021-07-29 RX ADMIN — POTASSIUM PHOSPHATE, MONOBASIC AND POTASSIUM PHOSPHATE, DIBASIC 30 MMOL: 224; 236 INJECTION, SOLUTION, CONCENTRATE INTRAVENOUS at 03:07

## 2021-07-29 RX ADMIN — POTASSIUM CHLORIDE 20 MEQ: 1500 TABLET, EXTENDED RELEASE ORAL at 05:07

## 2021-07-29 RX ADMIN — POTASSIUM BICARBONATE 20 MEQ: 391 TABLET, EFFERVESCENT ORAL at 06:07

## 2021-07-29 RX ADMIN — INSULIN DETEMIR 15 UNITS: 100 INJECTION, SOLUTION SUBCUTANEOUS at 11:07

## 2021-07-29 RX ADMIN — POTASSIUM CHLORIDE 20 MEQ: 1500 TABLET, EXTENDED RELEASE ORAL at 09:07

## 2021-07-29 RX ADMIN — SODIUM PHOSPHATE, MONOBASIC, MONOHYDRATE 30 MMOL: 276; 142 INJECTION, SOLUTION INTRAVENOUS at 06:07

## 2021-07-29 RX ADMIN — METHOCARBAMOL 500 MG: 500 TABLET ORAL at 09:07

## 2021-07-29 RX ADMIN — METHOCARBAMOL 500 MG: 500 TABLET ORAL at 03:07

## 2021-07-29 RX ADMIN — POTASSIUM CHLORIDE 20 MEQ: 1500 TABLET, EXTENDED RELEASE ORAL at 03:07

## 2021-07-29 RX ADMIN — INSULIN ASPART 10 UNITS: 100 INJECTION, SOLUTION INTRAVENOUS; SUBCUTANEOUS at 11:07

## 2021-07-29 RX ADMIN — ACETAMINOPHEN 650 MG: 325 TABLET ORAL at 03:07

## 2021-07-29 RX ADMIN — THERA TABS 1 TABLET: TAB at 09:07

## 2021-07-30 PROBLEM — D69.6 THROMBOCYTOPENIA: Status: ACTIVE | Noted: 2021-07-30

## 2021-07-30 LAB
ALBUMIN SERPL BCP-MCNC: 2.7 G/DL (ref 3.5–5.2)
ALP SERPL-CCNC: 63 U/L (ref 55–135)
ALT SERPL W/O P-5'-P-CCNC: 15 U/L (ref 10–44)
ANION GAP SERPL CALC-SCNC: 9 MMOL/L (ref 8–16)
AST SERPL-CCNC: 15 U/L (ref 10–40)
BASOPHILS # BLD AUTO: 0.01 K/UL (ref 0–0.2)
BASOPHILS NFR BLD: 0.2 % (ref 0–1.9)
BILIRUB DIRECT SERPL-MCNC: 0.2 MG/DL (ref 0.1–0.3)
BILIRUB SERPL-MCNC: 0.4 MG/DL (ref 0.1–1)
BUN SERPL-MCNC: 6 MG/DL (ref 6–20)
CALCIUM SERPL-MCNC: 8.3 MG/DL (ref 8.7–10.5)
CHLORIDE SERPL-SCNC: 104 MMOL/L (ref 95–110)
CO2 SERPL-SCNC: 23 MMOL/L (ref 23–29)
CREAT SERPL-MCNC: 0.8 MG/DL (ref 0.5–1.4)
DIFFERENTIAL METHOD: ABNORMAL
EOSINOPHIL # BLD AUTO: 0 K/UL (ref 0–0.5)
EOSINOPHIL NFR BLD: 0.7 % (ref 0–8)
ERYTHROCYTE [DISTWIDTH] IN BLOOD BY AUTOMATED COUNT: 12.7 % (ref 11.5–14.5)
EST. GFR  (AFRICAN AMERICAN): >60 ML/MIN/1.73 M^2
EST. GFR  (NON AFRICAN AMERICAN): >60 ML/MIN/1.73 M^2
GLUCOSE SERPL-MCNC: 363 MG/DL (ref 70–110)
HCT VFR BLD AUTO: 33.6 % (ref 40–54)
HGB BLD-MCNC: 12.3 G/DL (ref 14–18)
IMM GRANULOCYTES # BLD AUTO: 0.03 K/UL (ref 0–0.04)
IMM GRANULOCYTES NFR BLD AUTO: 0.7 % (ref 0–0.5)
LDH SERPL L TO P-CCNC: 160 U/L (ref 110–260)
LYMPHOCYTES # BLD AUTO: 1.7 K/UL (ref 1–4.8)
LYMPHOCYTES NFR BLD: 37.9 % (ref 18–48)
MAGNESIUM SERPL-MCNC: 2 MG/DL (ref 1.6–2.6)
MAGNESIUM SERPL-MCNC: 2.2 MG/DL (ref 1.6–2.6)
MCH RBC QN AUTO: 31 PG (ref 27–31)
MCHC RBC AUTO-ENTMCNC: 36.6 G/DL (ref 32–36)
MCV RBC AUTO: 85 FL (ref 82–98)
MONOCYTES # BLD AUTO: 0.5 K/UL (ref 0.3–1)
MONOCYTES NFR BLD: 11.5 % (ref 4–15)
NEUTROPHILS # BLD AUTO: 2.2 K/UL (ref 1.8–7.7)
NEUTROPHILS NFR BLD: 49 % (ref 38–73)
NRBC BLD-RTO: 0 /100 WBC
PHOSPHATE SERPL-MCNC: 2 MG/DL (ref 2.7–4.5)
PLATELET # BLD AUTO: 127 K/UL (ref 150–450)
PMV BLD AUTO: 13.3 FL (ref 9.2–12.9)
POCT GLUCOSE: 182 MG/DL (ref 70–110)
POCT GLUCOSE: 276 MG/DL (ref 70–110)
POCT GLUCOSE: 285 MG/DL (ref 70–110)
POCT GLUCOSE: 292 MG/DL (ref 70–110)
POCT GLUCOSE: 358 MG/DL (ref 70–110)
POTASSIUM SERPL-SCNC: 3.8 MMOL/L (ref 3.5–5.1)
PROT SERPL-MCNC: 5.7 G/DL (ref 6–8.4)
RBC # BLD AUTO: 3.97 M/UL (ref 4.6–6.2)
SODIUM SERPL-SCNC: 136 MMOL/L (ref 136–145)
WBC # BLD AUTO: 4.54 K/UL (ref 3.9–12.7)

## 2021-07-30 PROCEDURE — 25000003 PHARM REV CODE 250: Performed by: STUDENT IN AN ORGANIZED HEALTH CARE EDUCATION/TRAINING PROGRAM

## 2021-07-30 PROCEDURE — 99233 PR SUBSEQUENT HOSPITAL CARE,LEVL III: ICD-10-PCS | Mod: ,,, | Performed by: INTERNAL MEDICINE

## 2021-07-30 PROCEDURE — 83735 ASSAY OF MAGNESIUM: CPT | Performed by: STUDENT IN AN ORGANIZED HEALTH CARE EDUCATION/TRAINING PROGRAM

## 2021-07-30 PROCEDURE — 63600175 PHARM REV CODE 636 W HCPCS: Performed by: HOSPITALIST

## 2021-07-30 PROCEDURE — 20600001 HC STEP DOWN PRIVATE ROOM

## 2021-07-30 PROCEDURE — 80076 HEPATIC FUNCTION PANEL: CPT | Performed by: STUDENT IN AN ORGANIZED HEALTH CARE EDUCATION/TRAINING PROGRAM

## 2021-07-30 PROCEDURE — 99233 SBSQ HOSP IP/OBS HIGH 50: CPT | Mod: ,,, | Performed by: INTERNAL MEDICINE

## 2021-07-30 PROCEDURE — 94761 N-INVAS EAR/PLS OXIMETRY MLT: CPT

## 2021-07-30 PROCEDURE — 80048 BASIC METABOLIC PNL TOTAL CA: CPT | Performed by: STUDENT IN AN ORGANIZED HEALTH CARE EDUCATION/TRAINING PROGRAM

## 2021-07-30 PROCEDURE — 36415 COLL VENOUS BLD VENIPUNCTURE: CPT | Performed by: STUDENT IN AN ORGANIZED HEALTH CARE EDUCATION/TRAINING PROGRAM

## 2021-07-30 PROCEDURE — C9399 UNCLASSIFIED DRUGS OR BIOLOG: HCPCS | Performed by: STUDENT IN AN ORGANIZED HEALTH CARE EDUCATION/TRAINING PROGRAM

## 2021-07-30 PROCEDURE — 84100 ASSAY OF PHOSPHORUS: CPT | Performed by: STUDENT IN AN ORGANIZED HEALTH CARE EDUCATION/TRAINING PROGRAM

## 2021-07-30 PROCEDURE — 63600175 PHARM REV CODE 636 W HCPCS: Performed by: STUDENT IN AN ORGANIZED HEALTH CARE EDUCATION/TRAINING PROGRAM

## 2021-07-30 PROCEDURE — 85025 COMPLETE CBC W/AUTO DIFF WBC: CPT | Performed by: STUDENT IN AN ORGANIZED HEALTH CARE EDUCATION/TRAINING PROGRAM

## 2021-07-30 RX ORDER — INSULIN ASPART 100 [IU]/ML
12 INJECTION, SOLUTION INTRAVENOUS; SUBCUTANEOUS
Status: DISCONTINUED | OUTPATIENT
Start: 2021-07-30 | End: 2021-07-30

## 2021-07-30 RX ORDER — INSULIN ASPART 100 [IU]/ML
18 INJECTION, SOLUTION INTRAVENOUS; SUBCUTANEOUS
Status: DISCONTINUED | OUTPATIENT
Start: 2021-07-30 | End: 2021-07-31

## 2021-07-30 RX ADMIN — METHOCARBAMOL 500 MG: 500 TABLET ORAL at 04:07

## 2021-07-30 RX ADMIN — INSULIN ASPART 6 UNITS: 100 INJECTION, SOLUTION INTRAVENOUS; SUBCUTANEOUS at 08:07

## 2021-07-30 RX ADMIN — INSULIN ASPART 1 UNITS: 100 INJECTION, SOLUTION INTRAVENOUS; SUBCUTANEOUS at 09:07

## 2021-07-30 RX ADMIN — MUPIROCIN: 20 OINTMENT TOPICAL at 09:07

## 2021-07-30 RX ADMIN — INSULIN ASPART 18 UNITS: 100 INJECTION, SOLUTION INTRAVENOUS; SUBCUTANEOUS at 05:07

## 2021-07-30 RX ADMIN — SODIUM PHOSPHATE, MONOBASIC, MONOHYDRATE 20.01 MMOL: 276; 142 INJECTION, SOLUTION INTRAVENOUS at 08:07

## 2021-07-30 RX ADMIN — INSULIN DETEMIR 18 UNITS: 100 INJECTION, SOLUTION SUBCUTANEOUS at 09:07

## 2021-07-30 RX ADMIN — INSULIN ASPART 10 UNITS: 100 INJECTION, SOLUTION INTRAVENOUS; SUBCUTANEOUS at 08:07

## 2021-07-30 RX ADMIN — INSULIN ASPART 6 UNITS: 100 INJECTION, SOLUTION INTRAVENOUS; SUBCUTANEOUS at 05:07

## 2021-07-30 RX ADMIN — ENOXAPARIN SODIUM 40 MG: 40 INJECTION SUBCUTANEOUS at 05:07

## 2021-07-30 RX ADMIN — ACETAMINOPHEN 650 MG: 325 TABLET ORAL at 04:07

## 2021-07-30 RX ADMIN — INSULIN ASPART 5 UNITS: 100 INJECTION, SOLUTION INTRAVENOUS; SUBCUTANEOUS at 01:07

## 2021-07-30 RX ADMIN — INSULIN DETEMIR 3 UNITS: 100 INJECTION, SOLUTION SUBCUTANEOUS at 09:07

## 2021-07-30 RX ADMIN — INSULIN DETEMIR 15 UNITS: 100 INJECTION, SOLUTION SUBCUTANEOUS at 08:07

## 2021-07-30 RX ADMIN — THERA TABS 1 TABLET: TAB at 08:07

## 2021-07-30 RX ADMIN — Medication 1000 UNITS: at 08:07

## 2021-07-30 RX ADMIN — MUPIROCIN: 20 OINTMENT TOPICAL at 08:07

## 2021-07-30 RX ADMIN — METHOCARBAMOL 500 MG: 500 TABLET ORAL at 06:07

## 2021-07-30 RX ADMIN — ACETAMINOPHEN 650 MG: 325 TABLET ORAL at 06:07

## 2021-07-30 RX ADMIN — INSULIN ASPART 12 UNITS: 100 INJECTION, SOLUTION INTRAVENOUS; SUBCUTANEOUS at 12:07

## 2021-07-30 RX ADMIN — INSULIN ASPART 10 UNITS: 100 INJECTION, SOLUTION INTRAVENOUS; SUBCUTANEOUS at 12:07

## 2021-07-31 ENCOUNTER — PATIENT MESSAGE (OUTPATIENT)
Dept: ADMINISTRATIVE | Facility: CLINIC | Age: 36
End: 2021-07-31

## 2021-07-31 VITALS
HEART RATE: 73 BPM | SYSTOLIC BLOOD PRESSURE: 141 MMHG | RESPIRATION RATE: 17 BRPM | TEMPERATURE: 98 F | OXYGEN SATURATION: 94 % | DIASTOLIC BLOOD PRESSURE: 82 MMHG | HEIGHT: 68 IN | BODY MASS INDEX: 24.56 KG/M2 | WEIGHT: 162.06 LBS

## 2021-07-31 LAB
ALBUMIN SERPL BCP-MCNC: 2.5 G/DL (ref 3.5–5.2)
ALP SERPL-CCNC: 68 U/L (ref 55–135)
ALT SERPL W/O P-5'-P-CCNC: 18 U/L (ref 10–44)
ANION GAP SERPL CALC-SCNC: 8 MMOL/L (ref 8–16)
AST SERPL-CCNC: 18 U/L (ref 10–40)
BASOPHILS # BLD AUTO: 0.01 K/UL (ref 0–0.2)
BASOPHILS NFR BLD: 0.2 % (ref 0–1.9)
BILIRUB DIRECT SERPL-MCNC: 0.1 MG/DL (ref 0.1–0.3)
BILIRUB SERPL-MCNC: 0.3 MG/DL (ref 0.1–1)
BUN SERPL-MCNC: 11 MG/DL (ref 6–20)
CALCIUM SERPL-MCNC: 9.1 MG/DL (ref 8.7–10.5)
CHLORIDE SERPL-SCNC: 100 MMOL/L (ref 95–110)
CO2 SERPL-SCNC: 28 MMOL/L (ref 23–29)
CREAT SERPL-MCNC: 0.9 MG/DL (ref 0.5–1.4)
DIFFERENTIAL METHOD: ABNORMAL
EOSINOPHIL # BLD AUTO: 0.1 K/UL (ref 0–0.5)
EOSINOPHIL NFR BLD: 1.1 % (ref 0–8)
ERYTHROCYTE [DISTWIDTH] IN BLOOD BY AUTOMATED COUNT: 12.5 % (ref 11.5–14.5)
EST. GFR  (AFRICAN AMERICAN): >60 ML/MIN/1.73 M^2
EST. GFR  (NON AFRICAN AMERICAN): >60 ML/MIN/1.73 M^2
GLUCOSE SERPL-MCNC: 479 MG/DL (ref 70–110)
HCT VFR BLD AUTO: 35.8 % (ref 40–54)
HGB BLD-MCNC: 13.1 G/DL (ref 14–18)
IMM GRANULOCYTES # BLD AUTO: 0.03 K/UL (ref 0–0.04)
IMM GRANULOCYTES NFR BLD AUTO: 0.6 % (ref 0–0.5)
LYMPHOCYTES # BLD AUTO: 1.9 K/UL (ref 1–4.8)
LYMPHOCYTES NFR BLD: 35.7 % (ref 18–48)
MAGNESIUM SERPL-MCNC: 1.7 MG/DL (ref 1.6–2.6)
MCH RBC QN AUTO: 30.7 PG (ref 27–31)
MCHC RBC AUTO-ENTMCNC: 36.6 G/DL (ref 32–36)
MCV RBC AUTO: 84 FL (ref 82–98)
MONOCYTES # BLD AUTO: 0.7 K/UL (ref 0.3–1)
MONOCYTES NFR BLD: 12.9 % (ref 4–15)
NEUTROPHILS # BLD AUTO: 2.7 K/UL (ref 1.8–7.7)
NEUTROPHILS NFR BLD: 49.5 % (ref 38–73)
NRBC BLD-RTO: 0 /100 WBC
PHOSPHATE SERPL-MCNC: 3.9 MG/DL (ref 2.7–4.5)
PLATELET # BLD AUTO: 149 K/UL (ref 150–450)
PMV BLD AUTO: 13 FL (ref 9.2–12.9)
POCT GLUCOSE: 128 MG/DL (ref 70–110)
POCT GLUCOSE: 376 MG/DL (ref 70–110)
POTASSIUM SERPL-SCNC: 3.8 MMOL/L (ref 3.5–5.1)
PROT SERPL-MCNC: 5.7 G/DL (ref 6–8.4)
RBC # BLD AUTO: 4.27 M/UL (ref 4.6–6.2)
SODIUM SERPL-SCNC: 136 MMOL/L (ref 136–145)
WBC # BLD AUTO: 5.41 K/UL (ref 3.9–12.7)

## 2021-07-31 PROCEDURE — 99900035 HC TECH TIME PER 15 MIN (STAT)

## 2021-07-31 PROCEDURE — 99239 HOSP IP/OBS DSCHRG MGMT >30: CPT | Mod: ,,, | Performed by: INTERNAL MEDICINE

## 2021-07-31 PROCEDURE — 80076 HEPATIC FUNCTION PANEL: CPT | Performed by: STUDENT IN AN ORGANIZED HEALTH CARE EDUCATION/TRAINING PROGRAM

## 2021-07-31 PROCEDURE — 83735 ASSAY OF MAGNESIUM: CPT | Performed by: STUDENT IN AN ORGANIZED HEALTH CARE EDUCATION/TRAINING PROGRAM

## 2021-07-31 PROCEDURE — 63600175 PHARM REV CODE 636 W HCPCS: Performed by: STUDENT IN AN ORGANIZED HEALTH CARE EDUCATION/TRAINING PROGRAM

## 2021-07-31 PROCEDURE — 80048 BASIC METABOLIC PNL TOTAL CA: CPT | Performed by: STUDENT IN AN ORGANIZED HEALTH CARE EDUCATION/TRAINING PROGRAM

## 2021-07-31 PROCEDURE — 99239 PR HOSPITAL DISCHARGE DAY,>30 MIN: ICD-10-PCS | Mod: ,,, | Performed by: INTERNAL MEDICINE

## 2021-07-31 PROCEDURE — 84100 ASSAY OF PHOSPHORUS: CPT | Performed by: STUDENT IN AN ORGANIZED HEALTH CARE EDUCATION/TRAINING PROGRAM

## 2021-07-31 PROCEDURE — 94761 N-INVAS EAR/PLS OXIMETRY MLT: CPT

## 2021-07-31 PROCEDURE — 85025 COMPLETE CBC W/AUTO DIFF WBC: CPT | Performed by: STUDENT IN AN ORGANIZED HEALTH CARE EDUCATION/TRAINING PROGRAM

## 2021-07-31 PROCEDURE — 36415 COLL VENOUS BLD VENIPUNCTURE: CPT | Performed by: STUDENT IN AN ORGANIZED HEALTH CARE EDUCATION/TRAINING PROGRAM

## 2021-07-31 PROCEDURE — 25000003 PHARM REV CODE 250: Performed by: STUDENT IN AN ORGANIZED HEALTH CARE EDUCATION/TRAINING PROGRAM

## 2021-07-31 RX ORDER — INSULIN ASPART 100 [IU]/ML
20 INJECTION, SOLUTION INTRAVENOUS; SUBCUTANEOUS
Qty: 6 SYRINGE | Refills: 5 | Status: CANCELLED | OUTPATIENT
Start: 2021-07-31

## 2021-07-31 RX ORDER — INSULIN ASPART 100 [IU]/ML
25 INJECTION, SOLUTION INTRAVENOUS; SUBCUTANEOUS
Status: DISCONTINUED | OUTPATIENT
Start: 2021-07-31 | End: 2021-07-31 | Stop reason: HOSPADM

## 2021-07-31 RX ORDER — CHOLECALCIFEROL (VITAMIN D3) 25 MCG
1000 TABLET ORAL DAILY
Qty: 30 TABLET | Refills: 2 | Status: SHIPPED | OUTPATIENT
Start: 2021-08-01 | End: 2024-01-18

## 2021-07-31 RX ORDER — DEXTROSE 4 G
TABLET,CHEWABLE ORAL
Qty: 1 EACH | Refills: 0 | Status: SHIPPED | OUTPATIENT
Start: 2021-07-31 | End: 2024-01-18

## 2021-07-31 RX ORDER — METFORMIN HYDROCHLORIDE 500 MG/1
1000 TABLET, EXTENDED RELEASE ORAL 2 TIMES DAILY WITH MEALS
Qty: 120 TABLET | Refills: 5 | Status: CANCELLED | OUTPATIENT
Start: 2021-07-31

## 2021-07-31 RX ORDER — CHOLECALCIFEROL (VITAMIN D3) 25 MCG
1000 TABLET ORAL DAILY
Qty: 30 TABLET | Refills: 5 | Status: CANCELLED | OUTPATIENT
Start: 2021-07-31

## 2021-07-31 RX ORDER — LANCING DEVICE
EACH MISCELLANEOUS
Qty: 1 EACH | Refills: 5 | Status: CANCELLED | OUTPATIENT
Start: 2021-07-31

## 2021-07-31 RX ORDER — LANCING DEVICE
EACH MISCELLANEOUS
Qty: 1 EACH | Refills: 0 | Status: SHIPPED | OUTPATIENT
Start: 2021-07-31

## 2021-07-31 RX ORDER — PEN NEEDLE, DIABETIC 30 GX3/16"
NEEDLE, DISPOSABLE MISCELLANEOUS
Qty: 100 EACH | Refills: 2 | Status: SHIPPED | OUTPATIENT
Start: 2021-07-31

## 2021-07-31 RX ORDER — INSULIN ASPART 100 [IU]/ML
20 INJECTION, SOLUTION INTRAVENOUS; SUBCUTANEOUS
Status: DISCONTINUED | OUTPATIENT
Start: 2021-07-31 | End: 2021-07-31

## 2021-07-31 RX ORDER — INSULIN ASPART 100 [IU]/ML
5 INJECTION, SOLUTION INTRAVENOUS; SUBCUTANEOUS ONCE
Status: COMPLETED | OUTPATIENT
Start: 2021-07-31 | End: 2021-07-31

## 2021-07-31 RX ORDER — INSULIN ASPART 100 [IU]/ML
25 INJECTION, SOLUTION INTRAVENOUS; SUBCUTANEOUS
Qty: 15 ML | Refills: 2 | Status: SHIPPED | OUTPATIENT
Start: 2021-07-31 | End: 2021-07-31 | Stop reason: CLARIF

## 2021-07-31 RX ORDER — DEXTROSE 4 G
TABLET,CHEWABLE ORAL
Qty: 1 EACH | Refills: 0 | Status: CANCELLED | OUTPATIENT
Start: 2021-07-31 | End: 2022-07-31

## 2021-07-31 RX ORDER — INSULIN ASPART 100 [IU]/ML
5 INJECTION, SOLUTION INTRAVENOUS; SUBCUTANEOUS
Status: DISCONTINUED | OUTPATIENT
Start: 2021-07-31 | End: 2021-07-31 | Stop reason: HOSPADM

## 2021-07-31 RX ORDER — INSULIN GLARGINE 100 [IU]/ML
45 INJECTION, SOLUTION SUBCUTANEOUS DAILY
Qty: 15 ML | Refills: 0 | Status: SHIPPED | OUTPATIENT
Start: 2021-07-31 | End: 2021-07-31 | Stop reason: HOSPADM

## 2021-07-31 RX ORDER — PEN NEEDLE, DIABETIC 30 GX3/16"
NEEDLE, DISPOSABLE MISCELLANEOUS
Qty: 100 EACH | Refills: 5 | Status: CANCELLED | OUTPATIENT
Start: 2021-07-31

## 2021-07-31 RX ADMIN — THERA TABS 1 TABLET: TAB at 09:07

## 2021-07-31 RX ADMIN — MUPIROCIN: 20 OINTMENT TOPICAL at 09:07

## 2021-07-31 RX ADMIN — Medication 1000 UNITS: at 09:07

## 2021-07-31 RX ADMIN — ACETAMINOPHEN 650 MG: 325 TABLET ORAL at 03:07

## 2021-07-31 RX ADMIN — INSULIN ASPART 10 UNITS: 100 INJECTION, SOLUTION INTRAVENOUS; SUBCUTANEOUS at 10:07

## 2021-07-31 RX ADMIN — INSULIN ASPART 25 UNITS: 100 INJECTION, SOLUTION INTRAVENOUS; SUBCUTANEOUS at 06:07

## 2021-07-31 RX ADMIN — INSULIN ASPART 20 UNITS: 100 INJECTION, SOLUTION INTRAVENOUS; SUBCUTANEOUS at 09:07

## 2021-07-31 RX ADMIN — METHOCARBAMOL 500 MG: 500 TABLET ORAL at 03:07

## 2021-07-31 RX ADMIN — INSULIN ASPART 20 UNITS: 100 INJECTION, SOLUTION INTRAVENOUS; SUBCUTANEOUS at 01:07

## 2021-07-31 RX ADMIN — INSULIN DETEMIR 18 UNITS: 100 INJECTION, SOLUTION SUBCUTANEOUS at 09:07

## 2021-07-31 RX ADMIN — INSULIN ASPART 5 UNITS: 100 INJECTION, SOLUTION INTRAVENOUS; SUBCUTANEOUS at 01:07

## 2021-08-01 ENCOUNTER — NURSE TRIAGE (OUTPATIENT)
Dept: ADMINISTRATIVE | Facility: CLINIC | Age: 36
End: 2021-08-01

## 2021-08-01 LAB — POCT GLUCOSE: 420 MG/DL (ref 70–110)

## 2021-08-02 LAB
BACTERIA BLD CULT: NORMAL
BACTERIA BLD CULT: NORMAL

## 2021-08-06 ENCOUNTER — PATIENT MESSAGE (OUTPATIENT)
Dept: ENDOCRINOLOGY | Facility: CLINIC | Age: 36
End: 2021-08-06

## 2021-08-18 ENCOUNTER — HOSPITAL ENCOUNTER (EMERGENCY)
Facility: HOSPITAL | Age: 36
Discharge: HOME | End: 2021-08-18
Attending: EMERGENCY MEDICINE

## 2021-08-18 VITALS
HEIGHT: 68 IN | WEIGHT: 160 LBS | SYSTOLIC BLOOD PRESSURE: 152 MMHG | HEART RATE: 108 BPM | DIASTOLIC BLOOD PRESSURE: 85 MMHG | OXYGEN SATURATION: 97 % | TEMPERATURE: 99 F | RESPIRATION RATE: 19 BRPM | BODY MASS INDEX: 24.25 KG/M2

## 2021-08-18 DIAGNOSIS — R07.89 CHEST TIGHTNESS: ICD-10-CM

## 2021-08-18 LAB
BASOPHILS # BLD AUTO: 0.03 K/UL (ref 0–0.2)
BASOPHILS NFR BLD: 0.3 % (ref 0–1.9)
BUN SERPL-MCNC: 4 MG/DL (ref 6–30)
CHLORIDE SERPL-SCNC: 115 MMOL/L (ref 95–110)
CREAT SERPL-MCNC: 0.5 MG/DL (ref 0.5–1.4)
D DIMER PPP IA.FEU-MCNC: 0.37 MG/L FEU
DIFFERENTIAL METHOD: ABNORMAL
EOSINOPHIL # BLD AUTO: 0 K/UL (ref 0–0.5)
EOSINOPHIL NFR BLD: 0.3 % (ref 0–8)
ERYTHROCYTE [DISTWIDTH] IN BLOOD BY AUTOMATED COUNT: 13.8 % (ref 11.5–14.5)
GLUCOSE SERPL-MCNC: 47 MG/DL (ref 70–110)
HCT VFR BLD AUTO: 36.9 % (ref 40–54)
HCT VFR BLD CALC: 34 %PCV (ref 36–54)
HGB BLD-MCNC: 12.9 G/DL (ref 14–18)
IMM GRANULOCYTES # BLD AUTO: 0.03 K/UL (ref 0–0.04)
IMM GRANULOCYTES NFR BLD AUTO: 0.3 % (ref 0–0.5)
LYMPHOCYTES # BLD AUTO: 1.4 K/UL (ref 1–4.8)
LYMPHOCYTES NFR BLD: 13 % (ref 18–48)
MCH RBC QN AUTO: 30.8 PG (ref 27–31)
MCHC RBC AUTO-ENTMCNC: 35 G/DL (ref 32–36)
MCV RBC AUTO: 88 FL (ref 82–98)
MONOCYTES # BLD AUTO: 0.7 K/UL (ref 0.3–1)
MONOCYTES NFR BLD: 6.3 % (ref 4–15)
NEUTROPHILS # BLD AUTO: 8.5 K/UL (ref 1.8–7.7)
NEUTROPHILS NFR BLD: 79.8 % (ref 38–73)
NRBC BLD-RTO: 0 /100 WBC
PLATELET # BLD AUTO: 239 K/UL (ref 150–450)
PMV BLD AUTO: 11.6 FL (ref 9.2–12.9)
POC IONIZED CALCIUM: 0.8 MMOL/L (ref 1.06–1.42)
POC TCO2 (MEASURED): 16 MMOL/L (ref 23–29)
POCT GLUCOSE: 107 MG/DL (ref 70–110)
POTASSIUM BLD-SCNC: 4.5 MMOL/L (ref 3.5–5.1)
RBC # BLD AUTO: 4.19 M/UL (ref 4.6–6.2)
SAMPLE: ABNORMAL
SODIUM BLD-SCNC: 139 MMOL/L (ref 136–145)
TROPONIN I SERPL DL<=0.01 NG/ML-MCNC: <0.006 NG/ML (ref 0–0.03)
WBC # BLD AUTO: 10.69 K/UL (ref 3.9–12.7)

## 2021-08-18 PROCEDURE — 99284 PR EMERGENCY DEPT VISIT,LEVEL IV: ICD-10-PCS | Mod: ,,, | Performed by: EMERGENCY MEDICINE

## 2021-08-18 PROCEDURE — 93010 EKG 12-LEAD: ICD-10-PCS | Mod: ,,, | Performed by: INTERNAL MEDICINE

## 2021-08-18 PROCEDURE — 80047 BASIC METABLC PNL IONIZED CA: CPT | Mod: 59

## 2021-08-18 PROCEDURE — 99284 EMERGENCY DEPT VISIT MOD MDM: CPT | Mod: ,,, | Performed by: EMERGENCY MEDICINE

## 2021-08-18 PROCEDURE — 85025 COMPLETE CBC W/AUTO DIFF WBC: CPT | Performed by: EMERGENCY MEDICINE

## 2021-08-18 PROCEDURE — 84484 ASSAY OF TROPONIN QUANT: CPT | Performed by: EMERGENCY MEDICINE

## 2021-08-18 PROCEDURE — 82962 GLUCOSE BLOOD TEST: CPT

## 2021-08-18 PROCEDURE — 93010 ELECTROCARDIOGRAM REPORT: CPT | Mod: ,,, | Performed by: INTERNAL MEDICINE

## 2021-08-18 PROCEDURE — 93005 ELECTROCARDIOGRAM TRACING: CPT

## 2021-08-18 PROCEDURE — 99285 EMERGENCY DEPT VISIT HI MDM: CPT | Mod: 25

## 2021-08-18 PROCEDURE — 25000003 PHARM REV CODE 250: Performed by: EMERGENCY MEDICINE

## 2021-08-18 PROCEDURE — 85379 FIBRIN DEGRADATION QUANT: CPT | Performed by: EMERGENCY MEDICINE

## 2021-08-18 RX ORDER — ASPIRIN 325 MG
325 TABLET ORAL
Status: COMPLETED | OUTPATIENT
Start: 2021-08-18 | End: 2021-08-18

## 2021-08-18 RX ADMIN — ASPIRIN 325 MG ORAL TABLET 325 MG: 325 PILL ORAL at 12:08

## 2021-12-28 ENCOUNTER — ANESTHESIA (OUTPATIENT)
Dept: SURGERY | Facility: HOSPITAL | Age: 36
End: 2021-12-28

## 2021-12-28 ENCOUNTER — HOSPITAL ENCOUNTER (OUTPATIENT)
Facility: HOSPITAL | Age: 36
Discharge: HOME | End: 2021-12-28
Attending: EMERGENCY MEDICINE | Admitting: STUDENT IN AN ORGANIZED HEALTH CARE EDUCATION/TRAINING PROGRAM

## 2021-12-28 ENCOUNTER — ANESTHESIA EVENT (OUTPATIENT)
Dept: SURGERY | Facility: HOSPITAL | Age: 36
End: 2021-12-28

## 2021-12-28 VITALS
DIASTOLIC BLOOD PRESSURE: 94 MMHG | HEIGHT: 68 IN | OXYGEN SATURATION: 97 % | SYSTOLIC BLOOD PRESSURE: 144 MMHG | RESPIRATION RATE: 16 BRPM | TEMPERATURE: 98 F | HEART RATE: 68 BPM | WEIGHT: 195 LBS | BODY MASS INDEX: 29.55 KG/M2

## 2021-12-28 DIAGNOSIS — K50.918 CROHN'S DISEASE WITH OTHER COMPLICATION, UNSPECIFIED GASTROINTESTINAL TRACT LOCATION: Primary | ICD-10-CM

## 2021-12-28 DIAGNOSIS — N36.0 FISTULA OF PERINEUM: ICD-10-CM

## 2021-12-28 LAB
ANION GAP SERPL CALC-SCNC: 7 MMOL/L (ref 8–16)
BASOPHILS # BLD AUTO: 0.03 K/UL (ref 0–0.2)
BASOPHILS NFR BLD: 0.3 % (ref 0–1.9)
BUN SERPL-MCNC: 9 MG/DL (ref 6–20)
CALCIUM SERPL-MCNC: 9.3 MG/DL (ref 8.7–10.5)
CHLORIDE SERPL-SCNC: 105 MMOL/L (ref 95–110)
CO2 SERPL-SCNC: 23 MMOL/L (ref 23–29)
CREAT SERPL-MCNC: 0.7 MG/DL (ref 0.5–1.4)
CRP SERPL-MCNC: 11.6 MG/L (ref 0–8.2)
CTP QC/QA: YES
DIFFERENTIAL METHOD: ABNORMAL
EOSINOPHIL # BLD AUTO: 0.1 K/UL (ref 0–0.5)
EOSINOPHIL NFR BLD: 0.5 % (ref 0–8)
ERYTHROCYTE [DISTWIDTH] IN BLOOD BY AUTOMATED COUNT: 13.4 % (ref 11.5–14.5)
EST. GFR  (AFRICAN AMERICAN): >60 ML/MIN/1.73 M^2
EST. GFR  (NON AFRICAN AMERICAN): >60 ML/MIN/1.73 M^2
GLUCOSE SERPL-MCNC: 151 MG/DL (ref 70–110)
GRAM STN SPEC: NORMAL
GRAM STN SPEC: NORMAL
HCT VFR BLD AUTO: 41.6 % (ref 40–54)
HGB BLD-MCNC: 14.8 G/DL (ref 14–18)
IMM GRANULOCYTES # BLD AUTO: 0.06 K/UL (ref 0–0.04)
IMM GRANULOCYTES NFR BLD AUTO: 0.7 % (ref 0–0.5)
LYMPHOCYTES # BLD AUTO: 2.2 K/UL (ref 1–4.8)
LYMPHOCYTES NFR BLD: 24.2 % (ref 18–48)
MCH RBC QN AUTO: 30.7 PG (ref 27–31)
MCHC RBC AUTO-ENTMCNC: 35.6 G/DL (ref 32–36)
MCV RBC AUTO: 86 FL (ref 82–98)
MONOCYTES # BLD AUTO: 0.6 K/UL (ref 0.3–1)
MONOCYTES NFR BLD: 6.8 % (ref 4–15)
NEUTROPHILS # BLD AUTO: 6.2 K/UL (ref 1.8–7.7)
NEUTROPHILS NFR BLD: 67.5 % (ref 38–73)
NRBC BLD-RTO: 0 /100 WBC
PLATELET # BLD AUTO: 245 K/UL (ref 150–450)
PMV BLD AUTO: 12.1 FL (ref 9.2–12.9)
POTASSIUM SERPL-SCNC: 4.2 MMOL/L (ref 3.5–5.1)
RBC # BLD AUTO: 4.82 M/UL (ref 4.6–6.2)
SARS-COV-2 RDRP RESP QL NAA+PROBE: NEGATIVE
SODIUM SERPL-SCNC: 135 MMOL/L (ref 136–145)
WBC # BLD AUTO: 9.2 K/UL (ref 3.9–12.7)

## 2021-12-28 PROCEDURE — 99219 PR INITIAL OBSERVATION CARE,LEVL II: CPT | Mod: 57,,, | Performed by: STUDENT IN AN ORGANIZED HEALTH CARE EDUCATION/TRAINING PROGRAM

## 2021-12-28 PROCEDURE — 99219 PR INITIAL OBSERVATION CARE,LEVL II: ICD-10-PCS | Mod: 57,,, | Performed by: STUDENT IN AN ORGANIZED HEALTH CARE EDUCATION/TRAINING PROGRAM

## 2021-12-28 PROCEDURE — 55100 DRAINAGE OF SCROTUM ABSCESS: CPT | Mod: 51,,, | Performed by: STUDENT IN AN ORGANIZED HEALTH CARE EDUCATION/TRAINING PROGRAM

## 2021-12-28 PROCEDURE — G0378 HOSPITAL OBSERVATION PER HR: HCPCS

## 2021-12-28 PROCEDURE — 37000008 HC ANESTHESIA 1ST 15 MINUTES: Performed by: STUDENT IN AN ORGANIZED HEALTH CARE EDUCATION/TRAINING PROGRAM

## 2021-12-28 PROCEDURE — 37000009 HC ANESTHESIA EA ADD 15 MINS: Performed by: STUDENT IN AN ORGANIZED HEALTH CARE EDUCATION/TRAINING PROGRAM

## 2021-12-28 PROCEDURE — 85025 COMPLETE CBC W/AUTO DIFF WBC: CPT | Performed by: STUDENT IN AN ORGANIZED HEALTH CARE EDUCATION/TRAINING PROGRAM

## 2021-12-28 PROCEDURE — 87147 CULTURE TYPE IMMUNOLOGIC: CPT | Performed by: STUDENT IN AN ORGANIZED HEALTH CARE EDUCATION/TRAINING PROGRAM

## 2021-12-28 PROCEDURE — 86140 C-REACTIVE PROTEIN: CPT | Performed by: STUDENT IN AN ORGANIZED HEALTH CARE EDUCATION/TRAINING PROGRAM

## 2021-12-28 PROCEDURE — 63600175 PHARM REV CODE 636 W HCPCS: Performed by: ANESTHESIOLOGY

## 2021-12-28 PROCEDURE — 25000003 PHARM REV CODE 250: Performed by: STUDENT IN AN ORGANIZED HEALTH CARE EDUCATION/TRAINING PROGRAM

## 2021-12-28 PROCEDURE — 25000003 PHARM REV CODE 250: Performed by: EMERGENCY MEDICINE

## 2021-12-28 PROCEDURE — 25000003 PHARM REV CODE 250: Performed by: NURSE ANESTHETIST, CERTIFIED REGISTERED

## 2021-12-28 PROCEDURE — 36000705 HC OR TIME LEV I EA ADD 15 MIN: Performed by: STUDENT IN AN ORGANIZED HEALTH CARE EDUCATION/TRAINING PROGRAM

## 2021-12-28 PROCEDURE — U0002 COVID-19 LAB TEST NON-CDC: HCPCS | Performed by: STUDENT IN AN ORGANIZED HEALTH CARE EDUCATION/TRAINING PROGRAM

## 2021-12-28 PROCEDURE — 99285 PR EMERGENCY DEPT VISIT,LEVEL V: ICD-10-PCS | Mod: CS,,, | Performed by: EMERGENCY MEDICINE

## 2021-12-28 PROCEDURE — 87102 FUNGUS ISOLATION CULTURE: CPT | Performed by: STUDENT IN AN ORGANIZED HEALTH CARE EDUCATION/TRAINING PROGRAM

## 2021-12-28 PROCEDURE — 71000044 HC DOSC ROUTINE RECOVERY FIRST HOUR: Performed by: STUDENT IN AN ORGANIZED HEALTH CARE EDUCATION/TRAINING PROGRAM

## 2021-12-28 PROCEDURE — S0030 INJECTION, METRONIDAZOLE: HCPCS | Performed by: NURSE ANESTHETIST, CERTIFIED REGISTERED

## 2021-12-28 PROCEDURE — 46020 PLACEMENT OF SETON: CPT | Mod: ,,, | Performed by: STUDENT IN AN ORGANIZED HEALTH CARE EDUCATION/TRAINING PROGRAM

## 2021-12-28 PROCEDURE — 55100 PR DRAINAGE SCROTAL WALL ABSCESS: ICD-10-PCS | Mod: 51,,, | Performed by: STUDENT IN AN ORGANIZED HEALTH CARE EDUCATION/TRAINING PROGRAM

## 2021-12-28 PROCEDURE — 71000015 HC POSTOP RECOV 1ST HR: Performed by: STUDENT IN AN ORGANIZED HEALTH CARE EDUCATION/TRAINING PROGRAM

## 2021-12-28 PROCEDURE — 80048 BASIC METABOLIC PNL TOTAL CA: CPT | Performed by: STUDENT IN AN ORGANIZED HEALTH CARE EDUCATION/TRAINING PROGRAM

## 2021-12-28 PROCEDURE — 36000704 HC OR TIME LEV I 1ST 15 MIN: Performed by: STUDENT IN AN ORGANIZED HEALTH CARE EDUCATION/TRAINING PROGRAM

## 2021-12-28 PROCEDURE — 63600175 PHARM REV CODE 636 W HCPCS: Performed by: NURSE ANESTHETIST, CERTIFIED REGISTERED

## 2021-12-28 PROCEDURE — 96374 THER/PROPH/DIAG INJ IV PUSH: CPT

## 2021-12-28 PROCEDURE — 87205 SMEAR GRAM STAIN: CPT | Performed by: STUDENT IN AN ORGANIZED HEALTH CARE EDUCATION/TRAINING PROGRAM

## 2021-12-28 PROCEDURE — 87076 CULTURE ANAEROBE IDENT EACH: CPT | Mod: 59 | Performed by: STUDENT IN AN ORGANIZED HEALTH CARE EDUCATION/TRAINING PROGRAM

## 2021-12-28 PROCEDURE — 87070 CULTURE OTHR SPECIMN AEROBIC: CPT | Performed by: STUDENT IN AN ORGANIZED HEALTH CARE EDUCATION/TRAINING PROGRAM

## 2021-12-28 PROCEDURE — D9220A PRA ANESTHESIA: Mod: ,,, | Performed by: ANESTHESIOLOGY

## 2021-12-28 PROCEDURE — 99285 EMERGENCY DEPT VISIT HI MDM: CPT | Mod: CS,,, | Performed by: EMERGENCY MEDICINE

## 2021-12-28 PROCEDURE — 99285 EMERGENCY DEPT VISIT HI MDM: CPT | Mod: 25

## 2021-12-28 PROCEDURE — D9220A PRA ANESTHESIA: ICD-10-PCS | Mod: ,,, | Performed by: ANESTHESIOLOGY

## 2021-12-28 PROCEDURE — 46020 PR PLACEMENT,SETON: ICD-10-PCS | Mod: ,,, | Performed by: STUDENT IN AN ORGANIZED HEALTH CARE EDUCATION/TRAINING PROGRAM

## 2021-12-28 PROCEDURE — 87075 CULTR BACTERIA EXCEPT BLOOD: CPT | Performed by: STUDENT IN AN ORGANIZED HEALTH CARE EDUCATION/TRAINING PROGRAM

## 2021-12-28 RX ORDER — SODIUM CHLORIDE 0.9 % (FLUSH) 0.9 %
3 SYRINGE (ML) INJECTION
Status: DISCONTINUED | OUTPATIENT
Start: 2021-12-28 | End: 2021-12-28 | Stop reason: HOSPADM

## 2021-12-28 RX ORDER — LIDOCAINE HYDROCHLORIDE 10 MG/ML
INJECTION, SOLUTION EPIDURAL; INFILTRATION; INTRACAUDAL; PERINEURAL
Status: DISCONTINUED | OUTPATIENT
Start: 2021-12-28 | End: 2021-12-28 | Stop reason: HOSPADM

## 2021-12-28 RX ORDER — FENTANYL CITRATE 50 UG/ML
50 INJECTION, SOLUTION INTRAMUSCULAR; INTRAVENOUS EVERY 5 MIN PRN
Status: DISCONTINUED | OUTPATIENT
Start: 2021-12-28 | End: 2021-12-28 | Stop reason: HOSPADM

## 2021-12-28 RX ORDER — MIDAZOLAM HYDROCHLORIDE 1 MG/ML
INJECTION, SOLUTION INTRAMUSCULAR; INTRAVENOUS
Status: DISCONTINUED | OUTPATIENT
Start: 2021-12-28 | End: 2021-12-28

## 2021-12-28 RX ORDER — OXYCODONE AND ACETAMINOPHEN 5; 325 MG/1; MG/1
2 TABLET ORAL
Status: COMPLETED | OUTPATIENT
Start: 2021-12-28 | End: 2021-12-28

## 2021-12-28 RX ORDER — FENTANYL CITRATE 50 UG/ML
INJECTION, SOLUTION INTRAMUSCULAR; INTRAVENOUS
Status: DISCONTINUED | OUTPATIENT
Start: 2021-12-28 | End: 2021-12-28

## 2021-12-28 RX ORDER — ONDANSETRON 2 MG/ML
4 INJECTION INTRAMUSCULAR; INTRAVENOUS EVERY 8 HOURS PRN
Status: DISCONTINUED | OUTPATIENT
Start: 2021-12-28 | End: 2021-12-28 | Stop reason: HOSPADM

## 2021-12-28 RX ORDER — KETAMINE HCL IN 0.9 % NACL 50 MG/5 ML
SYRINGE (ML) INTRAVENOUS
Status: DISCONTINUED | OUTPATIENT
Start: 2021-12-28 | End: 2021-12-28

## 2021-12-28 RX ORDER — PROCHLORPERAZINE EDISYLATE 5 MG/ML
5 INJECTION INTRAMUSCULAR; INTRAVENOUS EVERY 6 HOURS PRN
Status: DISCONTINUED | OUTPATIENT
Start: 2021-12-28 | End: 2021-12-28 | Stop reason: HOSPADM

## 2021-12-28 RX ORDER — CIPROFLOXACIN 2 MG/ML
INJECTION, SOLUTION INTRAVENOUS
Status: DISCONTINUED | OUTPATIENT
Start: 2021-12-28 | End: 2021-12-28

## 2021-12-28 RX ORDER — ONDANSETRON 2 MG/ML
4 INJECTION INTRAMUSCULAR; INTRAVENOUS DAILY PRN
Status: DISCONTINUED | OUTPATIENT
Start: 2021-12-28 | End: 2021-12-28 | Stop reason: HOSPADM

## 2021-12-28 RX ORDER — OXYCODONE HYDROCHLORIDE 5 MG/1
5 TABLET ORAL EVERY 6 HOURS PRN
Qty: 20 TABLET | Refills: 0 | Status: SHIPPED | OUTPATIENT
Start: 2021-12-28 | End: 2024-01-18

## 2021-12-28 RX ORDER — METRONIDAZOLE 500 MG/100ML
INJECTION, SOLUTION INTRAVENOUS
Status: DISCONTINUED | OUTPATIENT
Start: 2021-12-28 | End: 2021-12-28

## 2021-12-28 RX ORDER — NALOXONE HCL 0.4 MG/ML
0.02 VIAL (ML) INJECTION
Status: DISCONTINUED | OUTPATIENT
Start: 2021-12-28 | End: 2021-12-28 | Stop reason: HOSPADM

## 2021-12-28 RX ORDER — SODIUM CHLORIDE 0.9 % (FLUSH) 0.9 %
10 SYRINGE (ML) INJECTION
Status: DISCONTINUED | OUTPATIENT
Start: 2021-12-28 | End: 2021-12-28 | Stop reason: HOSPADM

## 2021-12-28 RX ORDER — OXYCODONE HYDROCHLORIDE 5 MG/1
5 TABLET ORAL EVERY 4 HOURS PRN
Status: DISCONTINUED | OUTPATIENT
Start: 2021-12-28 | End: 2021-12-28 | Stop reason: HOSPADM

## 2021-12-28 RX ORDER — CIPROFLOXACIN 500 MG/1
500 TABLET ORAL EVERY 12 HOURS
Qty: 20 TABLET | Refills: 0 | Status: SHIPPED | OUTPATIENT
Start: 2021-12-28 | End: 2023-10-06 | Stop reason: CLARIF

## 2021-12-28 RX ORDER — ONDANSETRON 2 MG/ML
INJECTION INTRAMUSCULAR; INTRAVENOUS
Status: DISCONTINUED | OUTPATIENT
Start: 2021-12-28 | End: 2021-12-28

## 2021-12-28 RX ORDER — BUPIVACAINE HYDROCHLORIDE AND EPINEPHRINE 2.5; 5 MG/ML; UG/ML
INJECTION, SOLUTION EPIDURAL; INFILTRATION; INTRACAUDAL; PERINEURAL
Status: DISCONTINUED | OUTPATIENT
Start: 2021-12-28 | End: 2021-12-28 | Stop reason: HOSPADM

## 2021-12-28 RX ORDER — HYDROMORPHONE HYDROCHLORIDE 1 MG/ML
0.2 INJECTION, SOLUTION INTRAMUSCULAR; INTRAVENOUS; SUBCUTANEOUS EVERY 5 MIN PRN
Status: DISCONTINUED | OUTPATIENT
Start: 2021-12-28 | End: 2021-12-28 | Stop reason: HOSPADM

## 2021-12-28 RX ORDER — ACETAMINOPHEN 325 MG/1
650 TABLET ORAL EVERY 4 HOURS PRN
Status: DISCONTINUED | OUTPATIENT
Start: 2021-12-28 | End: 2021-12-28 | Stop reason: HOSPADM

## 2021-12-28 RX ORDER — PROPOFOL 10 MG/ML
VIAL (ML) INTRAVENOUS CONTINUOUS PRN
Status: DISCONTINUED | OUTPATIENT
Start: 2021-12-28 | End: 2021-12-28

## 2021-12-28 RX ORDER — PROPOFOL 10 MG/ML
VIAL (ML) INTRAVENOUS
Status: DISCONTINUED | OUTPATIENT
Start: 2021-12-28 | End: 2021-12-28

## 2021-12-28 RX ORDER — LIDOCAINE HYDROCHLORIDE 20 MG/ML
INJECTION INTRAVENOUS
Status: DISCONTINUED | OUTPATIENT
Start: 2021-12-28 | End: 2021-12-28

## 2021-12-28 RX ADMIN — CIPROFLOXACIN 400 MG: 2 INJECTION, SOLUTION INTRAVENOUS at 03:12

## 2021-12-28 RX ADMIN — Medication 20 MG: at 04:12

## 2021-12-28 RX ADMIN — MIDAZOLAM HYDROCHLORIDE 2 MG: 1 INJECTION, SOLUTION INTRAMUSCULAR; INTRAVENOUS at 03:12

## 2021-12-28 RX ADMIN — FENTANYL CITRATE 25 MCG: 50 INJECTION, SOLUTION INTRAMUSCULAR; INTRAVENOUS at 04:12

## 2021-12-28 RX ADMIN — SODIUM CHLORIDE: 0.9 INJECTION, SOLUTION INTRAVENOUS at 03:12

## 2021-12-28 RX ADMIN — FENTANYL CITRATE 50 MCG: 50 INJECTION, SOLUTION INTRAMUSCULAR; INTRAVENOUS at 04:12

## 2021-12-28 RX ADMIN — METRONIDAZOLE 500 MG: 500 INJECTION, SOLUTION INTRAVENOUS at 03:12

## 2021-12-28 RX ADMIN — ONDANSETRON 4 MG: 2 INJECTION INTRAMUSCULAR; INTRAVENOUS at 04:12

## 2021-12-28 RX ADMIN — FENTANYL CITRATE 50 MCG: 50 INJECTION, SOLUTION INTRAMUSCULAR; INTRAVENOUS at 03:12

## 2021-12-28 RX ADMIN — FENTANYL CITRATE 50 MCG: 50 INJECTION INTRAMUSCULAR; INTRAVENOUS at 05:12

## 2021-12-28 RX ADMIN — OXYCODONE 5 MG: 5 TABLET ORAL at 05:12

## 2021-12-28 RX ADMIN — PROPOFOL 70 MG: 10 INJECTION, EMULSION INTRAVENOUS at 03:12

## 2021-12-28 RX ADMIN — Medication 30 MG: at 03:12

## 2021-12-28 RX ADMIN — Medication 150 MCG/KG/MIN: at 03:12

## 2021-12-28 RX ADMIN — OXYCODONE HYDROCHLORIDE AND ACETAMINOPHEN 2 TABLET: 5; 325 TABLET ORAL at 09:12

## 2021-12-28 RX ADMIN — LIDOCAINE HYDROCHLORIDE 100 MG: 20 INJECTION, SOLUTION INTRAVENOUS at 03:12

## 2021-12-28 RX ADMIN — GLYCOPYRROLATE 0.2 MG: 0.2 INJECTION, SOLUTION INTRAMUSCULAR; INTRAVITREAL at 03:12

## 2021-12-30 LAB — BACTERIA SPEC AEROBE CULT: ABNORMAL

## 2022-01-05 LAB — BACTERIA SPEC ANAEROBE CULT: ABNORMAL

## 2022-01-24 ENCOUNTER — TELEPHONE (OUTPATIENT)
Dept: SURGERY | Facility: CLINIC | Age: 37
End: 2022-01-24

## 2022-01-24 ENCOUNTER — OFFICE VISIT (OUTPATIENT)
Dept: SURGERY | Facility: CLINIC | Age: 37
End: 2022-01-24

## 2022-01-24 VITALS
HEIGHT: 68 IN | DIASTOLIC BLOOD PRESSURE: 88 MMHG | WEIGHT: 187.5 LBS | HEART RATE: 114 BPM | SYSTOLIC BLOOD PRESSURE: 170 MMHG | BODY MASS INDEX: 28.42 KG/M2

## 2022-01-24 DIAGNOSIS — Z93.2 ILEOSTOMY PRESENT: ICD-10-CM

## 2022-01-24 DIAGNOSIS — K50.113 CROHN'S DISEASE OF COLON WITH FISTULA: ICD-10-CM

## 2022-01-24 DIAGNOSIS — K60.3 ANAL FISTULA: Primary | ICD-10-CM

## 2022-01-24 PROCEDURE — 99999 PR PBB SHADOW E&M-EST. PATIENT-LVL IV: ICD-10-PCS | Mod: PBBFAC,,, | Performed by: COLON & RECTAL SURGERY

## 2022-01-24 PROCEDURE — 99999 PR PBB SHADOW E&M-EST. PATIENT-LVL IV: CPT | Mod: PBBFAC,,, | Performed by: COLON & RECTAL SURGERY

## 2022-01-24 PROCEDURE — 99214 OFFICE O/P EST MOD 30 MIN: CPT | Mod: PBBFAC | Performed by: COLON & RECTAL SURGERY

## 2022-01-24 PROCEDURE — 99024 POSTOP FOLLOW-UP VISIT: CPT | Mod: ,,, | Performed by: COLON & RECTAL SURGERY

## 2022-01-24 PROCEDURE — 99024 PR POST-OP FOLLOW-UP VISIT: ICD-10-PCS | Mod: ,,, | Performed by: COLON & RECTAL SURGERY

## 2022-01-24 NOTE — LETTER
January 28, 2022      Ronni Freeman MD  [address]  [place] [address]           [facility]  [address]  [facility] [address]  Phone: [phone]  Fax: [phone]          Patient: Amando Calix   MR Number: [id number]   YOB: 1985   Date of Visit: 1/24/2022       Dear Dr. Freeman:    Thank you for referring Amando Calix to me for evaluation. Attached you will find relevant portions of my assessment and plan of care.    If you have questions, please do not hesitate to call me. I look forward to following Amando Calix along with you.    Sincerely,    Robe Suarez MD    Enclosure  CC:  No Recipients    If you would like to receive this communication electronically, please contact [email or web address] or [phone] to request more information on [facility] access.    For providers and/or their staff who would like to refer a patient to [facility], please contact us through our one-stop-shop provider referral line, [facility], at [phone].    If you feel you have received this communication in error or would no longer like to receive these types of communications, please e-mail [email or web address]

## 2022-01-24 NOTE — TELEPHONE ENCOUNTER
Called patient to see if he was still arriving to his 8:40 am apt. No answer; left a vm with a call back number.

## 2022-01-24 NOTE — PROGRESS NOTES
Subjective:       Patient ID: Amando Calix is a 36 y.o. male.    Chief Complaint: Crohn's Disease and Anal Fistula    HPI  37 yo M with history of Crohn's disease having undergone a prior total abdominal colectomy with end ileostomy.  He has residual anorectal stump and has severe fistulizing perianal Crohn's disease with anal stenosis.  He has undergone multiple prior drainage procedures.  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    He has been noncompliant with his medical regimen - was on Entyvio but has not had a dose since January 2021 - says he has had trouble finding time.  He also has not been taking his diabetes medications.    I last saw him 6/28/21 when he came in because he felt like he had a new fistula at the base of the scrotum with increased pain, swelling, drainage.  The 3 setons that had been placed previously all remain.  No fevers or chills.  On exam he had a new fistula to the left of the seton in place at the base of the scrotum.  Scheduled for EUA and seton 7/21/21 but surgery cancelled when he tested positive for COVID.  He was then admitted 7/27-31/21 with DKA, did not follow-up with me afterwards.   He then presented in late Dec with scrotal abscess, all of his setons had fallen out by then -  taken to OR by Dr. Cardenas for EUA, I&D, total of 4 setons placed.      He presents today for follow-up.  He has had no follow-up since surgery in December.  Still is not taking his Crohn's meds, and has not had follow-up with GI since last spring.  He is not checking blood sugars and is non-compliant with management of diabetes - takes insulin when he remembers, he is not sure who prescribed insulin for him.  Still smoking cigarettes. C/o mild discomfort from setons, has moderate drainage.  No F/C.        Review of patient's allergies indicates:   Allergen Reactions    Ibuprofen Other (See Comments)     Can't take d/t stomach ulcers        Past Medical History:   Diagnosis Date    Anemia     Chronic diarrhea     Clostridium difficile infection     Crohn's disease     Diabetic ketoacidosis without coma associated with diabetes mellitus due to underlying condition 11/21/2020    Protein calorie malnutrition     Steroid-induced diabetes        Past Surgical History:   Procedure Laterality Date    ABSCESS DRAINAGE      COLONOSCOPY      COLOSTOMY      EXAMINATION UNDER ANESTHESIA N/A 11/27/2020    Procedure: Exam under anesthesia and seton placement;  Surgeon: Robe Suarez MD;  [facility] F[facility];  Service: Colon and Rectal;  Laterality: N/A;    FLEXIBLE SIGMOIDOSCOPY N/A 7/10/2020    Procedure: SIGMOIDOSCOPY, FLEXIBLE;  Surgeon: Robe Suarez MD;  Location: [facility] 2ND FLR;  Service: Colon and Rectal;  Laterality: N/A;    HERNIA REPAIR      ILEOSTOMY      INCISION AND DRAINAGE OF ABSCESS  12/28/2021    Procedure: INCISION AND DRAINAGE, Scrotal ABSCESS;  Surgeon: Rober Cardenas MD;  Location: [facility] 2ND FLR;  Service: Colon and Rectal;;    INSERTION OF SETON STITCH N/A 7/10/2020    Procedure: PLACEMENT-SETON DRAIN;  Surgeon: Robe Suarez MD;  [facility]: [facility];  Service: Colon and Rectal;  Laterality: N/A;    INSERTION OF SETON STITCH N/A 12/28/2021    Procedure: PLACEMENT, SETON STITCH X4;  Surgeon: Rober Cardenas MD;  [facility]: [facility];  Service: Colon and Rectal;  Laterality: N/A;    SIGMOIDECTOMY         Current Outpatient Medications   Medication Sig Dispense Refill    acetaminophen (TYLENOL) 500 MG tablet Take 2 tablets (1,000 mg total) by mouth every 6 (six) hours as needed for Pain.  0    ciprofloxacin HCl (CIPRO) 500 MG tablet Take 1 tablet (500 mg total) by mouth every 12 (twelve) hours. 20 tablet 0    insulin NPH/Reg human (HUMULIN, 70/30,) 100 unit/mL (70-30) InPn pen Inject 60 units under the skin in the AM and 30 units under the skin in the PM 1 Box 2    lancets 30 gauge Misc  "Use as instructed to check blood sugar three times daily before meals and nightly (ICD-10-CM: E10.65) 100 each 11    lancing device Misc Use as instructed to check blood sugar three times daily before meals and nightly (ICD-10-CM: E10.65) 1 each 0    metFORMIN (GLUCOPHAGE-XR) 500 MG ER 24hr tablet Take 2 tablets (1,000 mg total) by mouth 2 (two) times daily with meals. 360 tablet 3    pen needle, diabetic 32 gauge x 5/32" Ndle Use to inject insulin 4x daily. 100 each 2    pulse oximeter (PULSE OXIMETER) device by Apply Externally route 2 (two) times a day. Use twice daily at 8 AM and 3 PM and record the value [facility] as directed. 1 each 0    vitamin D (VITAMIN D3) 1000 units Tab Take 1 tablet (1,000 Units total) by mouth once daily. 30 tablet 2    blood sugar diagnostic Strp Use as instructed to check blood sugar three times daily before meals and nightly (ICD-10-CM: E10.65 ) (Patient not taking: Reported on 1/24/2022) 100 strip 2    blood-glucose meter Misc Use as instructed to check blood sugar three times daily before meals and nightly (ICD-10-CM: E10.65 ) (Patient not taking: Reported on 1/24/2022) 1 each 0    oxyCODONE (ROXICODONE) 5 MG immediate release tablet Take 1 tablet (5 mg total) by mouth every 6 (six) hours as needed for Pain. (Patient not taking: Reported on 1/24/2022) 20 tablet 0     No current facility-administered medications for this visit.       Family History   Problem Relation Age of Onset    Diabetes Father     Hyperlipidemia Mother     Diabetes Mother     Crohn's disease Neg Hx     Celiac disease Neg Hx     Cirrhosis Neg Hx     Colon cancer Neg Hx     Esophageal cancer Neg Hx     Irritable bowel syndrome Neg Hx     Liver cancer Neg Hx     Rectal cancer Neg Hx     Stomach cancer Neg Hx     Ulcerative colitis Neg Hx        Social History     Socioeconomic History    Marital status: Single   Tobacco Use    Smoking status: Current Every Day Smoker     Packs/day: 0.50     " Years: 3.00     Pack years: 1.50     Types: Cigarettes     Last attempt to quit: 2014     Years since quittin.2    Smokeless tobacco: Never Used   Substance and Sexual Activity    Alcohol use: Yes     Comment: socially-weekly    Drug use: No    Sexual activity: Yes     Partners: Female       Review of Systems   Constitutional: Negative for chills and fever.   HENT: Negative for congestion and sinus pressure.    Eyes: Negative for visual disturbance.   Respiratory: Negative for cough and shortness of breath.    Cardiovascular: Negative for chest pain and palpitations.   Gastrointestinal: Positive for rectal pain. Negative for abdominal distention, abdominal pain, anal bleeding, blood in stool, constipation, diarrhea, nausea and vomiting.   Endocrine: Negative for cold intolerance and heat intolerance.   Genitourinary: Negative for dysuria and frequency.   Musculoskeletal: Negative for arthralgias and back pain.   Skin: Negative for rash.   Allergic/Immunologic: Negative for immunocompromised state.   Neurological: Negative for dizziness, light-headedness and headaches.   Psychiatric/Behavioral: Negative for confusion. The patient is not nervous/anxious.        Objective:      Physical Exam  Constitutional:       Appearance: He is well-developed.   HENT:      Head: Normocephalic and atraumatic.   Eyes:      Conjunctiva/sclera: Conjunctivae normal.   Pulmonary:      Effort: Pulmonary effort is normal. No respiratory distress.   Abdominal:      General: There is no distension.      Palpations: Abdomen is soft. There is no mass.      Tenderness: There is no abdominal tenderness. There is no guarding or rebound.   Genitourinary:     Comments: Perineum - 3 sequential setons in place along the right side of the anal margin extending to the base of the scrotum.  New fistula to the left of the seton in place at the base of the scrotum.  Skin:     General: Skin is warm and dry.      Findings: No erythema.    Neurological:      Mental Status: He is alert and oriented to person, place, and time.           Lab Results   Component Value Date    WBC 9.20 12/28/2021    HGB 14.8 12/28/2021    HCT 41.6 12/28/2021    MCV 86 12/28/2021     12/28/2021     BMP  Lab Results   Component Value Date     (L) 12/28/2021    K 4.2 12/28/2021     12/28/2021    CO2 23 12/28/2021    BUN 9 12/28/2021    CREATININE 0.7 12/28/2021    CALCIUM 9.3 12/28/2021    ANIONGAP 7 (L) 12/28/2021    ESTGFRAFRICA >60.0 12/28/2021    EGFRNONAA >60.0 12/28/2021     CMP  Sodium   Date Value Ref Range Status   12/28/2021 135 (L) 136 - 145 mmol/L Final     Potassium   Date Value Ref Range Status   12/28/2021 4.2 3.5 - 5.1 mmol/L Final     Chloride   Date Value Ref Range Status   12/28/2021 105 95 - 110 mmol/L Final     CO2   Date Value Ref Range Status   12/28/2021 23 23 - 29 mmol/L Final     Glucose   Date Value Ref Range Status   12/28/2021 151 (H) 70 - 110 mg/dL Final     BUN   Date Value Ref Range Status   12/28/2021 9 6 - 20 mg/dL Final     Creatinine   Date Value Ref Range Status   12/28/2021 0.7 0.5 - 1.4 mg/dL Final     Calcium   Date Value Ref Range Status   12/28/2021 9.3 8.7 - 10.5 mg/dL Final     Total Protein   Date Value Ref Range Status   07/31/2021 5.7 (L) 6.0 - 8.4 g/dL Final     Albumin   Date Value Ref Range Status   07/31/2021 2.5 (L) 3.5 - 5.2 g/dL Final     Total Bilirubin   Date Value Ref Range Status   07/31/2021 0.3 0.1 - 1.0 mg/dL Final     Comment:     For infants and newborns, interpretation of results should be based  on gestational age, weight and in agreement with clinical  observations.    Premature Infant recommended reference ranges:  Up to 24 hours.............<8.0 mg/dL  Up to 48 hours............<12.0 mg/dL  3-5 days..................<15.0 mg/dL  6-29 days.................<15.0 mg/dL       Alkaline Phosphatase   Date Value Ref Range Status   07/31/2021 68 55 - 135 U/L Final     AST   Date Value Ref Range  Status   07/31/2021 18 10 - 40 U/L Final     ALT   Date Value Ref Range Status   07/31/2021 18 10 - 44 U/L Final     Anion Gap   Date Value Ref Range Status   12/28/2021 7 (L) 8 - 16 mmol/L Final     eGFR if    Date Value Ref Range Status   12/28/2021 >60.0 >60 mL/min/1.73 m^2 Final     eGFR if non    Date Value Ref Range Status   12/28/2021 >60.0 >60 mL/min/1.73 m^2 Final     Comment:     Calculation used to obtain the estimated glomerular filtration  rate (eGFR) is the CKD-EPI equation.        No results found for: CEA        Assessment:       1. Anal fistula    2. Crohn's disease of colon with fistula    3. Ileostomy present        Plan:   Needs GI follow-up - encouraged him to reach out for an appt ASAP  Encouraged establishing care with PCP, and compliance with management of DM   Encouraged smoking cessation    He will ultimately benefit from completion proctectomy but will likely need flap closure due to extent of perineal disease  - needs better DM control and smoking cessation prior.  He also is not willing to proceed with this in the immediate future.      Robe Suarez MD, FACS, FASCRS  Senior Staff Surgeon  Department of Colon & Rectal Surgery     This note was created using voice recognition software, and may contain some unrecognized transcriptional errors.

## 2022-01-26 ENCOUNTER — TELEPHONE (OUTPATIENT)
Dept: GASTROENTEROLOGY | Facility: CLINIC | Age: 37
End: 2022-01-26

## 2022-01-31 ENCOUNTER — TELEPHONE (OUTPATIENT)
Dept: GASTROENTEROLOGY | Facility: HOSPITAL | Age: 37
End: 2022-01-31

## 2022-01-31 ENCOUNTER — PATIENT MESSAGE (OUTPATIENT)
Dept: GASTROENTEROLOGY | Facility: CLINIC | Age: 37
End: 2022-01-31

## 2022-01-31 ENCOUNTER — TELEPHONE (OUTPATIENT)
Dept: GASTROENTEROLOGY | Facility: CLINIC | Age: 37
End: 2022-01-31

## 2022-01-31 LAB — FUNGUS SPEC CULT: NORMAL

## 2022-01-31 NOTE — TELEPHONE ENCOUNTER
----- Message from Ronni Freeman MD sent at 1/31/2022  8:55 AM CST -----  Inflammatory Bowel Disease Referrals     IBD History    crohns    Diagnosis (Ulcerative colitis or Crohn's disease): crohns  Disease Location: perianal    Current Medications  entyvio     Other Pertinent Information:  Colectomy/ileostomy     Timeframe appointment needed:  1 to 2 months    PLEASE SEND REFERRALS TO:  P [facility] INFLAMMATORY BOWEL DISEASE (CROHN'S AND COLITIS) REFERRALS

## 2022-01-31 NOTE — TELEPHONE ENCOUNTER
Spoke with patient and completed NP Questionnaire.  Explained how new patient process and scheduling appointment will work. Patient does not currently have insurance. Stated he makes too much for medicaid and can't afford insurance premiums.  He says he usually works things out with [facility] financial assistance.  Advised that I would have to look into seeing a patient who is self pay and let him know. Pt verbalized understanding to all and has no further questions at this time.

## 2022-01-31 NOTE — TELEPHONE ENCOUNTER
Spoke with patient and notified that we do not see patient's without insurance.  Dr. Freeman notified.  Pt verbalized understanding to all and has no further questions at this time.

## 2022-06-15 ENCOUNTER — HOSPITAL ENCOUNTER (EMERGENCY)
Facility: HOSPITAL | Age: 37
Discharge: HOME | End: 2022-06-15
Attending: EMERGENCY MEDICINE

## 2022-06-15 ENCOUNTER — OCCUPATIONAL HEALTH (OUTPATIENT)
Dept: URGENT CARE | Facility: CLINIC | Age: 37
End: 2022-06-15

## 2022-06-15 VITALS
DIASTOLIC BLOOD PRESSURE: 75 MMHG | HEART RATE: 101 BPM | OXYGEN SATURATION: 96 % | SYSTOLIC BLOOD PRESSURE: 133 MMHG | TEMPERATURE: 98 F | WEIGHT: 160 LBS | BODY MASS INDEX: 24.25 KG/M2 | HEIGHT: 68 IN | RESPIRATION RATE: 16 BRPM

## 2022-06-15 DIAGNOSIS — R73.9 HYPERGLYCEMIA: Primary | ICD-10-CM

## 2022-06-15 DIAGNOSIS — Z02.1 PRE-EMPLOYMENT EXAMINATION: Primary | ICD-10-CM

## 2022-06-15 LAB
CTP QC/QA: YES
POC 10 PANEL DRUG SCREEN: NEGATIVE
POCT GLUCOSE: 404 MG/DL (ref 70–110)

## 2022-06-15 PROCEDURE — 99283 EMERGENCY DEPT VISIT LOW MDM: CPT | Mod: 25

## 2022-06-15 PROCEDURE — 82962 GLUCOSE BLOOD TEST: CPT

## 2022-06-15 PROCEDURE — 99080 SPECIAL REPORTS OR FORMS: CPT | Mod: S$GLB,,, | Performed by: EMERGENCY MEDICINE

## 2022-06-15 PROCEDURE — 80305 POCT RAPID DRUG SCREEN 10 PANEL: ICD-10-PCS | Mod: S$GLB,,, | Performed by: EMERGENCY MEDICINE

## 2022-06-15 PROCEDURE — 94010 BREATHING CAPACITY TEST: CPT | Mod: S$GLB,,, | Performed by: EMERGENCY MEDICINE

## 2022-06-15 PROCEDURE — 99499 PHYSICAL, BASIC COMPLEXITY: ICD-10-PCS | Mod: S$GLB,,, | Performed by: EMERGENCY MEDICINE

## 2022-06-15 PROCEDURE — 99284 PR EMERGENCY DEPT VISIT,LEVEL IV: ICD-10-PCS | Mod: ,,, | Performed by: EMERGENCY MEDICINE

## 2022-06-15 PROCEDURE — 80305 DRUG TEST PRSMV DIR OPT OBS: CPT | Mod: S$GLB,,, | Performed by: EMERGENCY MEDICINE

## 2022-06-15 PROCEDURE — 99284 EMERGENCY DEPT VISIT MOD MDM: CPT | Mod: ,,, | Performed by: EMERGENCY MEDICINE

## 2022-06-15 PROCEDURE — 99080 OSHA QUESTIONNAIRE: ICD-10-PCS | Mod: S$GLB,,, | Performed by: EMERGENCY MEDICINE

## 2022-06-15 PROCEDURE — 99499 UNLISTED E&M SERVICE: CPT | Mod: S$GLB,,, | Performed by: EMERGENCY MEDICINE

## 2022-06-15 PROCEDURE — 94010 PULMONARY FUNCTION SCREENING (OCC MED PHYSICALS): ICD-10-PCS | Mod: S$GLB,,, | Performed by: EMERGENCY MEDICINE

## 2022-06-15 NOTE — LETTER
[address]  [facility] [address]  Phone: [phone]  Fax: [phone] Anjali 15, 2022    Patient: Amando Calix   Patient ID [id number]   YOB: 1985   Date of Visit: 6/15/2022       To Whom It May Concern:    Amando Calix was seen and treated in our emergency department on 6/15/2022. He may return to work on 6/15/2022.  He is medically cleared for work but should be compliant with his medications and insulin.    Sincerely,       Kenzie Faust MD

## 2022-06-15 NOTE — PROGRESS NOTES
LRC      On hold for hyperglycemia at 500, encouraged to go to the er or see pcp to r/o dka and get treatment for dm as well as clearance

## 2022-06-16 NOTE — ED PROVIDER NOTES
Encounter Date: 6/15/2022       History     Chief Complaint   Patient presents with    Hyperglycemia     Tested >500 with glucose in his urine. Pt reports increased thirst and urination.     37yo M with PMH of DM, Crohn's disease c/b anal fistula presenting to ED with complaint of hyperglycemia.  He was being drug test today work when the saw that his sugar read over 500. He states that he is noncompliant with his insulin and does not regularly check his blood glucose.  He reports that he has not been having any symptoms.  He has had DKA in the past but it has been about a year.  He last used insulin about a month ago.  He states he has been increasingly thirsty and urinating frequently.  He denies nausea, vomiting, fevers, chills, abdominal pain, confusion, numbness, chest pain or shortness of breath.        Review of patient's allergies indicates:   Allergen Reactions    Ibuprofen Other (See Comments)     Can't take d/t stomach ulcers     Past Medical History:   Diagnosis Date    Anemia     Chronic diarrhea     Clostridium difficile infection     Crohn's disease     Diabetic ketoacidosis without coma associated with diabetes mellitus due to underlying condition 11/21/2020    Protein calorie malnutrition     Steroid-induced diabetes      Past Surgical History:   Procedure Laterality Date    ABSCESS DRAINAGE      COLONOSCOPY      COLOSTOMY      EXAMINATION UNDER ANESTHESIA N/A 11/27/2020    Procedure: Exam under anesthesia and seton placement;  Surgeon: Robe Suarez MD;  Location: [facility] OR [address] [place] [address];  Service: Colon and Rectal;  Laterality: N/A;    FLEXIBLE SIGMOIDOSCOPY N/A 7/10/2020    Procedure: SIGMOIDOSCOPY, FLEXIBLE;  Surgeon: Robe Suarez MD;  Location: [facility] OR [address] [place] [address];  Service: Colon and Rectal;  Laterality: N/A;    HERNIA REPAIR      ILEOSTOMY      INCISION AND DRAINAGE OF ABSCESS  12/28/2021    Procedure: INCISION AND DRAINAGE, Scrotal ABSCESS;  Surgeon: Rober Cardenas MD;   Location: [facility] OR 2ND FLR;  Service: Colon and Rectal;;    INSERTION OF SETON STITCH N/A 7/10/2020    Procedure: PLACEMENT-SETON DRAIN;  Surgeon: Robe Suarez MD;  Location: [facility] OR 2ND FLR;  Service: Colon and Rectal;  Laterality: N/A;    INSERTION OF SETON STITCH N/A 2021    Procedure: PLACEMENT, SETON STITCH X4;  Surgeon: Rober Cardenas MD;  Location: [facility] OR 2ND FLR;  Service: Colon and Rectal;  Laterality: N/A;    SIGMOIDECTOMY       Family History   Problem Relation Age of Onset    Diabetes Father     Hyperlipidemia Mother     Diabetes Mother     Crohn's disease Neg Hx     Celiac disease Neg Hx     Cirrhosis Neg Hx     Colon cancer Neg Hx     Esophageal cancer Neg Hx     Irritable bowel syndrome Neg Hx     Liver cancer Neg Hx     Rectal cancer Neg Hx     Stomach cancer Neg Hx     Ulcerative colitis Neg Hx      Social History     Tobacco Use    Smoking status: Current Every Day Smoker     Packs/day: 0.50     Years: 3.00     Pack years: 1.50     Types: Cigarettes     Last attempt to quit: 2014     Years since quittin.6    Smokeless tobacco: Never Used   Substance Use Topics    Alcohol use: Yes     Comment: socially-weekly    Drug use: No     Review of Systems   Constitutional: Negative for chills and fever.   HENT: Negative for congestion and rhinorrhea.    Eyes: Negative for pain and visual disturbance.   Respiratory: Negative for cough and shortness of breath.    Cardiovascular: Negative for chest pain and palpitations.   Gastrointestinal: Negative for abdominal pain, nausea and vomiting.   Endocrine: Positive for polydipsia and polyuria.   Genitourinary: Negative for difficulty urinating and dysuria.   Musculoskeletal: Negative for gait problem and joint swelling.   Skin: Negative for color change and rash.   Neurological: Negative for light-headedness, numbness and headaches.       Physical Exam     Initial Vitals [06/15/22 1827]   BP Pulse Resp Temp SpO2   133/75  101 16 97.9 °F (36.6 °C) 96 %      MAP       --         Physical Exam    Nursing note and vitals reviewed.  Constitutional: He is not diaphoretic. No distress.   HENT:   Head: Normocephalic and atraumatic.   Mouth/Throat: Oropharynx is clear and moist.   Eyes: Conjunctivae and EOM are normal.   Neck: Neck supple.   Normal range of motion.  Cardiovascular: Normal rate, regular rhythm, normal heart sounds and intact distal pulses.   Pulmonary/Chest: Breath sounds normal. He has no wheezes. He has no rhonchi. He has no rales.   Abdominal: Abdomen is soft. He exhibits no distension. There is no abdominal tenderness.   Musculoskeletal:         General: No tenderness or edema. Normal range of motion.      Cervical back: Normal range of motion and neck supple.     Neurological: He is alert and oriented to person, place, and time.   Skin: Skin is warm and dry. Capillary refill takes less than 2 seconds.         ED Course   Procedures  Labs Reviewed   POCT GLUCOSE - Abnormal; Notable for the following components:       Result Value    POCT Glucose 404 (*)     All other components within normal limits   HIV 1 / 2 ANTIBODY   HEPATITIS C ANTIBODY          Imaging Results    None          Medications - No data to display  Medical Decision Making:   History:   Old Medical Records: I decided to obtain old medical records.  Initial Assessment:   37yo M with PMH of DM, Crohn's disease c/b anal fistula presenting to ED with complaint of hyperglycemia.   Differential Diagnosis:   Hyperglycemia  DKA  HHS  Infection  ED Management:  Patient is hemodynamically stable.  His blood glucose in the ED is 404. He has no signs or symptoms of infection, DKA or another reason his blood glucose would be elevated other than noncompliance.  A discussion was held regarding the importance of medication compliance and good blood glucose control. Patient expressed understanding.  Given that patient is likely chronically hyperglycemic and his blood  glucose is decreasing on its own, there is no emergent indication to administer insulin in the ED.  patient advised to take his home insulin appropriately and closely monitor his blood glucose levels.  Insulin refill prescription given to patient.  Work note given.  Patient discharged home with strict return precautions.  All questions answered.                      Clinical Impression:   Final diagnoses:  [R73.9] Hyperglycemia (Primary)          ED Disposition Condition    Discharge Stable        ED Prescriptions     Medication Sig Dispense Start Date End Date Auth. Provider    insulin NPH/Reg human (HUMULIN, 70/30,) 100 unit/mL (70-30) InPn pen Inject 60 units under the skin in the AM and 30 units under the skin in the PM 1 each 6/15/2022  Kenzie Faust MD        Follow-up Information    None          Kenzie Faust MD  Resident  06/15/22 [id number]

## 2022-06-16 NOTE — ED NOTES
Patient identifiers verified and correct for Amando Calix   C/C: Hyperglycemia   APPEARANCE: awake and alert in NAD.  SKIN: warm, dry and intact. No breakdown or bruising.  MUSCULOSKELETAL: Patient moving all extremities spontaneously, no obvious swelling or deformities noted. Ambulates independently.  RESPIRATORY: Denies shortness of breath.Respirations unlabored.   CARDIAC: Denies CP, 2+ distal pulses; no peripheral edema  ABDOMEN: S/ND/NT, Denies nausea  : voids spontaneously, denies difficulty  Neurologic: AAO x 4; follows commands equal strength in all extremities; denies numbness/tingling. Denies dizziness

## 2022-06-16 NOTE — DISCHARGE INSTRUCTIONS
Diagnosis:   1. Hyperglycemia        Home Care Instructions:  - Medications: Continue taking your home medications as prescribed  - Check your blood sugar!    Follow-Up Plan:  - Follow-up with: Primary care doctor within 5 days  - Additional testing and/or evaluation will be directed by your primary doctor    Return to the Emergency Department for symptoms including but not limited to: worsening symptoms, severe back pain, shortness of breath or chest pain, vomiting with inability to hold down fluids, blood in vomit or poop, fevers greater than 100.4°F, passing out/fainting/unconsciousness, or other concerning symptoms.

## 2022-06-22 ENCOUNTER — OCCUPATIONAL HEALTH (OUTPATIENT)
Dept: URGENT CARE | Facility: CLINIC | Age: 37
End: 2022-06-22

## 2022-06-22 DIAGNOSIS — Z13.9 ENCOUNTER FOR SCREENING: Primary | ICD-10-CM

## 2022-06-22 PROCEDURE — 97750 AGILITY TEST: ICD-10-PCS | Mod: S$GLB,,, | Performed by: NURSE PRACTITIONER

## 2022-06-22 PROCEDURE — 97750 PHYSICAL PERFORMANCE TEST: CPT | Mod: S$GLB,,, | Performed by: NURSE PRACTITIONER

## 2022-11-09 ENCOUNTER — HOSPITAL ENCOUNTER (INPATIENT)
Facility: HOSPITAL | Age: 37
LOS: 2 days | Discharge: HOME | DRG: 193 | End: 2022-11-11
Attending: EMERGENCY MEDICINE | Admitting: STUDENT IN AN ORGANIZED HEALTH CARE EDUCATION/TRAINING PROGRAM
Payer: COMMERCIAL

## 2022-11-09 DIAGNOSIS — J96.01 ACUTE HYPOXEMIC RESPIRATORY FAILURE: ICD-10-CM

## 2022-11-09 DIAGNOSIS — R09.02 HYPOXIA: ICD-10-CM

## 2022-11-09 DIAGNOSIS — R07.9 CHEST PAIN: ICD-10-CM

## 2022-11-09 DIAGNOSIS — J10.1 INFLUENZA A: Primary | ICD-10-CM

## 2022-11-09 LAB
ALBUMIN SERPL BCP-MCNC: 3.7 G/DL (ref 3.5–5.2)
ALP SERPL-CCNC: 103 U/L (ref 55–135)
ALT SERPL W/O P-5'-P-CCNC: 32 U/L (ref 10–44)
ANION GAP SERPL CALC-SCNC: 14 MMOL/L (ref 8–16)
AST SERPL-CCNC: 27 U/L (ref 10–40)
BACTERIA #/AREA URNS AUTO: NORMAL /HPF
BASOPHILS # BLD AUTO: 0.01 K/UL (ref 0–0.2)
BASOPHILS NFR BLD: 0.1 % (ref 0–1.9)
BILIRUB SERPL-MCNC: 0.4 MG/DL (ref 0.1–1)
BILIRUB UR QL STRIP: NEGATIVE
BUN SERPL-MCNC: 11 MG/DL (ref 6–20)
BUN SERPL-MCNC: 11 MG/DL (ref 6–30)
CALCIUM SERPL-MCNC: 9.7 MG/DL (ref 8.7–10.5)
CHLORIDE SERPL-SCNC: 96 MMOL/L (ref 95–110)
CHLORIDE SERPL-SCNC: 98 MMOL/L (ref 95–110)
CLARITY UR REFRACT.AUTO: CLEAR
CO2 SERPL-SCNC: 21 MMOL/L (ref 23–29)
COLOR UR AUTO: YELLOW
CREAT SERPL-MCNC: 0.8 MG/DL (ref 0.5–1.4)
CREAT SERPL-MCNC: 1 MG/DL (ref 0.5–1.4)
DIFFERENTIAL METHOD: ABNORMAL
EOSINOPHIL # BLD AUTO: 0 K/UL (ref 0–0.5)
EOSINOPHIL NFR BLD: 0.2 % (ref 0–8)
ERYTHROCYTE [DISTWIDTH] IN BLOOD BY AUTOMATED COUNT: 13.2 % (ref 11.5–14.5)
EST. GFR  (NO RACE VARIABLE): >60 ML/MIN/1.73 M^2
GLUCOSE SERPL-MCNC: 174 MG/DL (ref 70–110)
GLUCOSE SERPL-MCNC: 186 MG/DL (ref 70–110)
GLUCOSE SERPL-MCNC: 247 MG/DL (ref 70–110)
GLUCOSE SERPL-MCNC: 250 MG/DL (ref 70–110)
GLUCOSE UR QL STRIP: ABNORMAL
HCT VFR BLD AUTO: 44.1 % (ref 40–54)
HCT VFR BLD CALC: 51 %PCV (ref 36–54)
HCV AB SERPL QL IA: NORMAL
HGB BLD-MCNC: 16.3 G/DL (ref 14–18)
HGB UR QL STRIP: ABNORMAL
HIV 1+2 AB+HIV1 P24 AG SERPL QL IA: NORMAL
HYALINE CASTS UR QL AUTO: 0 /LPF
IMM GRANULOCYTES # BLD AUTO: 0.08 K/UL (ref 0–0.04)
IMM GRANULOCYTES NFR BLD AUTO: 0.7 % (ref 0–0.5)
INFLUENZA A, MOLECULAR: DETECTED
INFLUENZA B, MOLECULAR: NOT DETECTED
KETONES UR QL STRIP: ABNORMAL
LACTATE SERPL-SCNC: 1.5 MMOL/L (ref 0.5–2.2)
LEUKOCYTE ESTERASE UR QL STRIP: NEGATIVE
LYMPHOCYTES # BLD AUTO: 1 K/UL (ref 1–4.8)
LYMPHOCYTES NFR BLD: 8.4 % (ref 18–48)
MCH RBC QN AUTO: 30 PG (ref 27–31)
MCHC RBC AUTO-ENTMCNC: 37 G/DL (ref 32–36)
MCV RBC AUTO: 81 FL (ref 82–98)
MICROSCOPIC COMMENT: NORMAL
MONOCYTES # BLD AUTO: 0.7 K/UL (ref 0.3–1)
MONOCYTES NFR BLD: 6.1 % (ref 4–15)
NEUTROPHILS # BLD AUTO: 9.7 K/UL (ref 1.8–7.7)
NEUTROPHILS NFR BLD: 84.5 % (ref 38–73)
NITRITE UR QL STRIP: NEGATIVE
NRBC BLD-RTO: 0 /100 WBC
PH UR STRIP: 7 [PH] (ref 5–8)
PLATELET # BLD AUTO: 112 K/UL (ref 150–450)
PLATELET BLD QL SMEAR: ABNORMAL
PMV BLD AUTO: 12.6 FL (ref 9.2–12.9)
POC IONIZED CALCIUM: 1.1 MMOL/L (ref 1.06–1.42)
POC TCO2 (MEASURED): 24 MMOL/L (ref 23–29)
POTASSIUM BLD-SCNC: 4.2 MMOL/L (ref 3.5–5.1)
POTASSIUM SERPL-SCNC: 4.3 MMOL/L (ref 3.5–5.1)
PROT SERPL-MCNC: 8.8 G/DL (ref 6–8.4)
PROT UR QL STRIP: ABNORMAL
RBC # BLD AUTO: 5.43 M/UL (ref 4.6–6.2)
RBC #/AREA URNS AUTO: 3 /HPF (ref 0–4)
RSV AG BY MOLECULAR METHOD: NOT DETECTED
SAMPLE: ABNORMAL
SARS-COV-2 RNA RESP QL NAA+PROBE: NOT DETECTED
SODIUM BLD-SCNC: 132 MMOL/L (ref 136–145)
SODIUM SERPL-SCNC: 131 MMOL/L (ref 136–145)
SP GR UR STRIP: >1.03 (ref 1–1.03)
URN SPEC COLLECT METH UR: ABNORMAL
WBC # BLD AUTO: 11.49 K/UL (ref 3.9–12.7)
WBC #/AREA URNS AUTO: 3 /HPF (ref 0–5)

## 2022-11-09 PROCEDURE — 25500020 PHARM REV CODE 255: Performed by: EMERGENCY MEDICINE

## 2022-11-09 PROCEDURE — 94761 N-INVAS EAR/PLS OXIMETRY MLT: CPT

## 2022-11-09 PROCEDURE — 25000003 PHARM REV CODE 250: Performed by: PHYSICIAN ASSISTANT

## 2022-11-09 PROCEDURE — 25000003 PHARM REV CODE 250: Performed by: STUDENT IN AN ORGANIZED HEALTH CARE EDUCATION/TRAINING PROGRAM

## 2022-11-09 PROCEDURE — 0241U SARS-COV2 (COVID) WITH FLU/RSV BY PCR: CPT | Performed by: PHYSICIAN ASSISTANT

## 2022-11-09 PROCEDURE — 99285 EMERGENCY DEPT VISIT HI MDM: CPT | Mod: CS,,, | Performed by: PHYSICIAN ASSISTANT

## 2022-11-09 PROCEDURE — 87389 HIV-1 AG W/HIV-1&-2 AB AG IA: CPT | Performed by: PHYSICIAN ASSISTANT

## 2022-11-09 PROCEDURE — 99285 EMERGENCY DEPT VISIT HI MDM: CPT | Mod: 25

## 2022-11-09 PROCEDURE — 63600175 PHARM REV CODE 636 W HCPCS: Performed by: PHYSICIAN ASSISTANT

## 2022-11-09 PROCEDURE — 96361 HYDRATE IV INFUSION ADD-ON: CPT

## 2022-11-09 PROCEDURE — 99900035 HC TECH TIME PER 15 MIN (STAT)

## 2022-11-09 PROCEDURE — 12000002 HC ACUTE/MED SURGE SEMI-PRIVATE ROOM

## 2022-11-09 PROCEDURE — 86803 HEPATITIS C AB TEST: CPT | Performed by: PHYSICIAN ASSISTANT

## 2022-11-09 PROCEDURE — 80053 COMPREHEN METABOLIC PANEL: CPT | Performed by: PHYSICIAN ASSISTANT

## 2022-11-09 PROCEDURE — 81001 URINALYSIS AUTO W/SCOPE: CPT | Performed by: PHYSICIAN ASSISTANT

## 2022-11-09 PROCEDURE — 87040 BLOOD CULTURE FOR BACTERIA: CPT | Mod: 59 | Performed by: PHYSICIAN ASSISTANT

## 2022-11-09 PROCEDURE — 83605 ASSAY OF LACTIC ACID: CPT | Performed by: PHYSICIAN ASSISTANT

## 2022-11-09 PROCEDURE — 85025 COMPLETE CBC W/AUTO DIFF WBC: CPT | Performed by: PHYSICIAN ASSISTANT

## 2022-11-09 PROCEDURE — 99223 1ST HOSP IP/OBS HIGH 75: CPT | Mod: ,,, | Performed by: PHYSICIAN ASSISTANT

## 2022-11-09 PROCEDURE — 99285 PR EMERGENCY DEPT VISIT,LEVEL V: ICD-10-PCS | Mod: CS,,, | Performed by: PHYSICIAN ASSISTANT

## 2022-11-09 PROCEDURE — 96374 THER/PROPH/DIAG INJ IV PUSH: CPT

## 2022-11-09 PROCEDURE — 99223 PR INITIAL HOSPITAL CARE,LEVL III: ICD-10-PCS | Mod: ,,, | Performed by: PHYSICIAN ASSISTANT

## 2022-11-09 RX ORDER — POLYETHYLENE GLYCOL 3350 17 G/17G
17 POWDER, FOR SOLUTION ORAL DAILY PRN
Status: DISCONTINUED | OUTPATIENT
Start: 2022-11-09 | End: 2022-11-11 | Stop reason: HOSPADM

## 2022-11-09 RX ORDER — OXYCODONE HYDROCHLORIDE 5 MG/1
5 TABLET ORAL EVERY 6 HOURS PRN
Status: DISCONTINUED | OUTPATIENT
Start: 2022-11-09 | End: 2022-11-11 | Stop reason: HOSPADM

## 2022-11-09 RX ORDER — INSULIN ASPART 100 [IU]/ML
6 INJECTION, SOLUTION INTRAVENOUS; SUBCUTANEOUS
Status: DISCONTINUED | OUTPATIENT
Start: 2022-11-10 | End: 2022-11-11 | Stop reason: HOSPADM

## 2022-11-09 RX ORDER — ENOXAPARIN SODIUM 100 MG/ML
40 INJECTION SUBCUTANEOUS EVERY 24 HOURS
Status: DISCONTINUED | OUTPATIENT
Start: 2022-11-09 | End: 2022-11-11 | Stop reason: HOSPADM

## 2022-11-09 RX ORDER — TALC
6 POWDER (GRAM) TOPICAL NIGHTLY PRN
Status: DISCONTINUED | OUTPATIENT
Start: 2022-11-09 | End: 2022-11-11 | Stop reason: HOSPADM

## 2022-11-09 RX ORDER — GUAIFENESIN 600 MG/1
600 TABLET, EXTENDED RELEASE ORAL 2 TIMES DAILY
Status: DISCONTINUED | OUTPATIENT
Start: 2022-11-09 | End: 2022-11-11 | Stop reason: HOSPADM

## 2022-11-09 RX ORDER — ONDANSETRON 8 MG/1
8 TABLET, ORALLY DISINTEGRATING ORAL EVERY 8 HOURS PRN
Status: DISCONTINUED | OUTPATIENT
Start: 2022-11-09 | End: 2022-11-11 | Stop reason: HOSPADM

## 2022-11-09 RX ORDER — INSULIN ASPART 100 [IU]/ML
0-5 INJECTION, SOLUTION INTRAVENOUS; SUBCUTANEOUS
Status: DISCONTINUED | OUTPATIENT
Start: 2022-11-09 | End: 2022-11-11 | Stop reason: HOSPADM

## 2022-11-09 RX ORDER — BENZONATATE 100 MG/1
100 CAPSULE ORAL 3 TIMES DAILY PRN
Status: DISCONTINUED | OUTPATIENT
Start: 2022-11-09 | End: 2022-11-11 | Stop reason: HOSPADM

## 2022-11-09 RX ORDER — OXYCODONE HYDROCHLORIDE 10 MG/1
10 TABLET ORAL EVERY 6 HOURS PRN
Status: DISCONTINUED | OUTPATIENT
Start: 2022-11-09 | End: 2022-11-11 | Stop reason: HOSPADM

## 2022-11-09 RX ORDER — IBUPROFEN 200 MG
24 TABLET ORAL
Status: DISCONTINUED | OUTPATIENT
Start: 2022-11-09 | End: 2022-11-11 | Stop reason: HOSPADM

## 2022-11-09 RX ORDER — GLUCAGON 1 MG
1 KIT INJECTION
Status: DISCONTINUED | OUTPATIENT
Start: 2022-11-09 | End: 2022-11-11 | Stop reason: HOSPADM

## 2022-11-09 RX ORDER — IBUPROFEN 200 MG
16 TABLET ORAL
Status: DISCONTINUED | OUTPATIENT
Start: 2022-11-09 | End: 2022-11-11 | Stop reason: HOSPADM

## 2022-11-09 RX ORDER — OSELTAMIVIR PHOSPHATE 75 MG/1
75 CAPSULE ORAL 2 TIMES DAILY
Status: DISCONTINUED | OUTPATIENT
Start: 2022-11-09 | End: 2022-11-11 | Stop reason: HOSPADM

## 2022-11-09 RX ORDER — IBUPROFEN 200 MG
1 TABLET ORAL DAILY PRN
Status: DISCONTINUED | OUTPATIENT
Start: 2022-11-09 | End: 2022-11-11 | Stop reason: HOSPADM

## 2022-11-09 RX ORDER — IPRATROPIUM BROMIDE AND ALBUTEROL SULFATE 2.5; .5 MG/3ML; MG/3ML
3 SOLUTION RESPIRATORY (INHALATION) EVERY 4 HOURS PRN
Status: DISCONTINUED | OUTPATIENT
Start: 2022-11-09 | End: 2022-11-11 | Stop reason: HOSPADM

## 2022-11-09 RX ORDER — SODIUM CHLORIDE, SODIUM LACTATE, POTASSIUM CHLORIDE, CALCIUM CHLORIDE 600; 310; 30; 20 MG/100ML; MG/100ML; MG/100ML; MG/100ML
INJECTION, SOLUTION INTRAVENOUS CONTINUOUS
Status: ACTIVE | OUTPATIENT
Start: 2022-11-09 | End: 2022-11-10

## 2022-11-09 RX ORDER — ACETAMINOPHEN 500 MG
1000 TABLET ORAL
Status: COMPLETED | OUTPATIENT
Start: 2022-11-09 | End: 2022-11-09

## 2022-11-09 RX ORDER — MUPIROCIN 20 MG/G
OINTMENT TOPICAL 2 TIMES DAILY
Status: DISCONTINUED | OUTPATIENT
Start: 2022-11-09 | End: 2022-11-11 | Stop reason: HOSPADM

## 2022-11-09 RX ORDER — METHOCARBAMOL 500 MG/1
500 TABLET, FILM COATED ORAL 4 TIMES DAILY
Status: DISCONTINUED | OUTPATIENT
Start: 2022-11-09 | End: 2022-11-11 | Stop reason: HOSPADM

## 2022-11-09 RX ORDER — PROMETHAZINE HYDROCHLORIDE 12.5 MG/1
25 TABLET ORAL EVERY 6 HOURS PRN
Status: DISCONTINUED | OUTPATIENT
Start: 2022-11-09 | End: 2022-11-11 | Stop reason: HOSPADM

## 2022-11-09 RX ORDER — MORPHINE SULFATE 4 MG/ML
4 INJECTION, SOLUTION INTRAMUSCULAR; INTRAVENOUS
Status: COMPLETED | OUTPATIENT
Start: 2022-11-09 | End: 2022-11-09

## 2022-11-09 RX ORDER — SODIUM CHLORIDE 0.9 % (FLUSH) 0.9 %
10 SYRINGE (ML) INJECTION
Status: DISCONTINUED | OUTPATIENT
Start: 2022-11-09 | End: 2022-11-11 | Stop reason: HOSPADM

## 2022-11-09 RX ORDER — ACETAMINOPHEN 325 MG/1
650 TABLET ORAL EVERY 4 HOURS PRN
Status: DISCONTINUED | OUTPATIENT
Start: 2022-11-09 | End: 2022-11-11 | Stop reason: HOSPADM

## 2022-11-09 RX ORDER — OXYCODONE HYDROCHLORIDE 5 MG/1
5 TABLET ORAL EVERY 6 HOURS PRN
Status: DISCONTINUED | OUTPATIENT
Start: 2022-11-09 | End: 2022-11-09

## 2022-11-09 RX ADMIN — OXYCODONE 5 MG: 5 TABLET ORAL at 08:11

## 2022-11-09 RX ADMIN — MUPIROCIN: 20 OINTMENT TOPICAL at 10:11

## 2022-11-09 RX ADMIN — GUAIFENESIN 600 MG: 600 TABLET, EXTENDED RELEASE ORAL at 10:11

## 2022-11-09 RX ADMIN — SODIUM CHLORIDE, SODIUM LACTATE, POTASSIUM CHLORIDE, AND CALCIUM CHLORIDE: .6; .31; .03; .02 INJECTION, SOLUTION INTRAVENOUS at 10:11

## 2022-11-09 RX ADMIN — MORPHINE SULFATE 4 MG: 4 INJECTION, SOLUTION INTRAMUSCULAR; INTRAVENOUS at 03:11

## 2022-11-09 RX ADMIN — OXYCODONE HYDROCHLORIDE 10 MG: 10 TABLET ORAL at 10:11

## 2022-11-09 RX ADMIN — ACETAMINOPHEN 1000 MG: 500 TABLET ORAL at 01:11

## 2022-11-09 RX ADMIN — METHOCARBAMOL 500 MG: 500 TABLET ORAL at 09:11

## 2022-11-09 RX ADMIN — SODIUM CHLORIDE, SODIUM LACTATE, POTASSIUM CHLORIDE, AND CALCIUM CHLORIDE 1000 ML: .6; .31; .03; .02 INJECTION, SOLUTION INTRAVENOUS at 01:11

## 2022-11-09 RX ADMIN — IOHEXOL 100 ML: 350 INJECTION, SOLUTION INTRAVENOUS at 02:11

## 2022-11-09 RX ADMIN — ENOXAPARIN SODIUM 40 MG: 100 INJECTION SUBCUTANEOUS at 09:11

## 2022-11-09 RX ADMIN — INSULIN DETEMIR 10 UNITS: 100 INJECTION, SOLUTION SUBCUTANEOUS at 10:11

## 2022-11-09 RX ADMIN — OSELTAMIVIR PHOSPHATE 75 MG: 75 CAPSULE ORAL at 09:11

## 2022-11-09 NOTE — ED PROVIDER NOTES
"Encounter Date: 11/9/2022       History     Chief Complaint   Patient presents with    Multiple complaints     Ileostomy, drainage from rectum, hx abscess, feeling hot and cold    Cough     37-year-old male with history of DM T1, Crohn's disease presents emergency department for multiple complaints.  Reports 6 days of nonproductive cough, body aches, subjective fever chills, diarrhea.  Patient has an ileostomy and notes when he has a bowel movement in feels a pressure sensation in his rectum.  Notes 2 days ago began having grayish brown discharge from his rectum.  Also notes dark urine which she described as "tea-colored".      Review of patient's allergies indicates:   Allergen Reactions    Ibuprofen Other (See Comments)     Can't take d/t stomach ulcers     Past Medical History:   Diagnosis Date    Anemia     Chronic diarrhea     Clostridium difficile infection     Crohn's disease     Diabetic ketoacidosis without coma associated with diabetes mellitus due to underlying condition 11/21/2020    Protein calorie malnutrition     Steroid-induced diabetes      Past Surgical History:   Procedure Laterality Date    ABSCESS DRAINAGE      COLONOSCOPY      COLOSTOMY      EXAMINATION UNDER ANESTHESIA N/A 11/27/2020    Procedure: Exam under anesthesia and seton placement;  Surgeon: Robe Suarez MD;  Location: [facility] OR [address] [place] [address];  Service: Colon and Rectal;  Laterality: N/A;    FLEXIBLE SIGMOIDOSCOPY N/A 7/10/2020    Procedure: SIGMOIDOSCOPY, FLEXIBLE;  Surgeon: Robe Suarez MD;  [facility];  Service: Colon and Rectal;  Laterality: N/A;    HERNIA REPAIR      ILEOSTOMY      INCISION AND DRAINAGE OF ABSCESS  12/28/2021    Procedure: INCISION AND DRAINAGE, Scrotal ABSCESS;  Surgeon: Rober Cardenas MD;  [facility];  Service: Colon and Rectal;;    INSERTION OF SETON STITCH N/A 7/10/2020    Procedure: PLACEMENT-SETON DRAIN;  Surgeon: Robe Suarez MD;  Location: [facility] OR [address] [place] [address];  Service: Colon " and Rectal;  Laterality: N/A;    INSERTION OF SETON STITCH N/A 2021    Procedure: PLACEMENT, SETON STITCH X4;  Surgeon: Rober Cardenas MD;  Location: [facility] OR [address] [place] [address];  Service: Colon and Rectal;  Laterality: N/A;    SIGMOIDECTOMY       Family History   Problem Relation Age of Onset    Diabetes Father     Hyperlipidemia Mother     Diabetes Mother     Crohn's disease Neg Hx     Celiac disease Neg Hx     Cirrhosis Neg Hx     Colon cancer Neg Hx     Esophageal cancer Neg Hx     Irritable bowel syndrome Neg Hx     Liver cancer Neg Hx     Rectal cancer Neg Hx     Stomach cancer Neg Hx     Ulcerative colitis Neg Hx      Social History     Tobacco Use    Smoking status: Every Day     Packs/day: 0.50     Years: 3.00     Pack years: 1.50     Types: Cigarettes     Last attempt to quit: 2014     Years since quittin.0    Smokeless tobacco: Never   Substance Use Topics    Alcohol use: Yes     Comment: socially-weekly    Drug use: No     Review of Systems   Constitutional:  Positive for chills and fever.   HENT:  Negative for sore throat.    Respiratory:  Positive for cough. Negative for shortness of breath.    Cardiovascular:  Negative for chest pain.   Gastrointestinal:  Negative for abdominal pain.   Genitourinary:  Negative for difficulty urinating and dysuria.   Musculoskeletal: Negative.    Skin: Negative.    Neurological:  Negative for weakness.   Psychiatric/Behavioral:  Negative for confusion.      Physical Exam     Initial Vitals [22 1122]   BP Pulse Resp Temp SpO2   (!) 140/78 104 18 100.3 °F (37.9 °C) 96 %      MAP       --         Physical Exam    Nursing note and vitals reviewed.  Constitutional: He appears well-developed and well-nourished. He is not diaphoretic. No distress.   HENT:   Head: Normocephalic and atraumatic.   Eyes: Conjunctivae are normal. Pupils are equal, round, and reactive to light.   Neck: Neck supple.   Normal range of motion.  Cardiovascular:  Normal rate, regular  rhythm, normal heart sounds and intact distal pulses.           Pulmonary/Chest: Breath sounds normal.   Abdominal: Abdomen is soft. Bowel sounds are normal. There is no abdominal tenderness.   Genitourinary:    Genitourinary Comments: Light brown drainage around the rectum.  Does no fluctuance or evidence of abscess.  No crepitus.     Musculoskeletal:         General: Normal range of motion.      Cervical back: Normal range of motion and neck supple.     Neurological: He is alert and oriented to person, place, and time. GCS score is 15. GCS eye subscore is 4. GCS verbal subscore is 5. GCS motor subscore is 6.   Skin: Skin is warm and dry. Capillary refill takes less than 2 seconds.   Psychiatric: He has a normal mood and affect.       ED Course   Procedures      Labs Reviewed   SARS-COV2 (COVID) WITH FLU/RSV BY PCR - Abnormal; Notable for the following components:       Result Value    Influenza A, Molecular Detected (*)     All other components within normal limits   CBC W/ AUTO DIFFERENTIAL - Abnormal; Notable for the following components:    MCV 81 (*)     MCHC 37.0 (*)     Platelets 112 (*)     Immature Granulocytes 0.7 (*)     Gran # (ANC) 9.7 (*)     Immature Grans (Abs) 0.08 (*)     Gran % 84.5 (*)     Lymph % 8.4 (*)     Platelet Estimate Decreased (*)     All other components within normal limits   COMPREHENSIVE METABOLIC PANEL - Abnormal; Notable for the following components:    Sodium 131 (*)     CO2 21 (*)     Glucose 250 (*)     Total Protein 8.8 (*)     All other components within normal limits   URINALYSIS, REFLEX TO URINE CULTURE - Abnormal; Notable for the following components:    Specific Gravity, UA >1.030 (*)     Protein, UA 3+ (*)     Glucose, UA 1+ (*)     Ketones, UA 2+ (*)     Occult Blood UA Trace (*)     All other components within normal limits    Narrative:     Specimen Source->Urine   ISTAT PROCEDURE - Abnormal; Notable for the following components:    POC Glucose 247 (*)     POC Sodium  132 (*)     All other components within normal limits   CULTURE, BLOOD   CULTURE, BLOOD   HIV 1 / 2 ANTIBODY    Narrative:     Release to patient->Immediate   HEPATITIS C ANTIBODY    Narrative:     Release to patient->Immediate   LACTIC ACID, PLASMA   URINALYSIS MICROSCOPIC    Narrative:     Specimen Source->Urine   ISTAT CHEM8          Imaging Results              X-Ray Chest PA And Lateral (Final result)  Result time 11/09/22 19:19:19      Final result by Carlyle Dong MD (11/09/22 19:19:19)                   Impression:      1. Pulmonary findings are concerning for infectious or inflammatory pneumonitis, particularly viral process.  Correlation is advised.      Electronically signed by: Carlyle Dong MD  Date:    11/09/2022  Time:    19:19               Narrative:    EXAMINATION:  XR CHEST PA AND LATERAL    CLINICAL HISTORY:  Influenza due to other identified influenza virus with other respiratory manifestations    TECHNIQUE:  PA and lateral views of the chest were performed.    COMPARISON:  08/18/2021    FINDINGS:  The cardiomediastinal silhouette is not enlarged.  There is no pleural effusion.  The trachea is midline.  The lungs are symmetrically expanded bilaterally with coarse central hilar interstitial attenuation, suggesting edema versus infectious or inflammatory pneumonitis..  No large focal consolidation seen.  There is no pneumothorax.  The osseous structures are unremarkable.                                       CT Pelvis With Contrast (Final result)  Result time 11/09/22 14:46:02      Final result by Carlyle Dong MD (11/09/22 14:46:02)                   Impression:      1. In this patient with history of previous perianal abscess, residual setons are in place, without focal organized fluid collection or significant soft tissue induration along the fistulous tract..  2. Surgical changes of colectomy with ileostomy, no findings to suggest obstruction.  3. Please see above for additional  findings.      Electronically signed by: Carlyle Dong MD  Date:    11/09/2022  Time:    14:46               Narrative:    EXAMINATION:  CT PELVIS WITH  CONTRAST    CLINICAL HISTORY:  Anal/rectal abscess;    TECHNIQUE:  Axial images of the pelvis were obtained at 3.75 mm intervals following administration of 100 cc omni 350 IV contrast.  Coronal and sagittal reformatted images were reviewed.    COMPARISON:  CT abdomen and pelvis 11/21/2020    FINDINGS:  The visualized distal aspects of the bilateral ureters are unremarkable.  The urinary bladder is nondistended noting a portion of the anterolateral aspect of the bladder is contained within a left inguinal hernia.  The prostate is not enlarged.  There is surgical change of colectomy.  Partially visualized ileostomy appears intact.  No significant small bowel dilation.  There is diastasis of the rectus abdominus musculature, small-bowel loops protrude into the defect without obstruction.  There is fat containing peristomal hernia without inflammation.  There are a few scattered shotty perirectal lymph nodes.    The osseous structures are intact.  No significant inguinal lymphadenopathy.    There is surgical change of the perianal soft tissues noting setons in place.  No significant fluid along the surgical region, no significant inflammation to suggest discrete abscess.                                       Medications   acetaminophen tablet 1,000 mg (1,000 mg Oral Given 11/9/22 1331)   lactated ringers bolus 1,000 mL (0 mLs Intravenous Stopped 11/9/22 1535)   iohexoL (OMNIPAQUE 350) injection 100 mL (100 mLs Intravenous Given 11/9/22 1428)   morphine injection 4 mg (4 mg Intravenous Given 11/9/22 1539)     Medical Decision Making:   History:   Old Medical Records: I decided to obtain old medical records.  Initial Assessment:   Presents to the ED for flu-like symptoms x6 days as well as drainage from his rectum.  Has an ileostomy due to Crohn's  disease.  Differential Diagnosis:   Influenza, COVID-19, pneumonia, rectal abscess, perianal fistula  Clinical Tests:   Lab Tests: Ordered and Reviewed  Radiological Study: Ordered and Reviewed  ED Management:  Found to be influenza A positive.  On reassessment was hypoxic in the low 90s on room air and high 80s with ambulation.  Placed on 3 L nasal cannula improved to 96%.  Denies shortness of breath and is well appearing overall.  Colorectal surgery was consulted given that rectal drainage and evaluated patient states that the drainage normal drainage and does not recommend any acute interventions.  Given his hypoxia  will admit to hospital medicine.  Other:   I have discussed this case with another health care provider.           ED Course as of 11/09/22 1936 Wed Nov 09, 2022   1552 Influenza A, Molecular(!): Detected [HJ]      ED Course User Index  [HJ] Kathy Valencia PA-C                 Clinical Impression:   Final diagnoses:  [J10.1] Influenza A (Primary)      ED Disposition Condition    Admit Stable                Kathy Valencia PA-C  11/09/22 1937

## 2022-11-09 NOTE — ED NOTES
Brown drainage from rectum. Has drain in place for yr. Ileostomy with liquid output. Feeling ill past week. Fever, body aches, cough. Denies chest pain    Patient identifiers for Amando Calix 37 y.o. male checked and correct.  Chief Complaint   Patient presents with    Multiple complaints     Ileostomy, drainage from rectum, hx abscess, feeling hot and cold    Cough     Past Medical History:   Diagnosis Date    Anemia     Chronic diarrhea     Clostridium difficile infection     Crohn's disease     Diabetic ketoacidosis without coma associated with diabetes mellitus due to underlying condition 11/21/2020    Protein calorie malnutrition     Steroid-induced diabetes      Allergies reported:   Review of patient's allergies indicates:   Allergen Reactions    Ibuprofen Other (See Comments)     Can't take d/t stomach ulcers       Appearance: Pt awake, alert & oriented to person, place & time. Pt in no acute distress at present time. Pt is clean and well groomed with clothes appropriately fastened.   Skin: Skin warm, dry . Color consistent with ethnicity. Mucous membranes moist. Rectal area has scar tissue and drain.  Musculoskeletal: Patient moving all extremities well, no obvious swelling or deformities noted.   Respiratory: Respirations spontaneous, even, and non-labored. Visible chest rise noted. Airway is open and patent. No accessory muscle use noted.   Neurologic: Sensation is intact. Speech is clear and appropriate. Eyes open spontaneously, behavior appropriate to situation, follows commands, facial expression symmetrical, bilateral hand grasp equal and even, purposeful motor response noted.  Cardiac: All peripheral pulses present. No Bilateral lower extremity edema. Cap refill is <3 seconds.  Abdomen: Abdomen soft, non distended, non tender to palpation. Ileostomy RUQ, liquid drainage to bag  : Ileostomy. Denies urinary changes

## 2022-11-09 NOTE — ED NOTES
I-STAT Chem-8+ Results:   Value Reference Range   Sodium 132 136-145 mmol/L   Potassium  4.2 3.5-5.1 mmol/L   Chloride 98  mmol/L   Ionized Calcium 1.10 1.06-1.42 mmol/L   CO2 (measured) 24 23-29 mmol/L   Glucose 247  mg/dL   BUN 11 6-30 mg/dL   Creatinine 0.8 0.5-1.4 mg/dL   Hematocrit 51 36-54%

## 2022-11-10 LAB
ANION GAP SERPL CALC-SCNC: 14 MMOL/L (ref 8–16)
BASOPHILS # BLD AUTO: 0.01 K/UL (ref 0–0.2)
BASOPHILS NFR BLD: 0.2 % (ref 0–1.9)
BUN SERPL-MCNC: 8 MG/DL (ref 6–20)
CALCIUM SERPL-MCNC: 8.8 MG/DL (ref 8.7–10.5)
CHLORIDE SERPL-SCNC: 97 MMOL/L (ref 95–110)
CO2 SERPL-SCNC: 21 MMOL/L (ref 23–29)
CREAT SERPL-MCNC: 0.7 MG/DL (ref 0.5–1.4)
DIFFERENTIAL METHOD: ABNORMAL
EOSINOPHIL # BLD AUTO: 0 K/UL (ref 0–0.5)
EOSINOPHIL NFR BLD: 0 % (ref 0–8)
ERYTHROCYTE [DISTWIDTH] IN BLOOD BY AUTOMATED COUNT: 13.2 % (ref 11.5–14.5)
EST. GFR  (NO RACE VARIABLE): >60 ML/MIN/1.73 M^2
ESTIMATED AVG GLUCOSE: 146 MG/DL (ref 68–131)
GIANT PLATELETS BLD QL SMEAR: PRESENT
GLUCOSE SERPL-MCNC: 178 MG/DL (ref 70–110)
HBA1C MFR BLD: 6.7 % (ref 4–5.6)
HCT VFR BLD AUTO: 41.4 % (ref 40–54)
HGB BLD-MCNC: 14.7 G/DL (ref 14–18)
IMM GRANULOCYTES # BLD AUTO: 0.03 K/UL (ref 0–0.04)
IMM GRANULOCYTES NFR BLD AUTO: 0.5 % (ref 0–0.5)
LYMPHOCYTES # BLD AUTO: 1.8 K/UL (ref 1–4.8)
LYMPHOCYTES NFR BLD: 28.4 % (ref 18–48)
MAGNESIUM SERPL-MCNC: 1.8 MG/DL (ref 1.6–2.6)
MCH RBC QN AUTO: 28.8 PG (ref 27–31)
MCHC RBC AUTO-ENTMCNC: 35.5 G/DL (ref 32–36)
MCV RBC AUTO: 81 FL (ref 82–98)
MONOCYTES # BLD AUTO: 0.7 K/UL (ref 0.3–1)
MONOCYTES NFR BLD: 11.1 % (ref 4–15)
NEUTROPHILS # BLD AUTO: 3.8 K/UL (ref 1.8–7.7)
NEUTROPHILS NFR BLD: 59.8 % (ref 38–73)
NRBC BLD-RTO: 0 /100 WBC
OVALOCYTES BLD QL SMEAR: ABNORMAL
PHOSPHATE SERPL-MCNC: 2.5 MG/DL (ref 2.7–4.5)
PLATELET # BLD AUTO: 144 K/UL (ref 150–450)
PLATELET BLD QL SMEAR: ABNORMAL
PMV BLD AUTO: 13.1 FL (ref 9.2–12.9)
POCT GLUCOSE: 107 MG/DL (ref 70–110)
POCT GLUCOSE: 174 MG/DL (ref 70–110)
POCT GLUCOSE: 194 MG/DL (ref 70–110)
POCT GLUCOSE: 198 MG/DL (ref 70–110)
POCT GLUCOSE: 206 MG/DL (ref 70–110)
POCT GLUCOSE: 241 MG/DL (ref 70–110)
POIKILOCYTOSIS BLD QL SMEAR: SLIGHT
POTASSIUM SERPL-SCNC: 4.2 MMOL/L (ref 3.5–5.1)
RBC # BLD AUTO: 5.1 M/UL (ref 4.6–6.2)
SODIUM SERPL-SCNC: 132 MMOL/L (ref 136–145)
TARGETS BLD QL SMEAR: ABNORMAL
WBC # BLD AUTO: 6.41 K/UL (ref 3.9–12.7)

## 2022-11-10 PROCEDURE — 63600175 PHARM REV CODE 636 W HCPCS: Performed by: STUDENT IN AN ORGANIZED HEALTH CARE EDUCATION/TRAINING PROGRAM

## 2022-11-10 PROCEDURE — 83735 ASSAY OF MAGNESIUM: CPT | Performed by: PHYSICIAN ASSISTANT

## 2022-11-10 PROCEDURE — 84100 ASSAY OF PHOSPHORUS: CPT | Performed by: PHYSICIAN ASSISTANT

## 2022-11-10 PROCEDURE — 85025 COMPLETE CBC W/AUTO DIFF WBC: CPT | Performed by: PHYSICIAN ASSISTANT

## 2022-11-10 PROCEDURE — 80048 BASIC METABOLIC PNL TOTAL CA: CPT | Performed by: PHYSICIAN ASSISTANT

## 2022-11-10 PROCEDURE — 27000207 HC ISOLATION

## 2022-11-10 PROCEDURE — 99232 PR SUBSEQUENT HOSPITAL CARE,LEVL II: ICD-10-PCS | Mod: ,,, | Performed by: STUDENT IN AN ORGANIZED HEALTH CARE EDUCATION/TRAINING PROGRAM

## 2022-11-10 PROCEDURE — 25000003 PHARM REV CODE 250: Performed by: PHYSICIAN ASSISTANT

## 2022-11-10 PROCEDURE — 94799 UNLISTED PULMONARY SVC/PX: CPT

## 2022-11-10 PROCEDURE — 36415 COLL VENOUS BLD VENIPUNCTURE: CPT | Performed by: PHYSICIAN ASSISTANT

## 2022-11-10 PROCEDURE — 25000003 PHARM REV CODE 250: Performed by: STUDENT IN AN ORGANIZED HEALTH CARE EDUCATION/TRAINING PROGRAM

## 2022-11-10 PROCEDURE — 12000002 HC ACUTE/MED SURGE SEMI-PRIVATE ROOM

## 2022-11-10 PROCEDURE — 83036 HEMOGLOBIN GLYCOSYLATED A1C: CPT | Performed by: PHYSICIAN ASSISTANT

## 2022-11-10 PROCEDURE — 99232 SBSQ HOSP IP/OBS MODERATE 35: CPT | Mod: ,,, | Performed by: STUDENT IN AN ORGANIZED HEALTH CARE EDUCATION/TRAINING PROGRAM

## 2022-11-10 PROCEDURE — 63600175 PHARM REV CODE 636 W HCPCS: Performed by: PHYSICIAN ASSISTANT

## 2022-11-10 RX ORDER — HYDROMORPHONE HYDROCHLORIDE 1 MG/ML
0.5 INJECTION, SOLUTION INTRAMUSCULAR; INTRAVENOUS; SUBCUTANEOUS EVERY 6 HOURS PRN
Status: DISCONTINUED | OUTPATIENT
Start: 2022-11-10 | End: 2022-11-11 | Stop reason: HOSPADM

## 2022-11-10 RX ADMIN — METHOCARBAMOL 500 MG: 500 TABLET ORAL at 01:11

## 2022-11-10 RX ADMIN — INSULIN ASPART 2 UNITS: 100 INJECTION, SOLUTION INTRAVENOUS; SUBCUTANEOUS at 01:11

## 2022-11-10 RX ADMIN — ONDANSETRON 8 MG: 8 TABLET, ORALLY DISINTEGRATING ORAL at 12:11

## 2022-11-10 RX ADMIN — OSELTAMIVIR PHOSPHATE 75 MG: 75 CAPSULE ORAL at 10:11

## 2022-11-10 RX ADMIN — MUPIROCIN: 20 OINTMENT TOPICAL at 08:11

## 2022-11-10 RX ADMIN — INSULIN ASPART 2 UNITS: 100 INJECTION, SOLUTION INTRAVENOUS; SUBCUTANEOUS at 06:11

## 2022-11-10 RX ADMIN — MUPIROCIN: 20 OINTMENT TOPICAL at 10:11

## 2022-11-10 RX ADMIN — GUAIFENESIN 600 MG: 600 TABLET, EXTENDED RELEASE ORAL at 10:11

## 2022-11-10 RX ADMIN — Medication 6 MG: at 08:11

## 2022-11-10 RX ADMIN — INSULIN DETEMIR 10 UNITS: 100 INJECTION, SOLUTION SUBCUTANEOUS at 10:11

## 2022-11-10 RX ADMIN — INSULIN ASPART 6 UNITS: 100 INJECTION, SOLUTION INTRAVENOUS; SUBCUTANEOUS at 06:11

## 2022-11-10 RX ADMIN — HYDROMORPHONE HYDROCHLORIDE 0.5 MG: 1 INJECTION, SOLUTION INTRAMUSCULAR; INTRAVENOUS; SUBCUTANEOUS at 10:11

## 2022-11-10 RX ADMIN — METHOCARBAMOL 500 MG: 500 TABLET ORAL at 10:11

## 2022-11-10 RX ADMIN — OSELTAMIVIR PHOSPHATE 75 MG: 75 CAPSULE ORAL at 08:11

## 2022-11-10 RX ADMIN — INSULIN ASPART 6 UNITS: 100 INJECTION, SOLUTION INTRAVENOUS; SUBCUTANEOUS at 01:11

## 2022-11-10 RX ADMIN — METHOCARBAMOL 500 MG: 500 TABLET ORAL at 06:11

## 2022-11-10 RX ADMIN — ENOXAPARIN SODIUM 40 MG: 100 INJECTION SUBCUTANEOUS at 06:11

## 2022-11-10 RX ADMIN — INSULIN DETEMIR 10 UNITS: 100 INJECTION, SOLUTION SUBCUTANEOUS at 08:11

## 2022-11-10 RX ADMIN — HYDROMORPHONE HYDROCHLORIDE 0.5 MG: 1 INJECTION, SOLUTION INTRAMUSCULAR; INTRAVENOUS; SUBCUTANEOUS at 06:11

## 2022-11-10 RX ADMIN — GUAIFENESIN 600 MG: 600 TABLET, EXTENDED RELEASE ORAL at 08:11

## 2022-11-10 NOTE — PLAN OF CARE
Problem: Adult Inpatient Plan of Care  Goal: Plan of Care Review  Outcome: Ongoing, Not Progressing  Goal: Optimal Comfort and Wellbeing  Outcome: Ongoing, Not Progressing     Problem: Infection  Goal: Absence of Infection Signs and Symptoms  Outcome: Ongoing, Not Progressing     AAOx4. 2L applied to maintain 02 sats >90% as needed. Continues with po antiviral regimen.

## 2022-11-10 NOTE — SUBJECTIVE & OBJECTIVE
Interval History:    Patient reports cough with no sputum production. Denies f/c     Review of Systems  Objective:     Vital Signs (Most Recent):  Temp: 98 °F (36.7 °C) (11/10/22 1204)  Pulse: 86 (11/10/22 1204)  Resp: 18 (11/10/22 1204)  BP: 120/71 (11/10/22 1204)  SpO2: (!) 93 % (11/10/22 1345)   Vital Signs (24h Range):  Temp:  [97.6 °F (36.4 °C)-98.5 °F (36.9 °C)] 98 °F (36.7 °C)  Pulse:  [78-96] 86  Resp:  [8-20] 18  SpO2:  [87 %-98 %] 93 %  BP: (120-149)/(57-86) 120/71     Weight: 84.1 kg (185 lb 6.5 oz)  Body mass index is 28.19 kg/m².    Intake/Output Summary (Last 24 hours) at 11/10/2022 1459  Last data filed at 11/9/2022 1535  Gross per 24 hour   Intake 1000 ml   Output --   Net 1000 ml      Physical Exam  Vitals and nursing note reviewed.   Constitutional:       General: He is not in acute distress.     Appearance: He is well-developed.   HENT:      Head: Normocephalic and atraumatic.      Mouth/Throat:      Pharynx: No oropharyngeal exudate.   Eyes:      General: No scleral icterus.     Conjunctiva/sclera: Conjunctivae normal.   Cardiovascular:      Rate and Rhythm: Normal rate and regular rhythm.      Heart sounds: Normal heart sounds.   Pulmonary:      Effort: Pulmonary effort is normal. No respiratory distress.      Breath sounds: No wheezing.   Abdominal:      General: Bowel sounds are normal. There is no distension.      Palpations: Abdomen is soft.      Tenderness: There is no abdominal tenderness.   Musculoskeletal:         General: Normal range of motion.      Cervical back: Normal range of motion and neck supple.      Right lower leg: No edema.      Left lower leg: No edema.   Lymphadenopathy:      Cervical: No cervical adenopathy.   Skin:     General: Skin is warm and dry.      Capillary Refill: Capillary refill takes less than 2 seconds.      Findings: No rash.   Neurological:      Mental Status: He is alert and oriented to person, place, and time.      Cranial Nerves: No cranial nerve  deficit.      Sensory: No sensory deficit.      Coordination: Coordination normal.   Psychiatric:         Behavior: Behavior normal.         Thought Content: Thought content normal.         Judgment: Judgment normal.       Computed MELD-Na score unavailable. Necessary lab results were not found in the last year.  Computed MELD score unavailable. Necessary lab results were not found in the last year.    Significant Labs:  CBC:  Recent Labs   Lab 11/09/22  1331 11/09/22  1342 11/10/22  0605   WBC 11.49  --  6.41   HGB 16.3  --  14.7   HCT 44.1 51 41.4   *  --  144*     CMP:  Recent Labs   Lab 11/09/22  1331 11/10/22  0605   * 132*   K 4.3 4.2   CL 96 97   CO2 21* 21*   * 178*   BUN 11 8   CREATININE 1.0 0.7   CALCIUM 9.7 8.8   PROT 8.8*  --    ALBUMIN 3.7  --    BILITOT 0.4  --    ALKPHOS 103  --    AST 27  --    ALT 32  --    ANIONGAP 14 14     PTINR:  No results for input(s): INR in the last 48 hours.

## 2022-11-10 NOTE — H&P
"[facility] - Emergency Dept  [facility]  History & Physical    Patient Name: Amando Calix  MRN: [id number]  Patient Class: IP- Inpatient  Admission Date: 11/9/2022  Attending Physician: Gui Ramirez*   Primary Care Provider: Celestino Wright MD (Inactive)         Patient information was obtained from patient, past medical records and ER records.     Subjective:     Principal Problem:Acute hypoxemic respiratory failure    Chief Complaint:   Chief Complaint   Patient presents with    Multiple complaints     Ileostomy, drainage from rectum, hx abscess, feeling hot and cold    Cough        HPI: Amando Calix is a 37 y.o. with a PMHx of T1DM, crohn's disease complicated by fistula s/p colectomy with end ileostomy [place] [facility]. Patient reports worsening dry cough, congestion, body aches, subjective fever, chills, and fatigue for the past 6 days. His daughter had the flu last week so he think he contracted it from her. Additionally, he reports non bloody "oozing" rectal drainage with associated pain and pressure sensation in his rectum. He also notes dark urine, low back pain and bilateral lower extremity cramping for the past few days. Denies chest pain, SOB, dizziness, vision changes, syncope or recent travel.     ED: vitals stable. Hypoxic to 87% on RA, placed on 2L NC. No leukocytosis. Influenza A positive. CXR shows viral pneumonitis. UA non-infectious. Given 1L LR.       Past Medical History:   Diagnosis Date    Anemia     Chronic diarrhea     Clostridium difficile infection     Crohn's disease     Diabetic ketoacidosis without coma associated with diabetes mellitus due to underlying condition 11/21/2020    Protein calorie malnutrition     Steroid-induced diabetes        Past Surgical History:   Procedure Laterality Date    ABSCESS DRAINAGE      COLONOSCOPY      COLOSTOMY      EXAMINATION UNDER ANESTHESIA N/A 11/27/2020    Procedure: Exam under " anesthesia and seton placement;  Surgeon: Robe Suarez MD;  Location: [facility] OR 2ND FLR;  Service: Colon and Rectal;  Laterality: N/A;    FLEXIBLE SIGMOIDOSCOPY N/A 7/10/2020    Procedure: SIGMOIDOSCOPY, FLEXIBLE;  Surgeon: Robe Suarez MD;  Location: [facility] OR 2ND FLR;  Service: Colon and Rectal;  Laterality: N/A;    HERNIA REPAIR      ILEOSTOMY      INCISION AND DRAINAGE OF ABSCESS  12/28/2021    Procedure: INCISION AND DRAINAGE, Scrotal ABSCESS;  Surgeon: Rober Cardenas MD;  Location: [facility] OR 2ND FLR;  Service: Colon and Rectal;;    INSERTION OF SETON STITCH N/A 7/10/2020    Procedure: PLACEMENT-SETON DRAIN;  Surgeon: Robe Suarez MD;  Location: [facility] OR 2ND FLR;  Service: Colon and Rectal;  Laterality: N/A;    INSERTION OF SETON STITCH N/A 12/28/2021    Procedure: PLACEMENT, SETON STITCH X4;  Surgeon: Rober Cardenas MD;  Location: [facility] OR 2ND FLR;  Service: Colon and Rectal;  Laterality: N/A;    SIGMOIDECTOMY         Review of patient's allergies indicates:   Allergen Reactions    Ibuprofen Other (See Comments)     Can't take d/t stomach ulcers       No current facility-administered medications on file prior to encounter.     Current Outpatient Medications on File Prior to Encounter   Medication Sig    acetaminophen (TYLENOL) 500 MG tablet Take 2 tablets (1,000 mg total) by mouth every 6 (six) hours as needed for Pain.    blood sugar diagnostic Strp Use as instructed to check blood sugar three times daily before meals and nightly (ICD-10-CM: E10.65 ) (Patient not taking: Reported on 1/24/2022)    blood-glucose meter Misc Use as instructed to check blood sugar three times daily before meals and nightly (ICD-10-CM: E10.65 ) (Patient not taking: Reported on 1/24/2022)    ciprofloxacin HCl (CIPRO) 500 MG tablet Take 1 tablet (500 mg total) by mouth every 12 (twelve) hours.    insulin NPH/Reg human (HUMULIN, 70/30,) 100 unit/mL (70-30) InPn pen Inject 60 units under the skin in the AM and 30  "units under the skin in the PM    lancets 30 gauge Misc Use as instructed to check blood sugar three times daily before meals and nightly (ICD-10-CM: E10.65)    lancing device Misc Use as instructed to check blood sugar three times daily before meals and nightly (ICD-10-CM: E10.65)    metFORMIN (GLUCOPHAGE-XR) 500 MG ER 24hr tablet Take 2 tablets (1,000 mg total) by mouth 2 (two) times daily with meals.    oxyCODONE (ROXICODONE) 5 MG immediate release tablet Take 1 tablet (5 mg total) by mouth every 6 (six) hours as needed for Pain. (Patient not taking: Reported on 2022)    pen needle, diabetic 32 gauge x 32" Ndle Use to inject insulin 4x daily.    pulse oximeter (PULSE OXIMETER) device by Apply Externally route 2 (two) times a day. Use twice daily at 8 AM and 3 PM and record the value in MyChart as directed.    vitamin D (VITAMIN D3) 1000 units Tab Take 1 tablet (1,000 Units total) by mouth once daily.    [DISCONTINUED] insulin aspart U-100 (NOVOLOG) 100 unit/mL (3 mL) InPn pen Inject 25 Units into the skin 3 (three) times daily with meals. And SSI.     Family History       Problem Relation (Age of Onset)    Diabetes Father, Mother    Hyperlipidemia Mother          Tobacco Use    Smoking status: Every Day     Packs/day: 0.50     Years: 3.00     Pack years: 1.50     Types: Cigarettes     Last attempt to quit: 2014     Years since quittin.0    Smokeless tobacco: Never   Substance and Sexual Activity    Alcohol use: Yes     Comment: socially-weekly    Drug use: No    Sexual activity: Yes     Partners: Female     Review of Systems   Constitutional:  Positive for fatigue. Negative for activity change, chills and fever.   HENT:  Negative for congestion, trouble swallowing and voice change.    Eyes:  Negative for photophobia and visual disturbance.   Respiratory:  Positive for cough, chest tightness and wheezing. Negative for shortness of breath.    Cardiovascular:  Negative for chest pain, " "palpitations and leg swelling.   Gastrointestinal:  Negative for abdominal pain, constipation, diarrhea, nausea and vomiting.        Rectal "oozing"   Genitourinary:  Negative for dysuria, frequency, hematuria and urgency.        Dark urine   Musculoskeletal:  Positive for myalgias. Negative for arthralgias, back pain and gait problem.   Skin:  Negative for color change and rash.   Neurological:  Negative for dizziness, syncope, weakness, light-headedness, numbness and headaches.   Psychiatric/Behavioral:  Negative for agitation and confusion. The patient is not nervous/anxious.    Objective:     Vital Signs (Most Recent):  Temp: 97.6 °F (36.4 °C) (11/09/22 2019)  Pulse: 79 (11/09/22 1933)  Resp: 18 (11/09/22 2002)  BP: (!) 124/57 (11/09/22 1933)  SpO2: 97 % (11/09/22 1932)   Vital Signs (24h Range):  Temp:  [97.6 °F (36.4 °C)-100.3 °F (37.9 °C)] 97.6 °F (36.4 °C)  Pulse:  [] 79  Resp:  [18] 18  SpO2:  [87 %-97 %] 97 %  BP: (124-149)/(57-86) 124/57     Weight: 83.5 kg (184 lb)  Body mass index is 27.98 kg/m².    Physical Exam  Vitals and nursing note reviewed.   Constitutional:       General: He is not in acute distress.     Appearance: He is well-developed.   HENT:      Head: Normocephalic and atraumatic.      Mouth/Throat:      Pharynx: No oropharyngeal exudate.   Eyes:      General: No scleral icterus.     Conjunctiva/sclera: Conjunctivae normal.   Cardiovascular:      Rate and Rhythm: Normal rate and regular rhythm.      Heart sounds: Normal heart sounds.   Pulmonary:      Effort: Pulmonary effort is normal. No respiratory distress.      Breath sounds: No wheezing.      Comments: Diminished BS throughout  Abdominal:      General: Bowel sounds are normal. There is no distension.      Palpations: Abdomen is soft.      Tenderness: There is no abdominal tenderness.   Musculoskeletal:         General: Tenderness (both calves) present. Normal range of motion.      Cervical back: Normal range of motion and neck " supple.      Right lower leg: No edema.      Left lower leg: No edema.   Lymphadenopathy:      Cervical: No cervical adenopathy.   Skin:     General: Skin is warm and dry.      Capillary Refill: Capillary refill takes less than 2 seconds.      Findings: No rash.   Neurological:      Mental Status: He is alert and oriented to person, place, and time.      Cranial Nerves: No cranial nerve deficit.      Sensory: No sensory deficit.      Coordination: Coordination normal.   Psychiatric:         Behavior: Behavior normal.         Thought Content: Thought content normal.         Judgment: Judgment normal.           Significant Labs: All pertinent labs within the past 24 hours have been reviewed.  CBC:   Recent Labs   Lab 11/09/22  1331 11/09/22  1342   WBC 11.49  --    HGB 16.3  --    HCT 44.1 51   *  --      CMP:   Recent Labs   Lab 11/09/22  1331   *   K 4.3   CL 96   CO2 21*   *   BUN 11   CREATININE 1.0   CALCIUM 9.7   PROT 8.8*   ALBUMIN 3.7   BILITOT 0.4   ALKPHOS 103   AST 27   ALT 32   ANIONGAP 14       Significant Imaging: I have reviewed all pertinent imaging results/findings within the past 24 hours.    Assessment/Plan:     * Acute hypoxemic respiratory failure  Influenza A  - afebrile without leukocytosis  - satting 97% on RA>> imnproved to 97% on 2L NC  - influenza A +  - CXR shows viral pneumonitis  - start tamiflu 75mg BID x5 days  - duonebs, IS  - check LE US given reported LE cramping  - IVFs  - mucinex and tessalon perles    Crohn's disease of colon with fistula  - reports recent oozing and rectal pain  - CT pelvis without abscess  - CRS consulted; no acute intervention warranted  - monitor H/H  - strict I/Os  - pain control     Type 1 diabetes mellitus with hyperglycemia  - hold home regimen  - start inpatient weight based insulin regimen: detemir 20U qhs + aspart 6U TIDWM  - LDSSI, ACHS  - diabetic diet  - repeat A1c  Lab Results   Component Value Date    HGBA1C 11.7 (H) 07/28/2021      VTE Risk Mitigation (From admission, onward)         Ordered     enoxaparin injection 40 mg  Daily         11/09/22 2024     IP VTE HIGH RISK PATIENT  Once         11/09/22 2024     Place sequential compression device  Until discontinued         11/09/22 2024                   Mya Brewer PA-C  Department of Hospital Medicine   [facility] - Emergency Dept

## 2022-11-10 NOTE — ASSESSMENT & PLAN NOTE
Influenza A  - afebrile without leukocytosis  - satting 97% on RA>> imnproved to 97% on 2L NC  - influenza A +  - CXR shows viral pneumonitis  - start tamiflu 75mg BID x5 days  - shane IS  - check LE US given reported LE cramping  - IVFs  - mucinex and tessalon perles

## 2022-11-10 NOTE — ASSESSMENT & PLAN NOTE
- hold home regimen  - start inpatient weight based insulin regimen: detemir 20U qhs + aspart 6U TIDWM  - ADÁNSI, ACHS  - diabetic diet  - repeat A1c  Lab Results   Component Value Date    HGBA1C 11.7 (H) 07/28/2021

## 2022-11-10 NOTE — PLAN OF CARE
[facility] - Intensive Care ([place] [address])  Initial Discharge Assessment       Primary Care Provider: Celestino Wright MD (Inactive)    Admission Diagnosis: Influenza A [J10.1]  Hypoxia [R09.02]  Chest pain [R07.9]    Admission Date: 11/9/2022  Expected Discharge Date: 11/10/2022    Discharge Barriers Identified: (P) None    Payor: BLUE CROSS BLUE SHIELD / Plan: BCBS ALL OUT OF STATE / Product Type: PPO /     Extended Emergency Contact Information  Primary Emergency Contact: Roseline Calix  Address: [address] [place] [address] .           [address] of [facility]  Home Phone: [phone]  Relation: Mother    Discharge Plan A: (P) Home       Spoke with mother via phone pt is independent with adl's drives self to appointment uses no dme no hh

## 2022-11-10 NOTE — SUBJECTIVE & OBJECTIVE
Past Medical History:   Diagnosis Date    Anemia     Chronic diarrhea     Clostridium difficile infection     Crohn's disease     Diabetic ketoacidosis without coma associated with diabetes mellitus due to underlying condition 11/21/2020    Protein calorie malnutrition     Steroid-induced diabetes        Past Surgical History:   Procedure Laterality Date    ABSCESS DRAINAGE      COLONOSCOPY      COLOSTOMY      EXAMINATION UNDER ANESTHESIA N/A 11/27/2020    Procedure: Exam under anesthesia and seton placement;  Surgeon: Robe Suarez MD;  Location: [facility] OR [facility] FLR;  Service: Colon and Rectal;  Laterality: N/A;    FLEXIBLE SIGMOIDOSCOPY N/A 7/10/2020    Procedure: SIGMOIDOSCOPY, FLEXIBLE;  Surgeon: Robe Suarez MD;  [facility];  Service: Colon and Rectal;  Laterality: N/A;    HERNIA REPAIR      ILEOSTOMY      INCISION AND DRAINAGE OF ABSCESS  12/28/2021    Procedure: INCISION AND DRAINAGE, Scrotal ABSCESS;  Surgeon: Rober Cardenas MD;  Location: [facility] OR [facility] FLR;  Service: Colon and Rectal;;    INSERTION OF SETON STITCH N/A 7/10/2020    Procedure: PLACEMENT-SETON DRAIN;  Surgeon: Robe Suarez MD;  Location: [facility] OR [facility] FLR;  Service: Colon and Rectal;  Laterality: N/A;    INSERTION OF SETON STITCH N/A 12/28/2021    Procedure: PLACEMENT, SETON STITCH X4;  Surgeon: Rober Cardenas MD;  [facility];  Service: Colon and Rectal;  Laterality: N/A;    SIGMOIDECTOMY         Review of patient's allergies indicates:   Allergen Reactions    Ibuprofen Other (See Comments)     Can't take d/t stomach ulcers       No current facility-administered medications on file prior to encounter.     Current Outpatient Medications on File Prior to Encounter   Medication Sig    acetaminophen (TYLENOL) 500 MG tablet Take 2 tablets (1,000 mg total) by mouth every 6 (six) hours as needed for Pain.    blood sugar diagnostic Strp Use as instructed to check blood sugar three times daily before meals and  "nightly (ICD-10-CM: E10.65 ) (Patient not taking: Reported on 2022)    blood-glucose meter Misc Use as instructed to check blood sugar three times daily before meals and nightly (ICD-10-CM: E10.65 ) (Patient not taking: Reported on 2022)    ciprofloxacin HCl (CIPRO) 500 MG tablet Take 1 tablet (500 mg total) by mouth every 12 (twelve) hours.    insulin NPH/Reg human (HUMULIN, 70/30,) 100 unit/mL (70-30) InPn pen Inject 60 units under the skin in the AM and 30 units under the skin in the PM    lancets 30 gauge Misc Use as instructed to check blood sugar three times daily before meals and nightly (ICD-10-CM: E10.65)    lancing device Misc Use as instructed to check blood sugar three times daily before meals and nightly (ICD-10-CM: E10.65)    metFORMIN (GLUCOPHAGE-XR) 500 MG ER 24hr tablet Take 2 tablets (1,000 mg total) by mouth 2 (two) times daily with meals.    oxyCODONE (ROXICODONE) 5 MG immediate release tablet Take 1 tablet (5 mg total) by mouth every 6 (six) hours as needed for Pain. (Patient not taking: Reported on 2022)    pen needle, diabetic 32 gauge x 5/32" Ndle Use to inject insulin 4x daily.    pulse oximeter (PULSE OXIMETER) device by Apply Externally route 2 (two) times a day. Use twice daily at 8 AM and 3 PM and [facility] the [facility] in [facility] as directed.    vitamin D (VITAMIN D3) 1000 units Tab Take 1 tablet (1,000 Units total) by mouth once daily.    [DISCONTINUED] insulin aspart U-100 (NOVOLOG) 100 unit/mL (3 mL) InPn pen Inject 25 Units into the skin 3 (three) times daily with meals. And SSI.     Family History       Problem Relation (Age of Onset)    Diabetes Father, Mother    Hyperlipidemia Mother          Tobacco Use    Smoking status: Every Day     Packs/day: 0.50     Years: 3.00     Pack years: 1.50     Types: Cigarettes     Last attempt to quit: 2014     Years since quittin.0    Smokeless tobacco: Never   Substance and Sexual Activity    Alcohol use: Yes     Comment: " "socially-weekly    Drug use: No    Sexual activity: Yes     Partners: Female     Review of Systems   Constitutional:  Positive for fatigue. Negative for activity change, chills and fever.   HENT:  Negative for congestion, trouble swallowing and voice change.    Eyes:  Negative for photophobia and visual disturbance.   Respiratory:  Positive for cough, chest tightness and wheezing. Negative for shortness of breath.    Cardiovascular:  Negative for chest pain, palpitations and leg swelling.   Gastrointestinal:  Negative for abdominal pain, constipation, diarrhea, nausea and vomiting.        Rectal "oozing"   Genitourinary:  Negative for dysuria, frequency, hematuria and urgency.        Dark urine   Musculoskeletal:  Positive for myalgias. Negative for arthralgias, back pain and gait problem.   Skin:  Negative for color change and rash.   Neurological:  Negative for dizziness, syncope, weakness, light-headedness, numbness and headaches.   Psychiatric/Behavioral:  Negative for agitation and confusion. The patient is not nervous/anxious.    Objective:     Vital Signs (Most Recent):  Temp: 97.6 °F (36.4 °C) (11/09/22 2019)  Pulse: 79 (11/09/22 1933)  Resp: 18 (11/09/22 2002)  BP: (!) 124/57 (11/09/22 1933)  SpO2: 97 % (11/09/22 1932)   Vital Signs (24h Range):  Temp:  [97.6 °F (36.4 °C)-100.3 °F (37.9 °C)] 97.6 °F (36.4 °C)  Pulse:  [] 79  Resp:  [18] 18  SpO2:  [87 %-97 %] 97 %  BP: (124-149)/(57-86) 124/57     Weight: 83.5 kg (184 lb)  Body mass index is 27.98 kg/m².    Physical Exam  Vitals and nursing note reviewed.   Constitutional:       General: He is not in acute distress.     Appearance: He is well-developed.   HENT:      Head: Normocephalic and atraumatic.      Mouth/Throat:      Pharynx: No oropharyngeal exudate.   Eyes:      General: No scleral icterus.     Conjunctiva/sclera: Conjunctivae normal.   Cardiovascular:      Rate and Rhythm: Normal rate and regular rhythm.      Heart sounds: Normal heart " sounds.   Pulmonary:      Effort: Pulmonary effort is normal. No respiratory distress.      Breath sounds: No wheezing.      Comments: Diminished BS throughout  Abdominal:      General: Bowel sounds are normal. There is no distension.      Palpations: Abdomen is soft.      Tenderness: There is no abdominal tenderness.   Musculoskeletal:         General: Tenderness (both calves) present. Normal range of motion.      Cervical back: Normal range of motion and neck supple.      Right lower leg: No edema.      Left lower leg: No edema.   Lymphadenopathy:      Cervical: No cervical adenopathy.   Skin:     General: Skin is warm and dry.      Capillary Refill: Capillary refill takes less than 2 seconds.      Findings: No rash.   Neurological:      Mental Status: He is alert and oriented to person, place, and time.      Cranial Nerves: No cranial nerve deficit.      Sensory: No sensory deficit.      Coordination: Coordination normal.   Psychiatric:         Behavior: Behavior normal.         Thought Content: Thought content normal.         Judgment: Judgment normal.           Significant Labs: All pertinent labs within the past 24 hours have been reviewed.  CBC:   Recent Labs   Lab 11/09/22  1331 11/09/22  1342   WBC 11.49  --    HGB 16.3  --    HCT 44.1 51   *  --      CMP:   Recent Labs   Lab 11/09/22  1331   *   K 4.3   CL 96   CO2 21*   *   BUN 11   CREATININE 1.0   CALCIUM 9.7   PROT 8.8*   ALBUMIN 3.7   BILITOT 0.4   ALKPHOS 103   AST 27   ALT 32   ANIONGAP 14       Significant Imaging: I have reviewed all pertinent imaging results/findings within the past 24 hours.

## 2022-11-10 NOTE — ASSESSMENT & PLAN NOTE
- hold home regimen  - start inpatient weight based insulin regimen: detemir 20U qhs + aspart 6U TIDWM  - LDSSI, ACHS  - diabetic diet  - repeat A1c  Lab Results   Component Value Date    HGBA1C 6.7 (H) 11/10/2022

## 2022-11-10 NOTE — ASSESSMENT & PLAN NOTE
- reports recent oozing and rectal pain  - CT pelvis without abscess  - CRS consulted; no acute intervention warranted  - monitor H/H  - strict I/Os  - pain control

## 2022-11-10 NOTE — PROGRESS NOTES
"[facility] - [facility] ([address] Medicine  Progress Note    Patient Name: Amando Calix  MRN: [id number]  Patient Class: IP- Inpatient   Admission Date: 11/9/2022  Length of Stay: 1 days  Attending Physician: Arthur Simmons MD  Primary Care Provider: Celestino Wright MD (Inactive)        Subjective:     Principal Problem:Acute hypoxemic respiratory failure        HPI:  Amando Calix is a 37 y.o. with a PMHx of T1DM, crohn's disease complicated by fistula s/p colectomy with end ileostomy who presents to [facility] [place] [place] for URI symptoms. Patient reports worsening dry cough, congestion, body aches, subjective fever, chills, and fatigue for the past 6 days. His daughter had the flu last week so he think he contracted it from her. Additionally, he reports non bloody "oozing" rectal drainage with associated pain and pressure sensation in his rectum. He also notes dark urine, low back pain and bilateral lower extremity cramping for the past few days. Denies chest pain, SOB, dizziness, vision changes, syncope or recent travel.     ED: vitals stable. Hypoxic to 87% on RA, placed on 2L NC. No leukocytosis. Influenza A positive. CXR shows viral pneumonitis. UA non-infectious. Given 1L LR.       Overview/Hospital Course:  No notes on file    Interval History:    Patient reports cough with no sputum production. Denies f/c     Review of Systems  Objective:     Vital Signs (Most Recent):  Temp: 98 °F (36.7 °C) (11/10/22 1204)  Pulse: 86 (11/10/22 1204)  Resp: 18 (11/10/22 1204)  BP: 120/71 (11/10/22 1204)  SpO2: (!) 93 % (11/10/22 1345)   Vital Signs (24h Range):  Temp:  [97.6 °F (36.4 °C)-98.5 °F (36.9 °C)] 98 °F (36.7 °C)  Pulse:  [78-96] 86  Resp:  [8-20] 18  SpO2:  [87 %-98 %] 93 %  BP: (120-149)/(57-86) 120/71     Weight: 84.1 kg (185 lb 6.5 oz)  Body mass index is 28.19 kg/m².    Intake/Output Summary (Last 24 hours) at 11/10/2022 145  Last data filed at 11/9/2022 1535  Gross per 24 hour   Intake " 1000 ml   Output --   Net 1000 ml      Physical Exam  Vitals and nursing note reviewed.   Constitutional:       General: He is not in acute distress.     Appearance: He is well-developed.   HENT:      Head: Normocephalic and atraumatic.      Mouth/Throat:      Pharynx: No oropharyngeal exudate.   Eyes:      General: No scleral icterus.     Conjunctiva/sclera: Conjunctivae normal.   Cardiovascular:      Rate and Rhythm: Normal rate and regular rhythm.      Heart sounds: Normal heart sounds.   Pulmonary:      Effort: Pulmonary effort is normal. No respiratory distress.      Breath sounds: No wheezing.   Abdominal:      General: Bowel sounds are normal. There is no distension.      Palpations: Abdomen is soft.      Tenderness: There is no abdominal tenderness.   Musculoskeletal:         General: Normal range of motion.      Cervical back: Normal range of motion and neck supple.      Right lower leg: No edema.      Left lower leg: No edema.   Lymphadenopathy:      Cervical: No cervical adenopathy.   Skin:     General: Skin is warm and dry.      Capillary Refill: Capillary refill takes less than 2 seconds.      Findings: No rash.   Neurological:      Mental Status: He is alert and oriented to person, place, and time.      Cranial Nerves: No cranial nerve deficit.      Sensory: No sensory deficit.      Coordination: Coordination normal.   Psychiatric:         Behavior: Behavior normal.         Thought Content: Thought content normal.         Judgment: Judgment normal.       Computed MELD-Na score unavailable. Necessary lab results were not found in the last year.  Computed MELD score unavailable. Necessary lab results were not found in the last year.    Significant Labs:  CBC:  Recent Labs   Lab 11/09/22  1331 11/09/22  1342 11/10/22  0605   WBC 11.49  --  6.41   HGB 16.3  --  14.7   HCT 44.1 51 41.4   *  --  144*     CMP:  Recent Labs   Lab 11/09/22  1331 11/10/22  0605   * 132*   K 4.3 4.2   CL 96 97   CO2  21* 21*   * 178*   BUN 11 8   CREATININE 1.0 0.7   CALCIUM 9.7 8.8   PROT 8.8*  --    ALBUMIN 3.7  --    BILITOT 0.4  --    ALKPHOS 103  --    AST 27  --    ALT 32  --    ANIONGAP 14 14     PTINR:  No results for input(s): INR in the last 48 hours.          Assessment/Plan:      * Acute hypoxemic respiratory failure  Influenza A  - afebrile without leukocytosis  - satting 97% on RA>> imnproved to 97% on 2L NC  - influenza A +  - CXR shows viral pneumonitis  - start tamiflu 75mg BID x5 days  - duonebs, IS  -  LE US for with no evidence of DVT   - IVFs  - mucinex and tessalon perles    Type 1 diabetes mellitus with hyperglycemia  - hold home regimen  - start inpatient weight based insulin regimen: detemir 20U qhs + aspart 6U TIDWM  - LDSSI, ACHS  - diabetic diet  - repeat A1c  Lab Results   Component Value Date    HGBA1C 6.7 (H) 11/10/2022       Crohn's disease of colon with fistula  - reports recent oozing and rectal pain  - CT pelvis without abscess  - CRS consulted; no acute intervention warranted  - monitor H/H  - strict I/Os  - pain control       VTE Risk Mitigation (From admission, onward)         Ordered     enoxaparin injection 40 mg  Daily         11/09/22 2024     IP VTE HIGH RISK PATIENT  Once         11/09/22 2024     Place sequential compression device  Until discontinued         11/09/22 2024                Discharge Planning   MIGUEL: 11/10/2022     Code Status: Full Code   Is the patient medically ready for discharge?: No    Reason for patient still in hospital (select all that apply): Patient trending condition                     Arthur Simmons MD  Department of Hospital Medicine   [facility] - Intensive Care ([place]

## 2022-11-11 VITALS
HEART RATE: 75 BPM | DIASTOLIC BLOOD PRESSURE: 72 MMHG | TEMPERATURE: 98 F | RESPIRATION RATE: 20 BRPM | HEIGHT: 68 IN | BODY MASS INDEX: 28.1 KG/M2 | WEIGHT: 185.44 LBS | SYSTOLIC BLOOD PRESSURE: 128 MMHG | OXYGEN SATURATION: 90 %

## 2022-11-11 LAB
ANION GAP SERPL CALC-SCNC: 14 MMOL/L (ref 8–16)
ANISOCYTOSIS BLD QL SMEAR: SLIGHT
BASOPHILS # BLD AUTO: 0.04 K/UL (ref 0–0.2)
BASOPHILS NFR BLD: 0.7 % (ref 0–1.9)
BUN SERPL-MCNC: 9 MG/DL (ref 6–20)
CALCIUM SERPL-MCNC: 9 MG/DL (ref 8.7–10.5)
CHLORIDE SERPL-SCNC: 96 MMOL/L (ref 95–110)
CO2 SERPL-SCNC: 22 MMOL/L (ref 23–29)
CREAT SERPL-MCNC: 0.7 MG/DL (ref 0.5–1.4)
DIFFERENTIAL METHOD: ABNORMAL
EOSINOPHIL # BLD AUTO: 0 K/UL (ref 0–0.5)
EOSINOPHIL NFR BLD: 0 % (ref 0–8)
ERYTHROCYTE [DISTWIDTH] IN BLOOD BY AUTOMATED COUNT: 13.3 % (ref 11.5–14.5)
EST. GFR  (NO RACE VARIABLE): >60 ML/MIN/1.73 M^2
GLUCOSE SERPL-MCNC: 102 MG/DL (ref 70–110)
HCT VFR BLD AUTO: 40.5 % (ref 40–54)
HGB BLD-MCNC: 14.7 G/DL (ref 14–18)
HYPOCHROMIA BLD QL SMEAR: ABNORMAL
IMM GRANULOCYTES # BLD AUTO: 0.01 K/UL (ref 0–0.04)
IMM GRANULOCYTES NFR BLD AUTO: 0.2 % (ref 0–0.5)
LYMPHOCYTES # BLD AUTO: 2.2 K/UL (ref 1–4.8)
LYMPHOCYTES NFR BLD: 37 % (ref 18–48)
MAGNESIUM SERPL-MCNC: 1.9 MG/DL (ref 1.6–2.6)
MCH RBC QN AUTO: 30 PG (ref 27–31)
MCHC RBC AUTO-ENTMCNC: 36.3 G/DL (ref 32–36)
MCV RBC AUTO: 83 FL (ref 82–98)
MONOCYTES # BLD AUTO: 0.7 K/UL (ref 0.3–1)
MONOCYTES NFR BLD: 12.7 % (ref 4–15)
NEUTROPHILS # BLD AUTO: 2.9 K/UL (ref 1.8–7.7)
NEUTROPHILS NFR BLD: 49.4 % (ref 38–73)
NRBC BLD-RTO: 0 /100 WBC
OVALOCYTES BLD QL SMEAR: ABNORMAL
PHOSPHATE SERPL-MCNC: 3.3 MG/DL (ref 2.7–4.5)
PLATELET # BLD AUTO: 160 K/UL (ref 150–450)
PMV BLD AUTO: 12.5 FL (ref 9.2–12.9)
POCT GLUCOSE: 135 MG/DL (ref 70–110)
POCT GLUCOSE: 140 MG/DL (ref 70–110)
POIKILOCYTOSIS BLD QL SMEAR: SLIGHT
POLYCHROMASIA BLD QL SMEAR: ABNORMAL
POTASSIUM SERPL-SCNC: 3.9 MMOL/L (ref 3.5–5.1)
RBC # BLD AUTO: 4.9 M/UL (ref 4.6–6.2)
SODIUM SERPL-SCNC: 132 MMOL/L (ref 136–145)
SPHEROCYTES BLD QL SMEAR: ABNORMAL
TARGETS BLD QL SMEAR: ABNORMAL
WBC # BLD AUTO: 5.81 K/UL (ref 3.9–12.7)

## 2022-11-11 PROCEDURE — 25000003 PHARM REV CODE 250: Performed by: PHYSICIAN ASSISTANT

## 2022-11-11 PROCEDURE — 80048 BASIC METABOLIC PNL TOTAL CA: CPT | Performed by: PHYSICIAN ASSISTANT

## 2022-11-11 PROCEDURE — 63600175 PHARM REV CODE 636 W HCPCS: Performed by: STUDENT IN AN ORGANIZED HEALTH CARE EDUCATION/TRAINING PROGRAM

## 2022-11-11 PROCEDURE — 99239 PR HOSPITAL DISCHARGE DAY,>30 MIN: ICD-10-PCS | Mod: ,,, | Performed by: STUDENT IN AN ORGANIZED HEALTH CARE EDUCATION/TRAINING PROGRAM

## 2022-11-11 PROCEDURE — 83735 ASSAY OF MAGNESIUM: CPT | Performed by: PHYSICIAN ASSISTANT

## 2022-11-11 PROCEDURE — 99239 HOSP IP/OBS DSCHRG MGMT >30: CPT | Mod: ,,, | Performed by: STUDENT IN AN ORGANIZED HEALTH CARE EDUCATION/TRAINING PROGRAM

## 2022-11-11 PROCEDURE — 36415 COLL VENOUS BLD VENIPUNCTURE: CPT | Performed by: PHYSICIAN ASSISTANT

## 2022-11-11 PROCEDURE — 85025 COMPLETE CBC W/AUTO DIFF WBC: CPT | Performed by: PHYSICIAN ASSISTANT

## 2022-11-11 PROCEDURE — 84100 ASSAY OF PHOSPHORUS: CPT | Performed by: PHYSICIAN ASSISTANT

## 2022-11-11 RX ORDER — OSELTAMIVIR PHOSPHATE 75 MG/1
75 CAPSULE ORAL 2 TIMES DAILY
Qty: 6 CAPSULE | Refills: 0 | Status: SHIPPED | OUTPATIENT
Start: 2022-11-11 | End: 2022-11-14

## 2022-11-11 RX ORDER — BENZONATATE 100 MG/1
100 CAPSULE ORAL 3 TIMES DAILY PRN
Qty: 10 CAPSULE | Refills: 0 | Status: SHIPPED | OUTPATIENT
Start: 2022-11-11 | End: 2022-11-21

## 2022-11-11 RX ADMIN — HYDROMORPHONE HYDROCHLORIDE 0.5 MG: 1 INJECTION, SOLUTION INTRAMUSCULAR; INTRAVENOUS; SUBCUTANEOUS at 12:11

## 2022-11-11 RX ADMIN — OSELTAMIVIR PHOSPHATE 75 MG: 75 CAPSULE ORAL at 09:11

## 2022-11-11 RX ADMIN — HYDROMORPHONE HYDROCHLORIDE 0.5 MG: 1 INJECTION, SOLUTION INTRAMUSCULAR; INTRAVENOUS; SUBCUTANEOUS at 06:11

## 2022-11-11 RX ADMIN — METHOCARBAMOL 500 MG: 500 TABLET ORAL at 12:11

## 2022-11-11 RX ADMIN — INSULIN DETEMIR 10 UNITS: 100 INJECTION, SOLUTION SUBCUTANEOUS at 09:11

## 2022-11-11 RX ADMIN — GUAIFENESIN 600 MG: 600 TABLET, EXTENDED RELEASE ORAL at 09:11

## 2022-11-11 RX ADMIN — INSULIN ASPART 6 UNITS: 100 INJECTION, SOLUTION INTRAVENOUS; SUBCUTANEOUS at 09:11

## 2022-11-11 RX ADMIN — MUPIROCIN: 20 OINTMENT TOPICAL at 09:11

## 2022-11-11 RX ADMIN — METHOCARBAMOL 500 MG: 500 TABLET ORAL at 09:11

## 2022-11-11 RX ADMIN — INSULIN ASPART 6 UNITS: 100 INJECTION, SOLUTION INTRAVENOUS; SUBCUTANEOUS at 12:11

## 2022-11-11 NOTE — PLAN OF CARE
Pt is AAOx4,VS WNL on room air. Pt with orders to d/c IV and d/c home. Tolerated d/c of Iv. Awake ,alert, and oriented with no acute distress noted . Reviewed discharge orders including : medicine orders, prescriptions, followup appts, and patient education materials for diet and diagnosis. Belongings packed for transport  to home. Ambulating out at discharge with belongs ,refused from wheelchair

## 2022-11-11 NOTE — PLAN OF CARE
Problem: Adult Inpatient Plan of Care  Goal: Plan of Care Review  Outcome: Ongoing, Progressing     Patient remained in stable condition through shift. Remained free of falls and other injuries. Able to reposition self independently. PRN dilaudid given for pain as patient refused PO oxy. Blood sugar checked per orders. Bed in locked and lowest position, call light in reach, all questions answered, declines any further needs at this time. Frequent monitoring maintained.

## 2022-11-11 NOTE — HOSPITAL COURSE
Patient was started on  tamiflu 75mg BID, to be continued for 5 days. He received duonebs, IS.  LE US for with no evidence of DVT . Patient has hx of Crohn's disease of colon with fistula. On admission he reported some oozing and rectal pain. CT pelvis without abscess. CRS consulted; no acute intervention warranted. Patient improved with supportive Rx. He is currently medically and HDS. Patient being d/c home with rec to fu with PCP and GI as OP. Prescribed Tamiflu to complete course of total 5 days.

## 2022-11-11 NOTE — DISCHARGE SUMMARY
"[facility] - Int[facility] ([place] [address])  [facility] Medicine  Discharge Summary      Patient Name: Amando Calix  MRN: [id number]  LINSEY: [id number]  Patient Class: IP- Inpatient  Admission Date: 11/9/2022  Hospital Length of Stay: 2 days  Discharge Date and Time:  11/11/2022 2:38 PM  Attending Physician: No att. providers found   Discharging Provider: Arthur Simmons MD  Primary Care Provider: Celestino Wright MD (Inactive)  Hospital Medicine Team: [facility] [place] [facility]OS[facility] MED A Arthur Simmons MD  Primary Care Team: [facility] [place] [facility] MED A    HPI:   Amando Calix is a 37 y.o. with a PMHx of T1DM, crohn's disease complicated by fistula s/p colectomy with end ileostomy who presents to [facility] [place] for URI symptoms. Patient reports worsening dry cough, congestion, body aches, subjective fever, chills, and fatigue for the past 6 days. His daughter had the flu last week so he think he contracted it from her. Additionally, he reports non bloody "oozing" rectal drainage with associated pain and pressure sensation in his rectum. He also notes dark urine, low back pain and bilateral lower extremity cramping for the past few days. Denies chest pain, SOB, dizziness, vision changes, syncope or recent travel.     ED: vitals stable. Hypoxic to 87% on RA, placed on 2L NC. No leukocytosis. Influenza A positive. CXR shows viral pneumonitis. UA non-infectious. Given 1L LR.       * No surgery found *      Hospital Course:   Patient was started on  tamiflu 75mg BID, to be continued for 5 days. He received duonebs, IS.  LE US for with no evidence of DVT . Patient has hx of Crohn's disease of colon with fistula. On admission he reported some oozing and rectal pain. CT pelvis without abscess. CRS consulted; no acute intervention warranted. Patient improved with supportive Rx. He is currently medically and HDS. Patient being d/c home with rec to fu with PCP and GI as OP. Prescribed Tamiflu to complete course of total 5 days.        Goals of Care Treatment " Preferences:  Code Status: Full Code      Consults:   Consults (From admission, onward)        Status Ordering Provider     Inpatient consult to Colorectal Surgery  Once        Provider:  (Not yet assigned)    Acknowledged KUMAR MAK          No new Assessment & Plan notes have been filed under this hospital service since the last note was generated.  Service: Hospital Medicine    Final Active Diagnoses:    Diagnosis Date Noted POA    PRINCIPAL PROBLEM:  Acute hypoxemic respiratory failure [J96.01] 11/09/2022 Yes    Influenza A [J10.1] 11/09/2022 Yes    Type 1 diabetes mellitus with hyperglycemia [E10.65] 11/28/2020 Yes    Crohn's disease of colon with fistula [K50.113] 09/30/2014 Yes      Problems Resolved During this Admission:       Discharged Condition: stable    Disposition: Home or Self Care    Follow Up:   Follow-up Information     Celestino Wright MD Follow up in 3 day(s).    Specialty: Gastroenterology  Contact information:  [address] [place] [address] [facility]  Specialists  [facility] [id number]  [phone]                       Patient Instructions:      BASIC METABOLIC PANEL   Standing Status: Future Standing Exp. Date: 01/10/24       Significant Diagnostic Studies: Labs:   BMP:   Recent Labs   Lab 11/10/22  0605 11/11/22  0453   * 102   * 132*   K 4.2 3.9   CL 97 96   CO2 21* 22*   BUN 8 9   CREATININE 0.7 0.7   CALCIUM 8.8 9.0   MG 1.8 1.9   , CMP   Recent Labs   Lab 11/10/22  0605 11/11/22  0453   * 132*   K 4.2 3.9   CL 97 96   CO2 21* 22*   * 102   BUN 8 9   CREATININE 0.7 0.7   CALCIUM 8.8 9.0   ANIONGAP 14 14    and CBC   Recent Labs   Lab 11/10/22  0605 11/11/22  0453   WBC 6.41 5.81   HGB 14.7 14.7   HCT 41.4 40.5   * 160       Pending Diagnostic Studies:     None         Medications:  Reconciled Home Medications:      Medication List      START taking these medications    benzonatate 100 MG capsule  Commonly known as: TESSALON  Take 1 capsule  "(100 mg total) by mouth 3 (three) times daily as needed for Cough.     oseltamivir 75 MG capsule  Commonly known as: TAMIFLU  Take 1 capsule (75 mg total) by mouth 2 (two) times daily        CONTINUE taking these medications    acetaminophen 500 MG tablet  Commonly known as: TYLENOL  Take 2 tablets (1,000 mg total) by mouth every 6 (six) hours as needed for Pain.     blood-glucose meter Misc  Use as instructed to check blood sugar three times daily before meals and nightly (ICD-10-CM: E10.65 )     ciprofloxacin HCl 500 MG tablet  Commonly known as: CIPRO  Take 1 tablet (500 mg total) by mouth every 12 (twelve) hours.     insulin NPH/Reg human 100 unit/mL (70-30) Inpn pen  Commonly known as: HumuLIN (70/30)  Inject 60 units under the skin in the AM and 30 units under the skin in the PM     lancets 30 gauge Misc  Use as instructed to check blood sugar three times daily before meals and nightly (ICD-10-CM: E10.65)     lancing device Misc  Use as instructed to check blood sugar three times daily before meals and nightly (ICD-10-CM: E10.65)     metFORMIN 500 MG ER 24hr tablet  Commonly known as: GLUCOPHAGE-XR  Take 2 tablets (1,000 mg total) by mouth 2 (two) times daily with meals.     pen needle, diabetic 32 gauge x 5/32" Ndle  Use to inject insulin 4x daily.     pulse oximeter device  Commonly known as: pulse oximeter  by Apply Externally route 2 (two) times a day. Use twice daily at 8 AM and 3 PM and record the value in [facility] as directed.     VITAMIN D3 1000 units Tab  Generic drug: vitamin D  Take 1 tablet (1,000 Units total) by mouth once daily.        ASK your doctor about these medications    blood sugar diagnostic Strp  Use as instructed to check blood sugar three times daily before meals and nightly (ICD-10-CM: E10.65 )     oxyCODONE 5 MG immediate release tablet  Commonly known as: ROXICODONE  Take 1 tablet (5 mg total) by mouth every 6 (six) hours as needed for Pain.            Indwelling Lines/Drains at " time of discharge:   Lines/Drains/Airways     Drain  Duration                Ileostomy RUQ -- days                Time spent on the discharge of patient: 35 minutes         Arthur Simmons MD  Department of Hospital Medicine  [facility] - Intensive Care ([place])

## 2022-11-14 LAB
BACTERIA BLD CULT: NORMAL
BACTERIA BLD CULT: NORMAL

## 2023-02-13 PROBLEM — J96.01 ACUTE HYPOXEMIC RESPIRATORY FAILURE: Status: RESOLVED | Noted: 2022-11-09 | Resolved: 2023-02-13

## 2023-10-06 ENCOUNTER — HOSPITAL ENCOUNTER (EMERGENCY)
Facility: HOSPITAL | Age: 38
Discharge: HOME | End: 2023-10-06
Attending: EMERGENCY MEDICINE
Payer: COMMERCIAL

## 2023-10-06 VITALS
DIASTOLIC BLOOD PRESSURE: 90 MMHG | BODY MASS INDEX: 27.06 KG/M2 | WEIGHT: 178 LBS | HEART RATE: 85 BPM | OXYGEN SATURATION: 98 % | RESPIRATION RATE: 18 BRPM | SYSTOLIC BLOOD PRESSURE: 127 MMHG | TEMPERATURE: 98 F

## 2023-10-06 DIAGNOSIS — R05.8 PRODUCTIVE COUGH: ICD-10-CM

## 2023-10-06 DIAGNOSIS — U07.1 COVID-19: Primary | ICD-10-CM

## 2023-10-06 LAB — SARS-COV-2 RDRP RESP QL NAA+PROBE: POSITIVE

## 2023-10-06 PROCEDURE — 82962 GLUCOSE BLOOD TEST: CPT

## 2023-10-06 PROCEDURE — 99284 EMERGENCY DEPT VISIT MOD MDM: CPT | Mod: 25

## 2023-10-06 PROCEDURE — U0002 COVID-19 LAB TEST NON-CDC: HCPCS | Performed by: EMERGENCY MEDICINE

## 2023-10-06 NOTE — ED NOTES
Patient identifiers verified and correct for Amando Calix  LOC: The patient is awake, alert and aware of environment with an appropriate affect, the patient is oriented x 3 and speaking appropriately.   APPEARANCE: Patient appears comfortable and in no acute distress, patient is clean and well groomed.  SKIN: The skin is warm and dry, color consistent with ethnicity, patient has normal skin turgor and moist mucus membranes, skin intact, no breakdown or bruising noted.   MUSCULOSKELETAL: Patient moving all extremities spontaneously, no swelling noted.  RESPIRATORY: Airway is open and patent, respirations are spontaneous, patient has a normal effort and rate, no accessory muscle use noted, O2 Sat 97% on room air. Pt reports congestion and SOB  CARDIAC: Patient has a normal rate and regular rhythm, no edema noted, capillary refill < 3 seconds. Pt reports pressure in his chest  GASTRO: Soft and non tender to palpation, no distention noted, Pt states bowel movements have been regular.  : Pt denies any pain or frequency with urination.  NEURO: Pt opens eyes spontaneously, behavior appropriate to situation, follows commands, facial expression symmetrical, bilateral hand grasp equal and even, purposeful motor response noted, normal sensation in all extremities when touched with a finger.

## 2023-10-06 NOTE — ED PROVIDER NOTES
"Encounter Date: 10/6/2023       History     Chief Complaint   Patient presents with    COVID-19 Concerns     Covid + 9/29. Since then has been feeling better but feels as though he has mucous in L lung, dry cough that is improving. Needs negative work note.      Patient is a 37-year-old male.  He has a past medical history of diabetes and Crohn's disease.  He states that he is not currently taking any immunosuppressant medications.  He states that he tested positive for COVID on 9/29.  He presents to the ER asking for a chest x-ray, stating that he "feels mucous" in his lungs.  He states that he is coughing frequently but only occasionally producing sputum or phlegm.  He states that he has had pneumonia and a "collapsed lung" in the past.  He denies any shortness of breath, wheezing, or exertional dyspnea..  He has been taking Tylenol intermittently but otherwise denies any pre arrival treatment.      Review of patient's allergies indicates:   Allergen Reactions    Ibuprofen Other (See Comments)     Can't take d/t stomach ulcers     Past Medical History:   Diagnosis Date    Anemia     Chronic diarrhea     Clostridium difficile infection     Crohn's disease     Diabetic ketoacidosis without coma associated with diabetes mellitus due to underlying condition 11/21/2020    Protein calorie malnutrition     Steroid-induced diabetes      Past Surgical History:   Procedure Laterality Date    ABSCESS DRAINAGE      COLONOSCOPY      COLOSTOMY      EXAMINATION UNDER ANESTHESIA N/A 11/27/2020    Procedure: Exam under anesthesia and seton placement;  Surgeon: Robe Suarez MD;  Location: [facility] OR [address] [place] [address];  Service: Colon and Rectal;  Laterality: N/A;    FLEXIBLE SIGMOIDOSCOPY N/A 7/10/2020    Procedure: SIGMOIDOSCOPY, FLEXIBLE;  Surgeon: Robe Suarez MD;  Location: [facility] OR [address] [place] [address];  Service: Colon and Rectal;  Laterality: N/A;    HERNIA REPAIR      ILEOSTOMY      INCISION AND DRAINAGE OF ABSCESS  12/28/2021    Procedure: " INCISION AND DRAINAGE, Scrotal ABSCESS;  Surgeon: Rober Cardenas MD;  Location: [facility] OR 2ND FLR;  Service: Colon and Rectal;;    INSERTION OF SETON STITCH N/A 7/10/2020    Procedure: PLACEMENT-SETON DRAIN;  Surgeon: Robe Suarez MD;  Location: [facility] OR 2ND FLR;  Service: Colon and Rectal;  Laterality: N/A;    INSERTION OF SETON STITCH N/A 2021    Procedure: PLACEMENT, SETON STITCH X4;  Surgeon: Rober Cardenas MD;  Location: [facility] OR 2ND FLR;  Service: Colon and Rectal;  Laterality: N/A;    SIGMOIDECTOMY       Family History   Problem Relation Age of Onset    Diabetes Father     Hyperlipidemia Mother     Diabetes Mother     Crohn's disease Neg Hx     Celiac disease Neg Hx     Cirrhosis Neg Hx     Colon cancer Neg Hx     Esophageal cancer Neg Hx     Irritable bowel syndrome Neg Hx     Liver cancer Neg Hx     Rectal cancer Neg Hx     Stomach cancer Neg Hx     Ulcerative colitis Neg Hx      Social History     Tobacco Use    Smoking status: Every Day     Current packs/day: 0.00     Average packs/day: 0.5 packs/day for 3.0 years (1.5 ttl pk-yrs)     Types: Cigarettes     Start date: 2011     Last attempt to quit: 2014     Years since quittin.9    Smokeless tobacco: Never   Substance Use Topics    Alcohol use: Yes     Comment: socially-weekly    Drug use: No     Review of Systems   Constitutional:  Negative for chills and fever.   HENT:  Positive for congestion and rhinorrhea.    Respiratory:  Positive for cough. Negative for chest tightness, shortness of breath and wheezing.    Cardiovascular:  Negative for chest pain, palpitations and leg swelling.   Gastrointestinal:  Negative for abdominal pain, diarrhea, nausea and vomiting.   Genitourinary:  Negative for decreased urine volume, dysuria, flank pain and hematuria.   Musculoskeletal:  Negative for back pain and myalgias.   Skin:  Negative for rash.   Allergic/Immunologic: Negative for immunocompromised state.   Neurological:  Negative for  dizziness, syncope, weakness, light-headedness, numbness and headaches.   Psychiatric/Behavioral:  Negative for confusion.        Physical Exam     Initial Vitals [10/06/23 1514]   BP Pulse Resp Temp SpO2   (!) 150/95 96 18 97.5 °F (36.4 °C) 98 %      MAP       --         Physical Exam    Nursing note and vitals reviewed.  Constitutional: He appears well-developed and well-nourished. He is not diaphoretic.   Alert and ambulatory.  Well-appearing.   HENT:   Head: Normocephalic.   Nasal sinus congestion.  Benign oropharynx.  Unremarkable ENT exam otherwise.   Eyes: Conjunctivae are normal.   Neck: Neck supple.   Cardiovascular:  Normal rate.           Pulmonary/Chest: No respiratory distress. He has no wheezes.   Occasional mild infrequent cough.  No wheezing.  No tachypnea or hypoxia.  No conversational dyspnea.  No increased work of breathing.   Abdominal: He exhibits no distension. There is no abdominal tenderness.   Musculoskeletal:         General: Normal range of motion.      Cervical back: Neck supple.      Comments: No lower leg edema.  No calf pain or swelling.     Neurological: He is alert and oriented to person, place, and time. He has normal strength. No sensory deficit.   Skin: Skin is warm and dry. No rash noted.   Psychiatric: He has a normal mood and affect. His behavior is normal.         ED Course   Procedures  Labs Reviewed   SARS-COV-2 RNA AMPLIFICATION, QUAL - Abnormal; Notable for the following components:       Result Value    SARS-CoV-2 RNA, Amplification, Qual Positive (*)     All other components within normal limits   POCT GLUCOSE MONITORING CONTINUOUS     Results for orders placed or performed during the hospital encounter of 10/06/23   COVID-19 Rapid Screening   Result Value Ref Range    SARS-CoV-2 RNA, Amplification, Qual Positive (A) Negative            Imaging Results              X-Ray Chest PA And Lateral (Final result)  Result time 10/06/23 16:13:07      Final result by Maxx  "Riley MORALES MD (10/06/23 16:13:07)                   Impression:      Normal chest      Electronically signed by: Riley Lilly MD  Date:    10/06/2023  Time:    16:13               Narrative:    EXAMINATION:  XR CHEST PA AND LATERAL    CLINICAL HISTORY:  Other specified cough    TECHNIQUE:  PA and lateral views of the chest were performed.    COMPARISON:  11/09/2022    FINDINGS:  Heart size and mediastinal contour are normal.  Lungs are expanded and clear.  No lung consolidation or pleural fluid is detected.  Skeletal structures are intact.                                       Medications - No data to display  Medical Decision Making  37-year-old male reports recent positive COVID test, complaining of chest congestion and coughing, states that he feels "mucous in his lungs".    Amount and/or Complexity of Data Reviewed  Labs: ordered.     Details: COVID positive, Accu-Chek unremarkable at 135  Radiology: ordered.     Details: Chest x-ray completed, clear lungs, no effusion or infiltrate.     Risk  Prescription drug management.  Risk Details: Differential diagnosis includes but is not limited to: Covid, Pneumonia, bronchitis, URI, etc   I considered but do not suspect PE   Covid precautions discussed. Return precautions provided. Rx for Paxlovid provided.                                Clinical Impression:   Final diagnoses:  [R05.8] Productive cough  [U07.1] COVID-19 (Primary)        ED Disposition Condition    Discharge Stable          ED Prescriptions       Medication Sig Dispense Start Date End Date Auth. Provider    dextromethorphan-guaiFENesin  mg/5 mL Liqd Take 5 mLs by mouth every 4 to 6 hours as needed (cough). 120 mL 10/6/2023 -- Tavo Mendez, SHERIDAN    nirmatrelvir-ritonavir 300 mg (150 mg x 2)-100 mg copackaged tablets (EUA)  (Status: Discontinued) Take 3 tablets by mouth 2 (two) times daily for 5 days. Each dose contains 2 nirmatrelvir (pink tablets) and 1 ritonavir (white tablet). Take " all 3 tablets together 30 tablet 10/6/2023 10/6/2023 Tavo Mendez PA-C    nirmatrelvir-ritonavir 300 mg (150 mg x 2)-100 mg copackaged tablets (EUA)  (Status: Discontinued) Take 3 tablets by mouth 2 (two) times daily for 5 days. Each dose contains 2 nirmatrelvir (pink tablets) and 1 ritonavir (white tablet). Take all 3 tablets together 30 tablet 10/6/2023 10/6/2023 Tavo Mendez PA-C    nirmatrelvir-ritonavir 300 mg (150 mg x 2)-100 mg copackaged tablets (EUA) Take 3 tablets by mouth 2 (two) times daily for 5 days. Each dose contains 2 nirmatrelvir (pink tablets) and 1 ritonavir (white tablet). Take all 3 tablets together 30 tablet 10/6/2023 10/11/2023 Tavo Mendez PA-C          Follow-up Information       Follow up With Specialties Details Why Contact Info    Celestino Wright MD Gastroenterology  .  Rest and hydrate.  COVID precautions.  Quarantine.  Take Tylenol as directed as needed for any fever or aches.  Follow up closely with primary care [address] [place] [address] Dr Danika JENSEN [address] [phone]      [facility] - Emergency Dept Emergency Medicine  Return to the ER promptly if worse in any way. [address]  [facility] [address] [phone]             Tavo Mendez PA-C  10/06/23 3809

## 2023-10-06 NOTE — Clinical Note
"Amando"Herminia Calix was seen and treated in our emergency department on 10/6/2023.     COVID-19 is present in our communities across the state. There is limited testing for COVID at this time, so not all patients can be tested. In this situation, your employee meets the following criteria:    Amando Calix has met the criteria for COVID-19 testing and has a POSITIVE result. He can return to work once they are asymptomatic for 24 hours without the use of fever reducing medications AND at least five days from the first positive result. A mask is recommended for 5 days post quarantine.     If you have any questions or concerns, or if I can be of further assistance, please do not hesitate to contact me.    Sincerely,             DR Santana/ JERICHO Alcazar"

## 2023-10-06 NOTE — ED TRIAGE NOTES
COVID-19 Concerns (Covid + 9/29. Since then has been feeling better but feels as though he has mucous in L lung, dry cough that is improving. Needs negative work note. )

## 2023-10-09 LAB — POCT GLUCOSE: 129 MG/DL (ref 70–110)

## 2023-10-12 ENCOUNTER — TELEPHONE (OUTPATIENT)
Dept: GASTROENTEROLOGY | Facility: CLINIC | Age: 38
End: 2023-10-12
Payer: COMMERCIAL

## 2023-10-12 NOTE — TELEPHONE ENCOUNTER
----- Message from Sanju Preston sent at 10/12/2023  3:09 PM CDT -----  Regarding: FMLA return call  Contact: [phone]  Patient is returning a missed called from Lynette. Regarding FMLA forms  Please call to further discuss.

## 2023-10-12 NOTE — TELEPHONE ENCOUNTER
----- Message from Ronni Freeman MD sent at 10/12/2023 12:38 PM CDT -----  Contact: [phone]  Does he have insurance now?  I can fill out the papers now as long as we have an appointment set for him with IBD  ----- Message -----  From: Lorin Yousif MA  Sent: 10/11/2023   2:41 PM CDT  To: Ronni Freeman MD    Spoke with Amando.  Stated he needs FMLA paperwork filled out for work.  He has not been seen for the past 2 years.  Never seen by IBD.  He stated he has not had any insurance.  Alisia  ----- Message -----  From: Rivera Delacruz  Sent: 10/11/2023   2:19 PM CDT  To: Pete PATEL Staff    Amando Calix calling regarding Patient Advice (message) Pt asking for a call back about his FMLA paperwork asking for a call back from the nurse

## 2023-10-16 ENCOUNTER — TELEPHONE (OUTPATIENT)
Dept: GASTROENTEROLOGY | Facility: CLINIC | Age: 38
End: 2023-10-16
Payer: COMMERCIAL

## 2023-10-18 ENCOUNTER — TELEPHONE (OUTPATIENT)
Dept: GASTROENTEROLOGY | Facility: CLINIC | Age: 38
End: 2023-10-18
Payer: COMMERCIAL

## 2023-10-18 NOTE — TELEPHONE ENCOUNTER
----- Message from Lorin Yousif MA sent at 10/17/2023  2:12 PM CDT -----  Regarding: FW: appt access  Contact: pt [phone]    ----- Message -----  From: Sydney Michele MA  Sent: 10/17/2023  11:51 AM CDT  To: Lorin Yousif MA  Subject: FW: appt access                                    ----- Message -----  From: Derrek Galloway  Sent: 10/17/2023  11:28 AM CDT  To: [facility] Clinical Staff  Subject: appt access                                      PATIENT RETURNING CALL    Pt returned Yanet's call regarding scheduling. Please call pt at [phone]        
Called & spoke to pt  - NP appt scheduled  
Yes

## 2023-12-31 ENCOUNTER — HOSPITAL ENCOUNTER (EMERGENCY)
Facility: OTHER | Age: 38
Discharge: HOME | End: 2023-12-31
Attending: EMERGENCY MEDICINE
Payer: COMMERCIAL

## 2023-12-31 VITALS
SYSTOLIC BLOOD PRESSURE: 140 MMHG | HEIGHT: 68 IN | DIASTOLIC BLOOD PRESSURE: 98 MMHG | HEART RATE: 80 BPM | OXYGEN SATURATION: 97 % | BODY MASS INDEX: 27.58 KG/M2 | RESPIRATION RATE: 18 BRPM | TEMPERATURE: 98 F | WEIGHT: 182 LBS
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
## 2023-12-31 DIAGNOSIS — S00.81XA ABRASION OF FACE, INITIAL ENCOUNTER: ICD-10-CM

## 2023-12-31 DIAGNOSIS — S59.911A INJURY OF RIGHT FOREARM, INITIAL ENCOUNTER: ICD-10-CM

## 2023-12-31 PROCEDURE — 99282 EMERGENCY DEPT VISIT SF MDM: CPT

## 2023-12-31 RX ORDER — ACETAMINOPHEN 325 MG/1
650 TABLET ORAL
Status: DISCONTINUED | OUTPATIENT
Start: 2023-12-31 | End: 2023-12-31 | Stop reason: HOSPADM
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
## 2024-01-09 ENCOUNTER — HOSPITAL ENCOUNTER (EMERGENCY)
Facility: HOSPITAL | Age: 39
Discharge: HOME | End: 2024-01-09
Attending: EMERGENCY MEDICINE
Payer: COMMERCIAL

## 2024-01-09 ENCOUNTER — TELEPHONE (OUTPATIENT)
Dept: GASTROENTEROLOGY | Facility: CLINIC | Age: 39
End: 2024-01-09

## 2024-01-09 VITALS
SYSTOLIC BLOOD PRESSURE: 137 MMHG | TEMPERATURE: 98 F | BODY MASS INDEX: 27.13 KG/M2 | OXYGEN SATURATION: 99 % | HEIGHT: 68 IN | RESPIRATION RATE: 16 BRPM | HEART RATE: 78 BPM | WEIGHT: 179 LBS | DIASTOLIC BLOOD PRESSURE: 79 MMHG

## 2024-01-09 DIAGNOSIS — M54.9 UPPER BACK PAIN ON LEFT SIDE: ICD-10-CM

## 2024-01-09 DIAGNOSIS — R05.9 COUGH: ICD-10-CM

## 2024-01-09 DIAGNOSIS — J06.9 VIRAL URI: Primary | ICD-10-CM

## 2024-01-09 LAB
INFLUENZA A, MOLECULAR: NEGATIVE
INFLUENZA B, MOLECULAR: NEGATIVE
SARS-COV-2 RDRP RESP QL NAA+PROBE: NEGATIVE
SPECIMEN SOURCE: NORMAL

## 2024-01-09 PROCEDURE — 25000003 PHARM REV CODE 250: Performed by: PHYSICIAN ASSISTANT

## 2024-01-09 PROCEDURE — U0002 COVID-19 LAB TEST NON-CDC: HCPCS | Performed by: EMERGENCY MEDICINE

## 2024-01-09 PROCEDURE — 87502 INFLUENZA DNA AMP PROBE: CPT | Performed by: EMERGENCY MEDICINE

## 2024-01-09 PROCEDURE — 99284 EMERGENCY DEPT VISIT MOD MDM: CPT | Mod: 25

## 2024-01-09 RX ORDER — LIDOCAINE 50 MG/G
1 PATCH TOPICAL DAILY
Qty: 15 PATCH | Refills: 0 | Status: SHIPPED | OUTPATIENT
Start: 2024-01-09

## 2024-01-09 RX ORDER — ACETAMINOPHEN 500 MG
1000 TABLET ORAL
Status: COMPLETED | OUTPATIENT
Start: 2024-01-09 | End: 2024-01-09

## 2024-01-09 RX ORDER — BENZONATATE 100 MG/1
100 CAPSULE ORAL
Status: COMPLETED | OUTPATIENT
Start: 2024-01-09 | End: 2024-01-09

## 2024-01-09 RX ORDER — BENZONATATE 100 MG/1
100 CAPSULE ORAL 3 TIMES DAILY PRN
Qty: 15 CAPSULE | Refills: 0 | Status: SHIPPED | OUTPATIENT
Start: 2024-01-09 | End: 2024-01-18

## 2024-01-09 RX ORDER — METHOCARBAMOL 500 MG/1
1000 TABLET, FILM COATED ORAL 3 TIMES DAILY
Qty: 30 TABLET | Refills: 0 | Status: SHIPPED | OUTPATIENT
Start: 2024-01-09 | End: 2024-01-14

## 2024-01-09 RX ADMIN — BENZONATATE 100 MG: 100 CAPSULE ORAL at 02:01

## 2024-01-09 RX ADMIN — ACETAMINOPHEN 1000 MG: 500 TABLET ORAL at 02:01

## 2024-01-09 NOTE — ED NOTES
"Patient states intermittent cough, congestion, states felt like he was " drowning" when lying down on Sunday night.   "

## 2024-01-09 NOTE — TELEPHONE ENCOUNTER
Called patient and informed NP appointment is for 1 and that is only time available  - patient confirmed will keep his appointment    ----- Message from Yady Caban sent at 1/9/2024  9:54 AM CST -----  Regarding: appt access  Contact: pt [phone]  Pt calling to request a earlier time for his 1/18/2023 appt. Pls call
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
## 2024-01-09 NOTE — ED NOTES
Patient identifiers verified and correct for Mr Calix   C/C: Intermittent congestion, cough SEE NN  APPEARANCE: awake and alert in NAD. PAIN  0/10  SKIN: warm, dry and intact. No breakdown or bruising.  MUSCULOSKELETAL: Patient moving all extremities spontaneously, no obvious swelling or deformities noted. Ambulates independently.  RESPIRATORY: Denies shortness of breath.Respirations unlabored. Positive cough  CARDIAC: Denies CP, 2+ distal pulses; no peripheral edema  ABDOMEN: S/ND/NT, Denies nausea, ostomy   : voids spontaneously, denies difficulty  Neurologic: AAO x 4; follows commands equal strength in all extremities; denies numbness/tingling. Denies dizziness Denies new weakness

## 2024-01-09 NOTE — Clinical Note
"Amando"Herminia Calix was seen and treated in our emergency department on 1/9/2024.  He may return to work on 01/10/2024.       If you have any questions or concerns, please don't hesitate to call.      Lillian Austin PA-C"

## 2024-01-09 NOTE — ED NOTES
Patient states intermittent cough, congestion, worse on Sunday, reports also worse when lying down, deneis symptoms at present
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
## 2024-01-18 ENCOUNTER — OFFICE VISIT (OUTPATIENT)
Dept: GASTROENTEROLOGY | Facility: CLINIC | Age: 39
End: 2024-01-18
Payer: COMMERCIAL

## 2024-01-18 ENCOUNTER — LAB VISIT (OUTPATIENT)
Dept: LAB | Facility: HOSPITAL | Age: 39
End: 2024-01-18
Attending: INTERNAL MEDICINE
Payer: COMMERCIAL

## 2024-01-18 VITALS
HEART RATE: 94 BPM | HEIGHT: 68 IN | DIASTOLIC BLOOD PRESSURE: 88 MMHG | BODY MASS INDEX: 27.13 KG/M2 | WEIGHT: 179 LBS | SYSTOLIC BLOOD PRESSURE: 140 MMHG | OXYGEN SATURATION: 97 % | TEMPERATURE: 98 F

## 2024-01-18 DIAGNOSIS — E11.9 TYPE 2 DIABETES MELLITUS WITHOUT COMPLICATION, WITH LONG-TERM CURRENT USE OF INSULIN: ICD-10-CM

## 2024-01-18 DIAGNOSIS — K60.3 ANAL FISTULA: ICD-10-CM

## 2024-01-18 DIAGNOSIS — F17.200 TOBACCO USE DISORDER: ICD-10-CM

## 2024-01-18 DIAGNOSIS — Z79.4 TYPE 2 DIABETES MELLITUS WITHOUT COMPLICATION, WITH LONG-TERM CURRENT USE OF INSULIN: ICD-10-CM

## 2024-01-18 DIAGNOSIS — K50.113 CROHN'S DISEASE OF COLON WITH FISTULA: Primary | ICD-10-CM

## 2024-01-18 DIAGNOSIS — K50.113 CROHN'S DISEASE OF COLON WITH FISTULA: ICD-10-CM

## 2024-01-18 PROBLEM — Z79.899 ON DEEP VEIN THROMBOSIS (DVT) PROPHYLAXIS: Status: RESOLVED | Noted: 2021-07-28 | Resolved: 2024-01-18

## 2024-01-18 PROBLEM — E10.10 DIABETIC KETOACIDOSIS WITHOUT COMA ASSOCIATED WITH TYPE 1 DIABETES MELLITUS: Status: RESOLVED | Noted: 2021-07-28 | Resolved: 2024-01-18

## 2024-01-18 PROBLEM — E78.1 HYPERTRIGLYCERIDEMIA: Status: RESOLVED | Noted: 2021-07-28 | Resolved: 2024-01-18

## 2024-01-18 PROBLEM — N49.2 CELLULITIS OF SCROTUM: Status: RESOLVED | Noted: 2020-11-21 | Resolved: 2024-01-18

## 2024-01-18 PROBLEM — D69.6 THROMBOCYTOPENIA: Status: RESOLVED | Noted: 2021-07-30 | Resolved: 2024-01-18

## 2024-01-18 PROBLEM — E78.2 MIXED HYPERLIPIDEMIA: Status: RESOLVED | Noted: 2021-03-16 | Resolved: 2024-01-18

## 2024-01-18 PROBLEM — E10.65 TYPE 1 DIABETES MELLITUS WITH HYPERGLYCEMIA: Status: RESOLVED | Noted: 2020-11-28 | Resolved: 2024-01-18

## 2024-01-18 PROBLEM — U07.1 COVID-19: Status: RESOLVED | Noted: 2021-07-28 | Resolved: 2024-01-18

## 2024-01-18 PROBLEM — N17.9 AKI (ACUTE KIDNEY INJURY): Status: RESOLVED | Noted: 2020-11-21 | Resolved: 2024-01-18

## 2024-01-18 PROBLEM — G89.29 CHRONIC PAIN: Status: RESOLVED | Noted: 2021-07-28 | Resolved: 2024-01-18

## 2024-01-18 PROBLEM — J10.1 INFLUENZA A: Status: RESOLVED | Noted: 2022-11-09 | Resolved: 2024-01-18

## 2024-01-18 LAB
25(OH)D3+25(OH)D2 SERPL-MCNC: 9 NG/ML (ref 30–96)
ALBUMIN SERPL BCP-MCNC: 4.4 G/DL (ref 3.5–5.2)
ALP SERPL-CCNC: 88 U/L (ref 55–135)
ALT SERPL W/O P-5'-P-CCNC: 14 U/L (ref 10–44)
ANION GAP SERPL CALC-SCNC: 8 MMOL/L (ref 8–16)
AST SERPL-CCNC: 16 U/L (ref 10–40)
BASOPHILS # BLD AUTO: 0.02 K/UL (ref 0–0.2)
BASOPHILS NFR BLD: 0.3 % (ref 0–1.9)
BILIRUB SERPL-MCNC: 0.4 MG/DL (ref 0.1–1)
BUN SERPL-MCNC: 13 MG/DL (ref 6–20)
CALCIUM SERPL-MCNC: 9.7 MG/DL (ref 8.7–10.5)
CHLORIDE SERPL-SCNC: 107 MMOL/L (ref 95–110)
CHOLEST SERPL-MCNC: 200 MG/DL (ref 120–199)
CHOLEST/HDLC SERPL: 5.1 {RATIO} (ref 2–5)
CO2 SERPL-SCNC: 26 MMOL/L (ref 23–29)
CREAT SERPL-MCNC: 0.8 MG/DL (ref 0.5–1.4)
CRP SERPL-MCNC: 3.7 MG/L (ref 0–8.2)
DIFFERENTIAL METHOD BLD: ABNORMAL
EOSINOPHIL # BLD AUTO: 0.1 K/UL (ref 0–0.5)
EOSINOPHIL NFR BLD: 0.8 % (ref 0–8)
ERYTHROCYTE [DISTWIDTH] IN BLOOD BY AUTOMATED COUNT: 12.9 % (ref 11.5–14.5)
EST. GFR  (NO RACE VARIABLE): >60 ML/MIN/1.73 M^2
ESTIMATED AVG GLUCOSE: 146 MG/DL (ref 68–131)
GLUCOSE SERPL-MCNC: 137 MG/DL (ref 70–110)
HAV IGG SER QL IA: NORMAL
HBA1C MFR BLD: 6.7 % (ref 4–5.6)
HBV CORE AB SERPL QL IA: NORMAL
HBV SURFACE AG SERPL QL IA: NORMAL
HCT VFR BLD AUTO: 44 % (ref 40–54)
HCV AB SERPL QL IA: NORMAL
HDLC SERPL-MCNC: 39 MG/DL (ref 40–75)
HDLC SERPL: 19.5 % (ref 20–50)
HGB BLD-MCNC: 15.9 G/DL (ref 14–18)
HIV 1+2 AB+HIV1 P24 AG SERPL QL IA: NORMAL
IMM GRANULOCYTES # BLD AUTO: 0.01 K/UL (ref 0–0.04)
IMM GRANULOCYTES NFR BLD AUTO: 0.1 % (ref 0–0.5)
LDLC SERPL CALC-MCNC: 122.8 MG/DL (ref 63–159)
LYMPHOCYTES # BLD AUTO: 1.4 K/UL (ref 1–4.8)
LYMPHOCYTES NFR BLD: 19.6 % (ref 18–48)
MCH RBC QN AUTO: 29.9 PG (ref 27–31)
MCHC RBC AUTO-ENTMCNC: 36.1 G/DL (ref 32–36)
MCV RBC AUTO: 83 FL (ref 82–98)
MONOCYTES # BLD AUTO: 0.7 K/UL (ref 0.3–1)
MONOCYTES NFR BLD: 9.3 % (ref 4–15)
NEUTROPHILS # BLD AUTO: 5.1 K/UL (ref 1.8–7.7)
NEUTROPHILS NFR BLD: 69.9 % (ref 38–73)
NONHDLC SERPL-MCNC: 161 MG/DL
NRBC BLD-RTO: 0 /100 WBC
PLATELET # BLD AUTO: 221 K/UL (ref 150–450)
PMV BLD AUTO: 12.5 FL (ref 9.2–12.9)
POTASSIUM SERPL-SCNC: 3.8 MMOL/L (ref 3.5–5.1)
PROT SERPL-MCNC: 8.3 G/DL (ref 6–8.4)
RBC # BLD AUTO: 5.31 M/UL (ref 4.6–6.2)
SODIUM SERPL-SCNC: 141 MMOL/L (ref 136–145)
TRIGL SERPL-MCNC: 191 MG/DL (ref 30–150)
VIT B12 SERPL-MCNC: 701 PG/ML (ref 210–950)
WBC # BLD AUTO: 7.33 K/UL (ref 3.9–12.7)

## 2024-01-18 PROCEDURE — 86765 RUBEOLA ANTIBODY: CPT | Performed by: INTERNAL MEDICINE

## 2024-01-18 PROCEDURE — 86480 TB TEST CELL IMMUN MEASURE: CPT | Performed by: INTERNAL MEDICINE

## 2024-01-18 PROCEDURE — 83036 HEMOGLOBIN GLYCOSYLATED A1C: CPT | Performed by: INTERNAL MEDICINE

## 2024-01-18 PROCEDURE — 36415 COLL VENOUS BLD VENIPUNCTURE: CPT | Performed by: INTERNAL MEDICINE

## 2024-01-18 PROCEDURE — 87340 HEPATITIS B SURFACE AG IA: CPT | Performed by: INTERNAL MEDICINE

## 2024-01-18 PROCEDURE — 1160F RVW MEDS BY RX/DR IN RCRD: CPT | Mod: CPTII,S$GLB,, | Performed by: INTERNAL MEDICINE

## 2024-01-18 PROCEDURE — 1159F MED LIST DOCD IN RCRD: CPT | Mod: CPTII,S$GLB,, | Performed by: INTERNAL MEDICINE

## 2024-01-18 PROCEDURE — 86790 VIRUS ANTIBODY NOS: CPT | Performed by: INTERNAL MEDICINE

## 2024-01-18 PROCEDURE — 86706 HEP B SURFACE ANTIBODY: CPT | Performed by: INTERNAL MEDICINE

## 2024-01-18 PROCEDURE — 86592 SYPHILIS TEST NON-TREP QUAL: CPT | Performed by: INTERNAL MEDICINE

## 2024-01-18 PROCEDURE — 86762 RUBELLA ANTIBODY: CPT | Performed by: INTERNAL MEDICINE

## 2024-01-18 PROCEDURE — 86735 MUMPS ANTIBODY: CPT | Performed by: INTERNAL MEDICINE

## 2024-01-18 PROCEDURE — 80053 COMPREHEN METABOLIC PANEL: CPT | Performed by: INTERNAL MEDICINE

## 2024-01-18 PROCEDURE — 80061 LIPID PANEL: CPT | Performed by: INTERNAL MEDICINE

## 2024-01-18 PROCEDURE — 86704 HEP B CORE ANTIBODY TOTAL: CPT | Performed by: INTERNAL MEDICINE

## 2024-01-18 PROCEDURE — 86787 VARICELLA-ZOSTER ANTIBODY: CPT | Performed by: INTERNAL MEDICINE

## 2024-01-18 PROCEDURE — 3008F BODY MASS INDEX DOCD: CPT | Mod: CPTII,S$GLB,, | Performed by: INTERNAL MEDICINE

## 2024-01-18 PROCEDURE — 99215 OFFICE O/P EST HI 40 MIN: CPT | Mod: S$GLB,,, | Performed by: INTERNAL MEDICINE

## 2024-01-18 PROCEDURE — 86140 C-REACTIVE PROTEIN: CPT | Performed by: INTERNAL MEDICINE

## 2024-01-18 PROCEDURE — 85025 COMPLETE CBC W/AUTO DIFF WBC: CPT | Performed by: INTERNAL MEDICINE

## 2024-01-18 PROCEDURE — 82306 VITAMIN D 25 HYDROXY: CPT | Performed by: INTERNAL MEDICINE

## 2024-01-18 PROCEDURE — 86803 HEPATITIS C AB TEST: CPT | Performed by: INTERNAL MEDICINE

## 2024-01-18 PROCEDURE — 82607 VITAMIN B-12: CPT | Performed by: INTERNAL MEDICINE

## 2024-01-18 PROCEDURE — 3077F SYST BP >= 140 MM HG: CPT | Mod: CPTII,S$GLB,, | Performed by: INTERNAL MEDICINE

## 2024-01-18 PROCEDURE — 3079F DIAST BP 80-89 MM HG: CPT | Mod: CPTII,S$GLB,, | Performed by: INTERNAL MEDICINE

## 2024-01-18 PROCEDURE — 87389 HIV-1 AG W/HIV-1&-2 AB AG IA: CPT | Performed by: INTERNAL MEDICINE

## 2024-01-18 NOTE — PROGRESS NOTES
[facility]          Inflammatory Bowel Disease New Patient Consultation Note         TODAY'S VISIT DATE:  1/18/2024    Reason for Consult:    Chief Complaint   Patient presents with    Crohn's Disease       PCP: Celestino Wright (Inactive)      Referring MD:   Dr. Ronni Freeman    History of Present Illness:  Amando Calix who is a 38 y.o. male is being seen today at the [facility] on 01/18/2024 for inflammatory bowel disease- Crohn's disease.  This is 1st appointment in the GI clinic in almost 3 years.  He was previously followed by Dr. Freeman.  He has been off of medical therapy for his Crohn's disease for the last several years.  He was on Entyvio most recently and that did not provide any significant improvement.  Overall he has actually been doing quite well.  His ostomy output is stable.  His weight is stable.  He has a good appetite.  He has not had much in the way of problems with his perianal disease and has not had any recent abscess issues.  He would actually like to schedule an appointment with Colorectal surgery in the near future to discuss possible seton removal.  He also needs to discuss proctectomy.  He reports that as long as his blood glucose is under good control his perianal issues are well controlled as well.  If his blood sugars get a hand he may develop more drainage from his perianal disease.  He is not seeing a primary care physician for his diabetes management.  He buys insulin over-the-counter and checks his blood sugars at home.  He reports that he smokes marijuana which helps with some of his Crohn's disease related symptoms that he has had in the past.  He also smokes cigarettes.  Denies a family history of colon cancer or inflammatory bowel disease.    IBD History:  He is long history of colonic Crohn's disease.  He was initially diagnosed in 1997.  He has been on multiple medications including  infliximab, adalimumab, Cimzia, azathioprine, and methotrexate.  None of these medications was effective but it has not entirely clear that they were ever optimized.  He has had issues with a sigmoid stricture that required sigmoid colectomy around 2012.  He was treated with Stelara during a clinical trial which provided no significant improvement.  He underwent total colectomy in 2014 for an anastomotic stricture and ongoing active Crohn's disease.  He has and end ileostomy and rectal stump in place.  He has had admissions to the hospital in the past related to perianal fistula/perianal abscess issues.  He also most recently had a scrotal abscess in 2020.  He has not been hospitalized since 2020.  He was last treated with Entyvio that was started in 2015 and he took this until early 2021.  It did not provide any significant improvement.    IBD Details:  Dx Date:  1997  Disease type/distribution:  Crohn's disease/pancolonic disease with severe perianal fistulizing disease-never any evidence of small-bowel disease  Current Treatment:  None Start Date:  Response:   Optimized:   Adverse reactions:   Prior surgeries:   2012-sigmoid colectomy with colo colonic anastomosis     2014-total colectomy with end ileostomy due to sigmoid stricture and ongoing perianal fistulizing disease  CRP Elevation: Y   calprotectin: Unknown  Disease Complications:  Stricturing and fistulizing disease requiring surgery  Extraintestinal manifestations:  None  Prior treatments:   Steroids:  Unclear response  5ASA:  Not effective  IMM:  Azathioprine and methotrexate not effective  TNF Inh:  Previously treated with infliximab, adalimumab, certolizumab without significant improvement-not clearly optimized   Anti-Integrin:  Entyvio-not effective, not clearly optimized   IL 12/23:  Stelara-treated as part of a clinical trial-not effective  QUANG Inh:  None    Previous Clinical Trials:  Stelara trial    Last Colonoscopy:  Sigmoidoscopy-April 2016-anal  "strictures, otherwise essentially normal    Other Endoscopies:  Multiple other endoscopic evaluations reviewed    Imaging:   MRE:  None   CT:  Previous CT scans reviewed   Other:  Other pertinent imaging reviewed    Pertinent Labs:  Lab Results   Component Value Date    SEDRATE 33 (H) 07/28/2021    CRP 11.6 (H) 12/28/2021     No results found for: "TTGIGA", "IGA"  Lab Results   Component Value Date    TSH 0.635 12/29/2012     Lab Results   Component Value Date    DHZPURCE44VX 17 (L) 07/28/2021    JYPUNLGL23 884 12/29/2012     Lab Results   Component Value Date    HEPBSAG Negative 11/27/2020    HEPCAB Non-reactive 11/09/2022     Lab Results   Component Value Date    EBF72VMAQ Non-reactive 11/09/2022     Lab Results   Component Value Date    NIL 0.020 03/22/2021    MITOGENNIL 6.490 03/22/2021     No results found for: "TPTMINTERP", "TPMTRESULT"  Lab Results   Component Value Date    STOOLCULTURE  09/29/2014     No Salmonella,Shigella,Vibrio,Campylobacter,Yersinia isolated.    CSIQIMHVVK3U Negative 09/29/2014    SJLBTJVCQE9C Negative 09/29/2014    CDIFFICILEAN Negative 09/29/2014    CDIFFTOX Negative 09/29/2014    CDIFFICILEBY Positive 12/28/2012     Lab Results   Component Value Date    CALPROTECTIN 2,021 (H) 02/07/2013       Therapeutic Drug Monitoring Labs:  No results found for: "PROMETH"  No results found for: "ANSADAINIT", "INFLIXIMAB", "INFLIXINTERP"    Vaccinations:  Lab Results   Component Value Date    HEPBSAB [place]zone (A) 11/27/2020     No results found for: "HEPAIGG"  Lab Results   Component Value Date    VARICELLAZOS 3.59 (H) 01/16/2007    VARICELLAINT Positive (A) 01/16/2007     Immunization History   Administered Date(s) Administered    COVID-19, MRNA, LN-S, PF (Pfizer) (Purple Cap) 01/04/2022, 01/24/2022    PPD Test 06/15/2015         Review of Systems  Review of Systems   Constitutional:  Negative for chills, fever and weight loss.   HENT:  Negative for sore throat.    Eyes:  Negative for pain, " discharge and redness.   Respiratory:  Negative for cough, shortness of breath and wheezing.    Cardiovascular:  Negative for chest pain and leg swelling.   Gastrointestinal:  Negative for abdominal pain, blood in stool, constipation, diarrhea, heartburn, melena, nausea and vomiting.   Genitourinary:  Negative for dysuria and frequency.   Musculoskeletal:  Positive for back pain. Negative for joint pain and myalgias.   Skin:  Negative for rash.   Neurological:  Negative for focal weakness and seizures.   Endo/Heme/Allergies:  Does not bruise/bleed easily.   Psychiatric/Behavioral:  Negative for depression. The patient is nervous/anxious.        Medical History:   Past Medical History:   Diagnosis Date    Crohn's disease     Type 2 diabetes mellitus without complications        Surgical History:  Past Surgical History:   Procedure Laterality Date    ABSCESS DRAINAGE      COLONOSCOPY      COLOSTOMY      EXAMINATION UNDER ANESTHESIA N/A 11/27/2020    Procedure: Exam under anesthesia and seton placement;  Surgeon: Robe Suarez MD;  Location: [facility] OR [address] [place] [address];  Service: Colon and Rectal;  Laterality: N/A;    FLEXIBLE SIGMOIDOSCOPY N/A 7/10/2020    Procedure: SIGMOIDOSCOPY, FLEXIBLE;  Surgeon: Robe Suarez MD;  [facility];  Service: Colon and Rectal;  Laterality: N/A;    HERNIA REPAIR      ILEOSTOMY      INCISION AND DRAINAGE OF ABSCESS  12/28/2021    Procedure: INCISION AND DRAINAGE, Scrotal ABSCESS;  Surgeon: Rober Cardenas MD;  [facility];  Service: Colon and Rectal;;    INSERTION OF SETON STITCH N/A 7/10/2020    Procedure: PLACEMENT-SETON DRAIN;  Surgeon: Robe Suarez MD;  [facility];  Service: Colon and Rectal;  Laterality: N/A;    INSERTION OF SETON STITCH N/A 12/28/2021    Procedure: PLACEMENT, SETON STITCH X4;  Surgeon: Rober Cardenas MD;  [facility];  Service: Colon and Rectal;  Laterality: N/A;    SIGMOIDECTOMY         Family History:    Family History   Problem Relation Age of Onset    Hyperlipidemia Mother     Diabetes Mother     Diabetes Father     Prostate cancer Maternal Uncle     Breast cancer Maternal Grandmother     Prostate cancer Maternal Grandfather     Crohn's disease Neg Hx     Celiac disease Neg Hx     Cirrhosis Neg Hx     Colon cancer Neg Hx     Esophageal cancer Neg Hx     Irritable bowel syndrome Neg Hx     Liver cancer Neg Hx     Rectal cancer Neg Hx     Stomach cancer Neg Hx     Ulcerative colitis Neg Hx        Social History:   Social History     Tobacco Use    Smoking status: Every Day     Current packs/day: 1.00     Average packs/day: 1 pack/day for 12.2 years (12.2 ttl pk-yrs)     Types: Cigarettes     Start date: 11/1/2011    Smokeless tobacco: Never   Substance Use Topics    Alcohol use: Yes     Comment: socially-weekly    Drug use: No       Allergies: Reviewed    Home Medications:   Medication List with Changes/Refills   Current Medications    ACETAMINOPHEN (TYLENOL) 500 MG TABLET    Take 2 tablets (1,000 mg total) by mouth every 6 (six) hours as needed for Pain.    BLOOD SUGAR DIAGNOSTIC STRP    Use as instructed to check blood sugar three times daily before meals and nightly (ICD-10-CM: E10.65 )    BLOOD-GLUCOSE METER MISC    Use as instructed to check blood sugar three times daily before meals and nightly (ICD-10-CM: E10.65 )    INSULIN NPH/REG HUMAN (HUMULIN, 70/30,) 100 UNIT/ML (70-30) INPN PEN    Inject 60 units under the skin in the AM and 30 units under the skin in the PM    LANCETS 30 GAUGE MISC    Use as instructed to check blood sugar three times daily before meals and nightly (ICD-10-CM: E10.65)    LANCING DEVICE MISC    Use as instructed to check blood sugar three times daily before meals and nightly (ICD-10-CM: E10.65)    LIDOCAINE (LIDODERM) 5 %    Place 1 patch onto the skin once daily. Remove & Discard patch within 12 hours or as directed by MD    METFORMIN (GLUCOPHAGE-XR) 500 MG ER 24HR TABLET    Take 2  "tablets (1,000 mg total) by mouth 2 (two) times daily with meals.    PEN NEEDLE, DIABETIC 32 GAUGE X 5/32" NDLE    Use to inject insulin 4x daily.   Discontinued Medications    BENZONATATE (TESSALON) 100 MG CAPSULE    Take 1 capsule (100 mg total) by mouth 3 (three) times daily as needed for Cough.    DEXTROMETHORPHAN-GUAIFENESIN  MG/5 ML LIQD    Take 5 mLs by mouth every 4 to 6 hours as needed (cough).    OXYCODONE (ROXICODONE) 5 MG IMMEDIATE RELEASE TABLET    Take 1 tablet (5 mg total) by mouth every 6 (six) hours as needed for Pain.    PULSE OXIMETER (PULSE OXIMETER) DEVICE    by Apply Externally route 2 (two) times a day. Use twice daily at 8 AM and 3 PM and record the value in MyChart as directed.    VITAMIN D (VITAMIN D3) 1000 UNITS TAB    Take 1 tablet (1,000 Units total) by mouth once daily.       Physical Exam:  Vital Signs:  BP (!) 140/88 (BP Location: Right arm, Patient Position: Sitting)   Pulse 94   Temp 97.7 °F (36.5 °C)   Ht 5' 8" (1.727 m)   Wt 81.2 kg (179 lb 0.2 oz)   SpO2 97%   BMI 27.22 kg/m²   Body mass index is 27.22 kg/m².    Physical Exam  Vitals and nursing note reviewed.   Constitutional:       General: He is not in acute distress.     Appearance: Normal appearance. He is well-developed. He is not ill-appearing or toxic-appearing.   HENT:      Head: Normocephalic and atraumatic.   Eyes:      General: No scleral icterus.     Pupils: Pupils are equal, round, and reactive to light.   Neck:      Thyroid: No thyromegaly.   Cardiovascular:      Rate and Rhythm: Normal rate and regular rhythm.      Heart sounds: No murmur heard.     No friction rub.   Pulmonary:      Effort: Pulmonary effort is normal.      Breath sounds: Normal breath sounds. No wheezing or rales.   Abdominal:      General: Bowel sounds are normal. There is no distension.      Palpations: Abdomen is soft. There is no mass.      Tenderness: There is no abdominal tenderness. There is no guarding or rebound.      " Comments: Ileostomy in the right lower quadrant with mild stomal protrusion   Musculoskeletal:      Right lower leg: No edema.      Left lower leg: No edema.   Lymphadenopathy:      Cervical: No cervical adenopathy.   Skin:     Findings: No rash.   Neurological:      General: No focal deficit present.      Mental Status: He is alert and oriented to person, place, and time.   Psychiatric:         Mood and Affect: Mood normal.         Behavior: Behavior normal.         Thought Content: Thought content normal.         Judgment: Judgment normal.         Labs: reviewed and pertinent noted above    Assessment/Plan:  Amando Calix is a 38 y.o. male with colonic Crohn's disease. The following issues were addresssed:    1. Crohn's disease of colon with fistula    2. Type 2 diabetes mellitus without complication, with long-term current use of insulin    3. Anal fistula    4. Tobacco use disorder        1. Crohn's disease: This does not seem to have been much of an issue recently.  His primary issue over the last 10 years has been his perianal disease because he still has rectum in place.  I encouraged him to follow up with Colorectal surgery to discuss proctectomy.  He would like to follow up with them to discuss possibly having the seton removed.  I explained to him that that may not be the best idea but I will let him discuss this with Colorectal surgery.  At this time he has not really had any significant issues with his perianal disease so I do not feel like any medical therapy is really warranted.  We will plan to get some updated labs on him.  He probably would benefit from proctoscopy to evaluate his rectal stump for colon cancer screening but I will defer this until after he is seen by Dr. Suarez as he may wish to do the scope himself if any surgical interventions are planned.      2. Perianal fistula:  Scheduled appointment with colorectal surgery.      3. Diabetes:  He is managing this on his own with  over-the-counter insulin and checking his blood glucose.  We will check an A1c.  We will also check a lipid panel.  He also needs to be scheduled for an appointment with the primary care physician.      4. Tobacco use: Encouraged him to stop smoking.  Explain that this can make his Crohn's disease worse and also as an additional risk factor for heart disease.         # Tobacco use:   - recommended to stop and come up with a plan prior to next visit  - discussed in detail that Crohn's disease can worsen with smoking and may make patient more resistant to treatment      # IBD specific health maintenance:  Colon cancer surveillance:  Needs proctoscopy    Annual:  - Eye exam:  Deferred to primary care  - Skin exam (if on IMM/TNF):  Discuss in the future  - reminded pt to use sunblock/hats/sunprotective clothing  - PAP (if immunosuppressed):  Not applicable    DEXA:  Not applicable     Vitamin D:  Check today    Vaccines:    Influenza:  Patient declines   Pneumovax:  Review in the future   HAV:  Check serology   HBV:  Check serology   Tdap:  Discuss in the future   MMR:  Check serology   VZV:  Check serology   HZV:  Not applicable   HPV:  Not applicable   Meningococcus:  Not applicable   COVID:  Vaccinated    Follow up: Follow up in about 1 year (around 1/18/2025).    Thank you again for sending Amando Calix to see Dr. Luis Ruiz today at the [facility]. Please don't hesitate to contact Dr. Ruiz if there are any questions regarding this evaluation, or if you have any other patients with inflammatory bowel disease for whom you would like a consultation. You can reach Dr. Ruiz at [phone] or by email at [email or web address]    Keith Ruiz MD  Department of Gastroenterology  Inflammatory Bowel Disease

## 2024-01-18 NOTE — PATIENT INSTRUCTIONS
Schedule appt with Dr. Suarez  Schedule appt with PCP  Get labs  Stop smoking  Follow up in 1 year

## 2024-01-19 LAB
RPR SER QL: NORMAL
RUBV IGG SER-ACNC: 17.7 IU/ML
RUBV IGG SER-IMP: REACTIVE

## 2024-01-21 LAB
GAMMA INTERFERON BACKGROUND BLD IA-ACNC: 0.01 IU/ML
M TB IFN-G CD4+ BCKGRND COR BLD-ACNC: -0 IU/ML
M TB IFN-G CD4+ BCKGRND COR BLD-ACNC: 0.01 IU/ML
MITOGEN IGNF BCKGRD COR BLD-ACNC: 9.99 IU/ML
MUMPS IGG INTERPRETATION: POSITIVE
MUMPS IGG SCREEN: 215 AU/ML
RUBEOLA IGG ANTIBODY: 21.1 AU/ML
RUBEOLA INTERPRETATION: POSITIVE
TB GOLD PLUS: NEGATIVE
VARICELLA INTERPRETATION: POSITIVE
VARICELLA ZOSTER IGG: 1557 AU/ML

## 2024-01-22 ENCOUNTER — PATIENT MESSAGE (OUTPATIENT)
Dept: GASTROENTEROLOGY | Facility: CLINIC | Age: 39
End: 2024-01-22

## 2024-01-22 LAB
HBV SURFACE AB SER QL IA: NEGATIVE
HBV SURFACE AB SERPL IA-ACNC: 4 MIU/ML

## 2024-01-22 RX ORDER — ERGOCALCIFEROL 1.25 MG/1
50000 CAPSULE ORAL
Qty: 12 CAPSULE | Refills: 1 | Status: SHIPPED | OUTPATIENT
Start: 2024-01-22 | End: 2024-07-02

## 2024-02-02 ENCOUNTER — HOSPITAL ENCOUNTER (EMERGENCY)
Facility: HOSPITAL | Age: 39
Discharge: HOME | End: 2024-02-03
Attending: STUDENT IN AN ORGANIZED HEALTH CARE EDUCATION/TRAINING PROGRAM
Payer: COMMERCIAL

## 2024-02-02 DIAGNOSIS — S99.921A INJURY OF TOE ON RIGHT FOOT, INITIAL ENCOUNTER: ICD-10-CM

## 2024-02-02 DIAGNOSIS — S91.219A LACERATION OF NAIL BED OF TOE, INITIAL ENCOUNTER: Primary | ICD-10-CM

## 2024-02-02 DIAGNOSIS — S92.424B OPEN NONDISPLACED FRACTURE OF DISTAL PHALANX OF RIGHT GREAT TOE, INITIAL ENCOUNTER: ICD-10-CM

## 2024-02-03 VITALS
TEMPERATURE: 99 F | WEIGHT: 181 LBS | BODY MASS INDEX: 27.52 KG/M2 | HEART RATE: 86 BPM | RESPIRATION RATE: 16 BRPM | SYSTOLIC BLOOD PRESSURE: 135 MMHG | OXYGEN SATURATION: 97 % | DIASTOLIC BLOOD PRESSURE: 100 MMHG

## 2024-02-03 PROBLEM — S97.101A: Status: ACTIVE | Noted: 2024-02-03

## 2024-02-03 LAB
ALBUMIN SERPL BCP-MCNC: 4 G/DL (ref 3.5–5.2)
ALP SERPL-CCNC: 87 U/L (ref 55–135)
ALT SERPL W/O P-5'-P-CCNC: 22 U/L (ref 10–44)
AMPHET+METHAMPHET UR QL: NEGATIVE
ANION GAP SERPL CALC-SCNC: 7 MMOL/L (ref 8–16)
ANISOCYTOSIS BLD QL SMEAR: SLIGHT
AST SERPL-CCNC: 16 U/L (ref 10–40)
BARBITURATES UR QL SCN>200 NG/ML: NEGATIVE
BASOPHILS # BLD AUTO: 0.03 K/UL (ref 0–0.2)
BASOPHILS NFR BLD: 0.3 % (ref 0–1.9)
BENZODIAZ UR QL SCN>200 NG/ML: NEGATIVE
BILIRUB SERPL-MCNC: 0.3 MG/DL (ref 0.1–1)
BUN SERPL-MCNC: 17 MG/DL (ref 6–20)
BZE UR QL SCN: NEGATIVE
CALCIUM SERPL-MCNC: 9.1 MG/DL (ref 8.7–10.5)
CANNABINOIDS UR QL SCN: ABNORMAL
CHLORIDE SERPL-SCNC: 107 MMOL/L (ref 95–110)
CO2 SERPL-SCNC: 23 MMOL/L (ref 23–29)
CREAT SERPL-MCNC: 0.8 MG/DL (ref 0.5–1.4)
CREAT UR-MCNC: 273 MG/DL (ref 23–375)
DIFFERENTIAL METHOD BLD: ABNORMAL
EOSINOPHIL # BLD AUTO: 0.1 K/UL (ref 0–0.5)
EOSINOPHIL NFR BLD: 1.1 % (ref 0–8)
ERYTHROCYTE [DISTWIDTH] IN BLOOD BY AUTOMATED COUNT: 13.5 % (ref 11.5–14.5)
EST. GFR  (NO RACE VARIABLE): >60 ML/MIN/1.73 M^2
GLUCOSE SERPL-MCNC: 177 MG/DL (ref 70–110)
HCT VFR BLD AUTO: 39.9 % (ref 40–54)
HGB BLD-MCNC: 14.3 G/DL (ref 14–18)
HYPOCHROMIA BLD QL SMEAR: ABNORMAL
IMM GRANULOCYTES # BLD AUTO: 0.01 K/UL (ref 0–0.04)
IMM GRANULOCYTES NFR BLD AUTO: 0.1 % (ref 0–0.5)
LYMPHOCYTES # BLD AUTO: 2.1 K/UL (ref 1–4.8)
LYMPHOCYTES NFR BLD: 24.4 % (ref 18–48)
MCH RBC QN AUTO: 29.6 PG (ref 27–31)
MCHC RBC AUTO-ENTMCNC: 35.8 G/DL (ref 32–36)
MCV RBC AUTO: 83 FL (ref 82–98)
METHADONE UR QL SCN>300 NG/ML: NEGATIVE
MONOCYTES # BLD AUTO: 0.8 K/UL (ref 0.3–1)
MONOCYTES NFR BLD: 8.6 % (ref 4–15)
NEUTROPHILS # BLD AUTO: 5.7 K/UL (ref 1.8–7.7)
NEUTROPHILS NFR BLD: 65.5 % (ref 38–73)
NRBC BLD-RTO: 0 /100 WBC
OPIATES UR QL SCN: NEGATIVE
OVALOCYTES BLD QL SMEAR: ABNORMAL
PCP UR QL SCN>25 NG/ML: NEGATIVE
PLATELET # BLD AUTO: 184 K/UL (ref 150–450)
PLATELET BLD QL SMEAR: ABNORMAL
PMV BLD AUTO: 12.6 FL (ref 9.2–12.9)
POIKILOCYTOSIS BLD QL SMEAR: SLIGHT
POLYCHROMASIA BLD QL SMEAR: ABNORMAL
POTASSIUM SERPL-SCNC: 4 MMOL/L (ref 3.5–5.1)
PROT SERPL-MCNC: 7.7 G/DL (ref 6–8.4)
RBC # BLD AUTO: 4.83 M/UL (ref 4.6–6.2)
SODIUM SERPL-SCNC: 137 MMOL/L (ref 136–145)
SPHEROCYTES BLD QL SMEAR: ABNORMAL
TARGETS BLD QL SMEAR: ABNORMAL
TOXICOLOGY INFORMATION: ABNORMAL
WBC # BLD AUTO: 8.72 K/UL (ref 3.9–12.7)

## 2024-02-03 PROCEDURE — 99283 EMERGENCY DEPT VISIT LOW MDM: CPT | Mod: ,,, | Performed by: PODIATRIST

## 2024-02-03 PROCEDURE — 96365 THER/PROPH/DIAG IV INF INIT: CPT

## 2024-02-03 PROCEDURE — 11730 AVULSION NAIL PLATE SIMPLE 1: CPT | Mod: T5

## 2024-02-03 PROCEDURE — 90471 IMMUNIZATION ADMIN: CPT | Performed by: STUDENT IN AN ORGANIZED HEALTH CARE EDUCATION/TRAINING PROGRAM

## 2024-02-03 PROCEDURE — 63600175 PHARM REV CODE 636 W HCPCS: Performed by: STUDENT IN AN ORGANIZED HEALTH CARE EDUCATION/TRAINING PROGRAM

## 2024-02-03 PROCEDURE — 80053 COMPREHEN METABOLIC PANEL: CPT | Performed by: STUDENT IN AN ORGANIZED HEALTH CARE EDUCATION/TRAINING PROGRAM

## 2024-02-03 PROCEDURE — 90715 TDAP VACCINE 7 YRS/> IM: CPT | Performed by: STUDENT IN AN ORGANIZED HEALTH CARE EDUCATION/TRAINING PROGRAM

## 2024-02-03 PROCEDURE — 85025 COMPLETE CBC W/AUTO DIFF WBC: CPT | Performed by: STUDENT IN AN ORGANIZED HEALTH CARE EDUCATION/TRAINING PROGRAM

## 2024-02-03 PROCEDURE — 99284 EMERGENCY DEPT VISIT MOD MDM: CPT | Mod: 25

## 2024-02-03 PROCEDURE — 25000003 PHARM REV CODE 250: Performed by: STUDENT IN AN ORGANIZED HEALTH CARE EDUCATION/TRAINING PROGRAM

## 2024-02-03 PROCEDURE — 80307 DRUG TEST PRSMV CHEM ANLYZR: CPT | Performed by: STUDENT IN AN ORGANIZED HEALTH CARE EDUCATION/TRAINING PROGRAM

## 2024-02-03 RX ORDER — HYDROCODONE BITARTRATE AND ACETAMINOPHEN 5; 325 MG/1; MG/1
1 TABLET ORAL EVERY 6 HOURS PRN
Qty: 10 TABLET | Refills: 0 | Status: SHIPPED | OUTPATIENT
Start: 2024-02-03

## 2024-02-03 RX ORDER — CEPHALEXIN 500 MG/1
500 CAPSULE ORAL 4 TIMES DAILY
Qty: 40 CAPSULE | Refills: 0 | Status: SHIPPED | OUTPATIENT
Start: 2024-02-03 | End: 2024-02-13

## 2024-02-03 RX ORDER — LIDOCAINE HYDROCHLORIDE AND EPINEPHRINE 10; 10 MG/ML; UG/ML
1 INJECTION, SOLUTION INFILTRATION; PERINEURAL ONCE
Status: COMPLETED | OUTPATIENT
Start: 2024-02-03 | End: 2024-02-03

## 2024-02-03 RX ADMIN — TETANUS TOXOID, REDUCED DIPHTHERIA TOXOID AND ACELLULAR PERTUSSIS VACCINE, ADSORBED 0.5 ML: 5; 2.5; 8; 8; 2.5 SUSPENSION INTRAMUSCULAR at 12:02

## 2024-02-03 RX ADMIN — CEFAZOLIN 2 G: 2 INJECTION, POWDER, FOR SOLUTION INTRAMUSCULAR; INTRAVENOUS at 12:02

## 2024-02-03 RX ADMIN — LIDOCAINE HYDROCHLORIDE AND EPINEPHRINE 1 ML: 10; 10 INJECTION, SOLUTION INFILTRATION; PERINEURAL at 03:02

## 2024-02-03 NOTE — PROCEDURES
Amando Calix is a 38 y.o. male patient.    Temp: 98.7 °F (37.1 °C) (24)  Pulse: 86 (24)  Resp: 16 (24)  BP: (!) 135/100 (24)  SpO2: 97 % (24)  Weight: 82.1 kg (181 lb) (24)       Total Nail Removal    Date/Time: 2/3/2024 1:30 AM  Location procedure was performed: [facility] EMERGENCY DEPARTMENT    Performed by: Satish Inman MD  Authorized by: Satish Inman MD  Assisting provider: Evgeny Thompson DPM  Pre-operative diagnosis: crush injury toe  Post-operative diagnosis: crush injury toe  Consent Done: Yes  Risks and benefits: risks, benefits and alternatives were discussed  Consent given by: patient  Patient understanding: patient states understanding of the procedure being performed  Patient consent: the patient's understanding of the procedure matches consent given  Procedure consent: procedure consent matches procedure scheduled  Patient identity confirmed:  and name  Body area: lower extremity  Location details: right big toe  Anesthesia: local infiltration    Anesthesia:  Local Anesthetic: lidocaine 1% without epinephrine, lidocaine 1% with epinephrine and bupivacaine 0.5% without epinephrine  Anesthetic total: 20 mL    Patient sedated: no  Drainage amount: moderate  Complications: No  Estimated blood loss (mL): 5  Specimens: No  Patient tolerance: Patient tolerated the procedure well with no immediate complications            Informed consent is obtained. Using a 50-50 mixture of 1% plain lidocaine and 0.5% plain marcaine, a hallux block was done (10 cc).  Following this, 10 cc of 1% lidocaine with epinephrine was utilized for hemostasis.  Using a tourniquet for hemostasis and sterile instruments, I freed the nail from the nail bed. This was well tolerated, bleeding controlled through pressure hemostasis. Iodosorb and a football dressing with ABD and xeroform applied.  Patient endured procedure with no complications.

## 2024-02-03 NOTE — ASSESSMENT & PLAN NOTE
38-year-old male with past medical history of diabetes and Crohn's disease presents to ED with right hallux pain.  Patient is otherwise ambulatory, however with antalgic gait.  Range of motion along great toe is diminished secondary to pain. Labs taken on admission unconcerning.  X-ray taken of his right foot showing fracture of 1st distal phalangeal tuft, with no other fractures or dislocations.    - Imaging reviewed with patient, and indications for total nail removal were discussed.  Patient agreed to removal of right hallux toenail.  Written consent was obtained.  Procedure note to follow.  - wound at site of crush injury copiously flushed with sterile normal saline and Betadine.  Closure of crush injury/laceration along distal phalanx of right hallux done via for 4-0 Vicryl, 3-0 nylon sutures.  - patient was made aware that he is to follow-up at Podiatry Clinic outpatient.  Furthermore, patient is made aware that if he experiencing new, worsening changes in symptoms that he is to present to the ED immediately.    Discharge recs:  Patient to follow up in Podiatry Clinic outpatient, Podiatry will arrange.  Abx plan per ED - 10 day course of Keflex  Patient to only to transfer in short distances to affected foot with Darco shoe and CAM boot, partial weightbearing to heel  Patient to keep dressings clean dry and intact  Patient explained the importance of daily foot checks

## 2024-02-03 NOTE — HPI
38-year-old male with past medical history of diabetes and Crohn's disease presents to ED with right hallux pain.  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 in the night of 02/02/2024.  He had immediate pain and bleeding which was not able to be controlled, and thus his boss had driven him to the ED. He had not taken any medications prior to arrival.  He otherwise states that he is able to ambulate, however with antalgic gait. His range of motion along his great toe is diminished secondary to pain.  No other pedal complaints at this time.  Denies nausea, vomiting, fevers, shortness of breath.    Labs taken on admission unconcerning.  X-ray taken of his right foot showing fracture of 1st distal phalangeal tuft, with no other fractures or dislocations.

## 2024-02-03 NOTE — CONSULTS
[facility] - Emergency Dept  Podiatry  Consult Note    Patient Name: Amando Calix  MRN: [id number]  Admission Date: 2/2/2024  Hospital Length of Stay: 0 days  Attending Physician: Jose Bailey MD  Primary Care Provider: Celestino Wright MD (Inactive)     Consults  Subjective:     History of Present Illness:  38-year-old male with past medical history of diabetes and Crohn's disease presents to ED with right hallux pain.  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 in the night of 02/02/2024.  He had immediate pain and bleeding which was not able to be controlled, and thus his boss had driven him to the ED. He had not taken any medications prior to arrival.  He otherwise states that he is able to ambulate, however with antalgic gait. His range of motion along his great toe is diminished secondary to pain.  No other pedal complaints at this time.  Denies nausea, vomiting, fevers, shortness of breath.    Labs taken on admission unconcerning.  X-ray taken of his right foot showing fracture of 1st distal phalangeal tuft, with no other fractures or dislocations.    Scheduled Meds:  Continuous Infusions:  PRN Meds:    Review of patient's allergies indicates:   Allergen Reactions    Ibuprofen Other (See Comments)     Can't take d/t stomach ulcers        Past Medical History:   Diagnosis Date    Crohn's disease     Type 2 diabetes mellitus without complications      Past Surgical History:   Procedure Laterality Date    ABSCESS DRAINAGE      COLONOSCOPY      COLOSTOMY      EXAMINATION UNDER ANESTHESIA N/A 11/27/2020    Procedure: Exam under anesthesia and seton placement;  Surgeon: Robe Suarez MD;  Location: [facility] OR [address] [place] [address];  Service: Colon and Rectal;  Laterality: N/A;    FLEXIBLE SIGMOIDOSCOPY N/A 7/10/2020    Procedure: SIGMOIDOSCOPY, FLEXIBLE;  Surgeon: Robe Suarez MD;  [facility];  Service: Colon and Rectal;  Laterality: N/A;    HERNIA  REPAIR      ILEOSTOMY      INCISION AND DRAINAGE OF ABSCESS  12/28/2021    Procedure: INCISION AND DRAINAGE, Scrotal ABSCESS;  Surgeon: Rober Cardenas MD;  Location: [facility] OR 2ND FLR;  Service: Colon and Rectal;;    INSERTION OF SETON STITCH N/A 7/10/2020    Procedure: PLACEMENT-SETON DRAIN;  Surgeon: Robe Saurez MD;  Location: [facility] OR 2ND FLR;  Service: Colon and Rectal;  Laterality: N/A;    INSERTION OF SETON STITCH N/A 12/28/2021    Procedure: PLACEMENT, SETON STITCH X4;  Surgeon: Rober Cardenas MD;  Location: [facility] OR 2ND FLR;  Service: Colon and Rectal;  Laterality: N/A;    SIGMOIDECTOMY         Family History       Problem Relation (Age of Onset)    Breast cancer Maternal Grandmother    Diabetes Mother, Father    Hyperlipidemia Mother    Prostate cancer Maternal Uncle, Maternal Grandfather          Tobacco Use    Smoking status: Every Day     Current packs/day: 1.00     Average packs/day: 1 pack/day for 12.3 years (12.3 ttl pk-yrs)     Types: Cigarettes     Start date: 11/1/2011    Smokeless tobacco: Never   Substance and Sexual Activity    Alcohol use: Yes     Comment: socially-weekly    Drug use: No    Sexual activity: Yes     Partners: Female     Review of Systems   Constitutional:  Negative for chills and fever.   Respiratory:  Negative for cough and shortness of breath.    Gastrointestinal:  Negative for diarrhea, nausea and vomiting.   Musculoskeletal:  Positive for arthralgias and gait problem.   Skin:  Positive for wound.     Objective:     Vital Signs (Most Recent):  Temp: 98.7 °F (37.1 °C) (02/03/24 0345)  Pulse: 86 (02/03/24 0345)  Resp: 16 (02/02/24 2146)  BP: (!) 135/100 (02/03/24 0345)  SpO2: 97 % (02/03/24 0345) Vital Signs (24h Range):  Temp:  [98.7 °F (37.1 °C)-99 °F (37.2 °C)] 98.7 °F (37.1 °C)  Pulse:  [] 86  Resp:  [16] 16  SpO2:  [97 %] 97 %  BP: (135-174)/() 135/100     Weight: 82.1 kg (181 lb)  Body mass index is 27.52 kg/m².    Foot Exam    General  General  Appearance: appears stated age and healthy   Orientation: alert and oriented to person, place, and time   Affect: appropriate   Gait: antalgic       Right Foot/Ankle     Inspection and Palpation  Tenderness: (Distal aspect of great toe.)  Swelling: great toe interphalangeal joint and great toe metatarsophalangeal joint   Skin Exam: ulcer;     Neurovascular  Dorsalis pedis: 1+  Posterior tibial: 1+  Saphenous nerve sensation: normal  Tibial nerve sensation: normal  Superficial peroneal nerve sensation: normal  Deep peroneal nerve sensation: normal  Sural nerve sensation: normal    Comments  Right foot great toe present with diffuse swelling, ecchymosis particularly along hyponychia.  Post nail removal, diffuse bleeding is noted.  Probe to bone positive.  No discernible signs of pus, malodor.           Laboratory:  CBC:   Recent Labs   Lab 02/03/24  0032   WBC 8.72   RBC 4.83   HGB 14.3   HCT 39.9*      MCV 83   MCH 29.6   MCHC 35.8     CMP:   Recent Labs   Lab 02/03/24 0032   *   CALCIUM 9.1   ALBUMIN 4.0   PROT 7.7      K 4.0   CO2 23      BUN 17   CREATININE 0.8   ALKPHOS 87   ALT 22   AST 16   BILITOT 0.3       Diagnostic Results:  X-Ray: I have reviewed all pertinent results/findings within the past 24 hours.       Clinical Findings:              Assessment/Plan:     Other  Crush injury, toe, right, initial encounter  38-year-old male with past medical history of diabetes and Crohn's disease presents to ED with right hallux pain.  Patient is otherwise ambulatory, however with antalgic gait.  Range of motion along great toe is diminished secondary to pain. Labs taken on admission unconcerning.  X-ray taken of his right foot showing fracture of 1st distal phalangeal tuft, with no other fractures or dislocations.    - Imaging reviewed with patient, and indications for total nail removal were discussed.  Patient agreed to removal of right hallux toenail.  Written consent was obtained.   Procedure note to follow.  - wound at site of crush injury copiously flushed with sterile normal saline and Betadine.  Closure of crush injury/laceration along distal phalanx of right hallux done via for 4-0 Vicryl, 3-0 nylon sutures.  - patient was made aware that he is to follow-up at Podiatry Clinic outpatient.  Furthermore, patient is made aware that if he experiencing new, worsening changes in symptoms that he is to present to the ED immediately.    Discharge recs:  Patient to follow up in Podiatry Clinic outpatient, Podiatry will arrange.  Abx plan per ED - 10 day course of Keflex  Patient to only to transfer in short distances to affected foot with Darco shoe and CAM boot, partial weightbearing to heel  Patient to keep dressings clean dry and intact  Patient explained the importance of daily foot checks           Thank you for your consult. I will sign off. Please contact us if you have any additional questions.    [facility] - Emergency Dept  Satish Inman DPM PGY-1  Podiatric Medicine & Surgery  [facility]  Secure Chat Preferred  Mobile: [phone]  Pager: [phone]

## 2024-02-03 NOTE — DISCHARGE INSTRUCTIONS
Return to the ED immediately for any increased pain or swelling of your foot, redness tracking up your foot or leg, increased swelling or redness of your toe, fever greater than 101F or any additional concerns.     Please complete the entire course of antibiotics as prescribed    You may take hydrocodone-acetaminophen also known as Norco. 1-2 tablets every 4-6 hours as needed for pain for a maximum 8 tablets in 24 hours.  Please note that this contains acetaminophen which is also known as Tylenol and do not take additional Tylenol with this medication but you may take ibuprofen, or naproxen as needed for pain.

## 2024-02-03 NOTE — ED PROVIDER NOTES
Encounter Date: 2/2/2024       History     Chief Complaint   Patient presents with    Toe Injury     Manlift rolled over right big toe while at work. Bleeding from toe nail controlled at this time     38-year-old male with PMH of diabetes and Crohn's disease presents with right great toe pain.  Patient states he was at work when a man lift rolled over his right big toe.  He had immediate pain and bleeding from the area that was controlled with direct pressure.  He states he has not touching and moving the pain is minimal but with any movement or touch it does hurt.  No head trauma or LOC.  All of his symptoms are focused around his toe.  He is able to ambulate with a limp.  No medications prior to arrival.    The history is provided by the patient.     Review of patient's allergies indicates:   Allergen Reactions    Ibuprofen Other (See Comments)     Can't take d/t stomach ulcers     Past Medical History:   Diagnosis Date    Crohn's disease     Type 2 diabetes mellitus without complications      Past Surgical History:   Procedure Laterality Date    ABSCESS DRAINAGE      COLONOSCOPY      COLOSTOMY      EXAMINATION UNDER ANESTHESIA N/A 11/27/2020    Procedure: Exam under anesthesia and seton placement;  Surgeon: Robe Suarez MD;  Location: [facility] OR [address] [place] [address];  Service: Colon and Rectal;  Laterality: N/A;    FLEXIBLE SIGMOIDOSCOPY N/A 7/10/2020    Procedure: SIGMOIDOSCOPY, FLEXIBLE;  Surgeon: Robe Suarez MD;  [facility];  Service: Colon and Rectal;  Laterality: N/A;    HERNIA REPAIR      ILEOSTOMY      INCISION AND DRAINAGE OF ABSCESS  12/28/2021    Procedure: INCISION AND DRAINAGE, Scrotal ABSCESS;  Surgeon: Rober Cardenas MD;  [facility];  Service: Colon and Rectal;;    INSERTION OF SETON STITCH N/A 7/10/2020    Procedure: PLACEMENT-SETON DRAIN;  Surgeon: Robe Suarez MD;  [facility];  Service: Colon and Rectal;  Laterality: N/A;    INSERTION OF SETON STITCH N/A  12/28/2021    Procedure: PLACEMENT, SETON STITCH X4;  Surgeon: Rober Cardenas MD;  [facility];  Service: Colon and Rectal;  Laterality: N/A;    SIGMOIDECTOMY       Family History   Problem Relation Age of Onset    Hyperlipidemia Mother     Diabetes Mother     Diabetes Father     Prostate cancer Maternal Uncle     Breast cancer Maternal Grandmother     Prostate cancer Maternal Grandfather     Crohn's disease Neg Hx     Celiac disease Neg Hx     Cirrhosis Neg Hx     Colon cancer Neg Hx     Esophageal cancer Neg Hx     Irritable bowel syndrome Neg Hx     Liver cancer Neg Hx     Rectal cancer Neg Hx     Stomach cancer Neg Hx     Ulcerative colitis Neg Hx      Social History     Tobacco Use    Smoking status: Every Day     Current packs/day: 1.00     Average packs/day: 1 pack/day for 12.3 years (12.3 ttl pk-yrs)     Types: Cigarettes     Start date: 11/1/2011    Smokeless tobacco: Never   Substance Use Topics    Alcohol use: Yes     Comment: socially-weekly    Drug use: No     Review of Systems    Physical Exam     Initial Vitals [02/02/24 2146]   BP Pulse Resp Temp SpO2   (!) 174/96 100 16 99 °F (37.2 °C) 97 %      MAP       --         Physical Exam    Nursing note and vitals reviewed.  Constitutional: He appears well-developed and well-nourished. He is not diaphoretic. No distress.   Eyes: Conjunctivae and EOM are normal. Pupils are equal, round, and reactive to light.   Neck: Neck supple.   Normal range of motion.  Pulmonary/Chest: Breath sounds normal. No respiratory distress.   Musculoskeletal:      Cervical back: Normal range of motion and neck supple.      Comments: Right toe: laceration near level of cuticle, no active bleeding. Nail is raised. No obvious subungual hematoma (see picture below)     Neurological: He is alert and oriented to person, place, and time. He has normal strength. No sensory deficit. GCS score is 15. GCS eye subscore is 4. GCS verbal subscore is 5. GCS motor subscore is  6.   Skin: Skin is warm and dry.   See MSK               ED Course   Procedures  Labs Reviewed   CBC W/ AUTO DIFFERENTIAL - Abnormal; Notable for the following components:       Result Value    Hematocrit 39.9 (*)     All other components within normal limits   COMPREHENSIVE METABOLIC PANEL   DRUG SCREEN PANEL, URINE EMERGENCY          Imaging Results              X-Ray Toe 2 or More Views Right (Final result)  Result time 02/02/24 23:50:38      Final result by Grey Horn MD (02/02/24 23:50:38)                   Impression:      Acute fracture of the 1st distal phalangeal tuft.      Electronically signed by: Grey Horn MD  Date:    02/02/2024  Time:    23:50               Narrative:    EXAMINATION:  XR TOE 2 OR MORE VIEWS RIGHT    CLINICAL HISTORY:  right toe injury;    TECHNIQUE:  Three views of the right toes were performed    COMPARISON:  None    FINDINGS:  Minimally displaced fracture involving the 1st distal phalangeal tuft with adjacent calcifications and overlying soft tissue/nail bed injury.  No additional acute displaced fracture.  No dislocation.  Soft tissue edema.  No unexpected radiopaque foreign body.                                       Medications   ceFAZolin 2 g in dextrose 5 % in water (D5W) 50 mL IVPB (MB+) (2 g Intravenous New Bag 2/3/24 0032)   Tdap (BOOSTRIX) vaccine injection 0.5 mL (0.5 mLs Intramuscular Given 2/3/24 0021)     Medical Decision Making  Differential diagnoses considered includes tuft fracture, nail bed laceration, open fracture, contusion    Patient does not want anything for pain at this time.  Tetanus updated.  See ED course for additional information.    Amount and/or Complexity of Data Reviewed  Labs: ordered. Decision-making details documented in ED Course.  Radiology: ordered and independent interpretation performed. Decision-making details documented in ED Course.    Risk  Prescription drug management.               ED Course as of 02/03/24 0052 Fri Feb  02, 2024   2354 X-Ray Toe 2 or More Views Right  Acute tuft fracture of first toe. Given severity of lac and fracture, will give initial dose of ancef [BD]   Sat Feb 03, 2024   0005 Discussed case with Podiatry who will evaluate the patient. They estimated it would be around 0130 until they can get here [BD]   0045 Patient care will be handed off at 1:00 a.m., pending podiatry evaluation [BD]      ED Course User Index  [BD] Jose Bailey MD                           Clinical Impression:  Final diagnoses:  [S91.219A] Laceration of nail bed of toe, initial encounter (Primary)  [S99.921A] Injury of toe on right foot, initial encounter  [S92.424B] Open nondisplaced fracture of distal phalanx of right great toe, initial encounter                 Jose Bailey MD  02/03/24 0052

## 2024-02-03 NOTE — ED TRIAGE NOTES
"Amando Calix, an 38 y.o. male presents to the ED POV with c/o R toe pain and bleeding after a "man lift" ran it over x 2 hrs while at work. Affected area is bandaged and bleeding controlled      Chief Complaint   Patient presents with    Toe Injury     Manlift rolled over right big toe while at work. Bleeding from toe nail controlled at this time     Review of patient's allergies indicates:   Allergen Reactions    Ibuprofen Other (See Comments)     Can't take d/t stomach ulcers     Past Medical History:   Diagnosis Date    Crohn's disease     Type 2 diabetes mellitus without complications      "

## 2024-02-03 NOTE — SUBJECTIVE & OBJECTIVE
Scheduled Meds:  Continuous Infusions:  PRN Meds:    Review of patient's allergies indicates:   Allergen Reactions    Ibuprofen Other (See Comments)     Can't take d/t stomach ulcers        Past Medical History:   Diagnosis Date    Crohn's disease     Type 2 diabetes mellitus without complications      Past Surgical History:   Procedure Laterality Date    ABSCESS DRAINAGE      COLONOSCOPY      COLOSTOMY      EXAMINATION UNDER ANESTHESIA N/A 11/27/2020    Procedure: Exam under anesthesia and seton placement;  Surgeon: Robe Suarez MD;  [facility] 2ND FLR;  Service: Colon and Rectal;  Laterality: N/A;    FLEXIBLE SIGMOIDOSCOPY N/A 7/10/2020    Procedure: SIGMOIDOSCOPY, FLEXIBLE;  Surgeon: Robe Suarez MD;  [facility] 2ND FLR;  Service: Colon and Rectal;  Laterality: N/A;    HERNIA REPAIR      ILEOSTOMY      INCISION AND DRAINAGE OF ABSCESS  12/28/2021    Procedure: INCISION AND DRAINAGE, Scrotal ABSCESS;  Surgeon: Rober Cardenas MD;  [facility]: [facility] 2ND FLR;  Service: Colon and Rectal;;    INSERTION OF SETON STITCH N/A 7/10/2020    Procedure: PLACEMENT-SETON DRAIN;  Surgeon: Robe Suarez MD;  [facility]: [facility] 2ND FLR;  Service: Colon and Rectal;  Laterality: N/A;    INSERTION OF SETON STITCH N/A 12/28/2021    Procedure: PLACEMENT, SETON STITCH X4;  Surgeon: Rober Cardenas MD;  [facility] 2ND FLR;  Service: Colon and Rectal;  Laterality: N/A;    SIGMOIDECTOMY         Family History       Problem Relation (Age of Onset)    Breast cancer Maternal Grandmother    Diabetes Mother, Father    Hyperlipidemia Mother    Prostate cancer Maternal Uncle, Maternal Grandfather          Tobacco Use    Smoking status: Every Day     Current packs/day: 1.00     Average packs/day: 1 pack/day for 12.3 years (12.3 ttl pk-yrs)     Types: Cigarettes     Start date: 11/1/2011    Smokeless tobacco: Never   Substance and Sexual Activity    Alcohol use: Yes     Comment: socially-weekly    Drug use: No     Sexual activity: Yes     Partners: Female     Review of Systems   Constitutional:  Negative for chills and fever.   Respiratory:  Negative for cough and shortness of breath.    Gastrointestinal:  Negative for diarrhea, nausea and vomiting.   Musculoskeletal:  Positive for arthralgias and gait problem.   Skin:  Positive for wound.     Objective:     Vital Signs (Most Recent):  Temp: 98.7 °F (37.1 °C) (02/03/24 0345)  Pulse: 86 (02/03/24 0345)  Resp: 16 (02/02/24 2146)  BP: (!) 135/100 (02/03/24 0345)  SpO2: 97 % (02/03/24 0345) Vital Signs (24h Range):  Temp:  [98.7 °F (37.1 °C)-99 °F (37.2 °C)] 98.7 °F (37.1 °C)  Pulse:  [] 86  Resp:  [16] 16  SpO2:  [97 %] 97 %  BP: (135-174)/() 135/100     Weight: 82.1 kg (181 lb)  Body mass index is 27.52 kg/m².    Foot Exam    General  General Appearance: appears stated age and healthy   Orientation: alert and oriented to person, place, and time   Affect: appropriate   Gait: antalgic       Right Foot/Ankle     Inspection and Palpation  Tenderness: (Distal aspect of great toe.)  Swelling: great toe interphalangeal joint and great toe metatarsophalangeal joint   Skin Exam: ulcer;     Neurovascular  Dorsalis pedis: 1+  Posterior tibial: 1+  Saphenous nerve sensation: normal  Tibial nerve sensation: normal  Superficial peroneal nerve sensation: normal  Deep peroneal nerve sensation: normal  Sural nerve sensation: normal    Comments  Right foot great toe present with diffuse swelling, ecchymosis particularly along hyponychia.  Post nail removal, diffuse bleeding is noted.  Probe to bone positive.  No discernible signs of pus, malodor.           Laboratory:  CBC:   Recent Labs   Lab 02/03/24 0032   WBC 8.72   RBC 4.83   HGB 14.3   HCT 39.9*      MCV 83   MCH 29.6   MCHC 35.8     CMP:   Recent Labs   Lab 02/03/24 0032   *   CALCIUM 9.1   ALBUMIN 4.0   PROT 7.7      K 4.0   CO2 23      BUN 17   CREATININE 0.8   ALKPHOS 87   ALT 22   AST 16    BILITOT 0.3       Diagnostic Results:  X-Ray: I have reviewed all pertinent results/findings within the past 24 hours.       Clinical Findings:

## 2024-02-03 NOTE — PROVIDER PROGRESS NOTES - EMERGENCY DEPT.
Encounter Date: 2/2/2024    ED Physician Progress Notes          ED staff SIGN OUT NOTE    Patient s/o to me by Dr. Bailey pending podiatry    Patient with evidence of an open distal right great toe fracture    Patient was evaluated by Podiatry who repaired the patient's wound and recommended p.o. Keflex, postop shoe and cam walker boot, weightbear as tolerated and will help arrange outpatient follow-up for patient

## 2024-02-05 ENCOUNTER — OFFICE VISIT (OUTPATIENT)
Dept: URGENT CARE | Facility: CLINIC | Age: 39
End: 2024-02-05
Payer: COMMERCIAL

## 2024-02-05 ENCOUNTER — TELEPHONE (OUTPATIENT)
Dept: PODIATRY | Facility: CLINIC | Age: 39
End: 2024-02-05

## 2024-02-05 VITALS
OXYGEN SATURATION: 98 % | DIASTOLIC BLOOD PRESSURE: 85 MMHG | HEIGHT: 68 IN | HEART RATE: 79 BPM | WEIGHT: 181 LBS | BODY MASS INDEX: 27.43 KG/M2 | RESPIRATION RATE: 18 BRPM | SYSTOLIC BLOOD PRESSURE: 135 MMHG | TEMPERATURE: 98 F

## 2024-02-05 DIAGNOSIS — S91.219D LACERATION OF NAIL BED OF TOE, SUBSEQUENT ENCOUNTER: ICD-10-CM

## 2024-02-05 DIAGNOSIS — S99.921D INJURY OF TOE ON RIGHT FOOT, SUBSEQUENT ENCOUNTER: ICD-10-CM

## 2024-02-05 DIAGNOSIS — Z02.6 ENCOUNTER RELATED TO WORKER'S COMPENSATION CLAIM: Primary | ICD-10-CM

## 2024-02-05 DIAGNOSIS — S92.424D OPEN NONDISPLACED FRACTURE OF DISTAL PHALANX OF RIGHT GREAT TOE WITH ROUTINE HEALING, SUBSEQUENT ENCOUNTER: ICD-10-CM

## 2024-02-05 LAB
BREATH ALCOHOL: 0
CTP QC/QA: YES
POC 10 PANEL DRUG SCREEN: ABNORMAL

## 2024-02-05 PROCEDURE — 82075 ASSAY OF BREATH ETHANOL: CPT | Mod: S$GLB,,, | Performed by: FAMILY MEDICINE

## 2024-02-05 PROCEDURE — 99203 OFFICE O/P NEW LOW 30 MIN: CPT | Mod: S$GLB,,, | Performed by: FAMILY MEDICINE

## 2024-02-05 PROCEDURE — 80305 DRUG TEST PRSMV DIR OPT OBS: CPT | Mod: S$GLB,,, | Performed by: FAMILY MEDICINE

## 2024-02-05 NOTE — TELEPHONE ENCOUNTER
Dakota Cox, this patient was a patient who had sustained a crush injury to his right big toe and came in last night to the ED. I worked him up and sent in on his way with a cam boot and antibiotics. He states that he has some other physicians here at [facility], and would like to follow up with Podiatry in this location. Would you mind sending him up with an appointment with Dr. aguiar. I have already let Dr. aguiar know. thanks  sometime this week if possible   Bill Inman MD     Appointment schedule with Dr Aguiar

## 2024-02-05 NOTE — PROGRESS NOTES
"Subjective:      Patient ID: Amando Calix is a 38 y.o. male.    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   Pain scale right now - 2/10  SB.    Pt states, "they removed the toe nail to close where the bone was exposed and sutures on top to close it all off"    Pt here for follow up of right big toe crush injury sustaining tuft fx and open wound that was sutured in ER, secondary to got hit on the machine and that rolled his right foot    Here for follow up      Musculoskeletal:  Positive for pain, trauma, joint pain, abnormal ROM of joint and pain with walking. Negative for joint swelling, arthritis, gout, back pain, muscle cramps, muscle ache and history of spine disorder.   Neurological:  Positive for tingling. Negative for numbness.     Objective:     Physical Exam  Vitals and nursing note reviewed.   Constitutional:       General: He is not in acute distress.     Appearance: Normal appearance. He is well-developed. He is not diaphoretic.   HENT:      Head: Normocephalic and atraumatic.      Nose: Nose normal.      Mouth/Throat:      Mouth: Mucous membranes are moist.      Pharynx: Oropharynx is clear.   Eyes:      General: Lids are normal.      Conjunctiva/sclera: Conjunctivae normal.   Neck:      Trachea: Trachea and phonation normal.   Cardiovascular:      Rate and Rhythm: Normal rate and regular rhythm.      Pulses: " Normal pulses.      Heart sounds: Normal heart sounds.   Pulmonary:      Effort: Pulmonary effort is normal.      Breath sounds: Normal breath sounds.   Abdominal:      General: Bowel sounds are normal. There is no abdominal bruit.      Palpations: Abdomen is soft. There is no mass or pulsatile mass.   Musculoskeletal:         General: No deformity.      Cervical back: Full passive range of motion without pain, normal range of motion and neck supple.      Comments: Right foot in walking boot  Wound checked, severley bruised and old blood on toe,   No purulence seen  Dressing changed      Skin:     General: Skin is warm and dry.   Neurological:      Mental Status: He is alert and oriented to person, place, and time.      Sensory: No sensory deficit.      Deep Tendon Reflexes: Reflexes are normal and symmetric.   Psychiatric:         Speech: Speech normal.         Behavior: Behavior normal. Behavior is cooperative.         Thought Content: Thought content normal.         Judgment: Judgment normal.        Assessment:      1. Encounter related to worker's compensation claim    2. Laceration of nail bed of toe, subsequent encounter    3. Injury of toe on right foot, subsequent encounter    4. Open nondisplaced fracture of distal phalanx of right great toe with routine healing, subsequent encounter      Plan:         Pt is on keflex, recd tetanus injection in the ER   Has appt with Podiatrist tomorrow,  Will adviose to follow up with Podiatrist in AM    Continue abx    Patient Instructions: Keep dressing clean/dry/covered, Daily home exercises/warm soaks, Elevated affected area   Restrictions: No driving company vehicles, Home today, Discharged to Ortho/Neuro/Opthomologist/Surgeon  No follow-ups on file.

## 2024-02-05 NOTE — LETTER
[facility]  [address] [address]  Phone: [phone]  Fax: [phone]  [facility] [facility]: [email or web address]    Pt Name: Amando Calix  Injury Date: 02/02/2024   Employee ID: [id number] Date of First Treatment: 02/05/2024   Company: [employer]      Appointment Time:  Arrived: 1:13 PM    Provider: Shraddha Brandt MD Time Out:3:23 PM     Office Treatment:   1. Encounter related to worker's compensation claim    2. Laceration of nail bed of toe, subsequent encounter    3. Injury of toe on right foot, subsequent encounter    4. Open nondisplaced fracture of distal phalanx of right great toe with routine healing, subsequent encounter          Patient Instructions: Keep dressing clean/dry/covered, Daily home exercises/warm soaks, Elevated affected area      Restrictions: No driving company vehicles, Home today, Discharged to Ortho/Neuro/Opthomologist/Surgeon     Return As needed. MANUEL

## 2024-02-06 ENCOUNTER — OFFICE VISIT (OUTPATIENT)
Dept: PODIATRY | Facility: CLINIC | Age: 39
End: 2024-02-06
Payer: COMMERCIAL

## 2024-02-06 VITALS
SYSTOLIC BLOOD PRESSURE: 121 MMHG | DIASTOLIC BLOOD PRESSURE: 80 MMHG | WEIGHT: 181 LBS | BODY MASS INDEX: 27.43 KG/M2 | HEIGHT: 68 IN | HEART RATE: 86 BPM

## 2024-02-06 DIAGNOSIS — M79.674 TOE PAIN, RIGHT: ICD-10-CM

## 2024-02-06 DIAGNOSIS — S97.101D: Primary | ICD-10-CM

## 2024-02-06 PROCEDURE — 99214 OFFICE O/P EST MOD 30 MIN: CPT | Mod: S$GLB,,, | Performed by: PODIATRIST

## 2024-02-06 PROCEDURE — 99999 PR PBB SHADOW E&M-EST. PATIENT-LVL III: CPT | Mod: PBBFAC,,, | Performed by: PODIATRIST

## 2024-02-06 RX ORDER — HYDROCODONE BITARTRATE AND ACETAMINOPHEN 5; 325 MG/1; MG/1
1 TABLET ORAL EVERY 6 HOURS PRN
Qty: 28 TABLET | Refills: 0 | Status: SHIPPED | OUTPATIENT
Start: 2024-02-06 | End: 2024-02-13

## 2024-02-06 NOTE — PROGRESS NOTES
"Subjective:     Patient ID: Amando Calix is a 38 y.o. male.    Chief Complaint: Foot Injury (Right foot great toe injury)    Amando is a 38 y.o. male who presents to the podiatry clinic  with complaint of  right foot pain. Onset of the symptoms was several days ago. Precipitating event:  man lift crush injury while at work . Current symptoms include:  pain in R great toe with bleeding . Aggravating factors: any weight bearing, walking, and direct touch . Symptoms have progressed to a point and plateaued. Patient has had no prior foot problems. Evaluation to date: plain films: abnormal fracture of tip of distal phalanx (small) . Treatment to date:  ER visit where he had nail removed, sutured lacerations, CAM boot and crutches. States he has been walking without crutches often. Has pain but taking pain medication. Needs refill . Patients rates pain 8/10 on pain scale.    Vitals:    02/06/24 1028   BP: 121/80   Pulse: 86   Weight: 82.1 kg (181 lb)   Height: 5' 8" (1.727 m)   PainSc:   8   PainLoc: Foot         Review of Systems   Constitutional: Negative for chills and fever.   Cardiovascular:  Negative for chest pain, claudication and leg swelling.   Respiratory:  Negative for cough and shortness of breath.    Skin:  Positive for color change, dry skin and nail changes.   Musculoskeletal:         R great toe pain/injury   Gastrointestinal:  Negative for nausea and vomiting.   Neurological:  Negative for numbness and paresthesias.   Psychiatric/Behavioral:  Negative for altered mental status.         Objective:     Physical Exam  Vitals reviewed.   Constitutional:       Appearance: He is well-developed.   HENT:      Head: Normocephalic.   Cardiovascular:      Pulses:           Dorsalis pedis pulses are 2+ on the right side and 2+ on the left side.        Posterior tibial pulses are 2+ on the right side and 2+ on the left side.      Comments: CRT < 3 sec to tips of toes.    Pulmonary:      Effort: No " respiratory distress.   Musculoskeletal:      Comments: Severe pain with even light touch R great toe and plantar L great toe. Adequate strength to R great toe (slightly decreased due to pain) in DF/PF.    Skin:     General: Skin is warm and dry.      Findings: No erythema.      Comments:  R great toe with lysed nail plate and raw nail bed, granular with active mild bleeding. Sutures noted to distal medial and distal lateral hallux with no sign of dehiscence noted. Moderate edema and mild erythema to the R great toe. Toenails 2-4 elongated 1cm, thickened and curling down to plantar tips of toes.     L hallux plantar IPJ centrally there is a focal hyperkeratotic lesion with central deep core with skin lines divergent. No sign of deep tissue injury noted.      Neurological:      Mental Status: He is alert and oriented to person, place, and time.      Sensory: No sensory deficit.      Comments: Light touch, proprioception, and sharp/dull sensation are all intact bilaterally.    Psychiatric:         Behavior: Behavior normal.         Thought Content: Thought content normal.         Judgment: Judgment normal.           Assessment:      Encounter Diagnoses   Name Primary?    Crush injury of toe, right, subsequent encounter Yes    Toe pain, right      Plan:     Amando was seen today for foot injury.    Diagnoses and all orders for this visit:    Crush injury of toe, right, subsequent encounter    Toe pain, right    Other orders  -     HYDROcodone-acetaminophen (NORCO) 5-325 mg per tablet; Take 1 tablet by mouth every 6 (six) hours as needed for Pain.      I counseled the patient on his conditions, their implications and medical management.    Cleansed R great toe with wound vashe. Dried well.    Betadine applied directly to wound bed after cleansing with alcohol. Applied Foam, football dressing to affected area. Dressing and foot/wound is to stay clean, dry and dressing to stay intact until follow up. Darco shoe dispensed  and applied to the affected foot. Patient was advised to wear Darco shoe for ambulation at ALL times. Advised not to get dressing wet, remove dressing until follow up or walk directly on football dressing.     Refill for [place] 5/325mg q6h for pain.     RTC 1 week, sooner PRN

## 2024-02-06 NOTE — LETTER
February 6, 2024      [id number]  [address]  [facility] [address]  Phone: [phone]       Patient: Amando Calix   YOB: 1985  Date of Visit: 02/06/2024    To Whom It May Concern:    Harriet Calix  was at [facility] on 02/06/2024. The patient may return to work with restrictions. At this time he is not to wear any steel toe boots on the R foot and is required to do light duty until further notice. If you have any questions or concerns, or if I can be of further assistance, please do not hesitate to contact me.    Sincerely,    Landon Aguiar DPM

## 2024-02-14 ENCOUNTER — OFFICE VISIT (OUTPATIENT)
Dept: PODIATRY | Facility: CLINIC | Age: 39
End: 2024-02-14
Payer: COMMERCIAL

## 2024-02-14 VITALS
SYSTOLIC BLOOD PRESSURE: 131 MMHG | HEIGHT: 68 IN | DIASTOLIC BLOOD PRESSURE: 76 MMHG | WEIGHT: 181 LBS | HEART RATE: 78 BPM | BODY MASS INDEX: 27.43 KG/M2

## 2024-02-14 DIAGNOSIS — L60.1 ONYCHOLYSIS: ICD-10-CM

## 2024-02-14 DIAGNOSIS — M79.674 TOE PAIN, RIGHT: ICD-10-CM

## 2024-02-14 DIAGNOSIS — S97.101D CRUSHING INJURY OF TOE OF RIGHT FOOT, SUBSEQUENT ENCOUNTER: Primary | ICD-10-CM

## 2024-02-14 PROCEDURE — 99999 PR PBB SHADOW E&M-EST. PATIENT-LVL IV: CPT | Mod: PBBFAC,,, | Performed by: PODIATRIST

## 2024-02-14 PROCEDURE — 99213 OFFICE O/P EST LOW 20 MIN: CPT | Mod: S$GLB,,, | Performed by: PODIATRIST

## 2024-02-14 NOTE — PROGRESS NOTES
"Subjective:     Patient ID: Amando Calix is a 38 y.o. male.    Chief Complaint: Wound Check (Right big toe) and Diabetes Mellitus    Amando is a 38 y.o. male who presents to the podiatry clinic  with complaint of  right foot pain. Onset of the symptoms was several days ago. Precipitating event:  man lift crush injury while at work . Current symptoms include:  pain in R great toe with bleeding . Aggravating factors: any weight bearing, walking, and direct touch . Symptoms have progressed to a point and plateaued. Patient has had no prior foot problems. Evaluation to date: plain films: abnormal fracture of tip of distal phalanx (small) . Treatment to date:  ER visit where he had nail removed, sutured lacerations, CAM boot and crutches. States he has been walking without crutches often. Has pain but taking pain medication. Needs refill . Patients rates pain 8/10 on pain scale.    02/14/2024: follow up for R great toe injury. Pain significantly better. Still takes pain meds mostly at night when pain is worst. No new concerns.     Vitals:    02/14/24 1102   BP: 131/76   Pulse: 78   Weight: 82.1 kg (181 lb)   Height: 5' 8" (1.727 m)   PainSc: 0-No pain         Review of Systems   Constitutional: Negative for chills and fever.   Cardiovascular:  Negative for chest pain, claudication and leg swelling.   Respiratory:  Negative for cough and shortness of breath.    Skin:  Positive for color change, dry skin and nail changes.   Musculoskeletal:         R great toe pain/injury   Gastrointestinal:  Negative for nausea and vomiting.   Neurological:  Negative for numbness and paresthesias.   Psychiatric/Behavioral:  Negative for altered mental status.         Objective:     Physical Exam  Vitals reviewed.   Constitutional:       Appearance: He is well-developed.   HENT:      Head: Normocephalic.   Cardiovascular:      Pulses:           Dorsalis pedis pulses are 2+ on the right side and 2+ on the left side.        " Posterior tibial pulses are 2+ on the right side and 2+ on the left side.      Comments: CRT < 3 sec to tips of toes.    Pulmonary:      Effort: No respiratory distress.   Musculoskeletal:      Comments: Mild (improved) pain with palpation R great toe.  Adequate strength to R great toe (improved)  in DF/PF.    Skin:     General: Skin is warm and dry.      Findings: No erythema.      Comments:  R great toe with lysed nail plate and raw nail bed, serosanguinous crusting/scabbing over entire nail bed. Sutures noted to distal medial and distal lateral hallux with no sign of dehiscence noted. Minimal (nearly resolved) edema and no further erythema to the R great toe. Toenails 2-4 elongated 1cm, thickened and curling down to plantar tips of toes.     L hallux plantar IPJ centrally there is a focal hyperkeratotic lesion with central deep core with skin lines divergent. No sign of deep tissue injury noted. stable     Neurological:      Mental Status: He is alert and oriented to person, place, and time.      Sensory: No sensory deficit.      Comments: Light touch, proprioception, and sharp/dull sensation are all intact bilaterally.    Psychiatric:         Behavior: Behavior normal.         Thought Content: Thought content normal.         Judgment: Judgment normal.           Assessment:      Encounter Diagnoses   Name Primary?    Crushing injury of toe of right foot, subsequent encounter Yes    Toe pain, right     Onycholysis        Plan:     Amando was seen today for wound check and diabetes mellitus.    Diagnoses and all orders for this visit:    Crushing injury of toe of right foot, subsequent encounter    Toe pain, right    Onycholysis        I counseled the patient on his conditions, their implications and medical management.    Cleansed R great toe with wound vashe. Dried well.pain drastically improved.     Betadine applied directly to wound bed after cleansing with alcohol. Applied xeroform, wound foam, football  dressing to affected area. Dressing and foot/wound is to stay clean, dry and dressing to stay intact until follow up. Darco shoe dispensed and applied to the affected foot. Patient was advised to wear Darco shoe for ambulation at ALL times. Advised not to get dressing wet, remove dressing until follow up or walk directly on football dressing.     Continue Norco 5/325mg q6h for pain.     RTC 1 week for suture removal, sooner PRN

## 2024-02-16 ENCOUNTER — TELEPHONE (OUTPATIENT)
Dept: SURGERY | Facility: CLINIC | Age: 39
End: 2024-02-16

## 2024-02-19 ENCOUNTER — OFFICE VISIT (OUTPATIENT)
Dept: SURGERY | Facility: CLINIC | Age: 39
End: 2024-02-19
Payer: COMMERCIAL

## 2024-02-19 VITALS
HEART RATE: 89 BPM | HEIGHT: 68 IN | BODY MASS INDEX: 27.11 KG/M2 | SYSTOLIC BLOOD PRESSURE: 136 MMHG | WEIGHT: 178.88 LBS | DIASTOLIC BLOOD PRESSURE: 83 MMHG

## 2024-02-19 DIAGNOSIS — K60.3 ANAL FISTULA: Primary | ICD-10-CM

## 2024-02-19 DIAGNOSIS — K50.118 CROHN'S DISEASE OF LARGE INTESTINE WITH OTHER COMPLICATION: ICD-10-CM

## 2024-02-19 DIAGNOSIS — Z93.2 ILEOSTOMY IN PLACE: ICD-10-CM

## 2024-02-19 PROCEDURE — 99212 OFFICE O/P EST SF 10 MIN: CPT | Mod: S$PBB,,, | Performed by: COLON & RECTAL SURGERY

## 2024-02-19 PROCEDURE — 99999 PR PBB SHADOW E&M-EST. PATIENT-LVL III: CPT | Mod: PBBFAC,,, | Performed by: COLON & RECTAL SURGERY

## 2024-02-19 NOTE — PROGRESS NOTES
Subjective:       Patient ID: Amando Calix is a 38 y.o. male.    Chief Complaint: Anal Fistula    HPI  39 yo M with history of Crohn's disease having undergone a prior total abdominal colectomy with end ileostomy.  He has residual anorectal stump and has severe fistulizing perianal Crohn's disease with anal stenosis.  He has undergone multiple prior drainage procedures.  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     He has been noncompliant with his medical regimen - was on Entyvio but has not had a dose since January 2021 - says he has had trouble finding time.  He also has not been taking his diabetes medications.     I saw him 6/28/21 when he came in because he felt like he had a new fistula at the base of the scrotum with increased pain, swelling, drainage.  The 3 setons that had been placed previously all remained in place.  No fevers or chills.  On exam he had a new fistula to the left of the seton in place at the base of the scrotum.  Scheduled for EUA and seton 7/21/21 but surgery cancelled when he tested positive for COVID.  He was then admitted 7/27-31/21 with DKA, did not follow-up with me afterwards.   He then presented in late Dec with scrotal abscess, all of his setons had fallen out by then -  taken to OR by Dr. Cardenas for EUA, I&D, total of 4 setons placed.       He presented 1/24/22 for follow-up.  He had had no follow-up since surgery in December.  Still is not taking his Crohn's meds, and has not had follow-up with GI since last spring.  He was not checking blood sugars and is non-compliant with management of diabetes - takes insulin when he remembers, he is not sure who prescribed insulin for him.  Still smoking cigarettes. C/o mild discomfort from setons, has moderate drainage.  No F/C.  I told him at that point he needs GI follow-up, encouraged him to reach out for an appt ASAP.  I also encouraged him to establish care with PCP,  and to be compliant with management of DM.  I also encouraged smoking cessation.  I told him would ultimately benefit from completion proctectomy but will likely need flap closure due to extent of perineal disease  - needs better DM control and smoking cessation prior.  He was not willing to proceed with this in the immediate future.    He returns today for follow-up after not being seen for more than 2 years.  All but 1 seton has fallen out, he wants remaining seton removed.  He established care with GI ([facility]) last month - did not feel that medical therapy was needed - recommended completion proctectomy, which patient is still unwilling to have done.  Managing DM on his own w OTC insulin-  last HgA1c 6.7.  Still smokling 1/2 ppd.  Reports minimal drainage from fistula.      Review of patient's allergies indicates:   Allergen Reactions    Ibuprofen Other (See Comments)     Can't take d/t stomach ulcers       Past Medical History:   Diagnosis Date    Crohn's disease     Type 2 diabetes mellitus without complications        Past Surgical History:   Procedure Laterality Date    ABSCESS DRAINAGE      COLONOSCOPY      COLOSTOMY      EXAMINATION UNDER ANESTHESIA N/A 11/27/2020    Procedure: Exam under anesthesia and seton placement;  Surgeon: Robe Suarez MD;  Location: [facility] OR [address] [place] [address];  Service: Colon and Rectal;  Laterality: N/A;    FLEXIBLE SIGMOIDOSCOPY N/A 7/10/2020    Procedure: SIGMOIDOSCOPY, FLEXIBLE;  Surgeon: Robe Suarez MD;  [facility];  Service: Colon and Rectal;  Laterality: N/A;    HERNIA REPAIR      ILEOSTOMY      INCISION AND DRAINAGE OF ABSCESS  12/28/2021    Procedure: INCISION AND DRAINAGE, Scrotal ABSCESS;  Surgeon: Rober Cardenas MD;  [facility];  Service: Colon and Rectal;;    INSERTION OF SETON STITCH N/A 7/10/2020    Procedure: PLACEMENT-SETON DRAIN;  Surgeon: Robe Suarez MD;  [facility];  Service: Colon and Rectal;  Laterality: N/A;     "INSERTION OF SETON STITCH N/A 12/28/2021    Procedure: PLACEMENT, SETON STITCH X4;  Surgeon: Rober Cardenas MD;  Location: [facility] OR [address] [place] [address];  Service: Colon and Rectal;  Laterality: N/A;    SIGMOIDECTOMY         Current Outpatient Medications   Medication Sig Dispense Refill    acetaminophen (TYLENOL) 500 MG tablet Take 2 tablets (1,000 mg total) by mouth every 6 (six) hours as needed for Pain.  0    blood sugar diagnostic Strp Use as instructed to check blood sugar three times daily before meals and nightly (ICD-10-CM: E10.65 ) 100 strip 2    HYDROcodone-acetaminophen (NORCO) 5-325 mg per tablet Take 1 tablet by mouth every 6 (six) hours as needed for Pain. 10 tablet 0    insulin NPH/Reg human (HUMULIN, 70/30,) 100 unit/mL (70-30) InPn pen Inject 60 units under the skin in the AM and 30 units under the skin in the PM 1 each 2    lancets 30 gauge Misc Use as instructed to check blood sugar three times daily before meals and nightly (ICD-10-CM: E10.65) 100 each 11    lancing device Misc Use as instructed to check blood sugar three times daily before meals and nightly (ICD-10-CM: E10.65) 1 each 0    LIDOcaine (LIDODERM) 5 % Place 1 patch onto the skin once daily. Remove & Discard patch within 12 hours or as directed by MD 15 patch 0    pen needle, diabetic 32 gauge x 5/32" Ndle Use to inject insulin 4x daily. 100 each 2    blood-glucose meter Misc Use as instructed to check blood sugar three times daily before meals and nightly (ICD-10-CM: E10.65 ) 1 each 0     No current facility-administered medications for this visit.       Family History   Problem Relation Name Age of Onset    Hyperlipidemia Mother      Diabetes Mother      Diabetes Father      Prostate cancer Maternal Uncle      Breast cancer Maternal Grandmother      Prostate cancer Maternal Grandfather      Crohn's disease Neg Hx      Celiac disease Neg Hx      Cirrhosis Neg Hx      Colon cancer Neg Hx      Esophageal cancer Neg Hx      Irritable bowel " syndrome Neg Hx      Liver cancer Neg Hx      Rectal cancer Neg Hx      Stomach cancer Neg Hx      Ulcerative colitis Neg Hx         Social History     Socioeconomic History    Marital status: Single   Tobacco Use    Smoking status: Every Day     Current packs/day: 1.00     Average packs/day: 1 pack/day for 12.8 years (12.8 ttl pk-yrs)     Types: Cigarettes     Start date: 11/1/2011    Smokeless tobacco: Never   Substance and Sexual Activity    Alcohol use: Yes     Comment: socially-weekly    Drug use: No    Sexual activity: Yes     Partners: Female     Social Determinants of Health     Financial Resource Strain: Medium Risk (2/6/2024)    Overall Financial Resource Strain (CARDIA)     Difficulty of Paying Living Expenses: Somewhat hard   Food Insecurity: Food Insecurity Present (2/6/2024)    Hunger Vital Sign     Worried About Running Out of Food in the Last Year: Sometimes true     Ran Out of Food in the Last Year: Sometimes true   Transportation Needs: No Transportation Needs (2/6/2024)    PRAPARE - Transportation     Lack of Transportation (Medical): No     Lack of Transportation (Non-Medical): No   Physical Activity: Sufficiently Active (2/6/2024)    Exercise Vital Sign     Days of Exercise per Week: 5 days     Minutes of Exercise per Session: 100 min   Stress: Stress Concern Present (2/6/2024)    [employer] - Occupational Stress Questionnaire     Feeling of Stress : Very much   Housing Stability: Unknown (2/6/2024)    Housing Stability Vital Sign     Unable to Pay for Housing in the Last Year: Patient declined     Unstable Housing in the Last Year: No       Review of Systems    Objective:      Physical Exam  Vitals reviewed. Exam conducted with a chaperone present.   Constitutional:       Appearance: He is well-developed.   HENT:      Head: Normocephalic and atraumatic.   Eyes:      Conjunctiva/sclera: Conjunctivae normal.   Pulmonary:      Effort: Pulmonary effort is normal. No  respiratory distress.   Abdominal:      General: There is no distension.      Palpations: Abdomen is soft. There is no mass.      Tenderness: There is no abdominal tenderness. There is no guarding or rebound.   Genitourinary:     Comments: Perineum - extensive scarring from prior surgeries/fistulae.  One seton remains - both openings external to anal canal - no drainage, no fluctuance/erythema/induration.  No other obvious fistula openings.    Skin:     General: Skin is warm and dry.      Findings: No erythema.   Neurological:      Mental Status: He is alert and oriented to person, place, and time.           Lab Results   Component Value Date    WBC 8.72 02/03/2024    HGB 14.3 02/03/2024    HCT 39.9 (L) 02/03/2024    MCV 83 02/03/2024     02/03/2024     BMP  Lab Results   Component Value Date     02/03/2024    K 4.0 02/03/2024     02/03/2024    CO2 23 02/03/2024    BUN 17 02/03/2024    CREATININE 0.8 02/03/2024    CALCIUM 9.1 02/03/2024    ANIONGAP 7 (L) 02/03/2024    ESTGFRAFRICA >60.0 12/28/2021    EGFRNONAA >60.0 12/28/2021     CMP  Sodium   Date Value Ref Range Status   02/03/2024 137 136 - 145 mmol/L Final     Potassium   Date Value Ref Range Status   02/03/2024 4.0 3.5 - 5.1 mmol/L Final     Chloride   Date Value Ref Range Status   02/03/2024 107 95 - 110 mmol/L Final     CO2   Date Value Ref Range Status   02/03/2024 23 23 - 29 mmol/L Final     Glucose   Date Value Ref Range Status   02/03/2024 177 (H) 70 - 110 mg/dL Final     BUN   Date Value Ref Range Status   02/03/2024 17 6 - 20 mg/dL Final     Creatinine   Date Value Ref Range Status   02/03/2024 0.8 0.5 - 1.4 mg/dL Final     Calcium   Date Value Ref Range Status   02/03/2024 9.1 8.7 - 10.5 mg/dL Final     Total Protein   Date Value Ref Range Status   02/03/2024 7.7 6.0 - 8.4 g/dL Final     Albumin   Date Value Ref Range Status   02/03/2024 4.0 3.5 - 5.2 g/dL Final     Total Bilirubin   Date Value Ref Range Status   02/03/2024 0.3 0.1  "- 1.0 mg/dL Final     Comment:     For infants and newborns, interpretation of results should be based  on gestational age, weight and in agreement with clinical  observations.    Premature Infant recommended reference ranges:  Up to 24 hours.............<8.0 mg/dL  Up to 48 hours............<12.0 mg/dL  3-5 days..................<15.0 mg/dL  6-29 days.................<15.0 mg/dL       Alkaline Phosphatase   Date Value Ref Range Status   02/03/2024 87 55 - 135 U/L Final     AST   Date Value Ref Range Status   02/03/2024 16 10 - 40 U/L Final     ALT   Date Value Ref Range Status   02/03/2024 22 10 - 44 U/L Final     Anion Gap   Date Value Ref Range Status   02/03/2024 7 (L) 8 - 16 mmol/L Final     eGFR if    Date Value Ref Range Status   12/28/2021 >60.0 >60 mL/min/1.73 m^2 Final     eGFR if non    Date Value Ref Range Status   12/28/2021 >60.0 >60 mL/min/1.73 m^2 Final     Comment:     Calculation used to obtain the estimated glomerular filtration  rate (eGFR) is the CKD-EPI equation.        No results found for: "CEA"  Lab Results   Component Value Date    CRP 3.7 01/18/2024           Assessment:       1. Anal fistula    2. Crohn's disease of large intestine with other complication    3. Ileostomy in place        Plan:   Seton removed.    Encouraged smoking cessation.  Encouraged establishing care w PCP to assist w management of DM  He is not wiling to consider completion proctectomy at this point.    RTO prn    Robe Suarez MD, FACS, FASCRS  , Department of Colon & Rectal Surgery     This note was created using voice recognition software, and may contain some unrecognized transcriptional errors.     "

## 2024-02-22 ENCOUNTER — OFFICE VISIT (OUTPATIENT)
Dept: PODIATRY | Facility: CLINIC | Age: 39
End: 2024-02-22
Payer: COMMERCIAL

## 2024-02-22 VITALS
BODY MASS INDEX: 27.1 KG/M2 | DIASTOLIC BLOOD PRESSURE: 83 MMHG | WEIGHT: 178.81 LBS | SYSTOLIC BLOOD PRESSURE: 130 MMHG | HEIGHT: 68 IN | HEART RATE: 84 BPM

## 2024-02-22 DIAGNOSIS — L60.1 ONYCHOLYSIS: ICD-10-CM

## 2024-02-22 DIAGNOSIS — S97.101D: Primary | ICD-10-CM

## 2024-02-22 PROCEDURE — 99213 OFFICE O/P EST LOW 20 MIN: CPT | Mod: S$PBB,,, | Performed by: PODIATRIST

## 2024-02-22 PROCEDURE — 99999 PR PBB SHADOW E&M-EST. PATIENT-LVL III: CPT | Mod: PBBFAC,,, | Performed by: PODIATRIST

## 2024-02-22 NOTE — PROGRESS NOTES
"Subjective:     Patient ID: Amando Calix is a 38 y.o. male.    Chief Complaint: Wound Check (Right big toe) and Diabetes Mellitus    Amando is a 38 y.o. male who presents to the podiatry clinic  with complaint of  right foot pain. Onset of the symptoms was several days ago. Precipitating event:  man lift crush injury while at work . Current symptoms include:  pain in R great toe with bleeding . Aggravating factors: any weight bearing, walking, and direct touch . Symptoms have progressed to a point and plateaued. Patient has had no prior foot problems. Evaluation to date: plain films: abnormal fracture of tip of distal phalanx (small) . Treatment to date:  ER visit where he had nail removed, sutured lacerations, CAM boot and crutches. States he has been walking without crutches often. Has pain but taking pain medication. Needs refill . Patients rates pain 8/10 on pain scale.    02/14/2024: follow up for R great toe injury. Pain significantly better. Still takes pain meds mostly at night when pain is worst. No new concerns.     02/22/2024: follow up for R great toe injury. Pain nearly resolved. No new concenrs. Here for further recommendations.     Vitals:    02/22/24 0916   BP: 130/83   Pulse: 84   Weight: 81.1 kg (178 lb 12.7 oz)   Height: 5' 8" (1.727 m)   PainSc: 0-No pain         Review of Systems   Constitutional: Negative for chills and fever.   Cardiovascular:  Negative for chest pain, claudication and leg swelling.   Respiratory:  Negative for cough and shortness of breath.    Skin:  Positive for color change, dry skin and nail changes.   Musculoskeletal:         R great toe pain/injury   Gastrointestinal:  Negative for nausea and vomiting.   Neurological:  Negative for numbness and paresthesias.   Psychiatric/Behavioral:  Negative for altered mental status.         Objective:     Physical Exam  Vitals reviewed.   Constitutional:       Appearance: He is well-developed.   HENT:      Head: " Normocephalic.   Cardiovascular:      Pulses:           Dorsalis pedis pulses are 2+ on the right side and 2+ on the left side.        Posterior tibial pulses are 2+ on the right side and 2+ on the left side.      Comments: CRT < 3 sec to tips of toes.    Pulmonary:      Effort: No respiratory distress.   Musculoskeletal:      Comments: Mild (improved) pain with palpation R great toe.  Adequate strength to R great toe (improved)  in DF/PF.    Skin:     General: Skin is warm and dry.      Findings: No erythema.      Comments:  R great toe with lysed nail plate and raw nail bed, serosanguinous crusting/scabbing over entire nail bed. Sutures noted to distal medial and distal lateral hallux with no sign of dehiscence noted. Minimal (nearly resolved) edema and no further erythema to the R great toe. Toenails 2-4 elongated 1cm, thickened and curling down to plantar tips of toes.     L hallux plantar IPJ centrally there is a focal hyperkeratotic lesion with central deep core with skin lines divergent. No sign of deep tissue injury noted. stable     Neurological:      Mental Status: He is alert and oriented to person, place, and time.      Sensory: No sensory deficit.      Comments: Light touch, proprioception, and sharp/dull sensation are all intact bilaterally.    Psychiatric:         Behavior: Behavior normal.         Thought Content: Thought content normal.         Judgment: Judgment normal.           Assessment:      Encounter Diagnoses   Name Primary?    Crushed toe, right, subsequent encounter Yes    Onycholysis          Plan:     Amando was seen today for wound check and diabetes mellitus.    Diagnoses and all orders for this visit:    Crushed toe, right, subsequent encounter    Onycholysis          I counseled the patient on his conditions, their implications and medical management.    Cleansed R great toe with wound vashe. Dried well.pain drastically improved. Sutures removed in sterile fashion. Tolerated well.  Used 3mm curette to remove loose scab and non viable tissue from nail bed site. Tolerated well.     Betadine applied directly to wound bed after cleansing with alcohol. Applied double bandaid. Additional betadine given to patient for daily use with bandaids as directed.     I warned patient of signs and symptoms of infection including redness, drainage, purulence, odor, streaking, fever, chills, etc and I advised patient to call clinic during business hours or seek medical attention (ER or urgent care) after hours if these symptoms arise.     Continue Norco 5/325mg q6h for pain. Continue as needed/directed.     RTC 2-3 weeks, sooner PRN

## 2024-03-01 ENCOUNTER — PATIENT MESSAGE (OUTPATIENT)
Dept: PODIATRY | Facility: CLINIC | Age: 39
End: 2024-03-01
Payer: COMMERCIAL

## 2024-03-11 ENCOUNTER — TELEPHONE (OUTPATIENT)
Dept: PODIATRY | Facility: CLINIC | Age: 39
End: 2024-03-11
Payer: COMMERCIAL

## 2024-03-12 ENCOUNTER — TELEPHONE (OUTPATIENT)
Dept: PODIATRY | Facility: CLINIC | Age: 39
End: 2024-03-12
Payer: COMMERCIAL

## 2024-03-12 NOTE — TELEPHONE ENCOUNTER
Good afternoon, this is Lisa  I am calling on behalf of our Podiatry department. I am calling to confirm your appointment with Dr. Aguiar on 3/14 at  9am . Thank you and we look forward to seeing you on Thursday . Have a great day.

## 2024-03-14 ENCOUNTER — OFFICE VISIT (OUTPATIENT)
Dept: PODIATRY | Facility: CLINIC | Age: 39
End: 2024-03-14
Payer: COMMERCIAL

## 2024-03-14 VITALS
BODY MASS INDEX: 30.44 KG/M2 | DIASTOLIC BLOOD PRESSURE: 88 MMHG | WEIGHT: 200.81 LBS | HEART RATE: 92 BPM | SYSTOLIC BLOOD PRESSURE: 133 MMHG | HEIGHT: 68 IN

## 2024-03-14 DIAGNOSIS — S97.101D: Primary | ICD-10-CM

## 2024-03-14 DIAGNOSIS — L84 CORN OR CALLUS: ICD-10-CM

## 2024-03-14 PROCEDURE — 99999 PR PBB SHADOW E&M-EST. PATIENT-LVL III: CPT | Mod: PBBFAC,,, | Performed by: PODIATRIST

## 2024-03-14 PROCEDURE — 99213 OFFICE O/P EST LOW 20 MIN: CPT | Mod: PBBFAC | Performed by: PODIATRIST

## 2024-03-14 PROCEDURE — 99213 OFFICE O/P EST LOW 20 MIN: CPT | Mod: S$PBB,,, | Performed by: PODIATRIST

## 2024-03-15 NOTE — PROGRESS NOTES
"Subjective:     Patient ID: Amando Calix is a 38 y.o. male.    Chief Complaint: Wound Check (Right great toe )    Amando is a 38 y.o. male who presents to the podiatry clinic  with complaint of  right foot pain. Onset of the symptoms was several days ago. Precipitating event:  man lift crush injury while at work . Current symptoms include:  pain in R great toe with bleeding . Aggravating factors: any weight bearing, walking, and direct touch . Symptoms have progressed to a point and plateaued. Patient has had no prior foot problems. Evaluation to date: plain films: abnormal fracture of tip of distal phalanx (small) . Treatment to date:  ER visit where he had nail removed, sutured lacerations, CAM boot and crutches. States he has been walking without crutches often. Has pain but taking pain medication. Needs refill . Patients rates pain 8/10 on pain scale.    02/14/2024: follow up for R great toe injury. Pain significantly better. Still takes pain meds mostly at night when pain is worst. No new concerns.     02/22/2024: follow up for R great toe injury. Pain nearly resolved. No new concenrs. Here for further recommendations.     03/15/2024: follow up for R great toe crush injury. No further pain. Callus on big toe and L foot hurts but stable. States he was let go from work due to drug test issue (cannabis). Overall doing better. Here for follow up. Workers comp paperwork to be filled.       Vitals:    03/14/24 1006   BP: 133/88   Pulse: 92   Weight: 91.1 kg (200 lb 13.4 oz)   Height: 5' 8" (1.727 m)   PainSc: 0-No pain         Review of Systems   Constitutional: Negative for chills and fever.   Cardiovascular:  Negative for chest pain, claudication and leg swelling.   Respiratory:  Negative for cough and shortness of breath.    Skin:  Positive for color change, dry skin and nail changes.   Musculoskeletal:         R great toe pain/injury   Gastrointestinal:  Negative for nausea and vomiting.   Neurological: "  Negative for numbness and paresthesias.   Psychiatric/Behavioral:  Negative for altered mental status.         Objective:     Physical Exam  Vitals reviewed.   Constitutional:       Appearance: He is well-developed.   HENT:      Head: Normocephalic.   Cardiovascular:      Pulses:           Dorsalis pedis pulses are 2+ on the right side and 2+ on the left side.        Posterior tibial pulses are 2+ on the right side and 2+ on the left side.      Comments: CRT < 3 sec to tips of toes.    Pulmonary:      Effort: No respiratory distress.   Musculoskeletal:      Comments: No further pain with palpation R great toe.  Adequate strength to R great toe (improved)  in DF/PF.    Skin:     General: Skin is warm and dry.      Findings: No erythema.      Comments:  R great toe with lysed nail plate and healed nail bed, serosanguinous crusting/scabbing over some portions with some portions fully healed. No further edema and no further erythema to the R great toe. Hyperkeratotic lesion noted to plantar b/l hallux and L lateral midfoot with pain on palpation. Skin lines present to lesion/s. No signs of deep tissue injury.  Toenails 2-4 elongated, thickened and curling down to plantar tips of toes.    Neurological:      Mental Status: He is alert and oriented to person, place, and time.      Sensory: No sensory deficit.      Comments: Light touch, proprioception, and sharp/dull sensation are all intact bilaterally.    Psychiatric:         Behavior: Behavior normal.         Thought Content: Thought content normal.         Judgment: Judgment normal.           Assessment:      Encounter Diagnoses   Name Primary?    Crush injury of toe, right, subsequent encounter Yes    Corn or callus            Plan:     Amando was seen today for wound check.    Diagnoses and all orders for this visit:    Crush injury of toe, right, subsequent encounter    Corn or callus    I counseled the patient on his conditions, their implications and medical  management.    Cleansed R great toe with wound vashe. Fully healed and stable. No further pain or open wounds noted. D/c medication and dressings. D/c darco shoe. Leave open to air and use sock and regular shoe. Ok to use bandaid for additional layer of protection if desired.      I warned patient of signs and symptoms of infection including redness, drainage, purulence, odor, streaking, fever, chills, etc and I advised patient to call clinic during business hours or seek medical attention (ER or urgent care) after hours if these symptoms arise.     Workers comp paperwork filled and provided to patient for clearance to return to work.     Continue Norco 5/325mg q6h for pain. Continue as needed/directed.     RTC 1-2 months, sooner PRN

## 2024-04-25 ENCOUNTER — OFFICE VISIT (OUTPATIENT)
Dept: PODIATRY | Facility: CLINIC | Age: 39
End: 2024-04-25
Payer: COMMERCIAL

## 2024-04-25 VITALS
BODY MASS INDEX: 30.44 KG/M2 | DIASTOLIC BLOOD PRESSURE: 77 MMHG | HEART RATE: 102 BPM | WEIGHT: 200.81 LBS | SYSTOLIC BLOOD PRESSURE: 121 MMHG | HEIGHT: 68 IN

## 2024-04-25 DIAGNOSIS — L84 CORNS/CALLOSITIES: ICD-10-CM

## 2024-04-25 DIAGNOSIS — E11.49 TYPE II DIABETES MELLITUS WITH NEUROLOGICAL MANIFESTATIONS: Primary | ICD-10-CM

## 2024-04-25 DIAGNOSIS — T78.40XD HYPERSENSITIVITY, SUBSEQUENT ENCOUNTER: ICD-10-CM

## 2024-04-25 DIAGNOSIS — L60.3 ONYCHODYSTROPHY: ICD-10-CM

## 2024-04-25 PROCEDURE — 99214 OFFICE O/P EST MOD 30 MIN: CPT | Mod: S$GLB,,, | Performed by: PODIATRIST

## 2024-04-25 PROCEDURE — 99999 PR PBB SHADOW E&M-EST. PATIENT-LVL III: CPT | Mod: PBBFAC,,, | Performed by: PODIATRIST

## 2024-04-25 NOTE — PROGRESS NOTES
"Subjective:     Patient ID: Amando Calix is a 38 y.o. male.    Chief Complaint: Wound Check (Right great toe)    Amando is a 38 y.o. male who presents to the podiatry clinic  with complaint of  right foot pain. Onset of the symptoms was several days ago. Precipitating event:  man lift crush injury while at work . Current symptoms include:  pain in R great toe with bleeding . Aggravating factors: any weight bearing, walking, and direct touch . Symptoms have progressed to a point and plateaued. Patient has had no prior foot problems. Evaluation to date: plain films: abnormal fracture of tip of distal phalanx (small) . Treatment to date:  ER visit where he had nail removed, sutured lacerations, CAM boot and crutches. States he has been walking without crutches often. Has pain but taking pain medication. Needs refill . Patients rates pain 8/10 on pain scale.    02/14/2024: follow up for R great toe injury. Pain significantly better. Still takes pain meds mostly at night when pain is worst. No new concerns.     02/22/2024: follow up for R great toe injury. Pain nearly resolved. No new concenrs. Here for further recommendations.     03/15/2024: follow up for R great toe crush injury. No further pain. Callus on big toe and L foot hurts but stable. States he was let go from work due to drug test issue (cannabis). Overall doing better. Here for follow up. Workers comp paperwork to be filled.     04/25/2024: follow up for R great toe crush injury which was healed last visit. Here for recheck and to establish long term care as he is having difficulty trimming nails and multiple calluses on feet. Takes Insulin for DM. No new concerns.       Vitals:    04/25/24 1048   BP: 121/77   Pulse: 102   Weight: 91.1 kg (200 lb 13.4 oz)   Height: 5' 8" (1.727 m)   PainSc: 0-No pain         Review of Systems   Constitutional: Negative for chills and fever.   Cardiovascular:  Negative for chest pain, claudication and leg swelling. "   Respiratory:  Negative for cough and shortness of breath.    Skin:  Positive for color change, dry skin and nail changes.   Musculoskeletal:         R great toe pain/injury   Gastrointestinal:  Negative for nausea and vomiting.   Neurological:  Negative for numbness and paresthesias.   Psychiatric/Behavioral:  Negative for altered mental status.         Objective:     Physical Exam  Vitals reviewed.   Constitutional:       Appearance: He is well-developed.   HENT:      Head: Normocephalic.   Cardiovascular:      Pulses:           Dorsalis pedis pulses are 2+ on the right side and 2+ on the left side.        Posterior tibial pulses are 2+ on the right side and 2+ on the left side.      Comments: CRT < 3 sec to tips of toes.    Pulmonary:      Effort: No respiratory distress.   Musculoskeletal:      Comments: No further pain with palpation or ROM of R great toe.  Adequate strength to R great toe in DF/PF without pain.    Skin:     General: Skin is warm and dry.      Findings: No erythema.      Comments:  R great toe with lysed nail plate and healed nail bed, with mild regrowth of nail noted, dystrophic. B/l 5th toes dystrophic, elongate,d thickened with yellow/brown discoloration and subungual debris. Remaining toenails dystrophic and elongated. No further edema and no further erythema to the R great toe. Hyperkeratotic lesion noted to plantar b/l hallux and L lateral midfoot with pain on palpation. Skin lines present to lesion/s. No signs of deep tissue injury.      Neurological:      Mental Status: He is alert and oriented to person, place, and time.      Sensory: No sensory deficit.      Comments: Light touch, proprioception, and sharp/dull sensation are all intact bilaterally.    Psychiatric:         Behavior: Behavior normal.         Thought Content: Thought content normal.         Judgment: Judgment normal.           Assessment:      Encounter Diagnoses   Name Primary?    Type II diabetes mellitus with  neurological manifestations Yes    Hypersensitivity, subsequent encounter     Onychodystrophy     Corns/callosities              Plan:     Amando was seen today for wound check.    Diagnoses and all orders for this visit:    Type II diabetes mellitus with neurological manifestations    Hypersensitivity, subsequent encounter    Onychodystrophy    Corns/callosities      I counseled the patient on his conditions, their implications and medical management.     - Shoe inspection. Diabetic Foot Education. Patient reminded of the importance of good nutrition and blood sugar control to help prevent podiatric complications of diabetes. Patient instructed on proper foot hygeine. We discussed wearing proper shoe gear, daily foot inspections, never walking without protective shoe gear, caution putting sharp instruments to feet     - Discussed DM foot care:  Wear comfortable, proper fitting shoes. Wash feet daily. Dry well. After drying, apply moisturizer to feet (no lotion to webspaces). Inspect feet daily for skin breaks, blisters, swelling, or redness. Wear cotton socks (preferably white)  Change socks every day. Do NOT walk barefoot. Do NOT use heating pads or warm/hot water soaks     With patient's permission, nails were aggressively reduced and debrided 1-5 b/l, removing all offending nail and debris. Patient tolerated this well and no blood was drawn. Patient reports relief following the procedure.     The affected area was cleansed with an alcohol prep pad. Next, utilizing a 5mm curette, the hyperkeratotic tissues were trimmed from b/l hallux and L forefoot, down to appropriate level of skin. Care was taken to remove any nucleated core from the center of the lesion. No pinpoint bleeding was encountered. The patient tolerated relief following this procedure.     NAILS AND CALLUSES TRIMMED AS COURTESY TODAY.     Discussed regular and routine moisturizer to skin of both feet to help improve dry skin. Advised to apply twice  daily until resolution of symptoms. Avoid between toes.     RTC 3 months, sooner PRN

## 2024-07-22 ENCOUNTER — PATIENT MESSAGE (OUTPATIENT)
Dept: PODIATRY | Facility: CLINIC | Age: 39
End: 2024-07-22
Payer: COMMERCIAL

## 2024-07-25 ENCOUNTER — OFFICE VISIT (OUTPATIENT)
Dept: PODIATRY | Facility: CLINIC | Age: 39
End: 2024-07-25
Payer: COMMERCIAL

## 2024-07-25 VITALS
DIASTOLIC BLOOD PRESSURE: 81 MMHG | WEIGHT: 165.81 LBS | SYSTOLIC BLOOD PRESSURE: 118 MMHG | HEART RATE: 88 BPM | HEIGHT: 68 IN | BODY MASS INDEX: 25.13 KG/M2

## 2024-07-25 DIAGNOSIS — E11.49 TYPE II DIABETES MELLITUS WITH NEUROLOGICAL MANIFESTATIONS: Primary | ICD-10-CM

## 2024-07-25 DIAGNOSIS — L84 CORN OR CALLUS: ICD-10-CM

## 2024-07-25 NOTE — PROGRESS NOTES
"Subjective:     Patient ID: Amando Calix is a 38 y.o. male.    Chief Complaint: Diabetes Mellitus and Nail Care    Amando is a 38 y.o. male who presents to the podiatry clinic  with complaint of  right foot pain. Onset of the symptoms was several days ago. Precipitating event:  man lift crush injury while at work . Current symptoms include:  pain in R great toe with bleeding . Aggravating factors: any weight bearing, walking, and direct touch . Symptoms have progressed to a point and plateaued. Patient has had no prior foot problems. Evaluation to date: plain films: abnormal fracture of tip of distal phalanx (small) . Treatment to date:  ER visit where he had nail removed, sutured lacerations, CAM boot and crutches. States he has been walking without crutches often. Has pain but taking pain medication. Needs refill . Patients rates pain 8/10 on pain scale.    02/14/2024: follow up for R great toe injury. Pain significantly better. Still takes pain meds mostly at night when pain is worst. No new concerns.     02/22/2024: follow up for R great toe injury. Pain nearly resolved. No new concenrs. Here for further recommendations.     03/15/2024: follow up for R great toe crush injury. No further pain. Callus on big toe and L foot hurts but stable. States he was let go from work due to drug test issue (cannabis). Overall doing better. Here for follow up. Workers comp paperwork to be filled.     04/25/2024: follow up for R great toe crush injury which was healed last visit. Here for recheck and to establish long term care as he is having difficulty trimming nails and multiple calluses on feet. Takes Insulin for DM. No new concerns.     7/25/2024 pt new to me, pt states no further pain in right hallux. No new complaints.       Vitals:    07/25/24 0740   BP: 118/81   Pulse: 88   Weight: 75.2 kg (165 lb 12.6 oz)   Height: 5' 8" (1.727 m)   PainSc: 0-No pain         Review of Systems   Constitutional: Negative for " chills and fever.   Cardiovascular:  Negative for chest pain, claudication and leg swelling.   Respiratory:  Negative for cough and shortness of breath.    Skin:  Positive for color change, dry skin and nail changes.   Musculoskeletal:         R great toe pain/injury   Gastrointestinal:  Negative for nausea and vomiting.   Neurological:  Negative for numbness and paresthesias.   Psychiatric/Behavioral:  Negative for altered mental status.         Objective:     Physical Exam  Vitals reviewed.   Constitutional:       Appearance: He is well-developed.   HENT:      Head: Normocephalic.   Cardiovascular:      Pulses:           Dorsalis pedis pulses are 2+ on the right side and 2+ on the left side.        Posterior tibial pulses are 2+ on the right side and 2+ on the left side.      Comments: CRT < 3 sec to tips of toes.    Pulmonary:      Effort: No respiratory distress.   Musculoskeletal:      Comments: No further pain with palpation or ROM of R great toe.  Adequate strength to R great toe in DF/PF without pain.    Skin:     General: Skin is warm and dry.      Findings: No erythema.      Comments:  R great toe with lysed nail plate and healed nail bed, with mild regrowth of nail noted, dystrophic.   Hyperkeratotic lesion noted to plantar b/l hallux and L lateral midfoot with pain on palpation. Skin lines present to lesion/s. No signs of deep tissue injury.      Neurological:      Mental Status: He is alert and oriented to person, place, and time.      Sensory: No sensory deficit.      Comments: Light touch, proprioception, and sharp/dull sensation are all intact bilaterally.    Psychiatric:         Behavior: Behavior normal.         Thought Content: Thought content normal.         Judgment: Judgment normal.           Assessment:      Encounter Diagnoses   Name Primary?    Type II diabetes mellitus with neurological manifestations Yes    Corn or callus              Plan:     Amando was seen today for diabetes mellitus  and nail care.    Diagnoses and all orders for this visit:    Type II diabetes mellitus with neurological manifestations    Corn or callus      I counseled the patient on his conditions, their implications and medical management.     - Shoe inspection. Diabetic Foot Education. Patient reminded of the importance of good nutrition and blood sugar control to help prevent podiatric complications of diabetes. Patient instructed on proper foot hygeine. We discussed wearing proper shoe gear, daily foot inspections, never walking without protective shoe gear, caution putting sharp instruments to feet     - Discussed DM foot care:  Wear comfortable, proper fitting shoes. Wash feet daily. Dry well. After drying, apply moisturizer to feet (no lotion to webspaces). Inspect feet daily for skin breaks, blisters, swelling, or redness. Wear cotton socks (preferably white)  Change socks every day. Do NOT walk barefoot. Do NOT use heating pads or warm/hot water soaks         NAILS AND CALLUSES TRIMMED AS COURTESY TODAY.     Discussed regular and routine moisturizer to skin of both feet to help improve dry skin. Advised to apply twice daily until resolution of symptoms. Avoid between toes.     RTC in 1 year or sooner if any new pedal problems should arise.

## 2025-04-23 ENCOUNTER — HOSPITAL ENCOUNTER (EMERGENCY)
Facility: OTHER | Age: 40
Discharge: HOME | End: 2025-04-23
Attending: EMERGENCY MEDICINE

## 2025-04-23 VITALS
HEART RATE: 71 BPM | TEMPERATURE: 99 F | SYSTOLIC BLOOD PRESSURE: 124 MMHG | RESPIRATION RATE: 16 BRPM | OXYGEN SATURATION: 98 % | DIASTOLIC BLOOD PRESSURE: 76 MMHG

## 2025-04-23 DIAGNOSIS — R73.9 HYPERGLYCEMIA: Primary | ICD-10-CM

## 2025-04-23 DIAGNOSIS — R07.9 CHEST PAIN: ICD-10-CM

## 2025-04-23 LAB
ABSOLUTE EOSINOPHIL (OHS): 0.07 K/UL
ABSOLUTE MONOCYTE (OHS): 0.83 K/UL (ref 0.3–1)
ABSOLUTE NEUTROPHIL COUNT (OHS): 6.13 K/UL (ref 1.8–7.7)
ALBUMIN SERPL BCP-MCNC: 3.7 G/DL (ref 3.5–5.2)
ALP SERPL-CCNC: 74 UNIT/L (ref 40–150)
ALT SERPL W/O P-5'-P-CCNC: 11 UNIT/L (ref 10–44)
ANION GAP (OHS): 8 MMOL/L (ref 8–16)
AST SERPL-CCNC: 14 UNIT/L (ref 11–45)
BASOPHILS # BLD AUTO: 0.03 K/UL
BASOPHILS NFR BLD AUTO: 0.4 %
BILIRUB SERPL-MCNC: 0.3 MG/DL (ref 0.1–1)
BNP SERPL-MCNC: 11 PG/ML (ref 0–99)
BUN SERPL-MCNC: 16 MG/DL (ref 6–20)
CALCIUM SERPL-MCNC: 9.1 MG/DL (ref 8.7–10.5)
CHLORIDE SERPL-SCNC: 104 MMOL/L (ref 95–110)
CO2 SERPL-SCNC: 22 MMOL/L (ref 23–29)
CREAT SERPL-MCNC: 1.2 MG/DL (ref 0.5–1.4)
ERYTHROCYTE [DISTWIDTH] IN BLOOD BY AUTOMATED COUNT: 13.4 % (ref 11.5–14.5)
GFR SERPLBLD CREATININE-BSD FMLA CKD-EPI: >60 ML/MIN/1.73/M2
GLUCOSE SERPL-MCNC: 335 MG/DL (ref 70–110)
HCT VFR BLD AUTO: 41 % (ref 40–54)
HCV AB SERPL QL IA: NEGATIVE
HGB BLD-MCNC: 14.8 GM/DL (ref 14–18)
HIV 1+2 AB+HIV1 P24 AG SERPL QL IA: NEGATIVE
IMM GRANULOCYTES # BLD AUTO: 0.02 K/UL (ref 0–0.04)
IMM GRANULOCYTES NFR BLD AUTO: 0.2 % (ref 0–0.5)
LYMPHOCYTES # BLD AUTO: 1.44 K/UL (ref 1–4.8)
MCH RBC QN AUTO: 30 PG (ref 27–31)
MCHC RBC AUTO-ENTMCNC: 36.1 G/DL (ref 32–36)
MCV RBC AUTO: 83 FL (ref 82–98)
NUCLEATED RBC (/100WBC) (OHS): 0 /100 WBC
PLATELET # BLD AUTO: 186 K/UL (ref 150–450)
PMV BLD AUTO: 12.5 FL (ref 9.2–12.9)
POCT GLUCOSE: 306 MG/DL (ref 70–110)
POTASSIUM SERPL-SCNC: 3.7 MMOL/L (ref 3.5–5.1)
PROT SERPL-MCNC: 7.1 GM/DL (ref 6–8.4)
RBC # BLD AUTO: 4.93 M/UL (ref 4.6–6.2)
RELATIVE EOSINOPHIL (OHS): 0.8 %
RELATIVE LYMPHOCYTE (OHS): 16.9 % (ref 18–48)
RELATIVE MONOCYTE (OHS): 9.7 % (ref 4–15)
RELATIVE NEUTROPHIL (OHS): 72 % (ref 38–73)
SODIUM SERPL-SCNC: 134 MMOL/L (ref 136–145)
TROPONIN I SERPL DL<=0.01 NG/ML-MCNC: <0.006 NG/ML
WBC # BLD AUTO: 8.52 K/UL (ref 3.9–12.7)

## 2025-04-23 PROCEDURE — 96360 HYDRATION IV INFUSION INIT: CPT

## 2025-04-23 PROCEDURE — 82962 GLUCOSE BLOOD TEST: CPT

## 2025-04-23 PROCEDURE — 93005 ELECTROCARDIOGRAM TRACING: CPT

## 2025-04-23 PROCEDURE — 85025 COMPLETE CBC W/AUTO DIFF WBC: CPT

## 2025-04-23 PROCEDURE — 63600175 PHARM REV CODE 636 W HCPCS

## 2025-04-23 PROCEDURE — 99285 EMERGENCY DEPT VISIT HI MDM: CPT | Mod: 25

## 2025-04-23 PROCEDURE — 83880 ASSAY OF NATRIURETIC PEPTIDE: CPT

## 2025-04-23 PROCEDURE — 87389 HIV-1 AG W/HIV-1&-2 AB AG IA: CPT | Performed by: EMERGENCY MEDICINE

## 2025-04-23 PROCEDURE — 93010 ELECTROCARDIOGRAM REPORT: CPT | Mod: ,,, | Performed by: INTERNAL MEDICINE

## 2025-04-23 PROCEDURE — 84484 ASSAY OF TROPONIN QUANT: CPT

## 2025-04-23 PROCEDURE — 80053 COMPREHEN METABOLIC PANEL: CPT

## 2025-04-23 PROCEDURE — 86803 HEPATITIS C AB TEST: CPT | Performed by: EMERGENCY MEDICINE

## 2025-04-23 RX ADMIN — SODIUM CHLORIDE, POTASSIUM CHLORIDE, SODIUM LACTATE AND CALCIUM CHLORIDE 1000 ML: 600; 310; 30; 20 INJECTION, SOLUTION INTRAVENOUS at 08:04

## 2025-04-23 NOTE — ED TRIAGE NOTES
Intermittent left sided chest pain with SOB and fatigue for past 2 months. States CP is pressure, worse at night, and usually resolves on its own. Not associated with diet, exercise, or movement. Denies N/V, cough, fever. Presents awake, alert.

## 2025-04-23 NOTE — FIRST PROVIDER EVALUATION
Medical screening examination initiated.  I have conducted a focused provider triage encounter, findings are as follows:    Brief history of present illness:  chest tightness/heaviness, sob, left leg heaviness X a few months.     Vitals:    04/23/25 1723   Temp: 98 °F (36.7 °C)   TempSrc: Oral       Pertinent physical exam:  lungs cta bilaterally, normal heart sounds. Normal gait.    Brief workup plan:  cardiac workup    Preliminary workup initiated; this workup will be continued and followed by the physician or advanced practice provider that is assigned to the patient when roomed.

## 2025-04-23 NOTE — Clinical Note
"Amando"Herminia Calix was seen and treated in our emergency department on 4/23/2025.  He may return to work on 04/24/2025.       If you have any questions or concerns, please don't hesitate to call.       LPN    "

## 2025-04-24 LAB — POCT GLUCOSE: 114 MG/DL (ref 70–110)

## 2025-04-24 NOTE — ED PROVIDER NOTES
Encounter Date: 4/23/2025       History     Chief Complaint   Patient presents with    Chest Pain     Reports chest tightness, SOB, L arm pain x2 months. Hx of DM      39-year-old male with history of Crohn's disease and type 2 diabetes presents for intermittent left-sided chest pain and right-sided leg heaviness x4 months.  Patient denies any triggers for these symptoms.  Patient reports associated left-sided arm pain and occasional shortness of breath.  Denies any worsening of the symptoms with exertion.  Denies any falls or injuries.  Denies any cardiac history.  Patient reports occasional numbness and tingling in his extremities.  Denies any issues with ambulation.  Patient states he does not regularly monitor his blood sugar and does not follow up with a PCP for these issues.  Denies any fever or chills.  Denies any URI symptoms.  This is the extent of the patient's complaint at this time.    The history is provided by the patient.     Review of patient's allergies indicates:   Allergen Reactions    Ibuprofen Other (See Comments)     Can't take d/t stomach ulcers     Past Medical History:   Diagnosis Date    Crohn's disease     Type 2 diabetes mellitus without complications      Past Surgical History:   Procedure Laterality Date    ABSCESS DRAINAGE      COLONOSCOPY      COLOSTOMY      EXAMINATION UNDER ANESTHESIA N/A 11/27/2020    Procedure: Exam under anesthesia and seton placement;  Surgeon: Robe Suarez MD;  Location: [facility] OR [address] [place] [address];  Service: Colon and Rectal;  Laterality: N/A;    FLEXIBLE SIGMOIDOSCOPY N/A 7/10/2020    Procedure: SIGMOIDOSCOPY, FLEXIBLE;  Surgeon: Robe Suarez MD;  [facility];  Service: Colon and Rectal;  Laterality: N/A;    HERNIA REPAIR      ILEOSTOMY      INCISION AND DRAINAGE OF ABSCESS  12/28/2021    Procedure: INCISION AND DRAINAGE, Scrotal ABSCESS;  Surgeon: Rober Cardenas MD;  Location: [facility] OR [address] [place] [address];  Service: Colon and Rectal;;    INSERTION OF SETON  STITCH N/A 7/10/2020    Procedure: PLACEMENT-SETON DRAIN;  Surgeon: Robe Suarez MD;  Location: [facility] OR 2ND FLR;  Service: Colon and Rectal;  Laterality: N/A;    INSERTION OF SETON STITCH N/A 12/28/2021    Procedure: PLACEMENT, SETON STITCH X4;  Surgeon: Rober Cardenas MD;  Location: [facility] OR 2ND FLR;  Service: Colon and Rectal;  Laterality: N/A;    SIGMOIDECTOMY       Family History   Problem Relation Name Age of Onset    Hyperlipidemia Mother      Diabetes Mother      Diabetes Father      Prostate cancer Maternal Uncle      Breast cancer Maternal Grandmother      Prostate cancer Maternal Grandfather      Crohn's disease Neg Hx      Celiac disease Neg Hx      Cirrhosis Neg Hx      Colon cancer Neg Hx      Esophageal cancer Neg Hx      Irritable bowel syndrome Neg Hx      Liver cancer Neg Hx      Rectal cancer Neg Hx      Stomach cancer Neg Hx      Ulcerative colitis Neg Hx       Social History[1]  Review of Systems  As per hpi  Physical Exam     Initial Vitals   BP Pulse Resp Temp SpO2   04/23/25 1934 04/23/25 1934 04/23/25 1934 04/23/25 1723 04/23/25 1934   131/85 80 14 98 °F (36.7 °C) 99 %      MAP       --                Physical Exam    Nursing note and vitals reviewed.  Constitutional: He appears well-developed and well-nourished. He is cooperative.  Non-toxic appearance. He does not appear ill. No distress.   HENT:   Head: Normocephalic and atraumatic.   Eyes: Conjunctivae and lids are normal.   Neck: Trachea normal. Neck supple. No stridor present.   Cardiovascular:  Normal rate and regular rhythm.           Pulmonary/Chest: Effort normal. No tachypnea. No respiratory distress. He exhibits no tenderness.   Abdominal: Abdomen is soft. There is no abdominal tenderness.   Musculoskeletal:      Cervical back: Neck supple.     Neurological: He is alert and oriented to person, place, and time. GCS eye subscore is 4. GCS verbal subscore is 5. GCS motor subscore is 6.   Skin: Skin is warm, dry and intact. No  rash noted.   Psychiatric: He has a normal mood and affect. His speech is normal and behavior is normal. Thought content normal.         ED Course   Procedures  Labs Reviewed   COMPREHENSIVE METABOLIC PANEL - Abnormal       Result Value    Sodium 134 (*)     Potassium 3.7      Chloride 104      CO2 22 (*)     Glucose 335 (*)     BUN 16      Creatinine 1.2      Calcium 9.1      Protein Total 7.1      Albumin 3.7      Bilirubin Total 0.3      ALP 74      AST 14      ALT 11      Anion Gap 8      eGFR >60     CBC WITH DIFFERENTIAL - Abnormal    WBC 8.52      RBC 4.93      HGB 14.8      HCT 41.0      MCV 83      MCH 30.0      MCHC 36.1 (*)     RDW 13.4      Platelet Count 186      MPV 12.5      Nucleated RBC 0      Neut % 72.0      Lymph % 16.9 (*)     Mono % 9.7      Eos % 0.8      Basophil % 0.4      Imm Grans % 0.2      Neut # 6.13      Lymph # 1.44      Mono # 0.83      Eos # 0.07      Baso # 0.03      Imm Grans # 0.02     POCT GLUCOSE - Abnormal    POCT Glucose 306 (*)    TROPONIN I - Normal    Troponin-I <0.006     B-TYPE NATRIURETIC PEPTIDE - Normal    BNP 11     HIV 1 / 2 ANTIBODY - Normal    HIV 1/2 Ag/Ab Negative     HEPATITIS C ANTIBODY - Normal    Hep C Ab Interp Negative     CBC W/ AUTO DIFFERENTIAL    Narrative:     The following orders were created for panel order CBC auto differential.  Procedure                               Abnormality         Status                     ---------                               -----------         ------                     CBC with Differential[[id number]]       Abnormal            Final result                 Please view results for these tests on the individual orders.   POCT GLUCOSE MONITORING CONTINUOUS   POCT GLUCOSE MONITORING CONTINUOUS          Imaging Results              X-Ray Chest AP Portable (Final result)  Result time 04/23/25 19:23:31      Final result by Carlyle Dong MD (04/23/25 19:23:31)                   Impression:      1. No acute  cardiopulmonary process.      Electronically signed by: Carlyle Dong MD  Date:    04/23/2025  Time:    19:23               Narrative:    EXAMINATION:  XR CHEST AP PORTABLE    CLINICAL HISTORY:  Chest Pain;    TECHNIQUE:  Single frontal view of the chest was performed.    COMPARISON:  01/09/2024    FINDINGS:  The cardiomediastinal silhouette is not enlarged.  There is no pleural effusion.  The trachea is midline.  The lungs are symmetrically expanded bilaterally without evidence of acute parenchymal process. No large focal consolidation seen.  There is no pneumothorax.  The osseous structures are remarkable for levo scoliotic curvature of the spine..                                       Medications   lactated ringers bolus 1,000 mL (0 mLs Intravenous Stopped 4/23/25 7443)     Medical Decision Making  This is an evaluation of an afebrile 39-year-old male for intermittent chest pain and right lower leg heaviness x4 months. VSS.  Normal neuro exam.  Patient ambulating normally.  EKG and chest x-ray unremarkable.  Cardiac workup unremarkable today.  No acidosis on CMP.  .  Patient has been resting comfortably in no distress.  We will give 1 L lactated Ringer's bolus and recheck glucose.  Discussed with patient importance of regular diabetes management and follow-up.  Advised patient to follow up with PCP regarding this issue and the issue of his recurrent chest pain.  Patient has verbalized understanding and agreement to this plan.  All questions answered at this time and patient is stable for discharge.    Differential Diagnosis includes, but is not limited to:  ACS/MI, PE, aortic dissection, pneumothorax, cardiac tamponade, pericarditis/myocarditis, pneumonia, infection/abscess, lung mass, trauma/fracture, costochondritis/pleurisy, MSK pain/contusion, GERD, biliary disease, pancreatitis, anemia, dka          Problems Addressed:  Chest pain: acute illness or injury  Hyperglycemia: acute illness or  injury    Amount and/or Complexity of Data Reviewed  Labs: ordered.  Radiology: ordered. Decision-making details documented in ED Course.  ECG/medicine tests: ordered and independent interpretation performed. Decision-making details documented in ED Course.               ED Course as of 04/23/25 2230 Wed Apr 23, 2025 1940 X-Ray Chest AP Portable  No acute cardiopulmonary process. [RM]   2003 EKG: NSR, normal axis, no stemi, 86bpm [RM]      ED Course User Index  [RM] Yanet Lilly PA-C                           Clinical Impression:  Final diagnoses:  [R07.9] Chest pain  [R73.9] Hyperglycemia (Primary)          ED Disposition Condition    Discharge Stable          ED Prescriptions    None       Follow-up Information    None              [1]   Social History  Tobacco Use    Smoking status: Every Day     Current packs/day: 1.00     Average packs/day: 1 pack/day for 13.5 years (13.5 ttl pk-yrs)     Types: Cigarettes     Start date: 11/1/2011    Smokeless tobacco: Never   Substance Use Topics    Alcohol use: Yes     Comment: socially-weekly    Drug use: No        Yanet Lilly PA-C  04/23/25 2230

## 2025-04-24 NOTE — DISCHARGE INSTRUCTIONS
I have placed a referral to diabetes education and family [facility] for you to follow up.  Please expect a call from them within 1-3 business days.  Please ensure your phone numbers updated in our system.

## 2025-04-28 ENCOUNTER — TELEPHONE (OUTPATIENT)
Dept: GASTROENTEROLOGY | Facility: CLINIC | Age: 40
End: 2025-04-28
Payer: COMMERCIAL

## 2025-04-28 LAB
OHS QRS DURATION: 102 MS
OHS QTC CALCULATION: 411 MS

## 2025-04-28 NOTE — TELEPHONE ENCOUNTER
LVM to return call if he's still interested in following Dr. Ruiz for his Crohn's disease.  Overdue for OV  Has not been on any medication for Crohn's for quite sometime  Dr. Ruiz had recommended he f/u w [facility] for completion proctectomy     PM sent. Will await pt response.

## 2025-08-28 ENCOUNTER — OFFICE VISIT (OUTPATIENT)
Dept: URGENT CARE | Facility: CLINIC | Age: 40
End: 2025-08-28
Payer: COMMERCIAL

## 2025-08-28 VITALS
WEIGHT: 165 LBS | RESPIRATION RATE: 20 BRPM | OXYGEN SATURATION: 95 % | TEMPERATURE: 98 F | SYSTOLIC BLOOD PRESSURE: 121 MMHG | BODY MASS INDEX: 25.01 KG/M2 | HEART RATE: 107 BPM | DIASTOLIC BLOOD PRESSURE: 82 MMHG | HEIGHT: 68 IN

## 2025-08-28 DIAGNOSIS — Z11.59 ENCOUNTER FOR SCREENING FOR VIRAL DISEASE: ICD-10-CM

## 2025-08-28 DIAGNOSIS — B34.9 ACUTE VIRAL SYNDROME: Primary | ICD-10-CM

## 2025-08-28 DIAGNOSIS — R05.9 COUGH, UNSPECIFIED TYPE: ICD-10-CM

## 2025-08-28 LAB
CTP QC/QA: YES
SARS-COV+SARS-COV-2 AG RESP QL IA.RAPID: NEGATIVE

## 2025-08-28 RX ORDER — BENZONATATE 200 MG/1
200 CAPSULE ORAL 3 TIMES DAILY PRN
Qty: 30 CAPSULE | Refills: 0 | Status: SHIPPED | OUTPATIENT
Start: 2025-08-28 | End: 2025-09-07

## 2025-08-28 RX ORDER — AZELASTINE 1 MG/ML
1 SPRAY, METERED NASAL 2 TIMES DAILY PRN
Qty: 30 ML | Refills: 0 | Status: SHIPPED | OUTPATIENT
Start: 2025-08-28

## (undated) DEVICE — SEE MEDLINE ITEM 146417

## (undated) DEVICE — BOVIE SUCTION

## (undated) DEVICE — SEE MEDLINE ITEM 152622

## (undated) DEVICE — TAPE SURG DURAPORE 2 X10YD

## (undated) DEVICE — GAUZE SPONGE 4X4 12PLY

## (undated) DEVICE — LUBRICANT SURGILUBE 2 OZ

## (undated) DEVICE — SEE MEDLINE ITEM 154981

## (undated) DEVICE — SEE MEDLINE ITEM 157117

## (undated) DEVICE — SEE MEDLINE ITEM 157148

## (undated) DEVICE — SYR ONLY LUER LOCK 20CC

## (undated) DEVICE — HEMOSTAT SURGICEL 4X8IN

## (undated) DEVICE — PANTIES FEMININE NAPKIN LG/XLG

## (undated) DEVICE — NDL 22GA X1 1/2 REG BEVEL

## (undated) DEVICE — BRIEF STRTCH MESH XX-LG

## (undated) DEVICE — TRAY MINOR GEN SURG

## (undated) DEVICE — ELECTRODE REM PLYHSV RETURN 9

## (undated) DEVICE — SEE MEDLINE ITEM 157181

## (undated) DEVICE — LEGGINGS 48X31 INCH

## (undated) DEVICE — TAPE SILK 3IN

## (undated) DEVICE — BRIEF MESH LARGE

## (undated) DEVICE — TIP YANKAUERS BULB NO VENT

## (undated) DEVICE — BOWL STERILE LARGE 32OZ